# Patient Record
Sex: MALE | Race: WHITE | NOT HISPANIC OR LATINO | URBAN - METROPOLITAN AREA
[De-identification: names, ages, dates, MRNs, and addresses within clinical notes are randomized per-mention and may not be internally consistent; named-entity substitution may affect disease eponyms.]

---

## 2017-10-02 ENCOUNTER — EMERGENCY (EMERGENCY)
Facility: HOSPITAL | Age: 77
LOS: 1 days | Discharge: HOME | End: 2017-10-02
Attending: EMERGENCY MEDICINE
Payer: COMMERCIAL

## 2017-10-02 VITALS
WEIGHT: 210.1 LBS | SYSTOLIC BLOOD PRESSURE: 167 MMHG | RESPIRATION RATE: 16 BRPM | TEMPERATURE: 98 F | HEIGHT: 70 IN | DIASTOLIC BLOOD PRESSURE: 88 MMHG | HEART RATE: 86 BPM | OXYGEN SATURATION: 99 %

## 2017-10-02 DIAGNOSIS — S01.01XA LACERATION WITHOUT FOREIGN BODY OF SCALP, INITIAL ENCOUNTER: ICD-10-CM
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
## 2017-10-02 PROBLEM — Z00.00 ENCOUNTER FOR PREVENTIVE HEALTH EXAMINATION: Status: ACTIVE | Noted: 2017-10-02

## 2017-10-02 PROCEDURE — 90715 TDAP VACCINE 7 YRS/> IM: CPT

## 2017-10-02 PROCEDURE — 12002 RPR S/N/AX/GEN/TRNK2.6-7.5CM: CPT

## 2017-10-02 PROCEDURE — 99283 EMERGENCY DEPT VISIT LOW MDM: CPT

## 2017-10-02 PROCEDURE — 99283 EMERGENCY DEPT VISIT LOW MDM: CPT | Mod: 25

## 2017-10-02 PROCEDURE — 90471 IMMUNIZATION ADMIN: CPT

## 2017-10-02 RX ORDER — TETANUS TOXOID, REDUCED DIPHTHERIA TOXOID AND ACELLULAR PERTUSSIS VACCINE, ADSORBED 5; 2.5; 8; 8; 2.5 [IU]/.5ML; [IU]/.5ML; UG/.5ML; UG/.5ML; UG/.5ML
0.5 SUSPENSION INTRAMUSCULAR ONCE
Qty: 0 | Refills: 0 | Status: COMPLETED | OUTPATIENT
Start: 2017-10-02 | End: 2017-10-02

## 2017-10-02 RX ADMIN — TETANUS TOXOID, REDUCED DIPHTHERIA TOXOID AND ACELLULAR PERTUSSIS VACCINE, ADSORBED 0.5 MILLILITER(S): 5; 2.5; 8; 8; 2.5 SUSPENSION INTRAMUSCULAR at 21:58

## 2017-10-02 NOTE — ED PROVIDER NOTE - OBJECTIVE STATEMENT
76 y/o male, no significant pmhx presents with laceration to posterior scalp s/p metal object fell from shelf in garage today. Denies LOC. Tetanus not up to date.

## 2017-10-02 NOTE — ED PROVIDER NOTE - MEDICAL DECISION MAKING DETAILS
scalp laceration, no LOC, will give adacel, staple and return instructions for removal x10 days; discussed s/s infection.

## 2017-10-02 NOTE — ED ADULT NURSE NOTE - OBJECTIVE STATEMENT
as per patient while working on garage some metal stuff fell on his head and sustained laceration to back of the head, bleeding controlled on arrival

## 2017-10-02 NOTE — ED PROVIDER NOTE - ATTENDING CONTRIBUTION TO CARE
I had a face-to-face interaction with this patient. I agree with NP documentation and plan.  Patient reports a light metal object fell on his head in his garage and caused a laceration. No LOC, headache, n/v, dizziness, focal weakness, paresthesias, blood thinners.  Gen: Well-developed, well-nourished, NAD, VS as noted by nursing. HEENT: normocephalic, 4cm linear lac to back of head to subcutaneous layer, mmm   Chest: RRR, nl S1 and S2, no m/r/g. Resp: CTAB, no w/r/r  Abd: nl BS, soft, nt/nd. Ext: Warm, dry  Neuro: CN II-XII intact, normal and equal strength, sensation, and reflexes bilaterally, normal gait  Psych: AAOx3  MDM: Patient with laceration to head. I recommended CT head. Patient refused. Patient d/c'd in company of family. Instructed to return immediately if he develops ha, vomiting, confusion, dizziness, or any new or troubling symptoms.

## 2017-10-10 ENCOUNTER — EMERGENCY (EMERGENCY)
Facility: HOSPITAL | Age: 77
LOS: 1 days | Discharge: HOME | End: 2017-10-10
Attending: EMERGENCY MEDICINE
Payer: COMMERCIAL

## 2017-10-10 VITALS
DIASTOLIC BLOOD PRESSURE: 69 MMHG | SYSTOLIC BLOOD PRESSURE: 153 MMHG | TEMPERATURE: 98 F | HEIGHT: 69 IN | RESPIRATION RATE: 16 BRPM | WEIGHT: 220.02 LBS | HEART RATE: 81 BPM | OXYGEN SATURATION: 98 %

## 2017-10-10 DIAGNOSIS — Z48.02 ENCOUNTER FOR REMOVAL OF SUTURES: ICD-10-CM

## 2017-10-10 PROCEDURE — G0463: CPT

## 2017-10-10 NOTE — ED PROVIDER NOTE - PHYSICAL EXAMINATION
well approximated scalp laceration x7 staples, minimal surround erythema, no streaking, no heat, no drainage

## 2017-10-10 NOTE — ED ADULT NURSE NOTE - OBJECTIVE STATEMENT
CAME IN FOR STAPLES REMOVAL FROM HEAD , STAPLES REMOVED BY JE NP , AREA HEALED , NO REDNESS , NO DISCHARGES. DENIES PAIN

## 2017-10-10 NOTE — ED PROVIDER NOTE - MEDICAL DECISION MAKING DETAILS
removed x7 staples, healing well, no signs infection, discussed removing x2 days earlier, to be jerman while washing hair and not to pick at scab. Will dc home to PMD.

## 2017-10-10 NOTE — ED PROVIDER NOTE - OBJECTIVE STATEMENT
76 y/o male, no significant pmhx returns x8 days for staple removal s/p metal object falling on head. Denies fever/chills, headache, or any other concerns.

## 2017-10-10 NOTE — ED PROVIDER NOTE - ATTENDING CONTRIBUTION TO CARE
I had a face-to-face interaction with this patient. I agree with NP documentation and plan. Patient here for staple removal. Staples removed by NP. Good healing in progress. f/u with PMD in 1-2 days.

## 2022-01-21 ENCOUNTER — INPATIENT (INPATIENT)
Facility: HOSPITAL | Age: 82
LOS: 4 days | Discharge: HOME | End: 2022-01-26
Attending: HOSPITALIST | Admitting: HOSPITALIST
Payer: MEDICARE

## 2022-01-21 VITALS
RESPIRATION RATE: 20 BRPM | SYSTOLIC BLOOD PRESSURE: 170 MMHG | DIASTOLIC BLOOD PRESSURE: 81 MMHG | HEART RATE: 86 BPM | HEIGHT: 69 IN | OXYGEN SATURATION: 94 % | TEMPERATURE: 98 F

## 2022-01-21 DIAGNOSIS — E78.5 HYPERLIPIDEMIA, UNSPECIFIED: ICD-10-CM

## 2022-01-21 DIAGNOSIS — R00.2 PALPITATIONS: ICD-10-CM

## 2022-01-21 DIAGNOSIS — I10 ESSENTIAL (PRIMARY) HYPERTENSION: ICD-10-CM

## 2022-01-21 DIAGNOSIS — R06.02 SHORTNESS OF BREATH: ICD-10-CM

## 2022-01-21 DIAGNOSIS — Z90.49 ACQUIRED ABSENCE OF OTHER SPECIFIED PARTS OF DIGESTIVE TRACT: Chronic | ICD-10-CM

## 2022-01-21 DIAGNOSIS — R07.9 CHEST PAIN, UNSPECIFIED: ICD-10-CM

## 2022-01-21 DIAGNOSIS — N40.0 BENIGN PROSTATIC HYPERPLASIA WITHOUT LOWER URINARY TRACT SYMPTOMS: ICD-10-CM

## 2022-01-21 DIAGNOSIS — R05.8 OTHER SPECIFIED COUGH: ICD-10-CM

## 2022-01-21 DIAGNOSIS — Z29.9 ENCOUNTER FOR PROPHYLACTIC MEASURES, UNSPECIFIED: ICD-10-CM

## 2022-01-21 DIAGNOSIS — J18.1 LOBAR PNEUMONIA, UNSPECIFIED ORGANISM: ICD-10-CM

## 2022-01-21 DIAGNOSIS — Z98.890 OTHER SPECIFIED POSTPROCEDURAL STATES: Chronic | ICD-10-CM

## 2022-01-21 LAB
ALBUMIN SERPL ELPH-MCNC: 3.8 G/DL — SIGNIFICANT CHANGE UP (ref 3.3–5)
ALP SERPL-CCNC: 126 U/L — HIGH (ref 40–120)
ALT FLD-CCNC: 46 U/L — HIGH (ref 4–41)
ANION GAP SERPL CALC-SCNC: 12 MMOL/L — SIGNIFICANT CHANGE UP (ref 7–14)
APTT BLD: 27.6 SEC — SIGNIFICANT CHANGE UP (ref 27–36.3)
AST SERPL-CCNC: 30 U/L — SIGNIFICANT CHANGE UP (ref 4–40)
BASOPHILS # BLD AUTO: 0.05 K/UL — SIGNIFICANT CHANGE UP (ref 0–0.2)
BASOPHILS NFR BLD AUTO: 0.5 % — SIGNIFICANT CHANGE UP (ref 0–2)
BILIRUB SERPL-MCNC: <0.2 MG/DL — SIGNIFICANT CHANGE UP (ref 0.2–1.2)
BLOOD GAS VENOUS COMPREHENSIVE RESULT: SIGNIFICANT CHANGE UP
BUN SERPL-MCNC: 23 MG/DL — SIGNIFICANT CHANGE UP (ref 7–23)
CALCIUM SERPL-MCNC: 9.2 MG/DL — SIGNIFICANT CHANGE UP (ref 8.4–10.5)
CHLORIDE SERPL-SCNC: 105 MMOL/L — SIGNIFICANT CHANGE UP (ref 98–107)
CO2 SERPL-SCNC: 21 MMOL/L — LOW (ref 22–31)
CREAT SERPL-MCNC: 1.26 MG/DL — SIGNIFICANT CHANGE UP (ref 0.5–1.3)
EOSINOPHIL # BLD AUTO: 0.28 K/UL — SIGNIFICANT CHANGE UP (ref 0–0.5)
EOSINOPHIL NFR BLD AUTO: 2.9 % — SIGNIFICANT CHANGE UP (ref 0–6)
FLUAV AG NPH QL: SIGNIFICANT CHANGE UP
FLUBV AG NPH QL: SIGNIFICANT CHANGE UP
GLUCOSE SERPL-MCNC: 87 MG/DL — SIGNIFICANT CHANGE UP (ref 70–99)
HCT VFR BLD CALC: 37.8 % — LOW (ref 39–50)
HGB BLD-MCNC: 12.3 G/DL — LOW (ref 13–17)
IANC: 5.4 K/UL — SIGNIFICANT CHANGE UP (ref 1.5–8.5)
IMM GRANULOCYTES NFR BLD AUTO: 1.1 % — SIGNIFICANT CHANGE UP (ref 0–1.5)
INR BLD: 1.18 RATIO — HIGH (ref 0.88–1.16)
LYMPHOCYTES # BLD AUTO: 3.28 K/UL — SIGNIFICANT CHANGE UP (ref 1–3.3)
LYMPHOCYTES # BLD AUTO: 33.4 % — SIGNIFICANT CHANGE UP (ref 13–44)
MAGNESIUM SERPL-MCNC: 2.2 MG/DL — SIGNIFICANT CHANGE UP (ref 1.6–2.6)
MCHC RBC-ENTMCNC: 27.8 PG — SIGNIFICANT CHANGE UP (ref 27–34)
MCHC RBC-ENTMCNC: 32.5 GM/DL — SIGNIFICANT CHANGE UP (ref 32–36)
MCV RBC AUTO: 85.5 FL — SIGNIFICANT CHANGE UP (ref 80–100)
MONOCYTES # BLD AUTO: 0.7 K/UL — SIGNIFICANT CHANGE UP (ref 0–0.9)
MONOCYTES NFR BLD AUTO: 7.1 % — SIGNIFICANT CHANGE UP (ref 2–14)
NEUTROPHILS # BLD AUTO: 5.4 K/UL — SIGNIFICANT CHANGE UP (ref 1.8–7.4)
NEUTROPHILS NFR BLD AUTO: 55 % — SIGNIFICANT CHANGE UP (ref 43–77)
NRBC # BLD: 0 /100 WBCS — SIGNIFICANT CHANGE UP
NRBC # FLD: 0 K/UL — SIGNIFICANT CHANGE UP
NT-PROBNP SERPL-SCNC: 183 PG/ML — SIGNIFICANT CHANGE UP
PHOSPHATE SERPL-MCNC: 3.9 MG/DL — SIGNIFICANT CHANGE UP (ref 2.5–4.5)
PLATELET # BLD AUTO: 274 K/UL — SIGNIFICANT CHANGE UP (ref 150–400)
POTASSIUM SERPL-MCNC: 4.9 MMOL/L — SIGNIFICANT CHANGE UP (ref 3.5–5.3)
POTASSIUM SERPL-SCNC: 4.9 MMOL/L — SIGNIFICANT CHANGE UP (ref 3.5–5.3)
PROT SERPL-MCNC: 8 G/DL — SIGNIFICANT CHANGE UP (ref 6–8.3)
PROTHROM AB SERPL-ACNC: 13.4 SEC — SIGNIFICANT CHANGE UP (ref 10.6–13.6)
RBC # BLD: 4.42 M/UL — SIGNIFICANT CHANGE UP (ref 4.2–5.8)
RBC # FLD: 13.7 % — SIGNIFICANT CHANGE UP (ref 10.3–14.5)
RSV RNA NPH QL NAA+NON-PROBE: SIGNIFICANT CHANGE UP
SARS-COV-2 RNA SPEC QL NAA+PROBE: SIGNIFICANT CHANGE UP
SODIUM SERPL-SCNC: 138 MMOL/L — SIGNIFICANT CHANGE UP (ref 135–145)
TROPONIN T, HIGH SENSITIVITY RESULT: 18 NG/L — SIGNIFICANT CHANGE UP
TROPONIN T, HIGH SENSITIVITY RESULT: 18 NG/L — SIGNIFICANT CHANGE UP
WBC # BLD: 9.82 K/UL — SIGNIFICANT CHANGE UP (ref 3.8–10.5)
WBC # FLD AUTO: 9.82 K/UL — SIGNIFICANT CHANGE UP (ref 3.8–10.5)

## 2022-01-21 PROCEDURE — 71046 X-RAY EXAM CHEST 2 VIEWS: CPT | Mod: 26

## 2022-01-21 PROCEDURE — 99223 1ST HOSP IP/OBS HIGH 75: CPT

## 2022-01-21 PROCEDURE — 99285 EMERGENCY DEPT VISIT HI MDM: CPT | Mod: GC

## 2022-01-21 PROCEDURE — 71275 CT ANGIOGRAPHY CHEST: CPT | Mod: 26,MA

## 2022-01-21 RX ORDER — CEFTRIAXONE 500 MG/1
1000 INJECTION, POWDER, FOR SOLUTION INTRAMUSCULAR; INTRAVENOUS ONCE
Refills: 0 | Status: COMPLETED | OUTPATIENT
Start: 2022-01-21 | End: 2022-01-21

## 2022-01-21 RX ORDER — ACETAMINOPHEN 500 MG
650 TABLET ORAL EVERY 6 HOURS
Refills: 0 | Status: DISCONTINUED | OUTPATIENT
Start: 2022-01-21 | End: 2022-01-26

## 2022-01-21 RX ORDER — ENOXAPARIN SODIUM 100 MG/ML
40 INJECTION SUBCUTANEOUS DAILY
Refills: 0 | Status: DISCONTINUED | OUTPATIENT
Start: 2022-01-21 | End: 2022-01-24

## 2022-01-21 RX ORDER — AZITHROMYCIN 500 MG/1
500 TABLET, FILM COATED ORAL ONCE
Refills: 0 | Status: COMPLETED | OUTPATIENT
Start: 2022-01-21 | End: 2022-01-21

## 2022-01-21 RX ORDER — FINASTERIDE 5 MG/1
5 TABLET, FILM COATED ORAL DAILY
Refills: 0 | Status: DISCONTINUED | OUTPATIENT
Start: 2022-01-21 | End: 2022-01-26

## 2022-01-21 RX ORDER — ATORVASTATIN CALCIUM 80 MG/1
80 TABLET, FILM COATED ORAL AT BEDTIME
Refills: 0 | Status: DISCONTINUED | OUTPATIENT
Start: 2022-01-21 | End: 2022-01-26

## 2022-01-21 RX ORDER — LOSARTAN POTASSIUM 100 MG/1
50 TABLET, FILM COATED ORAL DAILY
Refills: 0 | Status: DISCONTINUED | OUTPATIENT
Start: 2022-01-21 | End: 2022-01-26

## 2022-01-21 RX ADMIN — CEFTRIAXONE 1000 MILLIGRAM(S): 500 INJECTION, POWDER, FOR SOLUTION INTRAMUSCULAR; INTRAVENOUS at 23:24

## 2022-01-21 RX ADMIN — AZITHROMYCIN 255 MILLIGRAM(S): 500 TABLET, FILM COATED ORAL at 23:56

## 2022-01-21 RX ADMIN — CEFTRIAXONE 100 MILLIGRAM(S): 500 INJECTION, POWDER, FOR SOLUTION INTRAMUSCULAR; INTRAVENOUS at 20:47

## 2022-01-21 NOTE — H&P ADULT - NSHPSOCIALHISTORY_GEN_ALL_CORE
.  Lives with the spouse.  Denies history of Nicotine, ETOH, Illicit drug use.  Retired building maintenance.   Colonoscopy : Denies  COVID X 3, 2021   Flu Vax: 2021   PNA Vax: Denies .  Lives with spouse.  Denies history of tobacco, alcohol, or illicit drug use.  Retired building maintenance.   Colonoscopy: Denies  COVID Vax x3 (2021)  Flu Vax: 2021   PNA Vax: Denies

## 2022-01-21 NOTE — H&P ADULT - ASSESSMENT
81M history of BPH, HTN, Hyperlipidemia, with a chronic dry cough, SOB, nonexertional chest pain and palpitation admitted for out pt CT chest showing small R hilar and parenchymal calcification consolidation with granulomatous disease. Two large area of consolidation, RLL infiltration and ground glass opacity in L LL.  82 yo obese man, Czech speaking, with history of BPH, HTN, and HLD presents with worsening shortness of breath, cough, chest tightness, and palpitations with abnormal CT chest showing small R hilar and parenchymal calcification consolidation with granulomatous disease, and CTA chest here with masslike consolidation in the right middle lobe and large consolidation in the right lower lobe.

## 2022-01-21 NOTE — H&P ADULT - NSHPLABSRESULTS_GEN_ALL_CORE
- CXR : Preliminary  right lower lobe consolidation better characterized on same-day CT chest.  - EKG NSR @ 83b/ min, QT/ QTC= 370/ 434.  - COVID and RVP Not detected.     - vPH= 7.39  - vLactate= 0.9                          12.3   9.82  )-----------( 274      ( 21 Jan 2022 17:57 )             37.8   01-21    138  |  105  |  23  ----------------------------<  87  4.9   |  21<L>  |  1.26    Ca    9.2      21 Jan 2022 17:57  Phos  3.9     01-21  Mg     2.20     01-21    TPro  8.0  /  Alb  3.8  /  TBili  <0.2  /  DBili  x   /  AST  30  /  ALT  46<H>  /  AlkPhos  126<H>  01-21 Imaging personally reviewed.  ACC: [id number] EXAM:  CT ANGIO CHEST PULM ART [facility]                        PROCEDURE DATE:  01/21/2022    INTERPRETATION:  CLINICAL INFORMATION: Abnormal outpatient CT. Evaluate for PE, sarcoid, other etiology.  COMPARISON: None.  CONTRAST/COMPLICATIONS:  IV Contrast: Omnipaque 350  90 cc administered   10 cc discarded  Oral Contrast: NONE  Complications: None reported at time of study completion  PROCEDURE:  CT Angiography of the Chest.  Sagittal and coronal reformats were performed as well as 3D (MIP)   reconstructions.  FINDINGS: Images are degraded by respiratory motion artifact.  LUNGS AND AIRWAYS: Patent central airways.  Right upper lobe calcified   granuloma. Masslike consolidation in the right middle lobe measures 5.3 x   2.8 cm, abutting the major fissure. Large consolidation in the right   lower lobe. A 0.8 cm posterior right lower lobe nodule (series 2, image   71).  Left lower lobe subsegmental atelectasis.  PLEURA: No pleural effusion.  MEDIASTINUM AND CONNIE: No lymphadenopathy.  VESSELS: Excessive respiratory motion artifact limits evaluation of lobar   through subsegmental branches of the pulmonary arteries. No evidence for   pulmonary embolus in the pulmonary trunk or right and left main pulmonary   arteries.  HEART: Heart size is normal. No pericardial effusion.  CHEST WALL AND LOWER NECK: Within normal limits.  VISUALIZED UPPER ABDOMEN: Cholecystectomy. Right hepatic hypodensity, too   small to characterize. 1.7 cm right renal cyst.  BONES: Degenerative changes.    IMPRESSION:  Suboptimal exam due to excessive respiratory motion artifact.  No pulmonary embolus involving the pulmonary trunk or right and left main   pulmonary arteries.  Large right lower lobe consolidation and right middle lobe masslike   consolidation which may reflect pneumonia, however, this should be   followed to resolution to exclude underlying neoplasm.  Additional 0.8 cm right lower lobe pulmonary nodule.    ACC: [id number] EXAM:  XR CHEST PA LAT 2V                        PROCEDURE DATE:  01/21/2022    ******PRELIMINARY REPORT******      ******PRELIMINARY REPORT******   INTERPRETATION:  right lower lobe consolidation better characterized on same-day CT chest    EKG personally reviewed.  NSR @ 83 bpm, QT/ QTc= 370/ 434 ms    Labs personally reviewed.  - COVID and RVP Not detected.   - vPH= 7.39  - vLactate= 0.9                       12.3   9.82  )-----------( 274      ( 21 Jan 2022 17:57 )             37.8   01-21    138  |  105  |  23  ----------------------------<  87  4.9   |  21<L>  |  1.26    Ca    9.2      21 Jan 2022 17:57  Phos  3.9     01-21  Mg     2.20     01-21    TPro  8.0  /  Alb  3.8  /  TBili  <0.2  /  DBili  x   /  AST  30  /  ALT  46<H>  /  AlkPhos  126<H>  01-21

## 2022-01-21 NOTE — H&P ADULT - NEGATIVE GENERAL SYMPTOMS
no chills/no weight loss/no polyphagia/no polyuria/no polydipsia/no malaise no fever/no chills/no weight loss/no polyphagia/no polyuria/no polydipsia/no malaise

## 2022-01-21 NOTE — ED PROVIDER NOTE - ATTENDING CONTRIBUTION TO CARE
81M HTN HLD BPH, c/o dry cough x 1 year, 2 weeks SOB worse on exertion, went to PMD who did CT - granulomatous dz, 2 areas of GGO and "dilation of vasculature"  No fever (+)chest tightness.  no hemoptysis.  Covid vax x 3.  (+)tachypnea to 30.  Sat in mid 90's on RA.  Lungs clear.  Potentially covid vs TB vs PE vs CHF.  Check CTA r/o PE and further characterize lung findings  Given tachypnea would admit.  (+)isolation  VS:  unremarkable except tachypnea, HTN    GEN - Mild resp distress at rest;   A+O x3   HEAD - NC/AT     ENT - PEERL, EOMI, mucous membranes  dry, no discharge      NECK: Neck supple, non-tender without lymphadenopathy, no masses, no JVD  PULM - CTA b/l,  symmetric breath sounds  COR -  normal heart sounds    ABD - , ND, NT, soft,  BACK - no CVA tenderness, nontender spine     EXTREMS - no edema, no deformity, warm and well perfused    SKIN - no rash    or bruising      NEUROLOGIC - alert, face symmetric, speech fluent, sensation nl, motor no focal deficit.

## 2022-01-21 NOTE — H&P ADULT - NEGATIVE MUSCULOSKELETAL SYMPTOMS
no myalgia/no muscle weakness/no arm pain L/no arm pain R/no leg pain L/no leg pain R no joint swelling/no myalgia/no muscle weakness/no arm pain L/no arm pain R/no back pain/no leg pain L/no leg pain R

## 2022-01-21 NOTE — H&P ADULT - NEUROLOGICAL DETAILS
responds to verbal commands/sensation intact alert and oriented x 3/responds to verbal commands/sensation intact/cranial nerves intact/normal strength

## 2022-01-21 NOTE — ED PROVIDER NOTE - PHYSICAL EXAMINATION
Gen: NAD, non-toxic appearing  Head: normal appearing  HEENT: normal conjunctiva, oral mucosa moist  Lung: tachypneic but no acute respiratory distress, speaking in full sentences, CTA b/l  CV: regular rate and rhythm, no murmurs  Abd: soft, non distended, non tender   MSK: no visible deformities  Neuro: No focal deficits, AAOx3  Skin: Warm  Psych: normal affect

## 2022-01-21 NOTE — ED ADULT NURSE NOTE - OBJECTIVE STATEMENT
Pt. is a 81 y.o male who presents to red rm#5 w/ c/o of chest tightness. Pt. A&Ox4 and ambulatory. Yakut speaking. Pmhx BPH and HTN. Pt. was told by his PCP to come to the ED immediately. Pt. endorsing chest tightness and occasional SOB ongoing for a few months. Pt. vax against COVID w/ booster. Respirations equal and unlabored b/l. No signs of resp. distress, no accessory muscle use. Pt. Normal sinus on cardiac monitor. Comfort measures provided, safety measures implemented, call bell in reach. Pt. is a 81 y.o male who presents to red rm#5 w/ c/o of chest tightness. Pt. A&Ox4 and ambulatory. Tajik speaking. Pmhx BPH and HTN. Pt. was told by his PCP to come to the ED immediately. Pt. endorsing chest tightness and occasional SOB ongoing for a few months. States SOB worse w/ exertion and has progressed the passed 3wks. Denies any fevers/chills/N/V/D. Pt. vax against COVID w/ booster. Respirations equal and unlabored b/l. No signs of resp. distress, no accessory muscle use. Pt. Normal sinus on cardiac monitor. Comfort measures provided, safety measures implemented, call bell in reach. #20g IV placed to rt. AC. Labs sent.

## 2022-01-21 NOTE — H&P ADULT - GASTROINTESTINAL DETAILS
soft/nontender/no masses palpable/bowel sounds normal/no bruit/no rebound tenderness/no guarding/no rigidity soft/nontender/no distention/no masses palpable/bowel sounds normal/no rebound tenderness/no guarding/no rigidity

## 2022-01-21 NOTE — H&P ADULT - CARDIOVASCULAR COMMENTS
Nonexertional, chest pain and palpitations only felt with cough and laying down. Chest pain and palpitations only felt while coughing or lying down

## 2022-01-21 NOTE — H&P ADULT - PROBLEM SELECTOR PLAN 3
Nonexertional chest pain felt only with cough.  Trop ; 18> 18  EKG with no ischemic changes.  Currently chest pain free. Chest tightness only felt while coughing or lying supine.   - Hs trops 18 -> 18  - EKG with no ischemic changes  - Currently chest pain free

## 2022-01-21 NOTE — H&P ADULT - PROBLEM SELECTOR PLAN 9
VTE with Lovenox 40 mg sub cut daily.  Fall, Aspiration, safety precautions.   Pt and Pulmonary eval.

## 2022-01-21 NOTE — H&P ADULT - PROBLEM SELECTOR PLAN 7
BP = 161/ 79.  Elevated likely due to whitecoat HTN.   Low salt diet.   Continue BP meds. No S/S of urinary retention.  - C/w finasteride

## 2022-01-21 NOTE — H&P ADULT - ATTENDING COMMENTS
82 yo obese man, Arabic speaking, with history of BPH, HTN, and HLD presents with worsening shortness of breath, cough, chest tightness, and palpitations with abnormal CT chest showing small R hilar and parenchymal calcification consolidation with granulomatous disease, and CTA chest here with masslike consolidation in the right middle lobe and large consolidation in the right lower lobe. Unclear etiology for the consolidations. Can be in setting of granulomatous disease, less likely 2/2 PNA. Will r/o pulm TB with AFB x3. Pulm and ID consults to be obtained.

## 2022-01-21 NOTE — H&P ADULT - PROBLEM SELECTOR PLAN 6
F/U Lipid panel.  Continue Statin. - C/w atorvastatin 80 mg qHS as an interchange for rosuvastatin 20 mg qHS  - Check lipid profile

## 2022-01-21 NOTE — ED ADULT NURSE REASSESSMENT NOTE - NS ED NURSE REASSESS COMMENT FT1
report rcd from day shift CHRIS Marcus. pt in rm 05 is A&Ox4. pt granddaughter at bedside at this time. pt primary language is Azeri. pt c/o of chest tightness with SOB at this time that has been going on for a few months to a year. pt respirations are even and unlabored at this time with no accessory muscle use. pt prefers sitting up at this time. pt O2 sat 98% on RA. pt c/o of cough that has been occurring for a few months to a year at this time. pt ambulatory at baseline while monitoring O2 sat in which 96-98% was maintained. 20G saline lock to the RAC was placed by day shift RN unremarkable. pt turned and positioned. pt denies headache, dizziness, chest pain, nausea, vomiting at this time. pt normal sinus rhythm on cardiac monitor. VS as noted in flowsheet. pt medicated as per md orders. will continue to monitor.

## 2022-01-21 NOTE — ED PROVIDER NOTE - OBJECTIVE STATEMENT
80y/o M w/ h/o HTN, HLD, BPH, vaccinated x3 sent in from PMD for abnormal CT. Pt states that he has been having SOB and dry cough for almost a year but has gotten worse for the past 3wks, worse with exertion with some chest tightness. Low dose CT chest showed granulomatosis disease with multifocal consolidations. No fevers, chills, nausea, vomiting, palpitations, abdominal pain, back pain, dysuria, numbness, tingling, recent surgeries, travel history, calf pain, weight loss.

## 2022-01-21 NOTE — H&P ADULT - PROBLEM SELECTOR PLAN 5
Tessalon pearls po PRN. BPs elevated to 150s-170s systolic. Pt on telmisartan at home.  - Hold telmisartan for now given possible ROSS vs. CKD. Will resume as needed.

## 2022-01-21 NOTE — ED PROVIDER NOTE - CLINICAL SUMMARY MEDICAL DECISION MAKING FREE TEXT BOX
80y/o M w/ h/o HTN, HLD, BPH, vaccinated x3 p/w SOB,cough, chest tightness w/ o/p CT showing granulomatosis disease with multifocal consolidations. Pt tachypneic and mildly hypoxic concern for infection vs inflammatory. ddx includes sarcoidosis vs covid vs multifocal pna. plan labs, cxr, cta, cardiac markers, ekg and likely admission.

## 2022-01-21 NOTE — H&P ADULT - NEGATIVE NEUROLOGICAL SYMPTOMS
no transient paralysis/no weakness/no paresthesias/no generalized seizures/no focal seizures/no syncope/no tremors/no vertigo/no loss of sensation/no difficulty walking/no headache/no loss of consciousness/no hemiparesis/no confusion/no facial palsy no transient paralysis/no weakness/no paresthesias/no generalized seizures/no syncope/no tremors/no vertigo/no loss of sensation/no difficulty walking/no headache/no loss of consciousness/no hemiparesis/no confusion/no facial palsy

## 2022-01-21 NOTE — H&P ADULT - PROBLEM SELECTOR PLAN 1
F/U Official CXR results.  Pt's son with out patient CT chest results at the pt's bedside.  A Febrile.  No leukocytosis.  Will hold Abx for now, unless the pt spike a fever.   Call house pulmonary 1/22 for bronchoscopy eval of consolidation.   Aspiration precautions. F/U Official CXR results.  Out patient CT chest results with the pt's son.  A Febrile.  No leukocytosis.  Will hold Abx for now, unless the pt spike a fever.   Call house pulmonary 1/22 for bronchoscopy eval of consolidation.   Aspiration precautions. CTA chest with IV contrast with masslike consolidation in the right middle lobe and large consolidation in the right   lower lobe. Unclear etiology for consolidations, but possibly granulomatous disease of the lungs and less likely bacterial PNA.  - Pt with no fevers or leukocytosis. Low suspicion for bacterial PNA as cause for lung consolidations. Will monitor off abx for now.   - F/u blood cxs  - R/o pulm TB with AFB smear / culture x3, check quant gold  - House Pulmonology consult to be called in AM for possible bronchoscopy eval of consolidations.  - If Pulmonology not be able to biopsy consolidations, obtain IR consult for biopsy  - ID consult to be called in AM

## 2022-01-21 NOTE — H&P ADULT - PROBLEM SELECTOR PLAN 2
Tachypneic @ 26b/ min.  SPO2= 96% ra.  vPH= 7.39.  vPO2= 57.  O2 via NC PRN SOB, SPO2< 92% Pt intially with SOB on exertion, now also with SOB at rest. Also with nearly 1 year of nonproductive cough. Pt currently maintaining SpO2 94-97% on RA. VBG with no hypercapnia. Likely in setting of lung consolidations. COVID-19 negative. Hs-trops 18 -> 18. Pro-.   - Plan as above for lung consolidation  - CTA chest with IV contrast with no PE  - F/u TTE to evaluate for pulm HTN  - Supplemental O2 PRN  - Monitor pulse ox q4hrs

## 2022-01-21 NOTE — ED PROVIDER NOTE - CARE PLAN
Principal Discharge DX:	Shortness of breath   1 Principal Discharge DX:	Shortness of breath  Secondary Diagnosis:	Lung consolidation

## 2022-01-21 NOTE — H&P ADULT - MUSCULOSKELETAL
details… detailed exam no joint swelling/no joint erythema/no joint warmth/no calf tenderness/normal strength ROM intact/no joint swelling/no joint erythema/no joint warmth/no calf tenderness/normal strength

## 2022-01-21 NOTE — H&P ADULT - NECK DETAILS
Zoila Whipple 1936     Office/Outpatient Visit    Visit Date: Fri, Apr 26, 2019 10:56 am    Provider: Jewel Chavez MD (Assistant: Breann Chandra RN)    Location: [facility]        Electronically signed by Jewel Chavez MD on  04/27/2019 08:41:50 AM                             SUBJECTIVE:        CC: fatigue, leg cramping         HPI:     Zoila is in today not feeling well.  She says that she has been feeling tired for the last month.  She is not sure what is causing this.  She has 'no energy at all'.  She does not feel sleepy.  She just has no energy to do what she wants to do.  She is not aware of any medications changes in the last month that might explain this.  She does not think that this is a sleep issue.  She also has noted that her legs 'ache like a toothache'.  She is unsure what is causing this.  She has an ache all the time.  She will take an occasional Tylenol for this, but it does not help much.  She does feel better when she sits and rests.     ROS:     CONSTITUTIONAL:  Negative for chills and fever.      CARDIOVASCULAR:  Negative for chest pain and palpitations.      RESPIRATORY:  Positive for dyspnea ( with moderate exertion ).   Negative for recent cough.      GASTROINTESTINAL:  Negative for abdominal pain, nausea and vomiting.      INTEGUMENTARY/BREAST:  Negative for atypical mole(s) and rash.          PMH/FMH/SH:     Last Reviewed on 2/18/2019 10:18 AM by Jewel Chavez    Past Medical History:                 PAST MEDICAL HISTORY         Hypertension    Aortic Valve Replacement - mechanical     Gastroesophageal Reflux Disease     Hypothyroidism         CURRENT MEDICAL PROVIDERS:    Cardiologist: Sky Bowman    Urologist: Dr. Jensen         PREVENTIVE HEALTH MAINTENANCE             BONE DENSITY: was last done 05/2017 with the following abnormality noted-- osteopenia     COLORECTAL CANCER SCREENING: Up to date (colonoscopy q10y; sigmoidoscopy q5y; Cologuard q3y) was  last done 2014, Results are in chart; colonoscopy with the following abnormalities noted-- pelvic fixation - limited exam     EYE EXAM: Diabetic Eye Exam during this calendar year and results are in chart was last done 2018     MAMMOGRAM: Done within last 2 years and results in are chart was last done 18 with normal results         Surgical History:         Appendectomy: at age 28;     Hysterectomy: at age 28; complete;      Hernia Repair: right inguinal;     Laminectomy: lumbar region; 1975;     Aortic Valve Replacement;    Back Surgery;    Skin Cancer Removal; Procedures: colonoscopy /normal heart cath          Family History:         Positive for Myocardial Infarction ( father ).  Father:  at age 49; Cause of death was MI     Mother:  at age 94         Social History:     Occupation: Retired (Prior occupation: [employer])     Marital Status:       Children: 5 children         Tobacco/Alcohol/Supplements:     Last Reviewed on 2019 10:18 AM by Jewel Chavez    Tobacco: She has never smoked.          Alcohol:  Does not drink alcohol and never has.          Substance Abuse History:     Last Reviewed on 2019 10:18 AM by Jewel Chavez    NEGATIVE         Mental Health History:     Last Reviewed on 2019 10:18 AM by Jewel Chavez        Communicable Diseases (eg STDs):     Last Reviewed on 2019 10:18 AM by Jewel Chavez            Current Problems:     Last Reviewed on 2019 10:18 AM by Jewel Chavez    Near-syncope     History of recurrent UTI's     Moderate depression     Type 2 diabetes     Hypercholesterolemia     Use of high risk medications     Hypertension     Acquired hypothyroidism     Heart valve replacement patient     GERD     Dysuria         Immunizations:     Havrix -adult dose (HepA) 2018     Havrix -adult dose (HepA) 2018     Prevnar 13 (Pneumococcal PCV 13) 2016     Flulaval  9/16/2011     Fluzone pf (3+ years dose) 10/8/2013     Fluzone High-Dose pf (>=65 yr) 10/13/2014     Fluzone High-Dose pf (>=65 yr) 10/26/2015     Fluzone High-Dose pf (>=65 yr) 9/21/2016     Fluzone High-Dose pf (>=65 yr) 9/21/2017     Fluzone High-Dose pf (>=65 yr) 10/5/2018     PNEUMOVAX 23 (Pneumococcal PPV23) 9/21/2017         Allergies:     Last Reviewed on 4/26/2019 10:59 AM by Breann Chandra    Sulfonamides: rash    Bactrim:    Niacin (Nicotinic Acid):        Current Medications:     Last Reviewed on 4/26/2019 11:01 AM by Breann Chandra    Metformin HCl 500mg Tablets, Extended Release Take 2 tablet(s) by mouth daily     Pantoprazole 40mg Tablets, Delayed Release Take 1 tablet(s) by mouth daily     Synthroid 0.075mg Tablet Take 1 tablet(s) by mouth daily     Accu-Chek Lana Plus Test Strips  Reagent Strips check blood sugar once daily  DXE11.9     Accu-Chek Softclix  Lancet Check blood sugar 1 x per day. DX: E11.9     Potassium Chloride 20mEq Tablets, Extended Release Take 1 tablet(s) by mouth bid     Cephalexin 250mg Capsules 1 capsule daily     Furosemide 20mg Tablets 1 tab daily     hemp oil 2500mg daily     Uribel Capsules take 1 bid prn     CoQ10 daily     Fish Oil 1,000mg Softgel capsule 1 / day     Vitamin C     Vitamin D3     Vitamin E 1,000IU Capsules 1 capsule daily     Metoprolol Succinate 25mg Tablets, Extended Release 1 tab PO daily     Atorvastatin Calcium 20mg Tablet 1 tab daily         OBJECTIVE:        Vitals:         Current: 4/26/2019 11:03:24 AM    Ht:  5 ft, 6 in;  Wt: 168.6 lbs;  BMI: 27.2    T: 97.5 F (oral);  BP: 137/56 mm Hg (right arm, sitting);  P: 69 bpm (right arm (BP Cuff), sitting);  sCr: 0.67 mg/dL;  GFR: 72.22        Exams:     PHYSICAL EXAM:     GENERAL: vital signs recorded - well developed, well nourished;  tired-appearing;     EYES: extraocular movements intact; conjunctiva and cornea are normal; PERRLA;     E/N/T:  normal EACs, TMs, nasal/oral mucosa, teeth, gingiva,  and oropharynx;     NECK: range of motion is normal; thyroid is non-palpable;     RESPIRATORY: normal respiratory rate and pattern with no distress; normal breath sounds with no rales, rhonchi, wheezes or rubs;     CARDIOVASCULAR: normal rate; rhythm is regular;  a systolic murmur is noted: it is grade 3/6;     Peripheral Pulses: dorsalis pedis: 2+ amplitude, no bruits; no edema;     GASTROINTESTINAL: nontender; normal bowel sounds; no organomegaly;     LYMPHATIC: no enlargement of cervical or facial nodes; no supraclavicular nodes;     NEUROLOGIC: mental status: alert and oriented x 3; cranial nerves II-XII grossly intact;     PSYCHIATRIC: appropriate affect and demeanor;         ASSESSMENT           780.79   R53.83  Malaise and Fatigue              DDx:     719.46   M79.651   M79.652  Joint pain, lower leg              DDx:     244.8   E03.8  Acquired hypothyroidism              DDx:     250.00   E11.9  Type 2 diabetes              DDx:     272.0   E78.2  Hypercholesterolemia              DDx:         ORDERS:         Radiology/Test Orders:       3014F  Screening mammography results documented and reviewed (PV)1  (In-House)         3017F  Colorectal CA screen results documented and reviewed (PV)  (In-House)         2022F  Dilated retinal eye exam w/interpret by ophthalmologist/optometrist documented/reviewed (DM)4  (In-House)           Lab Orders:       [id number]  DIAB2 - [facility] CMP A1C LIPID AND MICRO ALBUM CR RATIO: [id number]  (Send-Out)         86827  RAPII - [facility] Arthritis Profile  (Send-Out)         15650  CK - [facility]- CK total  (Send-Out)         49874  TSH - [facility] TSH  (Send-Out)         24737  LYSCR - [facility] Lyme Ab/Western Blot Reflex  (Send-Out)         01193  MG - [facility] Magnesium, Serum  (Send-Out)         47245  B12FO - [facility] Vitamin B12 with Folate  (Send-Out)         92014  BDCB2 - [facility] CBC w/o diff  (Send-Out)           Other Orders:         Calculated BMI above the upper parameter and a  follow-up plan was documented in the medical record  (In-House)                   PLAN:          Malaise and Fatigue     LABORATORY:  Labs ordered to be performed today include B12 with Folate, CBC W/O DIFF, CK, total, Lyme Ab/Western Blot Reflex, and Magnesium level.      RECOMMENDATIONS given include: I don't have a clear picture of what is causing her symptoms.  We will check labs and go from there.  I did ask her to NOT take the atorvastatin for the near term pending labs..  MIPS Vaccines Flu and Pneumonia updated in Shot record    DIABETIC EYE EXAM: Diabetic Eye Exam during this calendar year and results are in chart     MAMMOGRAM: Done within last 2 years and results in are chart     COLORECTAL CANCER SCREENING: Results are in chart     BMI Elevated - Follow-Up Plan: She was provided education on weight loss strategies           Orders:       [id number]  LYSCR - [facility] Lyme Ab/Western Blot Reflex  (Send-Out)         91904  MG - [facility] Magnesium, Serum  (Send-Out)         87671  B12FO - [facility] Vitamin B12 with Folate  (Send-Out)         56329  BDCB2 - [facility] CBC w/o diff  (Send-Out)         3014F  Screening mammography results documented and reviewed (PV)1  (In-House)         3017F  Colorectal CA screen results documented and reviewed (PV)  (In-House)           Calculated BMI above the upper parameter and a follow-up plan was documented in the medical record  (In-House)         2022F  Dilated retinal eye exam w/interpret by ophthalmologist/optometrist documented/reviewed (DM)4  (In-House)            Joint pain, lower leg     LABORATORY:  Labs ordered to be performed today include Arthritis Profile and CK, total.            Orders:       05523  RAPII - [facility] Arthritis Profile  (Send-Out)         50432  CK - [facility]- CK total  (Send-Out)            Acquired hypothyroidism     LABORATORY:  Labs ordered to be performed today include TSH.            Orders:       72652  TSH - [facility] TSH  (Send-Out)             Patient Education  Handouts:       Hypothyroidism           Type 2 diabetes     LABORATORY:  Labs ordered to be performed today include Diabetes Panel 2;CMP, A1C, Lipid, Microalbumin:Creatinine Ratio.            Orders:       20509  DIAB2 - [facility] CMP A1C LIPID AND MICRO ALBUM CR RATIO: [id number]  (Send-Out)            Hypercholesterolemia As above.             Patient Recommendations:        For  Joint pain, lower leg:     I also recommend ^.              CHARGE CAPTURE           **Please note: ICD descriptions below are intended for billing purposes only and may not represent clinical diagnoses**        Primary Diagnosis:         780.79 Malaise and Fatigue            R53.83    Other fatigue              Orders:          77350   Office/outpatient visit; established patient, level 4  (In-House)             3014F   Screening mammography results documented and reviewed (PV)1  (In-House)             3017F   Colorectal CA screen results documented and reviewed (PV)  (In-House)                Calculated BMI above the upper parameter and a follow-up plan was documented in the medical record  (In-House)             2022F   Dilated retinal eye exam w/interpret by ophthalmologist/optometrist documented/reviewed (DM)4  (In-House)           719.46 Joint pain, lower leg            M79.651    Pain in right thigh           M79.652    Pain in left thigh    244.8 Acquired hypothyroidism            E03.8    Other specified hypothyroidism    250.00 Type 2 diabetes            E11.9    Type 2 diabetes mellitus without complications    272.0 Hypercholesterolemia            E78.2    Mixed hyperlipidemia            supple/no JVD

## 2022-01-21 NOTE — H&P ADULT - NEGATIVE ENMT SYMPTOMS
no ear pain/no tinnitus/no vertigo/no sinus symptoms/no nasal congestion/no nasal discharge/no dysphagia no ear pain/no tinnitus/no vertigo/no sinus symptoms/no nasal congestion/no nasal discharge/no throat pain/no dysphagia

## 2022-01-21 NOTE — H&P ADULT - NEGATIVE PSYCHIATRIC SYMPTOMS
no visual hallucinations/no auditory hallucinations no depression/no anxiety/no agitation/no visual hallucinations/no auditory hallucinations
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
## 2022-01-21 NOTE — H&P ADULT - NSHPPHYSICALEXAM_GEN_ALL_CORE
Vital Signs Last 24 Hrs  T(C): 37.2 (22 Jan 2022 06:17), Max: 37.2 (22 Jan 2022 06:17)  T(F): 98.9 (22 Jan 2022 06:17), Max: 98.9 (22 Jan 2022 06:17)  HR: 80 (22 Jan 2022 06:17) (80 - 90)  BP: 160/84 (22 Jan 2022 06:17) (159/80 - 170/81)  BP(mean): --  RR: 20 (22 Jan 2022 06:17) (20 - 34)  SpO2: 98% (22 Jan 2022 06:17) (94% - 98%)

## 2022-01-21 NOTE — H&P ADULT - PROBLEM SELECTOR PLAN 8
Continue Finasteride. VTE ppx with Lovenox 40 mg sub cut daily.  Fall, aspiration, safety precautions.   Pulmonary and ID consults

## 2022-01-21 NOTE — H&P ADULT - PROBLEM SELECTOR PLAN 4
Nonexertional palpitations felt only with cough.  F/U TSH   EKG with no ischemic changes.  Currently palpitation free. Serum Cr 1.26 on admission. Can be ROSS vs. CKD.   - Monitor Cr and urine output  - Avoid nephrotoxic agents and pt's home telmisartan  - Renally dose meds

## 2022-01-21 NOTE — H&P ADULT - HEMATOLOGY/LYMPHATICS
How Severe Is Your Acne?: moderate Is This A New Presentation, Or A Follow-Up?: Acne Additional Comments (Use Complete Sentences): Pt not sure if it's acne or perioral dermatitis details…

## 2022-01-22 DIAGNOSIS — N17.9 ACUTE KIDNEY FAILURE, UNSPECIFIED: ICD-10-CM

## 2022-01-22 LAB
24R-OH-CALCIDIOL SERPL-MCNC: 25.5 NG/ML — LOW (ref 30–80)
A1C WITH ESTIMATED AVERAGE GLUCOSE RESULT: 6 % — HIGH (ref 4–5.6)
ANION GAP SERPL CALC-SCNC: 10 MMOL/L — SIGNIFICANT CHANGE UP (ref 7–14)
BUN SERPL-MCNC: 21 MG/DL — SIGNIFICANT CHANGE UP (ref 7–23)
CALCIUM SERPL-MCNC: 9.1 MG/DL — SIGNIFICANT CHANGE UP (ref 8.4–10.5)
CHLORIDE SERPL-SCNC: 105 MMOL/L — SIGNIFICANT CHANGE UP (ref 98–107)
CHOLEST SERPL-MCNC: 112 MG/DL — SIGNIFICANT CHANGE UP
CO2 SERPL-SCNC: 22 MMOL/L — SIGNIFICANT CHANGE UP (ref 22–31)
CREAT SERPL-MCNC: 1.26 MG/DL — SIGNIFICANT CHANGE UP (ref 0.5–1.3)
ESTIMATED AVERAGE GLUCOSE: 126 — SIGNIFICANT CHANGE UP
GLUCOSE BLDC GLUCOMTR-MCNC: 116 MG/DL — HIGH (ref 70–99)
GLUCOSE BLDC GLUCOMTR-MCNC: 83 MG/DL — SIGNIFICANT CHANGE UP (ref 70–99)
GLUCOSE SERPL-MCNC: 100 MG/DL — HIGH (ref 70–99)
HCT VFR BLD CALC: 35.4 % — LOW (ref 39–50)
HDLC SERPL-MCNC: 24 MG/DL — LOW
HGB BLD-MCNC: 11.8 G/DL — LOW (ref 13–17)
LIPID PNL WITH DIRECT LDL SERPL: 66 MG/DL — SIGNIFICANT CHANGE UP
MAGNESIUM SERPL-MCNC: 2.1 MG/DL — SIGNIFICANT CHANGE UP (ref 1.6–2.6)
MCHC RBC-ENTMCNC: 28.5 PG — SIGNIFICANT CHANGE UP (ref 27–34)
MCHC RBC-ENTMCNC: 33.3 GM/DL — SIGNIFICANT CHANGE UP (ref 32–36)
MCV RBC AUTO: 85.5 FL — SIGNIFICANT CHANGE UP (ref 80–100)
NIGHT BLUE STAIN TISS: SIGNIFICANT CHANGE UP
NON HDL CHOLESTEROL: 88 MG/DL — SIGNIFICANT CHANGE UP
NRBC # BLD: 0 /100 WBCS — SIGNIFICANT CHANGE UP
NRBC # FLD: 0 K/UL — SIGNIFICANT CHANGE UP
PHOSPHATE SERPL-MCNC: 3.1 MG/DL — SIGNIFICANT CHANGE UP (ref 2.5–4.5)
PLATELET # BLD AUTO: 266 K/UL — SIGNIFICANT CHANGE UP (ref 150–400)
POTASSIUM SERPL-MCNC: 4.2 MMOL/L — SIGNIFICANT CHANGE UP (ref 3.5–5.3)
POTASSIUM SERPL-SCNC: 4.2 MMOL/L — SIGNIFICANT CHANGE UP (ref 3.5–5.3)
PROCALCITONIN SERPL-MCNC: 0.11 NG/ML — HIGH (ref 0.02–0.1)
RBC # BLD: 4.14 M/UL — LOW (ref 4.2–5.8)
RBC # FLD: 13.7 % — SIGNIFICANT CHANGE UP (ref 10.3–14.5)
SODIUM SERPL-SCNC: 137 MMOL/L — SIGNIFICANT CHANGE UP (ref 135–145)
SPECIMEN SOURCE: SIGNIFICANT CHANGE UP
TRIGL SERPL-MCNC: 109 MG/DL — SIGNIFICANT CHANGE UP
TSH SERPL-MCNC: 1.02 UIU/ML — SIGNIFICANT CHANGE UP (ref 0.27–4.2)
WBC # BLD: 9.27 K/UL — SIGNIFICANT CHANGE UP (ref 3.8–10.5)
WBC # FLD AUTO: 9.27 K/UL — SIGNIFICANT CHANGE UP (ref 3.8–10.5)

## 2022-01-22 PROCEDURE — 93306 TTE W/DOPPLER COMPLETE: CPT | Mod: 26

## 2022-01-22 PROCEDURE — 99223 1ST HOSP IP/OBS HIGH 75: CPT

## 2022-01-22 PROCEDURE — 99233 SBSQ HOSP IP/OBS HIGH 50: CPT | Mod: GC

## 2022-01-22 PROCEDURE — 99223 1ST HOSP IP/OBS HIGH 75: CPT | Mod: GC

## 2022-01-22 RX ORDER — INSULIN LISPRO 100/ML
VIAL (ML) SUBCUTANEOUS
Refills: 0 | Status: DISCONTINUED | OUTPATIENT
Start: 2022-01-22 | End: 2022-01-26

## 2022-01-22 RX ORDER — SODIUM CHLORIDE 9 MG/ML
1000 INJECTION, SOLUTION INTRAVENOUS
Refills: 0 | Status: DISCONTINUED | OUTPATIENT
Start: 2022-01-22 | End: 2022-01-26

## 2022-01-22 RX ORDER — GLUCAGON INJECTION, SOLUTION 0.5 MG/.1ML
1 INJECTION, SOLUTION SUBCUTANEOUS ONCE
Refills: 0 | Status: DISCONTINUED | OUTPATIENT
Start: 2022-01-22 | End: 2022-01-26

## 2022-01-22 RX ORDER — INFLUENZA VIRUS VACCINE 15; 15; 15; 15 UG/.5ML; UG/.5ML; UG/.5ML; UG/.5ML
0.7 SUSPENSION INTRAMUSCULAR ONCE
Refills: 0 | Status: DISCONTINUED | OUTPATIENT
Start: 2022-01-22 | End: 2022-01-26

## 2022-01-22 RX ORDER — DEXTROSE 50 % IN WATER 50 %
15 SYRINGE (ML) INTRAVENOUS ONCE
Refills: 0 | Status: DISCONTINUED | OUTPATIENT
Start: 2022-01-22 | End: 2022-01-26

## 2022-01-22 RX ORDER — DEXTROSE 50 % IN WATER 50 %
12.5 SYRINGE (ML) INTRAVENOUS ONCE
Refills: 0 | Status: DISCONTINUED | OUTPATIENT
Start: 2022-01-22 | End: 2022-01-26

## 2022-01-22 RX ORDER — DEXTROSE 50 % IN WATER 50 %
25 SYRINGE (ML) INTRAVENOUS ONCE
Refills: 0 | Status: DISCONTINUED | OUTPATIENT
Start: 2022-01-22 | End: 2022-01-26

## 2022-01-22 RX ORDER — INSULIN LISPRO 100/ML
VIAL (ML) SUBCUTANEOUS AT BEDTIME
Refills: 0 | Status: DISCONTINUED | OUTPATIENT
Start: 2022-01-22 | End: 2022-01-26

## 2022-01-22 RX ADMIN — ENOXAPARIN SODIUM 40 MILLIGRAM(S): 100 INJECTION SUBCUTANEOUS at 11:47

## 2022-01-22 RX ADMIN — LOSARTAN POTASSIUM 50 MILLIGRAM(S): 100 TABLET, FILM COATED ORAL at 09:07

## 2022-01-22 RX ADMIN — ATORVASTATIN CALCIUM 80 MILLIGRAM(S): 80 TABLET, FILM COATED ORAL at 21:56

## 2022-01-22 RX ADMIN — FINASTERIDE 5 MILLIGRAM(S): 5 TABLET, FILM COATED ORAL at 11:46

## 2022-01-22 NOTE — PHYSICAL THERAPY INITIAL EVALUATION ADULT - PERTINENT HX OF CURRENT PROBLEM, REHAB EVAL
81 year old obese man, Persian speaking, with history of BPH, HTN, and HLD presents after an abnormal CT chest. Pt has been dealing with dyspnea on exertion and a nonproductive cough for nearly a year (since 3/2021) but pt started having nonexertional shortness of breath and worsening of his nonproductive cough starting 3 weeks ago. CXR preliminary revealed right lower lobe consolidation.
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
## 2022-01-22 NOTE — PHYSICAL THERAPY INITIAL EVALUATION ADULT - PRECAUTIONS/LIMITATIONS, REHAB EVAL
+airborne isolation precautions r/o TB/aspiration precautions/fall precautions/isolation precautions/seizure precautions

## 2022-01-22 NOTE — PROGRESS NOTE ADULT - PROBLEM SELECTOR PLAN 1
CTA chest with IV contrast with masslike consolidation in the right middle lobe and large consolidation in the right   lower lobe. Unclear etiology for consolidations, but possibly granulomatous disease of the lungs and less likely bacterial PNA.  - Pt with no fevers or leukocytosis. Low suspicion for bacterial PNA as cause for lung consolidations. Will monitor off abx for now.   - F/u blood cxs  - R/o pulm TB with AFB smear / culture x3, check quant gold  - House Pulmonology consult to be called in AM for possible bronchoscopy eval of consolidations.  - If Pulmonology not be able to biopsy consolidations, obtain IR consult for biopsy  - ID consult to be called in AM

## 2022-01-22 NOTE — CONSULT NOTE ADULT - ASSESSMENT
80 y/o M PMHx HTN, HLD, and BPH, presented with worsening ROWLEY and non-productive cough x several weeks. CT Chest showing RLL/RML consolidations and RUL calcified granuloma.     Impression:  #R/O TB - abnormal CT Chest with mass-like consolidation in RML and large RLL consolidation. Differential includes atypical source of infection including TB, vs malignancy. Low suspicion for bacterial etiology as patient afebrile, no leukocytosis, neg procal, and no supplemental O2 requirements. No known risk factors for fungal or opportunistic infections. Never-smoker, born in [facility].     Recs:  - send sputum AFB x 3 (q8h with one AM sample)  - airborne precautions until TB r/o'd   - f/u Quant gold  - check HIV screen   - monitor off antibiotics  - non-urgent pulm consult for bronch eval    Plan discussed with primary team    Miki Flores MD PGY-4  Infectious Disease Fellow  Available on [employer] Teams  Pager: [phone]  Before 9AM or after 5PM: [phone]     80 y/o M PMHx HTN, HLD, and BPH, presented with worsening ROWLEY and non-productive cough x several weeks. CT Chest showing RLL/RML consolidations and RUL calcified granuloma.     Impression:  #R/O TB - abnormal CT Chest with mass-like consolidation in RML and large RLL consolidation. Differential includes atypical source of infection including TB, vs malignancy. Low suspicion for bacterial etiology as patient afebrile, no leukocytosis, neg procal, and no supplemental O2 requirements. No known risk factors for fungal or opportunistic infections. Never-smoker, born in [facility], no recent travel.     Recs:  - send sputum AFB x 3 (q8h with one AM sample)  - airborne precautions until TB r/o'd   - f/u Quant gold  - check HIV screen   - monitor off antibiotics  - non-urgent pulm consult for bronch eval    Plan discussed with primary team    Miki Flores MD PGY-4  Infectious Disease Fellow  Available on [employer] Teams  Pager: [phone]  Before 9AM or after 5PM: [phone]
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
## 2022-01-22 NOTE — PROGRESS NOTE ADULT - SUBJECTIVE AND OBJECTIVE BOX
PROGRESS NOTE:    Josie Lombardo MD  Internal Medicine PGY-1  [phone]/[id number]    Patient is a 81y old  Male who presents with a chief complaint of Abnormal CT Chest findings (21 Jan 2022 22:41)      SUBJECTIVE / OVERNIGHT EVENTS: No acute overnight events. Pt seen and examined. Denies fevers, chills, CP, SOB, Abdominal pain, N/V, Constipation, Diarrhea      MEDICATIONS  (STANDING):  atorvastatin 80 milliGRAM(s) Oral at bedtime  enoxaparin Injectable 40 milliGRAM(s) SubCutaneous daily  finasteride 5 milliGRAM(s) Oral daily  influenza  Vaccine (HIGH DOSE) 0.7 milliLiter(s) IntraMuscular once  losartan 50 milliGRAM(s) Oral daily    MEDICATIONS  (PRN):  acetaminophen     Tablet .. 650 milliGRAM(s) Oral every 6 hours PRN Temp greater or equal to 38C (100.4F), Mild Pain (1 - 3)  benzonatate 100 milliGRAM(s) Oral every 8 hours PRN Cough      I&O's Summary      Vital Signs Last 24 Hrs  T(C): 37.2 (22 Jan 2022 06:17), Max: 37.2 (22 Jan 2022 06:17)  T(F): 98.9 (22 Jan 2022 06:17), Max: 98.9 (22 Jan 2022 06:17)  HR: 80 (22 Jan 2022 06:17) (80 - 90)  BP: 160/84 (22 Jan 2022 06:17) (159/80 - 170/81)  BP(mean): --  RR: 20 (22 Jan 2022 06:17) (20 - 34)  SpO2: 98% (22 Jan 2022 06:17) (94% - 98%)    =================PHYSICAL EXAM=================    PHYSICAL EXAM:  GENERAL: NAD, lying in bed comfortably  HEAD:  Atraumatic, Normocephalic  EYES: EOMI, PERRLA, conjunctiva and sclera clear  ENT: Moist mucous membranes  NECK: Supple, No JVD  CHEST/LUNG: Clear to auscultation bilaterally; No rales, rhonchi, wheezing, or rubs. Unlabored respirations  HEART: Regular rate and rhythm; No murmurs, rubs, or gallops  ABDOMEN: Bowel sounds present; Soft, Nontender, Nondistended. No hepatomegally  EXTREMITIES:  2+ Peripheral Pulses, brisk capillary refill. No clubbing, cyanosis, or edema  NERVOUS SYSTEM:  Alert & Oriented X3, speech clear. No deficits   MSK: FROM all 4 extremities, full and equal strength  SKIN: No rashes or lesions    =================================================    LABS:                        12.3   9.82  )-----------( 274      ( 21 Jan 2022 17:57 )             37.8     Auto Eosinophil # 0.28  / Auto Eosinophil % 2.9   / Auto Neutrophil # 5.40  / Auto Neutrophil % 55.0  / BANDS % x        01-21    138  |  105  |  23  ----------------------------<  87  4.9   |  21<L>  |  1.26    Ca    9.2      21 Jan 2022 17:57  Mg     2.20     01-21  Phos  3.9     01-21  TPro  8.0  /  Alb  3.8  /  TBili  <0.2  /  DBili  x   /  AST  30  /  ALT  46<H>  /  AlkPhos  126<H>  01-21    PT/INR - ( 21 Jan 2022 17:57 )   PT: 13.4 sec;   INR: 1.18 ratio         PTT - ( 21 Jan 2022 17:57 )  PTT:27.6 sec              RADIOLOGY & ADDITIONAL TESTS:    Imaging Personally Reviewed:    Consultant(s) Notes Reviewed:      Care Discussed with Consultants/Other Providers:   PROGRESS NOTE:    Josie Lombardo MD  Internal Medicine PGY-1  [phone]/[facility] [id number]    Patient is a 81y old  Male who presents with a chief complaint of Abnormal CT Chest findings (21 Jan 2022 22:41)      SUBJECTIVE / OVERNIGHT EVENTS: No acute overnight events. Pt seen and examined. Patient endorses nonproductive cough. He states that he feels well. Denies fevers, chills, CP, Abdominal pain, N/V, Constipation, Diarrhea.      MEDICATIONS  (STANDING):  atorvastatin 80 milliGRAM(s) Oral at bedtime  enoxaparin Injectable 40 milliGRAM(s) SubCutaneous daily  finasteride 5 milliGRAM(s) Oral daily  influenza  Vaccine (HIGH DOSE) 0.7 milliLiter(s) IntraMuscular once  losartan 50 milliGRAM(s) Oral daily    MEDICATIONS  (PRN):  acetaminophen     Tablet .. 650 milliGRAM(s) Oral every 6 hours PRN Temp greater or equal to 38C (100.4F), Mild Pain (1 - 3)  benzonatate 100 milliGRAM(s) Oral every 8 hours PRN Cough      I&O's Summary      Vital Signs Last 24 Hrs  T(C): 37.2 (22 Jan 2022 06:17), Max: 37.2 (22 Jan 2022 06:17)  T(F): 98.9 (22 Jan 2022 06:17), Max: 98.9 (22 Jan 2022 06:17)  HR: 80 (22 Jan 2022 06:17) (80 - 90)  BP: 160/84 (22 Jan 2022 06:17) (159/80 - 170/81)  BP(mean): --  RR: 20 (22 Jan 2022 06:17) (20 - 34)  SpO2: 98% (22 Jan 2022 06:17) (94% - 98%)    =================PHYSICAL EXAM=================    GENERAL: Older gentleman sitting up in bed in NAD  HEAD:  Atraumatic, Normocephalic  EYES: EOMI, PERRLA, conjunctiva and sclera clear  ENT: Moist mucous membranes  NECK: Supple, No JVD  CHEST/LUNG: Clear to auscultation bilaterally; No rales, rhonchi, wheezing, or rubs  HEART: Regular rate and rhythm; No murmurs, rubs, or gallops  ABDOMEN: Bowel sounds present; Soft, Nontender, Nondistended. No hepatomegaly  EXTREMITIES:  2+ Peripheral Pulses, brisk capillary refill. No clubbing, cyanosis, or edema  NERVOUS SYSTEM:  Alert & Oriented X3, speech clear. No deficits   MSK: FROM all 4 extremities, full and equal strength  SKIN: No rashes or lesions    =================================================    LABS:                        12.3   9.82  )-----------( 274      ( 21 Jan 2022 17:57 )             37.8     Auto Eosinophil # 0.28  / Auto Eosinophil % 2.9   / Auto Neutrophil # 5.40  / Auto Neutrophil % 55.0  / BANDS % x        01-21    138  |  105  |  23  ----------------------------<  87  4.9   |  21<L>  |  1.26    Ca    9.2      21 Jan 2022 17:57  Mg     2.20     01-21  Phos  3.9     01-21  TPro  8.0  /  Alb  3.8  /  TBili  <0.2  /  DBili  x   /  AST  30  /  ALT  46<H>  /  AlkPhos  126<H>  01-21    PT/INR - ( 21 Jan 2022 17:57 )   PT: 13.4 sec;   INR: 1.18 ratio         PTT - ( 21 Jan 2022 17:57 )  PTT:27.6 sec              RADIOLOGY & ADDITIONAL TESTS:    Imaging Personally Reviewed:    Consultant(s) Notes Reviewed:      Care Discussed with Consultants/Other Providers:

## 2022-01-22 NOTE — CONSULT NOTE ADULT - ASSESSMENT
80YO [place] Male PMH BPH, HTN, and HLD who presented with 1yr of ROWLEY and nonproductive cough with worsening SOB x3 weeks and outside CT chest showing granulomas and consolidations.   Pulmonary consulted for RLL mass like consolidation w/ associated SOB.     #RLL mass  - CT chest on this admission showing RML and RLL mass like consolidation. No calcified granulomas  - Symptoms lasting 1 year more suggestive of malignancy over infectious  - F/u QuantiFeron  - F/u AFB x3  - Please obtain TTE given ROWLEY  - Can consider bronchoscopy next week    INCOMPLETE 82YO Japanese Male never smoker PMH BPH, HTN, and HLD who presented with 1yr of nonproductive cough with worsening SOB x3 weeks and outside CT chest showing granulomas and consolidations. Pulmonary consulted for RLL mass like consolidation w/ associated SOB.     #RLL mass  - CT chest on this admission showing RML and RLL mass like consolidation. No calcified granulomas  - Symptoms lasting 1 year more suggestive of malignancy over infectious  - F/u QuantiFeron  - F/u AFB x3  - Please obtain TTE given ROWLEY  - Will review images with interventional pulmonologist and consider flexible bronchoscopy with BAL and EBUS guided biopsy next week    Case discussed with Dr. Lazara Echeverria PGY-5  Pulmonary/Critical Care Fellow  Pager: [phone] (SHANICE), [phone] (NS)  Pulmonary Spectra #[id number] (NS) / [phone] (SHANICE)

## 2022-01-22 NOTE — PATIENT PROFILE ADULT - FALL HARM RISK - RISK INTERVENTIONS
Assistance OOB with selected safe patient handling equipment/Assistance with ambulation/Communicate Fall Risk and Risk Factors to all staff, patient, and family/Discuss with provider need for PT consult/Monitor gait and stability/Reinforce activity limits and safety measures with patient and family/Visual Cue: Yellow wristband/Bed in lowest position, wheels locked, appropriate side rails in place/Call bell, personal items and telephone in reach/Instruct patient to call for assistance before getting out of bed or chair/Non-slip footwear when patient is out of bed/[place] to call system/Physically safe environment - no spills, clutter or unnecessary equipment/Purposeful Proactive Rounding/Room/bathroom lighting operational, light cord in reach

## 2022-01-22 NOTE — PROGRESS NOTE ADULT - PROBLEM SELECTOR PLAN 2
Pt intially with SOB on exertion, now also with SOB at rest. Also with nearly 1 year of nonproductive cough. Pt currently maintaining SpO2 94-97% on RA. VBG with no hypercapnia. Likely in setting of lung consolidations. COVID-19 negative. Hs-trops 18 -> 18. Pro-.   - Plan as above for lung consolidation  - CTA chest with IV contrast with no PE, Large right lower lobe consolidation and right middle lobe masslike; consolidation which may reflect pneumonia,   rule out TB      - F/u TTE   - Supplemental O2 PRn  - Monitor pulse ox q4hrs  -f/u ACID fAST X3   -f/u HIV   -f/u QuantiFeron   -f/u pulm Bronchoscopy some time next week   -ID recs appreciated: monitor of antibiotics   -Airbone precautions/ isoloation

## 2022-01-22 NOTE — CONSULT NOTE ADULT - ATTENDING COMMENTS
It's probably not TB but we can check for it.     Abebe Saldaña MD   Infectious Disease   Pager [phone]   After 5PM and on weekends please page fellow on call or call [phone]
81 M with bph, htn, hld here with dry cough and worsening sob x 3 weeks found to have abnormal CT chest.    Malignancy high on differential.   Likely not TB given hsitory of symptoms.    # abnormal CT chest  # dry cough/sob  - agree with quant gold  - will plan for bronchoscopy for this week (possibly Tuesday)  - would check repeat COVID test on Monday for possible bronch on Tuesday  - patient prefers that his son translate for him on phone; explained what this could be and possibility for bronch, they are agreeable    # diastolic dysfunction  - seen on TTE  - BP control given htn and diastolic dysfunction

## 2022-01-22 NOTE — PROGRESS NOTE ADULT - ASSESSMENT
80 yo obese man, Croatian speaking, with history of BPH, HTN, and HLD presents with worsening shortness of breath, cough, chest tightness, and palpitations with abnormal CT chest showing small R hilar and parenchymal calcification consolidation with granulomatous disease, and CTA chest here with masslike consolidation in the right middle lobe and large consolidation in the right lower lobe.

## 2022-01-23 LAB
ALBUMIN SERPL ELPH-MCNC: 3.8 G/DL — SIGNIFICANT CHANGE UP (ref 3.3–5)
ALP SERPL-CCNC: 129 U/L — HIGH (ref 40–120)
ALT FLD-CCNC: 31 U/L — SIGNIFICANT CHANGE UP (ref 4–41)
ANION GAP SERPL CALC-SCNC: 11 MMOL/L — SIGNIFICANT CHANGE UP (ref 7–14)
AST SERPL-CCNC: 18 U/L — SIGNIFICANT CHANGE UP (ref 4–40)
BILIRUB SERPL-MCNC: 0.3 MG/DL — SIGNIFICANT CHANGE UP (ref 0.2–1.2)
BUN SERPL-MCNC: 26 MG/DL — HIGH (ref 7–23)
CALCIUM SERPL-MCNC: 9.4 MG/DL — SIGNIFICANT CHANGE UP (ref 8.4–10.5)
CHLORIDE SERPL-SCNC: 104 MMOL/L — SIGNIFICANT CHANGE UP (ref 98–107)
CO2 SERPL-SCNC: 22 MMOL/L — SIGNIFICANT CHANGE UP (ref 22–31)
CREAT SERPL-MCNC: 1.39 MG/DL — HIGH (ref 0.5–1.3)
GLUCOSE BLDC GLUCOMTR-MCNC: 112 MG/DL — HIGH (ref 70–99)
GLUCOSE BLDC GLUCOMTR-MCNC: 80 MG/DL — SIGNIFICANT CHANGE UP (ref 70–99)
GLUCOSE BLDC GLUCOMTR-MCNC: 88 MG/DL — SIGNIFICANT CHANGE UP (ref 70–99)
GLUCOSE BLDC GLUCOMTR-MCNC: 92 MG/DL — SIGNIFICANT CHANGE UP (ref 70–99)
GLUCOSE SERPL-MCNC: 160 MG/DL — HIGH (ref 70–99)
HCT VFR BLD CALC: 38.6 % — LOW (ref 39–50)
HGB BLD-MCNC: 12.9 G/DL — LOW (ref 13–17)
HIV 1+2 AB+HIV1 P24 AG SERPL QL IA: SIGNIFICANT CHANGE UP
MAGNESIUM SERPL-MCNC: 2.3 MG/DL — SIGNIFICANT CHANGE UP (ref 1.6–2.6)
MCHC RBC-ENTMCNC: 28.5 PG — SIGNIFICANT CHANGE UP (ref 27–34)
MCHC RBC-ENTMCNC: 33.4 GM/DL — SIGNIFICANT CHANGE UP (ref 32–36)
MCV RBC AUTO: 85.4 FL — SIGNIFICANT CHANGE UP (ref 80–100)
NIGHT BLUE STAIN TISS: SIGNIFICANT CHANGE UP
NIGHT BLUE STAIN TISS: SIGNIFICANT CHANGE UP
NRBC # BLD: 0 /100 WBCS — SIGNIFICANT CHANGE UP
NRBC # FLD: 0 K/UL — SIGNIFICANT CHANGE UP
PHOSPHATE SERPL-MCNC: 3.4 MG/DL — SIGNIFICANT CHANGE UP (ref 2.5–4.5)
PLATELET # BLD AUTO: 277 K/UL — SIGNIFICANT CHANGE UP (ref 150–400)
POTASSIUM SERPL-MCNC: 4.4 MMOL/L — SIGNIFICANT CHANGE UP (ref 3.5–5.3)
POTASSIUM SERPL-SCNC: 4.4 MMOL/L — SIGNIFICANT CHANGE UP (ref 3.5–5.3)
PROT SERPL-MCNC: 7.9 G/DL — SIGNIFICANT CHANGE UP (ref 6–8.3)
RBC # BLD: 4.52 M/UL — SIGNIFICANT CHANGE UP (ref 4.2–5.8)
RBC # FLD: 13.6 % — SIGNIFICANT CHANGE UP (ref 10.3–14.5)
SODIUM SERPL-SCNC: 137 MMOL/L — SIGNIFICANT CHANGE UP (ref 135–145)
SPECIMEN SOURCE: SIGNIFICANT CHANGE UP
SPECIMEN SOURCE: SIGNIFICANT CHANGE UP
WBC # BLD: 9.31 K/UL — SIGNIFICANT CHANGE UP (ref 3.8–10.5)
WBC # FLD AUTO: 9.31 K/UL — SIGNIFICANT CHANGE UP (ref 3.8–10.5)

## 2022-01-23 PROCEDURE — 99232 SBSQ HOSP IP/OBS MODERATE 35: CPT | Mod: GC

## 2022-01-23 RX ADMIN — Medication 100 MILLIGRAM(S): at 16:58

## 2022-01-23 RX ADMIN — ATORVASTATIN CALCIUM 80 MILLIGRAM(S): 80 TABLET, FILM COATED ORAL at 22:08

## 2022-01-23 RX ADMIN — FINASTERIDE 5 MILLIGRAM(S): 5 TABLET, FILM COATED ORAL at 12:56

## 2022-01-23 RX ADMIN — ENOXAPARIN SODIUM 40 MILLIGRAM(S): 100 INJECTION SUBCUTANEOUS at 12:56

## 2022-01-23 RX ADMIN — LOSARTAN POTASSIUM 50 MILLIGRAM(S): 100 TABLET, FILM COATED ORAL at 05:51

## 2022-01-23 NOTE — PROGRESS NOTE ADULT - SUBJECTIVE AND OBJECTIVE BOX
Natasha Coker PGY2  pager [phone] or check resident tab for coverage    Patient is a 81y old  Male who presents with a chief complaint of Abnormal CT Chest findings (22 Jan 2022 14:24)      SUBJECTIVE / OVERNIGHT EVENTS:  no overnight events  am- pt coughing. endorses no concerns, feels comfortable. denies chest pain/sob. no abdominal pain.     MEDICATIONS  (STANDING):  atorvastatin 80 milliGRAM(s) Oral at bedtime  dextrose 40% Gel 15 Gram(s) Oral once  dextrose 5%. 1000 milliLiter(s) (50 mL/Hr) IV Continuous <Continuous>  dextrose 5%. 1000 milliLiter(s) (100 mL/Hr) IV Continuous <Continuous>  dextrose 50% Injectable 25 Gram(s) IV Push once  dextrose 50% Injectable 12.5 Gram(s) IV Push once  dextrose 50% Injectable 25 Gram(s) IV Push once  enoxaparin Injectable 40 milliGRAM(s) SubCutaneous daily  finasteride 5 milliGRAM(s) Oral daily  glucagon  Injectable 1 milliGRAM(s) IntraMuscular once  influenza  Vaccine (HIGH DOSE) 0.7 milliLiter(s) IntraMuscular once  insulin lispro (ADMELOG) corrective regimen sliding scale   SubCutaneous three times a day before meals  insulin lispro (ADMELOG) corrective regimen sliding scale   SubCutaneous at bedtime  losartan 50 milliGRAM(s) Oral daily    MEDICATIONS  (PRN):  acetaminophen     Tablet .. 650 milliGRAM(s) Oral every 6 hours PRN Temp greater or equal to 38C (100.4F), Mild Pain (1 - 3)  benzonatate 100 milliGRAM(s) Oral every 8 hours PRN Cough      CAPILLARY BLOOD GLUCOSE      POCT Blood Glucose.: 116 mg/dL (22 Jan 2022 21:46)  POCT Blood Glucose.: 83 mg/dL (22 Jan 2022 17:28)    I&O's Summary      Vital Signs Last 24 Hrs  T(C): 36.7 (23 Jan 2022 05:50), Max: 37 (22 Jan 2022 21:55)  T(F): 98 (23 Jan 2022 05:50), Max: 98.6 (22 Jan 2022 21:55)  HR: 76 (23 Jan 2022 05:50) (76 - 81)  BP: 155/88 (23 Jan 2022 05:50) (132/63 - 157/86)  BP(mean): --  RR: 18 (23 Jan 2022 05:50) (17 - 20)  SpO2: 96% (23 Jan 2022 05:50) (96% - 97%)    PHYSICAL EXAM:  GENERAL: no distress, on room air, walking around, intermittent cough  PSYCH: A&O x3  HEAD: Atraumatic, Normocephalic  CHEST/LUNG: clear to auscultation bilaterally  HEART: regular rate and rhythm, no murmurs  ABDOMEN: nontender to palpation, no rebound tenderness/guarding  EXTREMITIES: no edema on bilateral LE  NEUROLOGY: no focal neurologic deficit  SKIN: No rashes or lesions    LABS:                        11.8   9.27  )-----------( 266      ( 22 Jan 2022 07:16 )             35.4      01-22    137  |  105  |  21  ----------------------------<  100<H>  4.2   |  22  |  1.26    Ca    9.1      22 Jan 2022 07:16  Phos  3.1     01-22  Mg     2.10     01-22    TPro  8.0  /  Alb  3.8  /  TBili  <0.2  /  DBili  x   /  AST  30  /  ALT  46<H>  /  AlkPhos  126<H>  01-21    PT/INR - ( 21 Jan 2022 17:57 )   PT: 13.4 sec;   INR: 1.18 ratio         PTT - ( 21 Jan 2022 17:57 )  PTT:27.6 sec          RADIOLOGY & ADDITIONAL TESTS:    Imaging Personally Reviewed:    Consultant(s) Notes Reviewed:      Care Discussed with Consultants/Other Providers:

## 2022-01-23 NOTE — PROGRESS NOTE ADULT - ASSESSMENT
82 yo obese man, Yakut speaking, with history of BPH, HTN, and HLD presents with worsening shortness of breath, cough, chest tightness, and palpitations with abnormal CT chest showing small R hilar and parenchymal calcification consolidation with granulomatous disease, and CTA chest here with masslike consolidation in the right middle lobe and large consolidation in the right lower lobe.

## 2022-01-23 NOTE — PROGRESS NOTE ADULT - PROBLEM SELECTOR PLAN 2
Pt intially with SOB on exertion, now also with SOB at rest. Also with nearly 1 year of nonproductive cough. Pt currently maintaining SpO2 94-97% on RA. VBG with no hypercapnia. Likely in setting of lung consolidations. COVID-19 negative. Hs-trops 18 -> 18. Pro-.   - Plan as above for lung consolidation  - CTA chest with IV contrast with no PE, Large right lower lobe consolidation and right middle lobe masslike; consolidation which may reflect pneumonia,   rule out TB      - F/u TTE   - Supplemental O2 PRn  - Monitor pulse ox q4hrs  -f/u ACID fAST X3   -f/u HIV   -f/u QuantiFeron   -f/u pulm Bronchoscopy some time next week   -ID recs appreciated: monitor of antibiotics   -Airbone precautions/ isoloation Pt intially with SOB on exertion, now also with SOB at rest. Also with nearly 1 year of nonproductive cough. Pt currently maintaining SpO2 94-97% on RA. VBG with no hypercapnia. Likely in setting of lung consolidations. COVID-19 negative. Hs-trops 18 -> 18. Pro-.   - Plan as above for lung consolidation  - CTA chest with IV contrast with no PE, Large right lower lobe consolidation and right middle lobe masslike; consolidation which may reflect pneumonia,   rule out TB      - F/u TTE - wnl  - Supplemental O2 PRn  - Monitor pulse ox q4hrs  -f/u ACID fAST X3   -f/u HIV   -f/u QuantiFeron   -f/u pulm Bronchoscopy some time next week   -ID recs appreciated: monitor of antibiotics   -Airbone precautions/ isoloation

## 2022-01-23 NOTE — PROGRESS NOTE ADULT - PROBLEM SELECTOR PLAN 1
CTA chest with IV contrast with masslike consolidation in the right middle lobe and large consolidation in the right   lower lobe. Unclear etiology for consolidations, but possibly granulomatous disease of the lungs and less likely bacterial PNA.  - Pt with no fevers or leukocytosis. Low suspicion for bacterial PNA as cause for lung consolidations. Will monitor off abx for now.   - F/u blood cxs  - R/o pulm TB with AFB smear / culture x3, check quant gold  - House Pulmonology consult to be called in AM for possible bronchoscopy eval of consolidations.  - If Pulmonology not be able to biopsy consolidations, obtain IR consult for biopsy  - ID consult to be called in AM CTA chest with IV contrast with masslike consolidation in the right middle lobe and large consolidation in the right   lower lobe. Unclear etiology for consolidations, but possibly granulomatous disease of the lungs and less likely bacterial PNA.  - Pt with no fevers or leukocytosis. Low suspicion for bacterial PNA as cause for lung consolidations. Will monitor off abx for now.   - F/u blood cxs  - afb x1 negative, 2nd and 3rd pending. quant gold and hiv pending.   - House Pulmonology consult to be called in AM for possible bronchoscopy eval of consolidations.  - If Pulmonology not be able to biopsy consolidations, obtain IR consult for biopsy  - ID consult to be called in AM

## 2022-01-24 ENCOUNTER — TRANSCRIPTION ENCOUNTER (OUTPATIENT)
Age: 82
End: 2022-01-24

## 2022-01-24 LAB
ALBUMIN SERPL ELPH-MCNC: 3.8 G/DL — SIGNIFICANT CHANGE UP (ref 3.3–5)
ALP SERPL-CCNC: 119 U/L — SIGNIFICANT CHANGE UP (ref 40–120)
ALT FLD-CCNC: 35 U/L — SIGNIFICANT CHANGE UP (ref 4–41)
ANION GAP SERPL CALC-SCNC: 11 MMOL/L — SIGNIFICANT CHANGE UP (ref 7–14)
AST SERPL-CCNC: 21 U/L — SIGNIFICANT CHANGE UP (ref 4–40)
BILIRUB SERPL-MCNC: 0.3 MG/DL — SIGNIFICANT CHANGE UP (ref 0.2–1.2)
BLD GP AB SCN SERPL QL: NEGATIVE — SIGNIFICANT CHANGE UP
BUN SERPL-MCNC: 30 MG/DL — HIGH (ref 7–23)
CALCIUM SERPL-MCNC: 9.3 MG/DL — SIGNIFICANT CHANGE UP (ref 8.4–10.5)
CHLORIDE SERPL-SCNC: 103 MMOL/L — SIGNIFICANT CHANGE UP (ref 98–107)
CO2 SERPL-SCNC: 21 MMOL/L — LOW (ref 22–31)
CREAT SERPL-MCNC: 1.45 MG/DL — HIGH (ref 0.5–1.3)
GLUCOSE BLDC GLUCOMTR-MCNC: 121 MG/DL — HIGH (ref 70–99)
GLUCOSE SERPL-MCNC: 93 MG/DL — SIGNIFICANT CHANGE UP (ref 70–99)
HCT VFR BLD CALC: 38.4 % — LOW (ref 39–50)
HGB BLD-MCNC: 12.6 G/DL — LOW (ref 13–17)
MAGNESIUM SERPL-MCNC: 2.5 MG/DL — SIGNIFICANT CHANGE UP (ref 1.6–2.6)
MCHC RBC-ENTMCNC: 28.4 PG — SIGNIFICANT CHANGE UP (ref 27–34)
MCHC RBC-ENTMCNC: 32.8 GM/DL — SIGNIFICANT CHANGE UP (ref 32–36)
MCV RBC AUTO: 86.5 FL — SIGNIFICANT CHANGE UP (ref 80–100)
NIGHT BLUE STAIN TISS: SIGNIFICANT CHANGE UP
NRBC # BLD: 0 /100 WBCS — SIGNIFICANT CHANGE UP
NRBC # FLD: 0 K/UL — SIGNIFICANT CHANGE UP
PHOSPHATE SERPL-MCNC: 3.6 MG/DL — SIGNIFICANT CHANGE UP (ref 2.5–4.5)
PLATELET # BLD AUTO: 267 K/UL — SIGNIFICANT CHANGE UP (ref 150–400)
POTASSIUM SERPL-MCNC: 4.5 MMOL/L — SIGNIFICANT CHANGE UP (ref 3.5–5.3)
POTASSIUM SERPL-SCNC: 4.5 MMOL/L — SIGNIFICANT CHANGE UP (ref 3.5–5.3)
PROT SERPL-MCNC: 7.7 G/DL — SIGNIFICANT CHANGE UP (ref 6–8.3)
RBC # BLD: 4.44 M/UL — SIGNIFICANT CHANGE UP (ref 4.2–5.8)
RBC # FLD: 13.6 % — SIGNIFICANT CHANGE UP (ref 10.3–14.5)
RH IG SCN BLD-IMP: POSITIVE — SIGNIFICANT CHANGE UP
SARS-COV-2 RNA SPEC QL NAA+PROBE: SIGNIFICANT CHANGE UP
SODIUM SERPL-SCNC: 135 MMOL/L — SIGNIFICANT CHANGE UP (ref 135–145)
SPECIMEN SOURCE: SIGNIFICANT CHANGE UP
WBC # BLD: 9.64 K/UL — SIGNIFICANT CHANGE UP (ref 3.8–10.5)
WBC # FLD AUTO: 9.64 K/UL — SIGNIFICANT CHANGE UP (ref 3.8–10.5)

## 2022-01-24 PROCEDURE — 99232 SBSQ HOSP IP/OBS MODERATE 35: CPT | Mod: GC

## 2022-01-24 PROCEDURE — 99233 SBSQ HOSP IP/OBS HIGH 50: CPT

## 2022-01-24 RX ORDER — SODIUM CHLORIDE 9 MG/ML
1000 INJECTION INTRAMUSCULAR; INTRAVENOUS; SUBCUTANEOUS ONCE
Refills: 0 | Status: COMPLETED | OUTPATIENT
Start: 2022-01-24 | End: 2022-01-24

## 2022-01-24 RX ADMIN — ATORVASTATIN CALCIUM 80 MILLIGRAM(S): 80 TABLET, FILM COATED ORAL at 22:03

## 2022-01-24 RX ADMIN — ENOXAPARIN SODIUM 40 MILLIGRAM(S): 100 INJECTION SUBCUTANEOUS at 11:48

## 2022-01-24 RX ADMIN — FINASTERIDE 5 MILLIGRAM(S): 5 TABLET, FILM COATED ORAL at 11:48

## 2022-01-24 RX ADMIN — Medication 100 MILLIGRAM(S): at 11:50

## 2022-01-24 RX ADMIN — LOSARTAN POTASSIUM 50 MILLIGRAM(S): 100 TABLET, FILM COATED ORAL at 06:27

## 2022-01-24 RX ADMIN — SODIUM CHLORIDE 1000 MILLILITER(S): 9 INJECTION INTRAMUSCULAR; INTRAVENOUS; SUBCUTANEOUS at 12:00

## 2022-01-24 NOTE — PROVIDER CONTACT NOTE (OTHER) - ACTION/TREATMENT ORDERED:
provider made aware, no sliding scale ordered because patient's finger sticks have been "normal and or low".

## 2022-01-24 NOTE — PROGRESS NOTE ADULT - PROBLEM SELECTOR PLAN 2
Pt intially with SOB on exertion, now also with SOB at rest. Also with nearly 1 year of nonproductive cough. Pt currently maintaining SpO2 94-97% on RA. VBG with no hypercapnia. Likely in setting of lung consolidations. COVID-19 negative. Hs-trops 18 -> 18. Pro-.   - Plan as above for lung consolidation  - CTA chest with IV contrast with no PE, Large right lower lobe consolidation and right middle lobe masslike; consolidation which may reflect pneumonia,   rule out TB   TTE - EF: 68%; Mild diastolic dysfunction  afb x4 negative  HIV negative    - Supplemental O2 PRn  - Monitor pulse ox q4hrs  -f/u QuantiFeron   - Bronchoscopy planned for  1/25 @ 12:30  -ID recs appreciated: monitor of antibiotics   -Airbone precautions/ isolation D'cd

## 2022-01-24 NOTE — PROGRESS NOTE ADULT - ASSESSMENT
82 yo obese man, Tamazight speaking, with history of BPH, HTN, and HLD presents with worsening shortness of breath, cough, chest tightness, and palpitations with abnormal CT chest showing small R hilar and parenchymal calcification consolidation with granulomatous disease, and CTA chest here with masslike consolidation in the right middle lobe and large consolidation in the right lower lobe.

## 2022-01-24 NOTE — PROGRESS NOTE ADULT - ASSESSMENT
82YO Micron[place] Male never smoker PMH BPH, HTN, and HLD who presented with 1yr of nonproductive cough with worsening SOB x3 weeks and outside CT chest showing granulomas and consolidations. Pulmonary consulted for RLL mass like consolidation w/ associated SOB.     #RLL mass  - CT chest on this admission showing RML and RLL mass like consolidation. No calcified granulomas  - Symptoms lasting 1 year more suggestive of malignancy over infectious  - F/u QuantiFeron  - AFB negative x3  - TTE with grade 1 diastolic dysfunction without increased LA size  - Will review images with interventional pulmonologist and consider flexible bronchoscopy with BAL and EBUS guided biopsy    Doug Gamez PGY-6  Pulmonary/Critical Care Fellow  Pager: [phone] (SHANICE) [phone] (NS)  Pulmonary Spectra #[id number] (NS) / [phone] (SHANICE)   80YO Afghan Male never smoker PMH BPH, HTN, and HLD who presented with 1yr of nonproductive cough with worsening SOB x3 weeks and outside CT chest showing granulomas and consolidations. Pulmonary consulted for RLL mass like consolidation w/ associated SOB.     #RLL mass  - CT chest on this admission showing RML and RLL mass like consolidation. No calcified granulomas  - F/u QuantiFeron  - AFB negative x3  - TTE with grade 1 diastolic dysfunction without increased LA size  - scheduled for bronchoscopy with navigational bronchoscopy with TBBx and EBUS with [facility] tomorrow at 12:30 pm  - please keep patient NPO at midnight and obtain AM labs  - can hold morning dose of lovenox    Doug Gamez PGY-6  Pulmonary/Critical Care Fellow  Pager: [phone] (SHANICE) [phone] (NS)  Pulmonary Spectra #[id number] (NS) / [id number] (SHANICE)

## 2022-01-24 NOTE — PROGRESS NOTE ADULT - SUBJECTIVE AND OBJECTIVE BOX
PROGRESS NOTE:    Josie Lombardo MD  Internal Medicine PGY-1  [phone]/[facility] [id number]    Patient is a 81y old  Male who presents with a chief complaint of Abnormal CT Chest findings (23 Jan 2022 08:16)      SUBJECTIVE / OVERNIGHT EVENTS: No acute overnight events. Pt seen and examined. Denies fevers, chills, CP, SOB, Abdominal pain, N/V, Constipation, Diarrhea      MEDICATIONS  (STANDING):  atorvastatin 80 milliGRAM(s) Oral at bedtime  dextrose 40% Gel 15 Gram(s) Oral once  dextrose 5%. 1000 milliLiter(s) (50 mL/Hr) IV Continuous <Continuous>  dextrose 5%. 1000 milliLiter(s) (100 mL/Hr) IV Continuous <Continuous>  dextrose 50% Injectable 25 Gram(s) IV Push once  dextrose 50% Injectable 12.5 Gram(s) IV Push once  dextrose 50% Injectable 25 Gram(s) IV Push once  enoxaparin Injectable 40 milliGRAM(s) SubCutaneous daily  finasteride 5 milliGRAM(s) Oral daily  glucagon  Injectable 1 milliGRAM(s) IntraMuscular once  influenza  Vaccine (HIGH DOSE) 0.7 milliLiter(s) IntraMuscular once  insulin lispro (ADMELOG) corrective regimen sliding scale   SubCutaneous three times a day before meals  insulin lispro (ADMELOG) corrective regimen sliding scale   SubCutaneous at bedtime  losartan 50 milliGRAM(s) Oral daily    MEDICATIONS  (PRN):  acetaminophen     Tablet .. 650 milliGRAM(s) Oral every 6 hours PRN Temp greater or equal to 38C (100.4F), Mild Pain (1 - 3)  benzonatate 100 milliGRAM(s) Oral every 8 hours PRN Cough      I&O's Summary      Vital Signs Last 24 Hrs  T(C): 36.8 (24 Jan 2022 06:27), Max: 36.9 (24 Jan 2022 01:45)  T(F): 98.2 (24 Jan 2022 06:27), Max: 98.5 (24 Jan 2022 01:45)  HR: 70 (24 Jan 2022 06:27) (69 - 78)  BP: 136/88 (24 Jan 2022 06:27) (130/69 - 145/75)  BP(mean): --  RR: 16 (24 Jan 2022 06:27) (16 - 18)  SpO2: 95% (24 Jan 2022 06:27) (93% - 96%)    =================PHYSICAL EXAM=================    PHYSICAL EXAM:  GENERAL: NAD, lying in bed comfortably  HEAD:  Atraumatic, Normocephalic  EYES: EOMI, PERRLA, conjunctiva and sclera clear  ENT: Moist mucous membranes  NECK: Supple, No JVD  CHEST/LUNG: Clear to auscultation bilaterally; No rales, rhonchi, wheezing, or rubs. Unlabored respirations  HEART: Regular rate and rhythm; No murmurs, rubs, or gallops  ABDOMEN: Bowel sounds present; Soft, Nontender, Nondistended. No hepatomegally  EXTREMITIES:  2+ Peripheral Pulses, brisk capillary refill. No clubbing, cyanosis, or edema  NERVOUS SYSTEM:  Alert & Oriented X3, speech clear. No deficits   MSK: FROM all 4 extremities, full and equal strength  SKIN: No rashes or lesions    =================================================    LABS:                        12.9   9.31  )-----------( 277      ( 23 Jan 2022 10:01 )             38.6     Auto Eosinophil # x     / Auto Eosinophil % x     / Auto Neutrophil # x     / Auto Neutrophil % x     / BANDS % x                            11.8   9.27  )-----------( 266      ( 22 Jan 2022 07:16 )             35.4     Auto Eosinophil # x     / Auto Eosinophil % x     / Auto Neutrophil # x     / Auto Neutrophil % x     / BANDS % x        01-23    137  |  104  |  26<H>  ----------------------------<  160<H>  4.4   |  22  |  1.39<H>  01-22    137  |  105  |  21  ----------------------------<  100<H>  4.2   |  22  |  1.26    Ca    9.4      23 Jan 2022 10:01  Mg     2.30     01-23  Phos  3.4     01-23  TPro  7.9  /  Alb  3.8  /  TBili  0.3  /  DBili  x   /  AST  18  /  ALT  31  /  AlkPhos  129<H>  01-23                  RADIOLOGY & ADDITIONAL TESTS:    Imaging Personally Reviewed:    Consultant(s) Notes Reviewed:      Care Discussed with Consultants/Other Providers:   PROGRESS NOTE:    Josie Lombardo MD  Internal Medicine PGY-1  [phone]/[facility] [id number]    Patient is a 81y old  Male who presents with a chief complaint of Abnormal CT Chest findings (23 Jan 2022 08:16)      SUBJECTIVE / OVERNIGHT EVENTS: No acute overnight events. Pt seen and examined. His granddaughter provided translation at the bedside. Patient states that he feels well this morning. He endorses a cough that is worse when he lays down, however he does not wake up at night coughing. He sleeps at night with one pillow and denies any trouble breathing. Denies fevers, chills, CP, SOB, Abdominal pain, N/V, Constipation, Diarrhea      MEDICATIONS  (STANDING):  atorvastatin 80 milliGRAM(s) Oral at bedtime  dextrose 40% Gel 15 Gram(s) Oral once  dextrose 5%. 1000 milliLiter(s) (50 mL/Hr) IV Continuous <Continuous>  dextrose 5%. 1000 milliLiter(s) (100 mL/Hr) IV Continuous <Continuous>  dextrose 50% Injectable 25 Gram(s) IV Push once  dextrose 50% Injectable 12.5 Gram(s) IV Push once  dextrose 50% Injectable 25 Gram(s) IV Push once  enoxaparin Injectable 40 milliGRAM(s) SubCutaneous daily  finasteride 5 milliGRAM(s) Oral daily  glucagon  Injectable 1 milliGRAM(s) IntraMuscular once  influenza  Vaccine (HIGH DOSE) 0.7 milliLiter(s) IntraMuscular once  insulin lispro (ADMELOG) corrective regimen sliding scale   SubCutaneous three times a day before meals  insulin lispro (ADMELOG) corrective regimen sliding scale   SubCutaneous at bedtime  losartan 50 milliGRAM(s) Oral daily    MEDICATIONS  (PRN):  acetaminophen     Tablet .. 650 milliGRAM(s) Oral every 6 hours PRN Temp greater or equal to 38C (100.4F), Mild Pain (1 - 3)  benzonatate 100 milliGRAM(s) Oral every 8 hours PRN Cough      I&O's Summary      Vital Signs Last 24 Hrs  T(C): 36.8 (24 Jan 2022 06:27), Max: 36.9 (24 Jan 2022 01:45)  T(F): 98.2 (24 Jan 2022 06:27), Max: 98.5 (24 Jan 2022 01:45)  HR: 70 (24 Jan 2022 06:27) (69 - 78)  BP: 136/88 (24 Jan 2022 06:27) (130/69 - 145/75)  BP(mean): --  RR: 16 (24 Jan 2022 06:27) (16 - 18)  SpO2: 95% (24 Jan 2022 06:27) (93% - 96%)    =================PHYSICAL EXAM=================    GENERAL: Older gentleman sitting up in bed in NAD  HEAD:  Atraumatic, Normocephalic  EYES: EOMI, PERRLA, conjunctiva and sclera clear  ENT: Moist mucous membranes  NECK: Supple, No JVD  CHEST/LUNG: Clear to auscultation bilaterally; No rales, rhonchi, wheezing, or rubs  HEART: Regular rate and rhythm; No murmurs, rubs, or gallops  ABDOMEN: Bowel sounds present; Soft, Nontender, Nondistended. No hepatomegaly  EXTREMITIES:  2+ Peripheral Pulses, brisk capillary refill. No clubbing, cyanosis, or edema  NERVOUS SYSTEM:  Alert & Oriented X3, speech clear. No deficits   MSK: FROM all 4 extremities, full and equal strength  SKIN: No rashes or lesions    =================================================    LABS:                        12.9   9.31  )-----------( 277      ( 23 Jan 2022 10:01 )             38.6     Auto Eosinophil # x     / Auto Eosinophil % x     / Auto Neutrophil # x     / Auto Neutrophil % x     / BANDS % x                            11.8   9.27  )-----------( 266      ( 22 Jan 2022 07:16 )             35.4     Auto Eosinophil # x     / Auto Eosinophil % x     / Auto Neutrophil # x     / Auto Neutrophil % x     / BANDS % x        01-23    137  |  104  |  26<H>  ----------------------------<  160<H>  4.4   |  22  |  1.39<H>  01-22    137  |  105  |  21  ----------------------------<  100<H>  4.2   |  22  |  1.26    Ca    9.4      23 Jan 2022 10:01  Mg     2.30     01-23  Phos  3.4     01-23  TPro  7.9  /  Alb  3.8  /  TBili  0.3  /  DBili  x   /  AST  18  /  ALT  31  /  AlkPhos  129<H>  01-23                  RADIOLOGY & ADDITIONAL TESTS:    Imaging Personally Reviewed:    Consultant(s) Notes Reviewed:      Care Discussed with Consultants/Other Providers:

## 2022-01-24 NOTE — PROGRESS NOTE ADULT - SUBJECTIVE AND OBJECTIVE BOX
INCOMPLETE    SUBJECTIVE/OVERNIGHT EVENTS:      14 point ROS negative except as noted above      OBJECTIVE:  ICU Vital Signs Last 24 Hrs  T(C): 36.8 (24 Jan 2022 06:27), Max: 36.9 (24 Jan 2022 01:45)  T(F): 98.2 (24 Jan 2022 06:27), Max: 98.5 (24 Jan 2022 01:45)  HR: 70 (24 Jan 2022 06:27) (69 - 78)  BP: 136/88 (24 Jan 2022 06:27) (130/69 - 145/75)  BP(mean): --  ABP: --  ABP(mean): --  RR: 16 (24 Jan 2022 06:27) (16 - 18)  SpO2: 95% (24 Jan 2022 06:27) (93% - 96%)        CAPILLARY BLOOD GLUCOSE      POCT Blood Glucose.: 92 mg/dL (23 Jan 2022 22:32)      PHYSICAL EXAM:  General: awake and alert, nontoxic appearing *** lying in bed  HEENT: NC/AT, EOMI b/l, conjunctiva normal, MMM  Lymph Nodes: no cervical LAD  Neck: supple. full range of motion  Respiratory: CTA b/l, no w/r/c, appears comfortable on ***, no conversational dyspnea or accessory muscle use  Cardiovascular: S1 S2 present, RRR, no m/r/g  Abdomen: soft, NT/ND, +BS  Extremities: no c/c/e  Skin: no rashes or lesions noted  Neurological: AAOx3, no focal deficits  Psychiatry: calm, cooperative    LINES:     HOSPITAL MEDICATIONS:  Standing Meds:  atorvastatin 80 milliGRAM(s) Oral at bedtime  dextrose 40% Gel 15 Gram(s) Oral once  dextrose 5%. 1000 milliLiter(s) IV Continuous <Continuous>  dextrose 5%. 1000 milliLiter(s) IV Continuous <Continuous>  dextrose 50% Injectable 25 Gram(s) IV Push once  dextrose 50% Injectable 12.5 Gram(s) IV Push once  dextrose 50% Injectable 25 Gram(s) IV Push once  enoxaparin Injectable 40 milliGRAM(s) SubCutaneous daily  finasteride 5 milliGRAM(s) Oral daily  glucagon  Injectable 1 milliGRAM(s) IntraMuscular once  influenza  Vaccine (HIGH DOSE) 0.7 milliLiter(s) IntraMuscular once  insulin lispro (ADMELOG) corrective regimen sliding scale   SubCutaneous three times a day before meals  insulin lispro (ADMELOG) corrective regimen sliding scale   SubCutaneous at bedtime  losartan 50 milliGRAM(s) Oral daily      PRN Meds:  acetaminophen     Tablet .. 650 milliGRAM(s) Oral every 6 hours PRN  benzonatate 100 milliGRAM(s) Oral every 8 hours PRN      LABS:                        12.6   9.64  )-----------( 267      ( 24 Jan 2022 07:22 )             38.4     Hgb Trend: 12.6<--, 12.9<--, 11.8<--, 12.3<--  01-23    137  |  104  |  26<H>  ----------------------------<  160<H>  4.4   |  22  |  1.39<H>    Ca    9.4      23 Jan 2022 10:01  Phos  3.4     01-23  Mg     2.30     01-23    TPro  7.9  /  Alb  3.8  /  TBili  0.3  /  DBili  x   /  AST  18  /  ALT  31  /  AlkPhos  129<H>  01-23    Creatinine Trend: 1.39<--, 1.26<--, 1.26<--            MICROBIOLOGY:     Culture - Acid Fast - Sputum w/Smear (collected 23 Jan 2022 10:21)  Source: .Sputum Sputum    Culture - Acid Fast - Sputum w/Smear (collected 22 Jan 2022 22:54)  Source: .Sputum Sputum    Culture - Acid Fast - Sputum w/Smear (collected 22 Jan 2022 14:47)  Source: .Sputum Sputum    Culture - Blood (collected 22 Jan 2022 01:07)  Source: .Blood Blood-Peripheral  Preliminary Report (23 Jan 2022 02:01):    No growth to date.    Culture - Blood (collected 22 Jan 2022 01:07)  Source: .Blood Blood-Peripheral  Preliminary Report (23 Jan 2022 02:01):    No growth to date.        RADIOLOGY:  [ ] Reviewed and interpreted by me    PULMONARY FUNCTION TESTS:    EKG: SUBJECTIVE/OVERNIGHT EVENTS:  No overnight events  Feels well  Still complains of mild nonproductive cough  Denies f/c, SOB, chest pain, pleuritic pain, sputum production, hemoptysis, weight loss    14 point ROS negative except as noted above      OBJECTIVE:  ICU Vital Signs Last 24 Hrs  T(C): 36.8 (24 Jan 2022 06:27), Max: 36.9 (24 Jan 2022 01:45)  T(F): 98.2 (24 Jan 2022 06:27), Max: 98.5 (24 Jan 2022 01:45)  HR: 70 (24 Jan 2022 06:27) (69 - 78)  BP: 136/88 (24 Jan 2022 06:27) (130/69 - 145/75)  BP(mean): --  ABP: --  ABP(mean): --  RR: 16 (24 Jan 2022 06:27) (16 - 18)  SpO2: 95% (24 Jan 2022 06:27) (93% - 96%)        CAPILLARY BLOOD GLUCOSE      POCT Blood Glucose.: 92 mg/dL (23 Jan 2022 22:32)      PHYSICAL EXAM:  General: awake and alert, nontoxic appearing male sitting up in chair  HEENT: NC/AT, EOMI b/l, conjunctiva normal, MMM  Lymph Nodes: no cervical LAD  Neck: supple. full range of motion  Respiratory: decreased breath sounds at R lung base, no w/r/c, appears comfortable on room air, no conversational dyspnea or accessory muscle use  Cardiovascular: S1 S2 present, RRR, no m/r/g  Abdomen: soft, NT/ND, +BS  Extremities: no c/c/e  Skin: no rashes or lesions noted  Neurological: AAOx3, no focal deficits  Psychiatry: calm, cooperative    LINES:     HOSPITAL MEDICATIONS:  Standing Meds:  atorvastatin 80 milliGRAM(s) Oral at bedtime  dextrose 40% Gel 15 Gram(s) Oral once  dextrose 5%. 1000 milliLiter(s) IV Continuous <Continuous>  dextrose 5%. 1000 milliLiter(s) IV Continuous <Continuous>  dextrose 50% Injectable 25 Gram(s) IV Push once  dextrose 50% Injectable 12.5 Gram(s) IV Push once  dextrose 50% Injectable 25 Gram(s) IV Push once  enoxaparin Injectable 40 milliGRAM(s) SubCutaneous daily  finasteride 5 milliGRAM(s) Oral daily  glucagon  Injectable 1 milliGRAM(s) IntraMuscular once  influenza  Vaccine (HIGH DOSE) 0.7 milliLiter(s) IntraMuscular once  insulin lispro (ADMELOG) corrective regimen sliding scale   SubCutaneous three times a day before meals  insulin lispro (ADMELOG) corrective regimen sliding scale   SubCutaneous at bedtime  losartan 50 milliGRAM(s) Oral daily      PRN Meds:  acetaminophen     Tablet .. 650 milliGRAM(s) Oral every 6 hours PRN  benzonatate 100 milliGRAM(s) Oral every 8 hours PRN      LABS:                        12.6   9.64  )-----------( 267      ( 24 Jan 2022 07:22 )             38.4     Hgb Trend: 12.6<--, 12.9<--, 11.8<--, 12.3<--  01-23    137  |  104  |  26<H>  ----------------------------<  160<H>  4.4   |  22  |  1.39<H>    Ca    9.4      23 Jan 2022 10:01  Phos  3.4     01-23  Mg     2.30     01-23    TPro  7.9  /  Alb  3.8  /  TBili  0.3  /  DBili  x   /  AST  18  /  ALT  31  /  AlkPhos  129<H>  01-23    Creatinine Trend: 1.39<--, 1.26<--, 1.26<--            MICROBIOLOGY:     Culture - Acid Fast - Sputum w/Smear (collected 23 Jan 2022 10:21)  Source: .Sputum Sputum    Culture - Acid Fast - Sputum w/Smear (collected 22 Jan 2022 22:54)  Source: .Sputum Sputum    Culture - Acid Fast - Sputum w/Smear (collected 22 Jan 2022 14:47)  Source: .Sputum Sputum    Culture - Blood (collected 22 Jan 2022 01:07)  Source: .Blood Blood-Peripheral  Preliminary Report (23 Jan 2022 02:01):    No growth to date.    Culture - Blood (collected 22 Jan 2022 01:07)  Source: .Blood Blood-Peripheral  Preliminary Report (23 Jan 2022 02:01):    No growth to date.        RADIOLOGY:  [ ] Reviewed and interpreted by me    PULMONARY FUNCTION TESTS:    EKG:

## 2022-01-24 NOTE — PROGRESS NOTE ADULT - PROBLEM SELECTOR PLAN 1
CTA chest with IV contrast with masslike consolidation in the right middle lobe and large consolidation in the right   lower lobe. Unclear etiology for consolidations, but possibly granulomatous disease of the lungs and less likely bacterial PNA.  - Pt with no fevers or leukocytosis. Low suspicion for bacterial PNA as cause for lung consolidations. Will monitor off abx for now.   blood cxs: BGTD  afb x4 negative  HIV negative  quant gold  pending    -Pulm recs appreciated  -Bronchoscopy planned for  1/25 @ 12:30

## 2022-01-25 ENCOUNTER — RESULT REVIEW (OUTPATIENT)
Age: 82
End: 2022-01-25

## 2022-01-25 LAB
ANION GAP SERPL CALC-SCNC: 12 MMOL/L — SIGNIFICANT CHANGE UP (ref 7–14)
APTT BLD: 32.7 SEC — SIGNIFICANT CHANGE UP (ref 27–36.3)
BUN SERPL-MCNC: 25 MG/DL — HIGH (ref 7–23)
CALCIUM SERPL-MCNC: 9 MG/DL — SIGNIFICANT CHANGE UP (ref 8.4–10.5)
CHLORIDE SERPL-SCNC: 105 MMOL/L — SIGNIFICANT CHANGE UP (ref 98–107)
CO2 SERPL-SCNC: 20 MMOL/L — LOW (ref 22–31)
CREAT SERPL-MCNC: 1.31 MG/DL — HIGH (ref 0.5–1.3)
GLUCOSE SERPL-MCNC: 86 MG/DL — SIGNIFICANT CHANGE UP (ref 70–99)
HCT VFR BLD CALC: 36.3 % — LOW (ref 39–50)
HGB BLD-MCNC: 11.7 G/DL — LOW (ref 13–17)
INR BLD: 1.16 RATIO — SIGNIFICANT CHANGE UP (ref 0.88–1.16)
MAGNESIUM SERPL-MCNC: 2.2 MG/DL — SIGNIFICANT CHANGE UP (ref 1.6–2.6)
MCHC RBC-ENTMCNC: 28.5 PG — SIGNIFICANT CHANGE UP (ref 27–34)
MCHC RBC-ENTMCNC: 32.2 GM/DL — SIGNIFICANT CHANGE UP (ref 32–36)
MCV RBC AUTO: 88.5 FL — SIGNIFICANT CHANGE UP (ref 80–100)
NRBC # BLD: 0 /100 WBCS — SIGNIFICANT CHANGE UP
NRBC # FLD: 0 K/UL — SIGNIFICANT CHANGE UP
PHOSPHATE SERPL-MCNC: 3.5 MG/DL — SIGNIFICANT CHANGE UP (ref 2.5–4.5)
PLATELET # BLD AUTO: 252 K/UL — SIGNIFICANT CHANGE UP (ref 150–400)
POTASSIUM SERPL-MCNC: 4.1 MMOL/L — SIGNIFICANT CHANGE UP (ref 3.5–5.3)
POTASSIUM SERPL-SCNC: 4.1 MMOL/L — SIGNIFICANT CHANGE UP (ref 3.5–5.3)
PROTHROM AB SERPL-ACNC: 13.1 SEC — SIGNIFICANT CHANGE UP (ref 10.6–13.6)
RBC # BLD: 4.1 M/UL — LOW (ref 4.2–5.8)
RBC # FLD: 13.5 % — SIGNIFICANT CHANGE UP (ref 10.3–14.5)
SODIUM SERPL-SCNC: 137 MMOL/L — SIGNIFICANT CHANGE UP (ref 135–145)
WBC # BLD: 7.79 K/UL — SIGNIFICANT CHANGE UP (ref 3.8–10.5)
WBC # FLD AUTO: 7.79 K/UL — SIGNIFICANT CHANGE UP (ref 3.8–10.5)

## 2022-01-25 PROCEDURE — 31654 BRONCH EBUS IVNTJ PERPH LES: CPT | Mod: GC

## 2022-01-25 PROCEDURE — 31645 BRNCHSC W/THER ASPIR 1ST: CPT | Mod: GC

## 2022-01-25 PROCEDURE — 31624 DX BRONCHOSCOPE/LAVAGE: CPT | Mod: GC

## 2022-01-25 PROCEDURE — 88188 FLOWCYTOMETRY/READ 9-15: CPT

## 2022-01-25 PROCEDURE — 31652 BRONCH EBUS SAMPLNG 1/2 NODE: CPT | Mod: GC

## 2022-01-25 PROCEDURE — 88112 CYTOPATH CELL ENHANCE TECH: CPT | Mod: 26,59

## 2022-01-25 PROCEDURE — 88342 IMHCHEM/IMCYTCHM 1ST ANTB: CPT | Mod: 26,59

## 2022-01-25 PROCEDURE — 88173 CYTOPATH EVAL FNA REPORT: CPT | Mod: 26

## 2022-01-25 PROCEDURE — 88341 IMHCHEM/IMCYTCHM EA ADD ANTB: CPT | Mod: 26

## 2022-01-25 PROCEDURE — 99232 SBSQ HOSP IP/OBS MODERATE 35: CPT | Mod: GC

## 2022-01-25 PROCEDURE — 88305 TISSUE EXAM BY PATHOLOGIST: CPT | Mod: 26

## 2022-01-25 PROCEDURE — 99233 SBSQ HOSP IP/OBS HIGH 50: CPT | Mod: GC,25

## 2022-01-25 PROCEDURE — 31629 BRONCHOSCOPY/NEEDLE BX EACH: CPT | Mod: GC

## 2022-01-25 RX ORDER — BENZOCAINE AND MENTHOL 5; 1 G/100ML; G/100ML
1 LIQUID ORAL
Refills: 0 | Status: DISCONTINUED | OUTPATIENT
Start: 2022-01-25 | End: 2022-01-26

## 2022-01-25 RX ORDER — SODIUM CHLORIDE 9 MG/ML
1000 INJECTION, SOLUTION INTRAVENOUS
Refills: 0 | Status: DISCONTINUED | OUTPATIENT
Start: 2022-01-25 | End: 2022-01-25

## 2022-01-25 RX ORDER — ONDANSETRON 8 MG/1
4 TABLET, FILM COATED ORAL ONCE
Refills: 0 | Status: DISCONTINUED | OUTPATIENT
Start: 2022-01-25 | End: 2022-01-25

## 2022-01-25 RX ADMIN — Medication 1: at 21:59

## 2022-01-25 RX ADMIN — LOSARTAN POTASSIUM 50 MILLIGRAM(S): 100 TABLET, FILM COATED ORAL at 06:14

## 2022-01-25 RX ADMIN — SODIUM CHLORIDE 75 MILLILITER(S): 9 INJECTION, SOLUTION INTRAVENOUS at 15:00

## 2022-01-25 RX ADMIN — ATORVASTATIN CALCIUM 80 MILLIGRAM(S): 80 TABLET, FILM COATED ORAL at 21:55

## 2022-01-25 RX ADMIN — FINASTERIDE 5 MILLIGRAM(S): 5 TABLET, FILM COATED ORAL at 12:13

## 2022-01-25 NOTE — BRIEF OPERATIVE NOTE - COMMENTS
Successful flexible bronchoscopy and endobronchial ultrasound with RLL BAL, station 7 lymph node biopsy, and RLL Mass transbronchial biopsies Successful flexible bronchoscopy and endobronchial ultrasound with RLL BAL, station 7 lymph node biopsy, and RLL Mass transbronchial biopsies    #[id number]

## 2022-01-25 NOTE — BRIEF OPERATIVE NOTE - OPERATION/FINDINGS
Flexible bronchoscopy with RLL bronchoalveolar lavage, Endobronchial Ultrasound with Station 7 lymph node biopsy and RLL mass transbronchial biopsies.

## 2022-01-25 NOTE — PROGRESS NOTE ADULT - PROBLEM SELECTOR PLAN 1
CTA chest with IV contrast with masslike consolidation in the right middle lobe and large consolidation in the right   lower lobe. Unclear etiology for consolidations, but possibly granulomatous disease of the lungs and less likely bacterial PNA.  - Pt with no fevers or leukocytosis. Low suspicion for bacterial PNA as cause for lung consolidations. Will monitor off abx for now.   blood cxs: BGTD  afb x4 negative  HIV negative  quant gold  pending    -f/u Pulm recs  -bronchoscopy and possible navigational bronchoscopy, EBUS planned for today @ 12:30  IR recs appreciated: Will plan for transbronchial biopsies, BAL. Will send for infectious workup and cytopathology

## 2022-01-25 NOTE — BRIEF OPERATIVE NOTE - NSICDXBRIEFPROCEDURE_GEN_ALL_CORE_FT
PROCEDURES:  Bronchoscopy, flexible 25-Jan-2022 14:01:05  Diomedes Rowley  Bronchoalveolar lavage 25-Jan-2022 14:01:17  Diomedes Rowlye  Bronchoscopy, with transbronchial single lobe lung biopsy 25-Jan-2022 14:01:46  Diomedes Rowley  Bronchoscopy, with EBUS and 1 or 2 lymph node sampling 25-Jan-2022 14:01:56  Diomedes Rowley

## 2022-01-25 NOTE — PROGRESS NOTE ADULT - ASSESSMENT
80 yo obese man, Urdu speaking, with history of BPH, HTN, and HLD presents with worsening shortness of breath, cough, chest tightness, and palpitations with abnormal CT chest showing small R hilar and parenchymal calcification consolidation with granulomatous disease, and CTA chest here with masslike consolidation in the right middle lobe and large consolidation in the right lower lobe.

## 2022-01-25 NOTE — PROGRESS NOTE ADULT - SUBJECTIVE AND OBJECTIVE BOX
PROGRESS NOTE:    Josie Lombardo MD  Internal Medicine PGY-1  [phone]/[facility] [id number]    Patient is a 81y old  Male who presents with a chief complaint of Abnormal CT Chest findings (24 Jan 2022 08:25)      SUBJECTIVE / OVERNIGHT EVENTS: No acute overnight events. Pt seen and examined. Denies fevers, chills, CP, SOB, Abdominal pain, N/V, Constipation, Diarrhea      MEDICATIONS  (STANDING):  atorvastatin 80 milliGRAM(s) Oral at bedtime  dextrose 40% Gel 15 Gram(s) Oral once  dextrose 5%. 1000 milliLiter(s) (50 mL/Hr) IV Continuous <Continuous>  dextrose 5%. 1000 milliLiter(s) (100 mL/Hr) IV Continuous <Continuous>  dextrose 50% Injectable 25 Gram(s) IV Push once  dextrose 50% Injectable 12.5 Gram(s) IV Push once  dextrose 50% Injectable 25 Gram(s) IV Push once  finasteride 5 milliGRAM(s) Oral daily  glucagon  Injectable 1 milliGRAM(s) IntraMuscular once  influenza  Vaccine (HIGH DOSE) 0.7 milliLiter(s) IntraMuscular once  insulin lispro (ADMELOG) corrective regimen sliding scale   SubCutaneous three times a day before meals  insulin lispro (ADMELOG) corrective regimen sliding scale   SubCutaneous at bedtime  losartan 50 milliGRAM(s) Oral daily    MEDICATIONS  (PRN):  acetaminophen     Tablet .. 650 milliGRAM(s) Oral every 6 hours PRN Temp greater or equal to 38C (100.4F), Mild Pain (1 - 3)  benzonatate 100 milliGRAM(s) Oral every 8 hours PRN Cough      I&O's Summary      Vital Signs Last 24 Hrs  T(C): 36.5 (25 Jan 2022 06:14), Max: 36.6 (24 Jan 2022 21:38)  T(F): 97.7 (25 Jan 2022 06:14), Max: 97.9 (24 Jan 2022 21:38)  HR: 67 (25 Jan 2022 06:14) (67 - 82)  BP: 146/66 (25 Jan 2022 06:14) (138/80 - 153/74)  BP(mean): --  RR: 16 (25 Jan 2022 06:14) (16 - 18)  SpO2: 95% (25 Jan 2022 06:14) (95% - 96%)    =================PHYSICAL EXAM=================    PHYSICAL EXAM:  GENERAL: NAD, lying in bed comfortably  HEAD:  Atraumatic, Normocephalic  EYES: EOMI, PERRLA, conjunctiva and sclera clear  ENT: Moist mucous membranes  NECK: Supple, No JVD  CHEST/LUNG: Clear to auscultation bilaterally; No rales, rhonchi, wheezing, or rubs. Unlabored respirations  HEART: Regular rate and rhythm; No murmurs, rubs, or gallops  ABDOMEN: Bowel sounds present; Soft, Nontender, Nondistended. No hepatomegally  EXTREMITIES:  2+ Peripheral Pulses, brisk capillary refill. No clubbing, cyanosis, or edema  NERVOUS SYSTEM:  Alert & Oriented X3, speech clear. No deficits   MSK: FROM all 4 extremities, full and equal strength  SKIN: No rashes or lesions    =================================================    LABS:                        12.6   9.64  )-----------( 267      ( 24 Jan 2022 07:22 )             38.4     Auto Eosinophil # x     / Auto Eosinophil % x     / Auto Neutrophil # x     / Auto Neutrophil % x     / BANDS % x                            12.9   9.31  )-----------( 277      ( 23 Jan 2022 10:01 )             38.6     Auto Eosinophil # x     / Auto Eosinophil % x     / Auto Neutrophil # x     / Auto Neutrophil % x     / BANDS % x        01-24    135  |  103  |  30<H>  ----------------------------<  93  4.5   |  21<L>  |  1.45<H>  01-23    137  |  104  |  26<H>  ----------------------------<  160<H>  4.4   |  22  |  1.39<H>    Ca    9.3      24 Jan 2022 07:22  Mg     2.50     01-24  Phos  3.6     01-24  TPro  7.7  /  Alb  3.8  /  TBili  0.3  /  DBili  x   /  AST  21  /  ALT  35  /  AlkPhos  119  01-24  TPro  7.9  /  Alb  3.8  /  TBili  0.3  /  DBili  x   /  AST  18  /  ALT  31  /  AlkPhos  129<H>  01-23                  RADIOLOGY & ADDITIONAL TESTS:    Imaging Personally Reviewed:    Consultant(s) Notes Reviewed:      Care Discussed with Consultants/Other Providers:   PROGRESS NOTE:    Josie Lombardo MD  Internal Medicine PGY-1  [phone]/[facility] [id number]    Patient is a 81y old  Male who presents with a chief complaint of Abnormal CT Chest findings (24 Jan 2022 08:25)      SUBJECTIVE / OVERNIGHT EVENTS: No acute overnight events. Pt seen and examined. He reports that his cough is better today. Otherwise he has no complaints. Denies fevers, chills, CP, SOB, Abdominal pain.       MEDICATIONS  (STANDING):  atorvastatin 80 milliGRAM(s) Oral at bedtime  dextrose 40% Gel 15 Gram(s) Oral once  dextrose 5%. 1000 milliLiter(s) (50 mL/Hr) IV Continuous <Continuous>  dextrose 5%. 1000 milliLiter(s) (100 mL/Hr) IV Continuous <Continuous>  dextrose 50% Injectable 25 Gram(s) IV Push once  dextrose 50% Injectable 12.5 Gram(s) IV Push once  dextrose 50% Injectable 25 Gram(s) IV Push once  finasteride 5 milliGRAM(s) Oral daily  glucagon  Injectable 1 milliGRAM(s) IntraMuscular once  influenza  Vaccine (HIGH DOSE) 0.7 milliLiter(s) IntraMuscular once  insulin lispro (ADMELOG) corrective regimen sliding scale   SubCutaneous three times a day before meals  insulin lispro (ADMELOG) corrective regimen sliding scale   SubCutaneous at bedtime  losartan 50 milliGRAM(s) Oral daily    MEDICATIONS  (PRN):  acetaminophen     Tablet .. 650 milliGRAM(s) Oral every 6 hours PRN Temp greater or equal to 38C (100.4F), Mild Pain (1 - 3)  benzonatate 100 milliGRAM(s) Oral every 8 hours PRN Cough      I&O's Summary      Vital Signs Last 24 Hrs  T(C): 36.5 (25 Jan 2022 06:14), Max: 36.6 (24 Jan 2022 21:38)  T(F): 97.7 (25 Jan 2022 06:14), Max: 97.9 (24 Jan 2022 21:38)  HR: 67 (25 Jan 2022 06:14) (67 - 82)  BP: 146/66 (25 Jan 2022 06:14) (138/80 - 153/74)  BP(mean): --  RR: 16 (25 Jan 2022 06:14) (16 - 18)  SpO2: 95% (25 Jan 2022 06:14) (95% - 96%)    =================PHYSICAL EXAM=================    GENERAL: Older gentleman sitting up in chair in NAD  HEAD:  Atraumatic, Normocephalic  EYES: EOMI, PERRLA, conjunctiva and sclera clear  ENT: Moist mucous membranes  CHEST/LUNG: Clear to auscultation bilaterally; No rales, rhonchi, wheezing, or rubs  HEART: Regular rate and rhythm; No murmurs, rubs, or gallops  ABDOMEN: Bowel sounds present; Soft, Nontender, Nondistended. No hepatomegaly  EXTREMITIES:  2+ Peripheral Pulses, brisk capillary refill. No clubbing, cyanosis, or edema  NERVOUS SYSTEM:  Alert & Oriented X3, speech clear. No deficits   MSK: FROM all 4 extremities, full and equal strength      =================================================    LABS:                        12.6   9.64  )-----------( 267      ( 24 Jan 2022 07:22 )             38.4     Auto Eosinophil # x     / Auto Eosinophil % x     / Auto Neutrophil # x     / Auto Neutrophil % x     / BANDS % x                            12.9   9.31  )-----------( 277      ( 23 Jan 2022 10:01 )             38.6     Auto Eosinophil # x     / Auto Eosinophil % x     / Auto Neutrophil # x     / Auto Neutrophil % x     / BANDS % x        01-24    135  |  103  |  30<H>  ----------------------------<  93  4.5   |  21<L>  |  1.45<H>  01-23    137  |  104  |  26<H>  ----------------------------<  160<H>  4.4   |  22  |  1.39<H>    Ca    9.3      24 Jan 2022 07:22  Mg     2.50     01-24  Phos  3.6     01-24  TPro  7.7  /  Alb  3.8  /  TBili  0.3  /  DBili  x   /  AST  21  /  ALT  35  /  AlkPhos  119  01-24  TPro  7.9  /  Alb  3.8  /  TBili  0.3  /  DBili  x   /  AST  18  /  ALT  31  /  AlkPhos  129<H>  01-23                  RADIOLOGY & ADDITIONAL TESTS:    Imaging Personally Reviewed:    Consultant(s) Notes Reviewed:      Care Discussed with Consultants/Other Providers:

## 2022-01-25 NOTE — PROGRESS NOTE ADULT - SUBJECTIVE AND OBJECTIVE BOX
CHIEF COMPLAINT: Abnormal CT Chest    Interval Events: No acute events, planned for bronchoscopy today. Still with cough.    OBJECTIVE:  ICU Vital Signs Last 24 Hrs  T(C): 36.5 (25 Jan 2022 06:14), Max: 36.6 (24 Jan 2022 21:38)  T(F): 97.7 (25 Jan 2022 06:14), Max: 97.9 (24 Jan 2022 21:38)  HR: 78 (25 Jan 2022 10:05) (67 - 78)  BP: 136/76 (25 Jan 2022 10:05) (136/76 - 146/71)  BP(mean): --  ABP: --  ABP(mean): --  RR: 17 (25 Jan 2022 10:05) (16 - 17)  SpO2: 96% (25 Jan 2022 10:05) (95% - 96%)        CAPILLARY BLOOD GLUCOSE      POCT Blood Glucose.: 89 mg/dL (25 Jan 2022 06:04)      PHYSICAL EXAM:  General: Awake, alert, oriented X 3.   HEENT: Atraumatic, normocephalic.   Lymph Nodes: No palpable lymphadenopathy  Neck: No JVD. No carotid bruit.   Respiratory: Normal chest expansion. Decreased BS at R base.  Cardiovascular: S1 S2 normal. No murmurs, rubs or gallops.   Abdomen: Soft, non-tender, non-distended. No organomegaly.  Extremities: Warm to touch. Peripheral pulse palpable. No pedal edema.   Skin: No rashes or skin lesions  Neurological: Motor and sensory examination equal and normal in all four extremities.  Psychiatry: Appropriate mood and affect.    HOSPITAL MEDICATIONS:  MEDICATIONS  (STANDING):  atorvastatin 80 milliGRAM(s) Oral at bedtime  dextrose 40% Gel 15 Gram(s) Oral once  dextrose 5%. 1000 milliLiter(s) (50 mL/Hr) IV Continuous <Continuous>  dextrose 5%. 1000 milliLiter(s) (100 mL/Hr) IV Continuous <Continuous>  dextrose 50% Injectable 25 Gram(s) IV Push once  dextrose 50% Injectable 12.5 Gram(s) IV Push once  dextrose 50% Injectable 25 Gram(s) IV Push once  finasteride 5 milliGRAM(s) Oral daily  glucagon  Injectable 1 milliGRAM(s) IntraMuscular once  influenza  Vaccine (HIGH DOSE) 0.7 milliLiter(s) IntraMuscular once  insulin lispro (ADMELOG) corrective regimen sliding scale   SubCutaneous three times a day before meals  insulin lispro (ADMELOG) corrective regimen sliding scale   SubCutaneous at bedtime  losartan 50 milliGRAM(s) Oral daily    MEDICATIONS  (PRN):  acetaminophen     Tablet .. 650 milliGRAM(s) Oral every 6 hours PRN Temp greater or equal to 38C (100.4F), Mild Pain (1 - 3)  benzonatate 100 milliGRAM(s) Oral every 8 hours PRN Cough      LABS:                        11.7   7.79  )-----------( 252      ( 25 Jan 2022 07:20 )             36.3     01-25    137  |  105  |  25<H>  ----------------------------<  86  4.1   |  20<L>  |  1.31<H>    Ca    9.0      25 Jan 2022 07:20  Phos  3.5     01-25  Mg     2.20     01-25    TPro  7.7  /  Alb  3.8  /  TBili  0.3  /  DBili  x   /  AST  21  /  ALT  35  /  AlkPhos  119  01-24    PT/INR - ( 25 Jan 2022 07:20 )   PT: 13.1 sec;   INR: 1.16 ratio         PTT - ( 25 Jan 2022 07:20 )  PTT:32.7 sec          MICROBIOLOGY:     RADIOLOGY:  [ ] Reviewed and interpreted by me    Point of Care Ultrasound Findings:    PFT:    EKG: Interventional Pulmonology and Bronchoscopy Progress Note    CHIEF COMPLAINT: Abnormal CT Chest    Interval Events: No acute events, planned for bronchoscopy today. Still with cough.    OBJECTIVE:  ICU Vital Signs Last 24 Hrs  T(C): 36.5 (25 Jan 2022 06:14), Max: 36.6 (24 Jan 2022 21:38)  T(F): 97.7 (25 Jan 2022 06:14), Max: 97.9 (24 Jan 2022 21:38)  HR: 78 (25 Jan 2022 10:05) (67 - 78)  BP: 136/76 (25 Jan 2022 10:05) (136/76 - 146/71)  BP(mean): --  ABP: --  ABP(mean): --  RR: 17 (25 Jan 2022 10:05) (16 - 17)  SpO2: 96% (25 Jan 2022 10:05) (95% - 96%)        CAPILLARY BLOOD GLUCOSE      POCT Blood Glucose.: 89 mg/dL (25 Jan 2022 06:04)      PHYSICAL EXAM:  General: Awake, alert, oriented X 3.   HEENT: Atraumatic, normocephalic.   Lymph Nodes: No palpable lymphadenopathy  Neck: No JVD. No carotid bruit.   Respiratory: Normal chest expansion. Decreased BS at R base.  Cardiovascular: S1 S2 normal. No murmurs, rubs or gallops.   Abdomen: Soft, non-tender, non-distended. No organomegaly.  Extremities: Warm to touch. Peripheral pulse palpable. No pedal edema.   Skin: No rashes or skin lesions  Neurological: Motor and sensory examination equal and normal in all four extremities.  Psychiatry: Appropriate mood and affect.    HOSPITAL MEDICATIONS:  MEDICATIONS  (STANDING):  atorvastatin 80 milliGRAM(s) Oral at bedtime  dextrose 40% Gel 15 Gram(s) Oral once  dextrose 5%. 1000 milliLiter(s) (50 mL/Hr) IV Continuous <Continuous>  dextrose 5%. 1000 milliLiter(s) (100 mL/Hr) IV Continuous <Continuous>  dextrose 50% Injectable 25 Gram(s) IV Push once  dextrose 50% Injectable 12.5 Gram(s) IV Push once  dextrose 50% Injectable 25 Gram(s) IV Push once  finasteride 5 milliGRAM(s) Oral daily  glucagon  Injectable 1 milliGRAM(s) IntraMuscular once  influenza  Vaccine (HIGH DOSE) 0.7 milliLiter(s) IntraMuscular once  insulin lispro (ADMELOG) corrective regimen sliding scale   SubCutaneous three times a day before meals  insulin lispro (ADMELOG) corrective regimen sliding scale   SubCutaneous at bedtime  losartan 50 milliGRAM(s) Oral daily    MEDICATIONS  (PRN):  acetaminophen     Tablet .. 650 milliGRAM(s) Oral every 6 hours PRN Temp greater or equal to 38C (100.4F), Mild Pain (1 - 3)  benzonatate 100 milliGRAM(s) Oral every 8 hours PRN Cough      LABS:                        11.7   7.79  )-----------( 252      ( 25 Jan 2022 07:20 )             36.3     01-25    137  |  105  |  25<H>  ----------------------------<  86  4.1   |  20<L>  |  1.31<H>    Ca    9.0      25 Jan 2022 07:20  Phos  3.5     01-25  Mg     2.20     01-25    TPro  7.7  /  Alb  3.8  /  TBili  0.3  /  DBili  x   /  AST  21  /  ALT  35  /  AlkPhos  119  01-24    PT/INR - ( 25 Jan 2022 07:20 )   PT: 13.1 sec;   INR: 1.16 ratio         PTT - ( 25 Jan 2022 07:20 )  PTT:32.7 sec          MICROBIOLOGY:     RADIOLOGY:  [ ] Reviewed and interpreted by me    Point of Care Ultrasound Findings:    PFT:    EKG:

## 2022-01-25 NOTE — PROGRESS NOTE ADULT - ASSESSMENT
80YO [place] Male never smoker PMH BPH, HTN, and HLD who presented with 1yr of nonproductive cough with worsening SOB x3 weeks and outside CT chest showing granulomas and consolidations. Interventional pulmonary consulted for bronchoscopic evaluation of RLL mass like consolidation w/ associated SOB.     #RLL mass  - CT chest on this admission showing RML and RLL mass like consolidation. No calcified granulomas  - AFB negative x3  - Planned for flexible bronchoscopy and possible navigational bronchoscopy, EBUS with Dr. Moran today, 1/25  - Will plan for transbronchial biopsies, BAL. Will send for infectious workup and cytopathology    Diomedes Rowley, PGY-6  Pulmonary and Critical Care Medicine  [id number]

## 2022-01-25 NOTE — PROGRESS NOTE ADULT - PROBLEM SELECTOR PLAN 2
Pt intially with SOB on exertion, now also with SOB at rest. Also with nearly 1 year of nonproductive cough. Pt currently maintaining SpO2 94-97% on RA. VBG with no hypercapnia. Likely in setting of lung consolidations. COVID-19 negative. Hs-trops 18 -> 18. Pro-.   - Plan as above for lung consolidation  - CTA chest with IV contrast with no PE, Large right lower lobe consolidation and right middle lobe masslike; consolidation which may reflect pneumonia,   rule out TB   TTE - EF: 68%; Mild diastolic dysfunction  afb x4 negative  HIV negative    - Supplemental O2 PRn  -f/u QuantiFeron   - Bronchoscopy planned for  today 1/25 @ 12:30  -ID recs appreciated: monitor of antibiotics   -Airbone precautions/ isolation

## 2022-01-26 ENCOUNTER — TRANSCRIPTION ENCOUNTER (OUTPATIENT)
Age: 82
End: 2022-01-26

## 2022-01-26 VITALS
SYSTOLIC BLOOD PRESSURE: 141 MMHG | TEMPERATURE: 97 F | RESPIRATION RATE: 16 BRPM | OXYGEN SATURATION: 96 % | DIASTOLIC BLOOD PRESSURE: 66 MMHG | HEART RATE: 74 BPM

## 2022-01-26 LAB
ANION GAP SERPL CALC-SCNC: 12 MMOL/L — SIGNIFICANT CHANGE UP (ref 7–14)
BUN SERPL-MCNC: 35 MG/DL — HIGH (ref 7–23)
CALCIUM SERPL-MCNC: 9.3 MG/DL — SIGNIFICANT CHANGE UP (ref 8.4–10.5)
CHLORIDE SERPL-SCNC: 105 MMOL/L — SIGNIFICANT CHANGE UP (ref 98–107)
CO2 SERPL-SCNC: 19 MMOL/L — LOW (ref 22–31)
CREAT SERPL-MCNC: 1.39 MG/DL — HIGH (ref 0.5–1.3)
GLUCOSE SERPL-MCNC: 152 MG/DL — HIGH (ref 70–99)
HCT VFR BLD CALC: 36.9 % — LOW (ref 39–50)
HGB BLD-MCNC: 12 G/DL — LOW (ref 13–17)
MAGNESIUM SERPL-MCNC: 2.2 MG/DL — SIGNIFICANT CHANGE UP (ref 1.6–2.6)
MCHC RBC-ENTMCNC: 28 PG — SIGNIFICANT CHANGE UP (ref 27–34)
MCHC RBC-ENTMCNC: 32.5 GM/DL — SIGNIFICANT CHANGE UP (ref 32–36)
MCV RBC AUTO: 86 FL — SIGNIFICANT CHANGE UP (ref 80–100)
NRBC # BLD: 0 /100 WBCS — SIGNIFICANT CHANGE UP
NRBC # FLD: 0 K/UL — SIGNIFICANT CHANGE UP
PHOSPHATE SERPL-MCNC: 3 MG/DL — SIGNIFICANT CHANGE UP (ref 2.5–4.5)
PLATELET # BLD AUTO: 299 K/UL — SIGNIFICANT CHANGE UP (ref 150–400)
POTASSIUM SERPL-MCNC: 4.8 MMOL/L — SIGNIFICANT CHANGE UP (ref 3.5–5.3)
POTASSIUM SERPL-SCNC: 4.8 MMOL/L — SIGNIFICANT CHANGE UP (ref 3.5–5.3)
RBC # BLD: 4.29 M/UL — SIGNIFICANT CHANGE UP (ref 4.2–5.8)
RBC # FLD: 13.5 % — SIGNIFICANT CHANGE UP (ref 10.3–14.5)
SODIUM SERPL-SCNC: 136 MMOL/L — SIGNIFICANT CHANGE UP (ref 135–145)
WBC # BLD: 15.65 K/UL — HIGH (ref 3.8–10.5)
WBC # FLD AUTO: 15.65 K/UL — HIGH (ref 3.8–10.5)

## 2022-01-26 PROCEDURE — 99233 SBSQ HOSP IP/OBS HIGH 50: CPT

## 2022-01-26 PROCEDURE — 99239 HOSP IP/OBS DSCHRG MGMT >30: CPT

## 2022-01-26 RX ORDER — ENOXAPARIN SODIUM 100 MG/ML
40 INJECTION SUBCUTANEOUS DAILY
Refills: 0 | Status: DISCONTINUED | OUTPATIENT
Start: 2022-01-26 | End: 2022-01-26

## 2022-01-26 RX ADMIN — Medication 100 MILLIGRAM(S): at 12:05

## 2022-01-26 RX ADMIN — LOSARTAN POTASSIUM 50 MILLIGRAM(S): 100 TABLET, FILM COATED ORAL at 06:23

## 2022-01-26 RX ADMIN — FINASTERIDE 5 MILLIGRAM(S): 5 TABLET, FILM COATED ORAL at 11:44

## 2022-01-26 RX ADMIN — BENZOCAINE AND MENTHOL 1 LOZENGE: 5; 1 LIQUID ORAL at 12:33

## 2022-01-26 RX ADMIN — ENOXAPARIN SODIUM 40 MILLIGRAM(S): 100 INJECTION SUBCUTANEOUS at 12:11

## 2022-01-26 NOTE — DISCHARGE NOTE NURSING/CASE MANAGEMENT/SOCIAL WORK - NSDCVIVACCINE_GEN_ALL_CORE_FT
Tdap; 02-Oct-2017 21:58; Nica Joyce (RN); [employer]; [id number]; IntraMuscular; Deltoid Right.; 0.5 milliLiter(s); VIS (VIS Published: 09-May-2013, VIS Presented: 02-Oct-2017);

## 2022-01-26 NOTE — PROGRESS NOTE ADULT - PROBLEM SELECTOR PLAN 7
No S/S of urinary retention.  - C/w finasteride

## 2022-01-26 NOTE — DISCHARGE NOTE PROVIDER - PROVIDER TOKENS
PROVIDER:[TOKEN:[[id number]]],PROVIDER:[TOKEN:[[id number]]],PROVIDER:[TOKEN:[[id number]]] PROVIDER:[TOKEN:[[id number]]],PROVIDER:[TOKEN:[[id number]]],PROVIDER:[TOKEN:[[id number]]],PROVIDER:[TOKEN:[[id number]]]

## 2022-01-26 NOTE — PROGRESS NOTE ADULT - PROBLEM SELECTOR PLAN 5
BPs elevated to 150s  now 130  Pt on telmisartan at home.  Hold telmisartan for now   -ctm   -consider restarting tomorrow
BPs elevated to 150s  now 130  Pt on telmisartan at home.  Hold telmisartan for now in setting of julita    -ctm
BPs elevated to 150s  now 130  Pt on telmisartan at home.  Hold telmisartan for now in setting of julita    -ctm
BPs elevated to 150s  now 130  Pt on telmisartan at home.  Hold telmisartan for now   -ctm   -consider restarting tomorrow
BPs elevated to 150s  now 130  Pt on telmisartan at home.  Hold telmisartan for now in setting of julita    -ctm

## 2022-01-26 NOTE — PROGRESS NOTE ADULT - SUBJECTIVE AND OBJECTIVE BOX
INCOMPLETE    SUBJECTIVE/OVERNIGHT EVENTS:  S/p bronchoscopy yesterday    14 point ROS negative except as noted above      OBJECTIVE:  ICU Vital Signs Last 24 Hrs  T(C): 36.3 (26 Jan 2022 06:23), Max: 36.4 (25 Jan 2022 22:04)  T(F): 97.3 (26 Jan 2022 06:23), Max: 97.6 (25 Jan 2022 22:04)  HR: 74 (26 Jan 2022 06:23) (74 - 92)  BP: 141/74 (26 Jan 2022 06:23) (120/60 - 154/65)  BP(mean): 86 (25 Jan 2022 16:15) (73 - 87)  ABP: --  ABP(mean): --  RR: 16 (26 Jan 2022 06:23) (16 - 20)  SpO2: 95% (26 Jan 2022 06:23) (90% - 100%)        01-25 @ 07:01  -  01-26 @ 07:00  --------------------------------------------------------  IN: 250 mL / OUT: 0 mL / NET: 250 mL      CAPILLARY BLOOD GLUCOSE      POCT Blood Glucose.: 265 mg/dL (25 Jan 2022 21:52)      PHYSICAL EXAM:  General: awake and alert, nontoxic appearing male sitting up in chair  HEENT: NC/AT, EOMI b/l, conjunctiva normal, MMM  Lymph Nodes: no cervical LAD  Neck: supple. full range of motion  Respiratory: decreased breath sounds at R lung base, no w/r/c, appears comfortable on room air, no conversational dyspnea or accessory muscle use  Cardiovascular: S1 S2 present, RRR, no m/r/g  Abdomen: soft, NT/ND, +BS  Extremities: no c/c/e  Skin: no rashes or lesions noted  Neurological: AAOx3, no focal deficits  Psychiatry: calm, cooperative    LINES:     MEDICATIONS  (STANDING):  atorvastatin 80 milliGRAM(s) Oral at bedtime  dextrose 40% Gel 15 Gram(s) Oral once  dextrose 5%. 1000 milliLiter(s) (50 mL/Hr) IV Continuous <Continuous>  dextrose 5%. 1000 milliLiter(s) (100 mL/Hr) IV Continuous <Continuous>  dextrose 50% Injectable 25 Gram(s) IV Push once  dextrose 50% Injectable 12.5 Gram(s) IV Push once  dextrose 50% Injectable 25 Gram(s) IV Push once  finasteride 5 milliGRAM(s) Oral daily  glucagon  Injectable 1 milliGRAM(s) IntraMuscular once  influenza  Vaccine (HIGH DOSE) 0.7 milliLiter(s) IntraMuscular once  insulin lispro (ADMELOG) corrective regimen sliding scale   SubCutaneous three times a day before meals  insulin lispro (ADMELOG) corrective regimen sliding scale   SubCutaneous at bedtime  losartan 50 milliGRAM(s) Oral daily    MEDICATIONS  (PRN):  acetaminophen     Tablet .. 650 milliGRAM(s) Oral every 6 hours PRN Temp greater or equal to 38C (100.4F), Mild Pain (1 - 3)  benzocaine 15 mG/menthol 3.6 mG Lozenge 1 Lozenge Oral four times a day PRN Sore Throat  benzonatate 100 milliGRAM(s) Oral every 8 hours PRN Cough      LABS:                        11.7   7.79  )-----------( 252      ( 25 Jan 2022 07:20 )             36.3     Hgb Trend: 11.7<--, 12.6<--, 12.9<--, 11.8<--, 12.3<--  01-25    137  |  105  |  25<H>  ----------------------------<  86  4.1   |  20<L>  |  1.31<H>    Ca    9.0      25 Jan 2022 07:20  Phos  3.5     01-25  Mg     2.20     01-25      Creatinine Trend: 1.31<--, 1.45<--, 1.39<--, 1.26<--, 1.26<--  PT/INR - ( 25 Jan 2022 07:20 )   PT: 13.1 sec;   INR: 1.16 ratio         PTT - ( 25 Jan 2022 07:20 )  PTT:32.7 sec          MICROBIOLOGY:     Culture - Acid Fast - Sputum w/Smear (collected 23 Jan 2022 10:21)  Source: .Sputum Sputum    Culture - Acid Fast - Sputum w/Smear (collected 22 Jan 2022 22:54)  Source: .Sputum Sputum    Culture - Acid Fast - Sputum w/Smear (collected 22 Jan 2022 14:47)  Source: .Sputum Sputum    Culture - Blood (collected 22 Jan 2022 01:07)  Source: .Blood Blood-Peripheral  Preliminary Report (23 Jan 2022 02:01):    No growth to date.    Culture - Blood (collected 22 Jan 2022 01:07)  Source: .Blood Blood-Peripheral  Preliminary Report (23 Jan 2022 02:01):    No growth to date.        RADIOLOGY:  [ ] Reviewed and interpreted by me    PULMONARY FUNCTION TESTS:    EKG: INCOMPLETE    SUBJECTIVE/OVERNIGHT EVENTS:  S/p bronchoscopy yesterday  No overnight events  Feels well today, has no complaints  Denies f/c, cough, SOB, chest pain, pleuritic pain, sputum production    14 point ROS negative except as noted above      OBJECTIVE:  ICU Vital Signs Last 24 Hrs  T(C): 36.3 (26 Jan 2022 06:23), Max: 36.4 (25 Jan 2022 22:04)  T(F): 97.3 (26 Jan 2022 06:23), Max: 97.6 (25 Jan 2022 22:04)  HR: 74 (26 Jan 2022 06:23) (74 - 92)  BP: 141/74 (26 Jan 2022 06:23) (120/60 - 154/65)  BP(mean): 86 (25 Jan 2022 16:15) (73 - 87)  ABP: --  ABP(mean): --  RR: 16 (26 Jan 2022 06:23) (16 - 20)  SpO2: 95% (26 Jan 2022 06:23) (90% - 100%)        01-25 @ 07:01  -  01-26 @ 07:00  --------------------------------------------------------  IN: 250 mL / OUT: 0 mL / NET: 250 mL      CAPILLARY BLOOD GLUCOSE      POCT Blood Glucose.: 265 mg/dL (25 Jan 2022 21:52)      PHYSICAL EXAM:  General: awake and alert, nontoxic appearing male sitting up in chair  HEENT: NC/AT, EOMI b/l, conjunctiva normal, MMM  Lymph Nodes: no cervical LAD  Neck: supple. full range of motion  Respiratory: decreased breath sounds at R lung base, no w/r/c, appears comfortable on room air, no conversational dyspnea or accessory muscle use  Cardiovascular: S1 S2 present, RRR, no m/r/g  Abdomen: soft, NT/ND, +BS  Extremities: no c/c/e  Skin: no rashes or lesions noted  Neurological: AAOx3, no focal deficits  Psychiatry: calm, cooperative    LINES:     MEDICATIONS  (STANDING):  atorvastatin 80 milliGRAM(s) Oral at bedtime  dextrose 40% Gel 15 Gram(s) Oral once  dextrose 5%. 1000 milliLiter(s) (50 mL/Hr) IV Continuous <Continuous>  dextrose 5%. 1000 milliLiter(s) (100 mL/Hr) IV Continuous <Continuous>  dextrose 50% Injectable 25 Gram(s) IV Push once  dextrose 50% Injectable 12.5 Gram(s) IV Push once  dextrose 50% Injectable 25 Gram(s) IV Push once  finasteride 5 milliGRAM(s) Oral daily  glucagon  Injectable 1 milliGRAM(s) IntraMuscular once  influenza  Vaccine (HIGH DOSE) 0.7 milliLiter(s) IntraMuscular once  insulin lispro (ADMELOG) corrective regimen sliding scale   SubCutaneous three times a day before meals  insulin lispro (ADMELOG) corrective regimen sliding scale   SubCutaneous at bedtime  losartan 50 milliGRAM(s) Oral daily    MEDICATIONS  (PRN):  acetaminophen     Tablet .. 650 milliGRAM(s) Oral every 6 hours PRN Temp greater or equal to 38C (100.4F), Mild Pain (1 - 3)  benzocaine 15 mG/menthol 3.6 mG Lozenge 1 Lozenge Oral four times a day PRN Sore Throat  benzonatate 100 milliGRAM(s) Oral every 8 hours PRN Cough      LABS:                        11.7   7.79  )-----------( 252      ( 25 Jan 2022 07:20 )             36.3     Hgb Trend: 11.7<--, 12.6<--, 12.9<--, 11.8<--, 12.3<--  01-25    137  |  105  |  25<H>  ----------------------------<  86  4.1   |  20<L>  |  1.31<H>    Ca    9.0      25 Jan 2022 07:20  Phos  3.5     01-25  Mg     2.20     01-25      Creatinine Trend: 1.31<--, 1.45<--, 1.39<--, 1.26<--, 1.26<--  PT/INR - ( 25 Jan 2022 07:20 )   PT: 13.1 sec;   INR: 1.16 ratio         PTT - ( 25 Jan 2022 07:20 )  PTT:32.7 sec          MICROBIOLOGY:     Culture - Acid Fast - Sputum w/Smear (collected 23 Jan 2022 10:21)  Source: .Sputum Sputum    Culture - Acid Fast - Sputum w/Smear (collected 22 Jan 2022 22:54)  Source: .Sputum Sputum    Culture - Acid Fast - Sputum w/Smear (collected 22 Jan 2022 14:47)  Source: .Sputum Sputum    Culture - Blood (collected 22 Jan 2022 01:07)  Source: .Blood Blood-Peripheral  Preliminary Report (23 Jan 2022 02:01):    No growth to date.    Culture - Blood (collected 22 Jan 2022 01:07)  Source: .Blood Blood-Peripheral  Preliminary Report (23 Jan 2022 02:01):    No growth to date.        RADIOLOGY:  [ ] Reviewed and interpreted by me    PULMONARY FUNCTION TESTS:    EKG: SUBJECTIVE/OVERNIGHT EVENTS:  S/p bronchoscopy yesterday  No overnight events  Feels well today, has no complaints  Denies f/c, cough, SOB, chest pain, pleuritic pain, sputum production    14 point ROS negative except as noted above      OBJECTIVE:  ICU Vital Signs Last 24 Hrs  T(C): 36.3 (26 Jan 2022 06:23), Max: 36.4 (25 Jan 2022 22:04)  T(F): 97.3 (26 Jan 2022 06:23), Max: 97.6 (25 Jan 2022 22:04)  HR: 74 (26 Jan 2022 06:23) (74 - 92)  BP: 141/74 (26 Jan 2022 06:23) (120/60 - 154/65)  BP(mean): 86 (25 Jan 2022 16:15) (73 - 87)  ABP: --  ABP(mean): --  RR: 16 (26 Jan 2022 06:23) (16 - 20)  SpO2: 95% (26 Jan 2022 06:23) (90% - 100%)        01-25 @ 07:01  -  01-26 @ 07:00  --------------------------------------------------------  IN: 250 mL / OUT: 0 mL / NET: 250 mL      CAPILLARY BLOOD GLUCOSE      POCT Blood Glucose.: 265 mg/dL (25 Jan 2022 21:52)      PHYSICAL EXAM:  General: awake and alert, nontoxic appearing male sitting up in chair  HEENT: NC/AT, EOMI b/l, conjunctiva normal, MMM  Lymph Nodes: no cervical LAD  Neck: supple. full range of motion  Respiratory: decreased breath sounds at R lung base, no w/r/c, appears comfortable on room air, no conversational dyspnea or accessory muscle use  Cardiovascular: S1 S2 present, RRR, no m/r/g  Abdomen: soft, NT/ND, +BS  Extremities: no c/c/e  Skin: no rashes or lesions noted  Neurological: AAOx3, no focal deficits  Psychiatry: calm, cooperative    LINES:     MEDICATIONS  (STANDING):  atorvastatin 80 milliGRAM(s) Oral at bedtime  dextrose 40% Gel 15 Gram(s) Oral once  dextrose 5%. 1000 milliLiter(s) (50 mL/Hr) IV Continuous <Continuous>  dextrose 5%. 1000 milliLiter(s) (100 mL/Hr) IV Continuous <Continuous>  dextrose 50% Injectable 25 Gram(s) IV Push once  dextrose 50% Injectable 12.5 Gram(s) IV Push once  dextrose 50% Injectable 25 Gram(s) IV Push once  finasteride 5 milliGRAM(s) Oral daily  glucagon  Injectable 1 milliGRAM(s) IntraMuscular once  influenza  Vaccine (HIGH DOSE) 0.7 milliLiter(s) IntraMuscular once  insulin lispro (ADMELOG) corrective regimen sliding scale   SubCutaneous three times a day before meals  insulin lispro (ADMELOG) corrective regimen sliding scale   SubCutaneous at bedtime  losartan 50 milliGRAM(s) Oral daily    MEDICATIONS  (PRN):  acetaminophen     Tablet .. 650 milliGRAM(s) Oral every 6 hours PRN Temp greater or equal to 38C (100.4F), Mild Pain (1 - 3)  benzocaine 15 mG/menthol 3.6 mG Lozenge 1 Lozenge Oral four times a day PRN Sore Throat  benzonatate 100 milliGRAM(s) Oral every 8 hours PRN Cough      LABS:                        11.7   7.79  )-----------( 252      ( 25 Jan 2022 07:20 )             36.3     Hgb Trend: 11.7<--, 12.6<--, 12.9<--, 11.8<--, 12.3<--  01-25    137  |  105  |  25<H>  ----------------------------<  86  4.1   |  20<L>  |  1.31<H>    Ca    9.0      25 Jan 2022 07:20  Phos  3.5     01-25  Mg     2.20     01-25      Creatinine Trend: 1.31<--, 1.45<--, 1.39<--, 1.26<--, 1.26<--  PT/INR - ( 25 Jan 2022 07:20 )   PT: 13.1 sec;   INR: 1.16 ratio         PTT - ( 25 Jan 2022 07:20 )  PTT:32.7 sec          MICROBIOLOGY:     Culture - Acid Fast - Sputum w/Smear (collected 23 Jan 2022 10:21)  Source: .Sputum Sputum    Culture - Acid Fast - Sputum w/Smear (collected 22 Jan 2022 22:54)  Source: .Sputum Sputum    Culture - Acid Fast - Sputum w/Smear (collected 22 Jan 2022 14:47)  Source: .Sputum Sputum    Culture - Blood (collected 22 Jan 2022 01:07)  Source: .Blood Blood-Peripheral  Preliminary Report (23 Jan 2022 02:01):    No growth to date.    Culture - Blood (collected 22 Jan 2022 01:07)  Source: .Blood Blood-Peripheral  Preliminary Report (23 Jan 2022 02:01):    No growth to date.        RADIOLOGY:  [ ] Reviewed and interpreted by me    PULMONARY FUNCTION TESTS:    EKG:

## 2022-01-26 NOTE — PROGRESS NOTE ADULT - TIME BILLING
risks and benefits of the procedure, alternative procedures as well as further management plan. Complex patient requiring services. Services provided were separate in additional from general pulmonary services due to critical nature of patient and interventions requires. Time spent separate from time spent doing any procedures.
reviewing chart and coordinating care with primary team/staff, as well as reviewing vitals, radiology, medication list, recent labs, and prior records.
reviewing chart and coordinating care with primary team/staff, as well as reviewing vitals, radiology, medication list, recent labs, and prior records. v

## 2022-01-26 NOTE — PROGRESS NOTE ADULT - ASSESSMENT
80YO Sinhala Male never smoker PMH BPH, HTN, and HLD who presented with 1yr of nonproductive cough with worsening SOB x3 weeks and outside CT chest showing granulomas and consolidations. Pulmonary consulted for RLL mass like consolidation w/ associated SOB.     #RLL mass  - CT chest on this admission showing RML and RLL mass like consolidation. No calcified granulomas  - F/u QuantiFeron  - AFB negative x3  - f/u AFB from bronch  - bronch cultures negative  - f/u cyto and flow cytometry - can be done outpatient    Patient should f/u with pulmonary within 1-2 weeks of discharge (Dr. Moran). Please email [email or web address] and include patient's name,  and MRN and allow 24 hours for scheduling.        Doug Gamez PGY-6  Pulmonary/Critical Care Fellow  Pager: [phone] (SHANICE) [phone] (NS)  Pulmonary Spectra #[id number] (NS) / [phone] (SHANICE)   82YO Maltese Male never smoker PMH BPH, HTN, and HLD who presented with 1yr of nonproductive cough with worsening SOB x3 weeks and outside CT chest showing granulomas and consolidations. Pulmonary consulted for RLL mass like consolidation w/ associated SOB.     #RLL mass  - CT chest on this admission showing RML and RLL mass like consolidation. No calcified granulomas  - F/u QuantiFeron  - AFB negative x3 - can D/C airborne precautions  - f/u AFB from bronch  - bronch cultures negative  - f/u cyto and flow cytometry - can be done outpatient    Patient should f/u with pulmonary within 1-2 weeks of discharge (Dr. Moran). Please email [email or web address] and include patient's name,  and MRN and allow 24 hours for scheduling.        Doug Gamez PGY-6  Pulmonary/Critical Care Fellow  Pager: [phone] (SHANICE) [phone] (NS)  Pulmonary Spectra #[id number] (NS) / [phone] (SHANICE)   82YO Japanese Male never smoker PMH BPH, HTN, and HLD who presented with 1yr of nonproductive cough with worsening SOB x3 weeks and outside CT chest showing granulomas and consolidations. Pulmonary consulted for RLL mass like consolidation w/ associated SOB.     #RLL mass  - CT chest on this admission showing RML and RLL mass like consolidation. No calcified granulomas  - F/u QuantiFeron - can be done outpatient  - AFB negative x3 - can D/C airborne precautions  - f/u AFB from bronch - can be followed up outpatient  - bronch cultures negative  - f/u cyto and flow cytometry - can be done outpatient    Patient should f/u with pulmonary within 1-2 weeks of discharge (Dr. Moran). Please email [email or web address] and include patient's name,  and MRN and allow 24 hours for scheduling.    Doug Gamez PGY-6  Pulmonary/Critical Care Fellow  Pager: [phone] (SHANICE) [phone] (NS)  Pulmonary Spectra #[id number] (NS) / [phone] (SHANICE)   80YO Faroese Male never smoker PMH BPH, HTN, and HLD who presented with 1yr of nonproductive cough with worsening SOB x3 weeks and outside CT chest showing granulomas and consolidations. Pulmonary consulted for RLL mass like consolidation w/ associated SOB.     #RLL mass  - CT chest on this admission showing RML and RLL mass like consolidation. No calcified granulomas  - F/u QuantiFeron - can be done outpatient  - AFB negative x3 - can D/C airborne precautions  - bronch AFB negative  - bronch cultures negative  - f/u cyto and flow cytometry - can be done outpatient    Patient should f/u with pulmonary within 1-2 weeks of discharge (Dr. Moran). Please email [email or web address] and include patient's name,  and MRN and allow 24 hours for scheduling.    Doug Gamez PGY-6  Pulmonary/Critical Care Fellow  Pager: [phone] (SHANICE) [phone] (NS)  Pulmonary Spectra #[id number] (NS) / [phone] (SHANICE)

## 2022-01-26 NOTE — PROGRESS NOTE ADULT - ATTENDING COMMENTS
80 y/o M with PMH as above her wit ha dry cough, CT chest showed a large RLL consolidation and RML mass-like which is suspicious for malignancy.    #RLL mass  - CT chest on this admission showing RML and RLL mass like consolidation. No calcified granulomas  - AFB negative x3 - can D/C airborne precautions  - bronch AFB negative  - bronch cultures negative  - f/u cyto and flow cytometry as an outpatient.
80 y/o M with PMH as above her wit ha dry cough, CT chest showed a large RLL consolidation and RML mass-like which is suspicious for malignancy.  Pulmonary consulted for further evaluation.  Planned for bronchoscopy tomorrow at 12:30 pm.  Please keep npo after midnight and check AM CBC/coags.  Follow up results of bronch.  Rest of plan as above.
Patient seen and examined at the bedside. Planned for flexible bronchoscopy with EBUS guided tissue sampling, possible ventura bronch. Risks benefits discussed. Further plan based on results. If malignant, will need Brain MRI and outpatient PET CT.
80 yo M non-smoker p/w sob and cough found to have mass like consolidation RUL/RML of the lung on CT. DDx inc malignancy vs. atypical infection  -sputum AFB neg x 3, can d/c isolation, quant gold still testing  -monitor off abx  -ID and pulm following- plan for bronchoscopy tomorrow   -Cr slightly elevated, 1L NS bolus given today   PT: no skilled PT needs
80 yo M non-smoker p/w sob and cough found to have mass like consolidation RUL/RML of the lung on CT. DDx inc malignancy vs. atypical infection, will r/u TB  -sputum AFB x 3 and QuantiFeron TB  -monitor off abx  -ID and pulm following- possible bronch
82 yo M non-smoker p/w sob and cough found to have mass like consolidation RUL/RML of the lung on CT. DDx inc malignancy vs. atypical infection  -sputum AFB neg x 3, QuantiFeron TB pending. likely can dc isolation- will discuss with infection control   -monitor off abx  -ID and pulm following- possible bronch  -Cr slightly elevated today after IV contrast,  gentle IVF today
82 yo M non-smoker p/w sob and cough found to have mass like consolidation RUL/RML of the lung on CT. DDx inc malignancy vs. atypical infection  -sputum AFB neg x 3, can d/c isolation, quant gold still testing  -monitor off abx  -ID and Pulm following, s/p bronchoscopy 1/25, bronch AFB negative, can f/u cytology and rest of studies as OP w/ Pulm  Medically stable for DC home today  35 min spent on DC planning
82 yo M non-smoker p/w sob and cough found to have mass like consolidation RUL/RML of the lung on CT. DDx inc malignancy vs. atypical infection  -sputum AFB neg x 3, can d/c isolation, quant gold still testing  -monitor off abx  -ID and Pulm following- bronchoscopy today   -Cr improved s/p IVF hydration yesterday  PT: no skilled PT needs   Will f/u Pulm re findings on bronch, whether pt can be dc'ed while awaiting path with OP f/u

## 2022-01-26 NOTE — PROGRESS NOTE ADULT - ASSESSMENT
82 yo obese man, Chinese speaking, with history of BPH, HTN, and HLD presents with worsening shortness of breath, cough, chest tightness, and palpitations with abnormal CT chest showing small R hilar and parenchymal calcification consolidation with granulomatous disease, and CTA chest here with masslike consolidation in the right middle lobe and large consolidation in the right lower lobe.
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
## 2022-01-26 NOTE — PROGRESS NOTE ADULT - REASON FOR ADMISSION
Abnormal CT Chest findings

## 2022-01-26 NOTE — DISCHARGE NOTE PROVIDER - NSDCCPCAREPLAN_GEN_ALL_CORE_FT
PRINCIPAL DISCHARGE DIAGNOSIS  Diagnosis: Shortness of breath  Assessment and Plan of Treatment: You presented to the hospital due to a worsening of your chronic shortness of breath and cough in the context of an abnormal CT scan of your chest as an outpatient. Upon admission, a repeat scan of your chest revealed abnormal findings concerning for malignancy versus infection. The pulmonary medicine (lung specialist) and infectious disease teams were consulted in your care. You underwent laboratory testing of your blood, and sputum, which were unrevealing for infection, including tuberculosis. Additionally, you underwent a bronchoscopy which is a procedure where a camera is advanced through your mouth, into your lungs, to visualize any abnormalities. Samples of your sputum were collected during this procedure, and also did not reveal evidence of infection, including tuberculosis. During the procedure, a biopsy was taken to further evaluate the abnormality seen on CT scan.  You will be discharged from the hospital, to follow up the results of the biopsy as an outpatient with the pulmonary medicine team. You will also follow up with your primary care doctor.   Please return to the hospital if you experience headache, weakness, fainting, chest pain, worsening shortness of breath or cough, visualize blood in your sputum/mucous, or any other concerning symptoms.         SECONDARY DISCHARGE DIAGNOSES  Diagnosis: Lung consolidation  Assessment and Plan of Treatment:      PRINCIPAL DISCHARGE DIAGNOSIS  Diagnosis: Shortness of breath  Assessment and Plan of Treatment: You presented to the hospital due to a worsening of your chronic shortness of breath and cough in the context of an abnormal CT scan of your chest as an outpatient. Upon admission, a repeat scan of your chest revealed abnormal findings concerning for malignancy versus infection. The pulmonary medicine (lung specialist) and infectious disease teams were consulted in your care. You underwent laboratory testing of your blood, and sputum, which were unrevealing for infection, including tuberculosis. Additionally, you underwent a bronchoscopy which is a procedure where a camera is advanced through your mouth, into your lungs, to visualize any abnormalities. Samples of your sputum were collected during this procedure, and also did not reveal evidence of infection, including tuberculosis. During the procedure, a biopsy was taken to further evaluate the abnormality seen on CT scan.  You will be discharged from the hospital, to follow up the results of the biopsy as an outpatient with the pulmonary medicine team. You will also follow up with your primary care doctor.   Please return to the hospital if you experience headache, weakness, fainting, chest pain, worsening shortness of breath or cough, visualize blood in your sputum/mucous, or any other concerning symptoms.          PRINCIPAL DISCHARGE DIAGNOSIS  Diagnosis: Shortness of breath  Assessment and Plan of Treatment: You presented to the hospital due to a worsening of your chronic shortness of breath and cough in the context of an abnormal CT scan of your chest as an outpatient. Upon admission, a repeat scan of your chest revealed abnormal findings concerning for malignancy versus infection. The pulmonary medicine (lung specialist) and infectious disease teams were consulted in your care. You underwent laboratory testing of your blood, and sputum, which were unrevealing for infection, including tuberculosis. Additionally, you underwent a bronchoscopy which is a procedure where a camera is advanced through your mouth, into your lungs, to visualize any abnormalities. Samples of your sputum were collected during this procedure, and also did not reveal evidence of infection, including tuberculosis. During the procedure, a biopsy was taken to further evaluate the abnormality seen on CT scan.  You will be discharged from the hospital, to follow up the results of the biopsy as well as a pending laboratory test for tuberculosis (quantiferon TB) as an outpatient with the pulmonary medicine team. You will also follow up with your primary care doctor.   Please return to the hospital if you experience headache, weakness, fainting, chest pain, worsening shortness of breath or cough, visualize blood in your sputum/mucous, or any other concerning symptoms.

## 2022-01-26 NOTE — PROGRESS NOTE ADULT - PROBLEM SELECTOR PLAN 8
DVT: Hold Pharmacologic ppx for bronchoscopy tomorrow   Diet: DASH, NPO after midnight for bronchoscopy   Dispo: pending clinical improvement; PT recs home with no skilled needs    Fall, aspiration, safety precautions.
DVT: Lovenox 40 mg sub cut daily  Diet: DASH  Dispo: pending clinical improvement; PT recs home with no skilled needs    Fall, aspiration, safety precautions.
DVT: Hold Pharmacologic ppx for bronchoscopy TODAY  Diet: DASH, NPO for bronchoscopy   Dispo: home pending clinical improvement; PT recs home with no skilled needs    Fall, aspiration, safety precautions.
DVT: enoxaparin   Diet: DASH  Dispo: home pending AFB results; PT recs home with no skilled needs    Fall, aspiration, safety precautions.
DVT: Lovenox 40 mg sub cut daily  Diet: DASH  Dispo: pending clinical improvement; PT recs home with no skilled needs    Fall, aspiration, safety precautions.

## 2022-01-26 NOTE — PROGRESS NOTE ADULT - PROBLEM SELECTOR PLAN 6
Home: rosuvastatin 20 mg qHS  Lipid:   Cholesterol: 112  Triglycerides: 109  HDL: 24  Non HDL: 88  LDL:66    - C/w atorvastatin 80 mg qHS

## 2022-01-26 NOTE — PROGRESS NOTE ADULT - PROBLEM SELECTOR PLAN 3
Chest tightness only felt while coughing or lying supine.   - Hs trops 18 -> 18  - EKG with no ischemic changes  - Currently chest pain free

## 2022-01-26 NOTE — PROGRESS NOTE ADULT - PROBLEM SELECTOR PROBLEM 4
ROSS (acute kidney injury)

## 2022-01-26 NOTE — PROGRESS NOTE ADULT - PROBLEM SELECTOR PLAN 2
Pt intially with SOB on exertion, now also with SOB at rest. Also with nearly 1 year of nonproductive cough. Pt currently maintaining SpO2 94-97% on RA. VBG with no hypercapnia. Likely in setting of lung consolidations. COVID-19 negative. Hs-trops 18 -> 18. Pro-.   - Plan as above for lung consolidation  - CTA chest with IV contrast with no PE, Large right lower lobe consolidation and right middle lobe masslike; consolidation which may reflect pneumonia,   rule out TB   TTE - EF: 68%; Mild diastolic dysfunction  afb x4 negative  HIV negative  sore throat s/p bronchoscopy     -f/u QuantiFeron   -ID recs appreciated: monitor of antibiotics   -Airbone precautions/ isolation discontinued   -cepacol for sore throat s/p bronchospy

## 2022-01-26 NOTE — PROGRESS NOTE ADULT - PROBLEM SELECTOR PLAN 1
CTA chest with IV contrast with masslike consolidation in the right middle lobe and large consolidation in the right   lower lobe. Unclear etiology for consolidations, but possibly granulomatous disease of the lungs and less likely bacterial PNA.  - Pt with no fevers or leukocytosis. Low suspicion for bacterial PNA as cause for lung consolidations. Will monitor off abx for now.   blood cxs: BGTD  afb x4 negative  HIV negative  s/p bronchoscopy yesterday  - bronch cultures negative     -f/u quant gold    -Pulm recs appreciated: d/c airborne precautions   - f/u AFB from bronch  - f/u cyto and flow cytometry - can be done outpatient

## 2022-01-26 NOTE — PROGRESS NOTE ADULT - PROBLEM SELECTOR PLAN 4
Serum Cr 1.26 on admission. Can be ROSS vs. CKD.   SCr improved after IVF bolus yesterday    - Monitor Cr and urine output  - Avoid nephrotoxic agents and pt's home telmisartan  - Renally dose meds
Serum Cr 1.26 on admission. Can be ROSS vs. CKD.   - Monitor Cr and urine output  - Avoid nephrotoxic agents and pt's home telmisartan  - Renally dose meds
Serum Cr 1.26 on admission. Can be ROSS vs. CKD.   SCr increase likely 2/2 to contrast    -IVF bolus today  - Monitor Cr and urine output  - Avoid nephrotoxic agents and pt's home telmisartan  - Renally dose meds
Serum Cr 1.26 on admission. Can be ROSS vs. CKD.   SCr worsened most likely 2/2 to NPO status yesterday     - Monitor Cr and urine output  - Avoid nephrotoxic agents and pt's home telmisartan  - Renally dose meds
Serum Cr 1.26 on admission. Can be ROSS vs. CKD.   - Monitor Cr and urine output  - Avoid nephrotoxic agents and pt's home telmisartan  - Renally dose meds

## 2022-01-26 NOTE — DISCHARGE NOTE PROVIDER - CARE PROVIDER_API CALL
Nicholas Elizalde ()  Critical Care Medicine; Internal Medicine; Pulmonary Disease  [address], [address]  [place] [address]  Phone: [phone]  Fax: [phone]  Follow Up Time:     Bahman Moran)  Critical Care Medicine; Internal Medicine; Pulmonary Disease  [address] [place] [address]  Phone: [phone]  Fax: [phone]  Follow Up Time:     Abebe Saldaña)  Internal Medicine  [address] [place] [id number]  Phone: [phone]  Fax: [phone]  Follow Up Time:    Nicholas Elizalde ()  Critical Care Medicine; Internal Medicine; Pulmonary Disease  [address], [address]  [place] [address]  Phone: [phone]  Fax: [phone]  Follow Up Time:     Bahman Moran)  Critical Care Medicine; Internal Medicine; Pulmonary Disease  [address] [place] [address]  Phone: [phone]  Fax: [phone]  Follow Up Time:     Abebe Saldaña)  Internal Medicine  [address] [place] [id number]  Phone: [phone]  Fax: [phone]  Follow Up Time:     HOLLAND MCNAMARA  Pediatrics  [address] [place] [address]  Phone: [phone]  Fax: [phone]  Follow Up Time:

## 2022-01-26 NOTE — DISCHARGE NOTE NURSING/CASE MANAGEMENT/SOCIAL WORK - NSDCPEFALRISK_GEN_ALL_CORE
For information on Fall & Injury Prevention, visit: [email or web address] OR  [email or web address] OR  https://www.cdc.gov/steadi/patient.html

## 2022-01-26 NOTE — DISCHARGE NOTE NURSING/CASE MANAGEMENT/SOCIAL WORK - PATIENT PORTAL LINK FT
You can access the FollowMyHealth Patient Portal offered by [facility] by registering at the following website: [email or web address]. By joining [facility]’s FollowMyHealth portal, you will also be able to view your health information using other applications (apps) compatible with our system.

## 2022-01-26 NOTE — PROGRESS NOTE ADULT - PROVIDER SPECIALTY LIST ADULT
Internal Medicine
Pulmonology
Pulmonology
Internal Medicine
Intervent Pulmonology
Internal Medicine

## 2022-01-27 LAB
CULTURE RESULTS: SIGNIFICANT CHANGE UP
CULTURE RESULTS: SIGNIFICANT CHANGE UP
SPECIMEN SOURCE: SIGNIFICANT CHANGE UP
SPECIMEN SOURCE: SIGNIFICANT CHANGE UP

## 2022-01-28 PROBLEM — Z87.438 PERSONAL HISTORY OF OTHER DISEASES OF MALE GENITAL ORGANS: Chronic | Status: ACTIVE | Noted: 2022-01-21

## 2022-01-28 PROBLEM — I10 ESSENTIAL (PRIMARY) HYPERTENSION: Chronic | Status: ACTIVE | Noted: 2022-01-21

## 2022-01-28 PROBLEM — E78.5 HYPERLIPIDEMIA, UNSPECIFIED: Chronic | Status: ACTIVE | Noted: 2022-01-21

## 2022-01-31 ENCOUNTER — TRANSCRIPTION ENCOUNTER (OUTPATIENT)
Age: 82
End: 2022-01-31

## 2022-01-31 ENCOUNTER — APPOINTMENT (OUTPATIENT)
Dept: PULMONOLOGY | Facility: CLINIC | Age: 82
End: 2022-01-31
Payer: MEDICARE

## 2022-01-31 VITALS
HEIGHT: 69.29 IN | RESPIRATION RATE: 16 BRPM | TEMPERATURE: 97.5 F | SYSTOLIC BLOOD PRESSURE: 175 MMHG | BODY MASS INDEX: 29.73 KG/M2 | DIASTOLIC BLOOD PRESSURE: 77 MMHG | OXYGEN SATURATION: 97 % | WEIGHT: 203 LBS | HEART RATE: 75 BPM

## 2022-01-31 PROCEDURE — 99205 OFFICE O/P NEW HI 60 MIN: CPT

## 2022-01-31 PROCEDURE — 99215 OFFICE O/P EST HI 40 MIN: CPT

## 2022-01-31 NOTE — CONSULT LETTER
[Dear  ___] : Dear  [unfilled], [Consult Letter:] : I had the pleasure of evaluating your patient, [unfilled]. [Please see my note below.] : Please see my note below. [Consult Closing:] : Thank you very much for allowing me to participate in the care of this patient.  If you have any questions, please do not hesitate to contact me. [Sincerely,] : Sincerely, [FreeTextEntry3] : Bahman Moran MD\par Director of Interventional Pulmonology & Bronchoscopy\par . \par Division of Pulmonary, Critical Care & Sleep Medicine\par [facility] [employer] [facility].\par \par

## 2022-01-31 NOTE — END OF VISIT
[] : Fellow [FreeTextEntry3] : \par Interventional Pulmonology Attending Addendum\par \par Patient seen and examined during the office visit with the fellow. 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 Referred to Thoracic oncology and Thoracic surgery. Full PFT's. Will follow. \par \par \par  [Time Spent: ___ minutes] : I have spent [unfilled] minutes of time on the encounter. [>50% of the face to face encounter time was spent on counseling and/or coordination of care for ___] : Greater than 50% of the face to face encounter time was spent on counseling and/or coordination of care for [unfilled]

## 2022-01-31 NOTE — PHYSICAL EXAM
[No Acute Distress] : no acute distress [Well Developed] : well developed [Normal Oropharynx] : normal oropharynx [Normal Appearance] : normal appearance [No Neck Mass] : no neck mass [Normal Rate/Rhythm] : normal rate/rhythm [Normal S1, S2] : normal s1, s2 [No Murmurs] : no murmurs [No Abnormalities] : no abnormalities [Benign] : benign [Not Tender] : not tender [Soft] : soft [No Clubbing] : no clubbing [No Cyanosis] : no cyanosis [No Edema] : no edema [Normal Color/ Pigmentation] : normal color/ pigmentation [Normal Turgor] : normal turgor [No Focal Deficits] : no focal deficits [No Motor Deficits] : no motor deficits [Oriented x3] : oriented x3 [Normal Mood] : normal mood [Normal Affect] : normal affect [TextBox_68] : Decreased breath sounds at the right base, otherwise clear to auscultation

## 2022-01-31 NOTE — HISTORY OF PRESENT ILLNESS
[Former] : former [TextBox_4] : Interventional Pulmonology Consultation/Visit Note\par \par 82 M Wol[place] speaking with PMH of HTN and BPH, never smoker but previously worked in building maintenance presented initially 1/21/22 with chronic dry cough x1 year and worsened dyspnea on exertion for 3 weeks, admitted to [facility] after found on CT to have to have a 5.3 x 2.8 cm masslike consolidation in the RLL. On 1/25, he underwent inpatient bronchoscopy and endobronchial ultrasound with RLL BAL, TBNA of station 7 lymph node, RLL Mass transbronchial biopsy which was suspicious for malignancy on EVERTON. Pt presents for evaluation and consultation bronchoscopy. He reports unchanged mild cough, denies fever, dyspnea, or chest pain. No acute exacerbating or relieving factors. \par \par Pt requests that son Hasan provide translation, does not wish to use  phone. Grand-daughter also present in the room.

## 2022-01-31 NOTE — ASSESSMENT
[FreeTextEntry1] : 81 y/o M Ukrainian speaking with PMH of HTN and BPH, never smoker but previously worked in building maintenance presented initially 1/21/22 with chronic dry cough x1 year and worsened dyspnea on exertion for 3 weeks, admitted to [facility] after found on CT to have to have a 5.3 x 2.8 cm masslike consolidation in the RLL. On 1/25, he underwent inpatient bronchoscopy and endobronchial ultrasound with RLL BAL, TBNA of station 7 lymph node, RLL Mass transbronchial biopsy which was suspicious for malignancy on [place]ERT[place]. Pt presents for evaluation following bronchoscopy, asymptomatic aside from chronic dry cough. 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 \par - Will obtain PET/CT, MRI brain for staging. Referrals and contact info provided. \par - Subcarinal node was negative for malignancy. \par - Will refer to Thoracic oncology team.\par - Will also refer to Thoracic Surgery team (Dr. Blunt) to assess surgical candidacy pending PET/CT results and MRI results/staging. \par - Referred for PFT's.\par - Diagnosis and further steps in staging discussed extensively with son Lazara who translated for his father and our patietn Mr. Del Rio. All questions answered. Emotional support provided.\par \par \par Huy Field MD\par Fellow, Pulmonary and Critical Care Medicine\par \par Discussed with Dr. Moran\par \par \par

## 2022-01-31 NOTE — REASON FOR VISIT
[Lung Cancer] : lung cancer [Consultation] : a consultation [TextBox_44] : Interventional Pulmonology Consultation for Lung Mass [TextBox_13] : Srinivas Mon

## 2022-01-31 NOTE — DISCUSSION/SUMMARY
[FreeTextEntry1] : The patients most recent CT scan was reviewed by my independently and my interpretation is stated below:\par \par CT CHEST with RLL consolidation.

## 2022-01-31 NOTE — REVIEW OF SYSTEMS
[Cough] : cough [Negative] : Endocrine [Hemoptysis] : no hemoptysis [Sputum] : no sputum [Dyspnea] : no dyspnea [Pleuritic Pain] : no pleuritic pain

## 2022-02-03 ENCOUNTER — NON-APPOINTMENT (OUTPATIENT)
Age: 82
End: 2022-02-03

## 2022-02-07 ENCOUNTER — APPOINTMENT (OUTPATIENT)
Dept: NUCLEAR MEDICINE | Facility: IMAGING CENTER | Age: 82
End: 2022-02-07
Payer: MEDICARE

## 2022-02-07 ENCOUNTER — OUTPATIENT (OUTPATIENT)
Dept: OUTPATIENT SERVICES | Facility: HOSPITAL | Age: 82
LOS: 1 days | End: 2022-02-07
Payer: MEDICARE

## 2022-02-07 DIAGNOSIS — C34.90 MALIGNANT NEOPLASM OF UNSPECIFIED PART OF UNSPECIFIED BRONCHUS OR LUNG: ICD-10-CM

## 2022-02-07 DIAGNOSIS — Z98.890 OTHER SPECIFIED POSTPROCEDURAL STATES: Chronic | ICD-10-CM

## 2022-02-07 PROCEDURE — A9552: CPT

## 2022-02-07 PROCEDURE — 78815 PET IMAGE W/CT SKULL-THIGH: CPT

## 2022-02-07 PROCEDURE — 78815 PET IMAGE W/CT SKULL-THIGH: CPT | Mod: 26,PI,MH

## 2022-02-08 ENCOUNTER — OUTPATIENT (OUTPATIENT)
Dept: OUTPATIENT SERVICES | Facility: HOSPITAL | Age: 82
LOS: 1 days | Discharge: HOME | End: 2022-02-08

## 2022-02-08 DIAGNOSIS — Z98.890 OTHER SPECIFIED POSTPROCEDURAL STATES: Chronic | ICD-10-CM

## 2022-02-08 DIAGNOSIS — C26.0 MALIGNANT NEOPLASM OF INTESTINAL TRACT, PART UNSPECIFIED: ICD-10-CM

## 2022-02-10 ENCOUNTER — RESULT REVIEW (OUTPATIENT)
Age: 82
End: 2022-02-10

## 2022-02-10 ENCOUNTER — APPOINTMENT (OUTPATIENT)
Dept: HEMATOLOGY ONCOLOGY | Facility: CLINIC | Age: 82
End: 2022-02-10
Payer: MEDICARE

## 2022-02-10 ENCOUNTER — NON-APPOINTMENT (OUTPATIENT)
Age: 82
End: 2022-02-10

## 2022-02-10 VITALS
BODY MASS INDEX: 32.58 KG/M2 | RESPIRATION RATE: 16 BRPM | DIASTOLIC BLOOD PRESSURE: 78 MMHG | OXYGEN SATURATION: 96 % | SYSTOLIC BLOOD PRESSURE: 150 MMHG | TEMPERATURE: 97.2 F | HEART RATE: 69 BPM | WEIGHT: 214.95 LBS | HEIGHT: 68.11 IN

## 2022-02-10 DIAGNOSIS — Z80.1 FAMILY HISTORY OF MALIGNANT NEOPLASM OF TRACHEA, BRONCHUS AND LUNG: ICD-10-CM

## 2022-02-10 LAB
BASOPHILS # BLD AUTO: 0.04 K/UL — SIGNIFICANT CHANGE UP (ref 0–0.2)
BASOPHILS NFR BLD AUTO: 0.5 % — SIGNIFICANT CHANGE UP (ref 0–2)
EOSINOPHIL # BLD AUTO: 0.28 K/UL — SIGNIFICANT CHANGE UP (ref 0–0.5)
EOSINOPHIL NFR BLD AUTO: 3.4 % — SIGNIFICANT CHANGE UP (ref 0–6)
HCT VFR BLD CALC: 40.4 % — SIGNIFICANT CHANGE UP (ref 39–50)
HGB BLD-MCNC: 13.1 G/DL — SIGNIFICANT CHANGE UP (ref 13–17)
IMM GRANULOCYTES NFR BLD AUTO: 0.6 % — SIGNIFICANT CHANGE UP (ref 0–1.5)
LYMPHOCYTES # BLD AUTO: 3.46 K/UL — HIGH (ref 1–3.3)
LYMPHOCYTES # BLD AUTO: 41.4 % — SIGNIFICANT CHANGE UP (ref 13–44)
MCHC RBC-ENTMCNC: 28.3 PG — SIGNIFICANT CHANGE UP (ref 27–34)
MCHC RBC-ENTMCNC: 32.4 G/DL — SIGNIFICANT CHANGE UP (ref 32–36)
MCV RBC AUTO: 87.3 FL — SIGNIFICANT CHANGE UP (ref 80–100)
MONOCYTES # BLD AUTO: 0.59 K/UL — SIGNIFICANT CHANGE UP (ref 0–0.9)
MONOCYTES NFR BLD AUTO: 7.1 % — SIGNIFICANT CHANGE UP (ref 2–14)
NEUTROPHILS # BLD AUTO: 3.93 K/UL — SIGNIFICANT CHANGE UP (ref 1.8–7.4)
NEUTROPHILS NFR BLD AUTO: 47 % — SIGNIFICANT CHANGE UP (ref 43–77)
NRBC # BLD: 0 /100 WBCS — SIGNIFICANT CHANGE UP (ref 0–0)
PLATELET # BLD AUTO: 198 K/UL — SIGNIFICANT CHANGE UP (ref 150–400)
RBC # BLD: 4.63 M/UL — SIGNIFICANT CHANGE UP (ref 4.2–5.8)
RBC # FLD: 14.3 % — SIGNIFICANT CHANGE UP (ref 10.3–14.5)
WBC # BLD: 8.35 K/UL — SIGNIFICANT CHANGE UP (ref 3.8–10.5)
WBC # FLD AUTO: 8.35 K/UL — SIGNIFICANT CHANGE UP (ref 3.8–10.5)

## 2022-02-10 PROCEDURE — 99205 OFFICE O/P NEW HI 60 MIN: CPT

## 2022-02-10 NOTE — CONSULT LETTER
[Dear  ___] : Dear  [unfilled], [Consult Letter:] : I had the pleasure of evaluating your patient, [unfilled]. [Please see my note below.] : Please see my note below. [Consult Closing:] : Thank you very much for allowing me to participate in the care of this patient.  If you have any questions, please do not hesitate to contact me. [Sincerely,] : Sincerely, [FreeTextEntry2] : Dr. Bahman Moran [FreeTextEntry3] : Vj Domingo MD\par \par

## 2022-02-10 NOTE — REASON FOR VISIT
[Initial Consultation] : an initial consultation [Family Member] : family member [Other: _____] : [unfilled] [FreeTextEntry2] : lung cancer

## 2022-02-10 NOTE — PHYSICAL EXAM
[Restricted in physically strenuous activity but ambulatory and able to carry out work of a light or sedentary nature] : Status 1- Restricted in physically strenuous activity but ambulatory and able to carry out work of a light or sedentary nature, e.g., light house work, office work [Normal] : affect appropriate [de-identified] : bronchial BS on rt

## 2022-02-10 NOTE — REVIEW OF SYSTEMS
[Cough] : cough [SOB on Exertion] : shortness of breath during exertion [Negative] : Allergic/Immunologic

## 2022-02-10 NOTE — HISTORY OF PRESENT ILLNESS
[Disease: _____________________] : Disease: [unfilled] [T: ___] : T[unfilled] [N: ___] : N[unfilled] [M: ___] : M[unfilled] [AJCC Stage: ____] : AJCC Stage: [unfilled] [de-identified] : Mr. Del Rio is a pleasant 82 M, Frisian (very little English), speaking with PMH of HTN and BPH, never smoker but previously worked in building maintenance presented initially 1/21/22 with chronic dry cough x1 year and worsened dyspnea on exertion for 3 weeks, admitted to [facility] after found on CT to have to have a 5.3 x 2.8 cm masslike consolidation in the RLL. On 1/25, he underwent inpatient bronchoscopy and endobronchial ultrasound with RLL BAL, TBNA of station 7 lymph node, RLL Mass transbronchial biopsy which was suspicious for malignancy. \par He saw Dr. Moran and was recommended for PET/CT\par 2/7/22: \par large FDG-avid consolidations in right middle lobe and right lower lobe, unchanged as compared to CT dated 1/21/2022, compatible with known adenocarcinoma. Small FDG-avid nodule in right lung is suspicious for another site of disease. A mildly FDG-avid left lower lobe peripheral opacity, also unchanged on CT, is indeterminate.\par \par 2. Mildly FDG-avid subcarinal lymph node is nonspecific.\par \par Pt does not wish to use  phone. Grand-daughter provided the translation as per patient request. Other than cough, he has no other symptoms. He has tried OTC cough meds with no help. \par  [de-identified] : adeno ca

## 2022-02-10 NOTE — ASSESSMENT
[FreeTextEntry1] : 81 yo m with at least stage IIIA lung adeno ca, PDL1 0%, NGS pending\par Reveiwed PET/CT personally- disease confined to rt RLL and RML- some activity in subcarinal region\par I also reviewed path- bx from RLL pos for adeno ca, lavage showed atypical cells, and level 7 LN- was benign\par MRI scheudled on 2/17\par NGS ETA 2/14\par Mr. SALAZAR  was educated about lung cancer, staging work up and treatment.\par I explained the need to wait till we get all results before we come up with a plan\par He would benefit from surgical eval. Will d/w Dr. Moran\par Will discuss at TB\par All questions answered to patient's satisfaction\par OV in 7-10 days- can be TEB as well\par Will follow up on all pending tests and results\par Trial of benzonatate for cough\par

## 2022-02-12 ENCOUNTER — NON-APPOINTMENT (OUTPATIENT)
Age: 82
End: 2022-02-12

## 2022-02-12 LAB
ALBUMIN SERPL ELPH-MCNC: 4.3 G/DL
ALP BLD-CCNC: 116 U/L
ALT SERPL-CCNC: 31 U/L
ANION GAP SERPL CALC-SCNC: 12 MMOL/L
AST SERPL-CCNC: 19 U/L
BILIRUB SERPL-MCNC: 0.4 MG/DL
BUN SERPL-MCNC: 25 MG/DL
CALCIUM SERPL-MCNC: 9.1 MG/DL
CEA SERPL-MCNC: 3.4 NG/ML
CHLORIDE SERPL-SCNC: 111 MMOL/L
CO2 SERPL-SCNC: 20 MMOL/L
CREAT SERPL-MCNC: 1.32 MG/DL
GLUCOSE SERPL-MCNC: 90 MG/DL
HAV IGM SER QL: NONREACTIVE
HBV CORE IGM SER QL: NONREACTIVE
HBV SURFACE AG SER QL: NONREACTIVE
HCV AB SER QL: NONREACTIVE
HCV S/CO RATIO: 0.1 S/CO
MAGNESIUM SERPL-MCNC: 2.2 MG/DL
POTASSIUM SERPL-SCNC: 4.8 MMOL/L
PROT SERPL-MCNC: 7.3 G/DL
SODIUM SERPL-SCNC: 142 MMOL/L
TSH SERPL-ACNC: 2.05 UIU/ML

## 2022-02-17 ENCOUNTER — APPOINTMENT (OUTPATIENT)
Dept: MRI IMAGING | Facility: CLINIC | Age: 82
End: 2022-02-17
Payer: MEDICARE

## 2022-02-17 ENCOUNTER — OUTPATIENT (OUTPATIENT)
Dept: OUTPATIENT SERVICES | Facility: HOSPITAL | Age: 82
LOS: 1 days | End: 2022-02-17
Payer: MEDICARE

## 2022-02-17 DIAGNOSIS — C34.90 MALIGNANT NEOPLASM OF UNSPECIFIED PART OF UNSPECIFIED BRONCHUS OR LUNG: ICD-10-CM

## 2022-02-17 DIAGNOSIS — Z98.890 OTHER SPECIFIED POSTPROCEDURAL STATES: Chronic | ICD-10-CM

## 2022-02-17 PROCEDURE — A9585: CPT

## 2022-02-17 PROCEDURE — 70553 MRI BRAIN STEM W/O & W/DYE: CPT | Mod: 26,MH

## 2022-02-17 PROCEDURE — 70553 MRI BRAIN STEM W/O & W/DYE: CPT

## 2022-02-18 ENCOUNTER — APPOINTMENT (OUTPATIENT)
Dept: HEMATOLOGY ONCOLOGY | Facility: CLINIC | Age: 82
End: 2022-02-18
Payer: MEDICARE

## 2022-02-18 PROCEDURE — 99215 OFFICE O/P EST HI 40 MIN: CPT | Mod: 95

## 2022-02-18 NOTE — HISTORY OF PRESENT ILLNESS
[Disease: _____________________] : Disease: [unfilled] [T: ___] : T[unfilled] [N: ___] : N[unfilled] [M: ___] : M[unfilled] [AJCC Stage: ____] : AJCC Stage: [unfilled] [Home] : at home, [unfilled] , at the time of the visit. [Medical Office: ([facility])___] : at the medical office located in  [Family Member] : family member [Verbal consent obtained from patient] : the patient, [unfilled] [de-identified] : Mr. Del Rio is a pleasant 82 M, Korean (very little English), speaking with PMH of HTN and BPH, never smoker but previously worked in building maintenance presented initially 1/21/22 with chronic dry cough x1 year and worsened dyspnea on exertion for 3 weeks, admitted to [facility] after found on CT to have to have a 5.3 x 2.8 cm masslike consolidation in the RLL. On 1/25, he underwent inpatient bronchoscopy and endobronchial ultrasound with RLL BAL, TBNA of station 7 lymph node, RLL Mass transbronchial biopsy which was suspicious for malignancy. \par He saw Dr. Moran and was recommended for PET/CT\par 2/7/22: \par large FDG-avid consolidations in right middle lobe and right lower lobe, unchanged as compared to CT dated 1/21/2022, compatible with known adenocarcinoma. Small FDG-avid nodule in right lung is suspicious for another site of disease. A mildly FDG-avid left lower lobe peripheral opacity, also unchanged on CT, is indeterminate.\par \par 2. Mildly FDG-avid subcarinal lymph node is nonspecific.\par \par Pt does not wish to use  phone. Grand-daughter provided the translation as per patient request. Other than cough, he has no other symptoms. He has tried OTC cough meds with no help. \par \par \par 2/18/22: Pt seen vis tel. No new complaints. He still has cough which is persistent and dry.  [de-identified] : adeno ca

## 2022-02-18 NOTE — ASSESSMENT
[FreeTextEntry1] : 83 yo m with at least stage IIIA lung adeno ca, PDL1 0%, NGS showed KRAS G12V mut in addition to a few other uncontainable ones. \par Reviewed PET/CT personally- disease confined to rt RLL and RML- some activity in subcarinal region\par I also reviewed path- bx from RLL pos for adeno ca, lavage showed atypical cells, and level 7 LN- was benign\par I presented the case at  and the group concurred that optimal staging would involve sampling of rt middle lobe. SUV on rt middkle lobe mass is lower than that of RLL. \par As per Dr. Moran, this could be accomplished by either ventura bronch or CT guided. He will discuss with Dr. Regan and inform the ot\par Meanwhile, pt has PFTs and appt with Dima Leroy next week\par I will reach ou to Dr. Blunt and get him set up with thoracic surgery. I think a surgical eval; is warranted\par MRI done on 2/17, read pending but to my eye- chronic microvascular changes, no mets or edema \par Mr. SALAZAR  and his granddaughter were educated about lung cancer, staging work up and treatment.\par I explained the need to wait till we get all results before we come up with a plan\par All questions answered to patient's satisfaction\par Will tentatively schedule chemoimmunotherapy (Carbo/pem/pem) to start 2 weeks from now\par I discussed the regimen in detail including potential benefits and AEs in detail. I answered all questions. I went over schedule and other pertinent details. I explained that the plan may change if staging uis different and if pt undergoes surgery or radiation is recommended concurrently. \par Continue benzonatate for cough\par

## 2022-02-18 NOTE — PHYSICAL EXAM
[Restricted in physically strenuous activity but ambulatory and able to carry out work of a light or sedentary nature] : Status 1- Restricted in physically strenuous activity but ambulatory and able to carry out work of a light or sedentary nature, e.g., light house work, office work [Normal] : affect appropriate [de-identified] : bronchial BS on rt

## 2022-02-22 LAB — SARS-COV-2 N GENE NPH QL NAA+PROBE: NOT DETECTED

## 2022-02-23 ENCOUNTER — APPOINTMENT (OUTPATIENT)
Dept: PULMONOLOGY | Facility: CLINIC | Age: 82
End: 2022-02-23
Payer: MEDICARE

## 2022-02-23 VITALS
HEIGHT: 68 IN | HEART RATE: 70 BPM | BODY MASS INDEX: 32.28 KG/M2 | OXYGEN SATURATION: 96 % | TEMPERATURE: 98.2 F | SYSTOLIC BLOOD PRESSURE: 120 MMHG | WEIGHT: 213 LBS | DIASTOLIC BLOOD PRESSURE: 70 MMHG

## 2022-02-23 DIAGNOSIS — E78.5 HYPERLIPIDEMIA, UNSPECIFIED: ICD-10-CM

## 2022-02-23 DIAGNOSIS — I10 ESSENTIAL (PRIMARY) HYPERTENSION: ICD-10-CM

## 2022-02-23 DIAGNOSIS — N40.0 BENIGN PROSTATIC HYPERPLASIA WITHOUT LOWER URINARY TRACT SYMPMS: ICD-10-CM

## 2022-02-23 PROCEDURE — ZZZZZ: CPT

## 2022-02-23 PROCEDURE — 94729 DIFFUSING CAPACITY: CPT | Mod: GC

## 2022-02-23 PROCEDURE — 94010 BREATHING CAPACITY TEST: CPT | Mod: GC

## 2022-02-23 PROCEDURE — 99213 OFFICE O/P EST LOW 20 MIN: CPT | Mod: 25,GC

## 2022-02-23 PROCEDURE — 94726 PLETHYSMOGRAPHY LUNG VOLUMES: CPT | Mod: GC

## 2022-02-23 RX ORDER — FINASTERIDE 5 MG/1
5 TABLET, FILM COATED ORAL DAILY
Qty: 30 | Refills: 5 | Status: ACTIVE | COMMUNITY

## 2022-02-23 NOTE — ASSESSMENT
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 \par \par Plan \par - PFTs overall normal function \par - c/w Tessalon pearls for Cough \par - will reach out to oncology to see if patient is scheduled for chemo \par - will reach out to Thoracic surgery to setup an outpatient evaluation \par - will contact Interventional pulmonary team for possible biopsy of the right middle lobe. \par \par d/w Dr. Ch

## 2022-02-23 NOTE — HISTORY OF PRESENT ILLNESS
[TextBox_4] : 82 y.o. male with Mucinous adenocarcinoma of the lung, HTN, HLD, BPH here to establish care. The patient underwent bronchoscopy and endobronchial US of the RLL, RLL BAL on 1/25. Found to have mucinous adenocarcinoma of the lung. Patient seen by oncology, plan for chemo tentatively in two weeks, however would want a biopsy of the RML nodule as well for further staging. \par \par The patient is accompanied by his granddaughter, Karmen who is also providing translation. The patient's only complaint has been a dry cough. He has been taking Tessalon pearls to help with the cough. The patient denies SOB at rest or exertion or wheezing.

## 2022-02-23 NOTE — PHYSICAL EXAM
[No Acute Distress] : no acute distress [Normal Oropharynx] : normal oropharynx [No Resp Distress] : no resp distress [Benign] : benign [Normal Gait] : normal gait [No Clubbing] : no clubbing [No Focal Deficits] : no focal deficits [Oriented x3] : oriented x3 [TextBox_68] : Decreased BS on the right side

## 2022-02-25 ENCOUNTER — APPOINTMENT (OUTPATIENT)
Dept: PULMONOLOGY | Facility: CLINIC | Age: 82
End: 2022-02-25

## 2022-03-07 ENCOUNTER — APPOINTMENT (OUTPATIENT)
Dept: PULMONOLOGY | Facility: CLINIC | Age: 82
End: 2022-03-07
Payer: MEDICARE

## 2022-03-07 VITALS
RESPIRATION RATE: 18 BRPM | SYSTOLIC BLOOD PRESSURE: 150 MMHG | HEIGHT: 68 IN | OXYGEN SATURATION: 93 % | BODY MASS INDEX: 32.58 KG/M2 | DIASTOLIC BLOOD PRESSURE: 66 MMHG | HEART RATE: 84 BPM | TEMPERATURE: 97.6 F | WEIGHT: 215 LBS

## 2022-03-07 PROCEDURE — 99215 OFFICE O/P EST HI 40 MIN: CPT

## 2022-03-07 NOTE — END OF VISIT
[] : Fellow [FreeTextEntry3] : Agree with assessment and plan as discussed above. Previously diagnosed RLL adenoca [id number]. Referred for biopsy of the RML lesion. Will pursue robotic ventura bronch. WIll discuss with oncology team if repeat mediastinal staging is warranted. Blood work today.  [Time Spent: ___ minutes] : I have spent [unfilled] minutes of time on the encounter. [>50% of the face to face encounter time was spent on counseling and/or coordination of care for ___] : Greater than 50% of the face to face encounter time was spent on counseling and/or coordination of care for [unfilled]

## 2022-03-07 NOTE — PHYSICAL EXAM
[No Acute Distress] : no acute distress [Normal Oropharynx] : normal oropharynx [Normal Appearance] : normal appearance [No Neck Mass] : no neck mass [Normal Rate/Rhythm] : normal rate/rhythm [Normal S1, S2] : normal s1, s2 [No Murmurs] : no murmurs [No Resp Distress] : no resp distress [Clear to Auscultation Bilaterally] : clear to auscultation bilaterally [No Abnormalities] : no abnormalities [Benign] : benign [Normal Gait] : normal gait [No Clubbing] : no clubbing [No Cyanosis] : no cyanosis [No Edema] : no edema [FROM] : FROM [Normal Color/ Pigmentation] : normal color/ pigmentation [No Focal Deficits] : no focal deficits [Oriented x3] : oriented x3 [Normal Affect] : normal affect [No Acc Muscle Use] : no acc muscle use [Normal Palpation] : normal palpation [Normal Rhythm and Effort] : normal rhythm and effort [Normal to Percussion] : normal to percussion

## 2022-03-07 NOTE — HISTORY OF PRESENT ILLNESS
[TextBox_4] : Interventional Pulmonology Consultation/Visit Note\par \par Son providing translation at bedside, declined translation service. \par \par Patient is a 82 never smoker M Croatian speaking with PMH of HTN and BPH, and recently diagnosed RLL adenocarcinoma. had 5.3 x 2.8 cm masslike consolidation in the RLL s/p bronchoscopy and EBUS on 1/25. RLL mass FNA was positive for malignancy and level 7 FNA was negative. Patient PET CT on 2/7 which showed large PET avid RML and RLL consolidation unchanged as well as small PET avid R lung nodule and a mildly PET avid LLL peripheral opacity. \par \par Patient denies any fevers, chills, SOB. Endorses intermittent dry cough. Denies any ROWLEY or CP. \par \par Discussed need for sampling of RML lesion. Discussed diagnostic modalities including bronchoscopic biopsy vs transthoracic needle biopsy including risk and benefits.

## 2022-03-07 NOTE — DISCUSSION/SUMMARY
Pre-procedure Intake  If YES to any questions or NO to having a   Please complete laminated checklist and leave on the computer keyboard for Provider, verbally inform provider if able.    For SCS Trial, RFA's or any sedation procedure:  Have you been fasting? NA    If yes, for how long?     Are you taking any any blood thinners such as Coumadin, Warfarin, Jantoven, Pradaxa Xarelto, Eliquis, Edoxaban, Enoxaparin, Lovenox, Heparin, Arixtra, Fondaparinux, or Fragmin? OR Antiplatelet medication such as Plavix, Brilinta, or Effient?   No     If yes, when did you take your last dose?     Do you take aspirin?  Yes -   ASA 81g/day    If cervical procedure, have you held aspirin for 6 days?   NA    Do you have any allergies to contrast dye, iodine, steroid and/or numbing medications?  NO    Are you currently taking antibiotics or have an active infection?  YES: chronically 3x/week for bronchial issues    Have you had a fever/elevated temperature within the past week? NO    Are you currently taking oral steroids? NO    Do you have a ? Yes    Are you pregnant or breastfeeding?  Not Applicable    Have you received the COVID-19 vaccine? Yes    If yes, was it your 1st, 2nd or only dose needed?booster > 14 days ago    Date of most recent vaccine:     Notify provider and RNs if systolic BP >170, diastolic BP >100, P >100 or O2 sats < 90%      Callie RN-BSN  [facility]             [FreeTextEntry1] : The patients most recent CT scan was reviewed by my independently and my interpretation is stated below:\par \par RLL mass, previously known NSCLC. RML lesion, ill defined with airway leading to the same. \par

## 2022-03-07 NOTE — ASSESSMENT
[FreeTextEntry1] : Patient is a 82 never smoker M Omani speaking with PMH of HTN and BPH, and recently diagnosed RLL adenocarcinoma. had 5.3 x 2.8 cm masslike consolidation in the RLL s/p bronchoscopy and EBUS on 1/25. RLL mass FNA was positive for malignancy and level 7 FNA was negative. \par \par #Adenocarcinoma -  s/p bronchoscopy and EBUS on 1/25. RLL mass FNA was positive for malignancy and level 7 FNA was negative. PET CT on 2/7 which showed large PET avid RML and RLL consolidation unchanged as well as small PET avid R lung nodule and a mildly PET avid LLL peripheral opacity. \par - Discussed need for sampling of RML lesion. Discussed diagnostic modalities including bronchoscopic vs transthoracic including risk and benefits. Patient opting for bronchoscopic evaluation\par \par The diagnostic yield of robotic assisted navigation assisted bronchoscopy with biopsy was discussed with the patient. The risk and benefits of bronchoscopy with navigation assisted transbronchial biopsy including risk of bleeding and  risk pneumothorax was discussed with the patient and they demonstrated understanding. We will also send the tissue/BAL for culture to assess for infectious etiology during the procedure. The patient is agreeable to proceed with a navigation assisted bronchoscopy with biopsy of the lung lesion.\par - Advised to obtain medical clearance from PMD\par - Will schedule for procedure\par \par Will need COVID swab and presurgical testing prior to scheduling the procedure. The office will co-ordinate testing and pre-surgical appointment.\par \par \par Chepe Ivey, F3

## 2022-03-08 ENCOUNTER — OUTPATIENT (OUTPATIENT)
Dept: OUTPATIENT SERVICES | Facility: HOSPITAL | Age: 82
LOS: 1 days | End: 2022-03-08

## 2022-03-08 VITALS
HEIGHT: 68 IN | TEMPERATURE: 97 F | DIASTOLIC BLOOD PRESSURE: 79 MMHG | HEART RATE: 77 BPM | SYSTOLIC BLOOD PRESSURE: 153 MMHG | RESPIRATION RATE: 16 BRPM | OXYGEN SATURATION: 97 % | WEIGHT: 216.05 LBS

## 2022-03-08 DIAGNOSIS — G47.33 OBSTRUCTIVE SLEEP APNEA (ADULT) (PEDIATRIC): ICD-10-CM

## 2022-03-08 DIAGNOSIS — Z98.890 OTHER SPECIFIED POSTPROCEDURAL STATES: Chronic | ICD-10-CM

## 2022-03-08 DIAGNOSIS — I10 ESSENTIAL (PRIMARY) HYPERTENSION: ICD-10-CM

## 2022-03-08 DIAGNOSIS — Z87.438 PERSONAL HISTORY OF OTHER DISEASES OF MALE GENITAL ORGANS: ICD-10-CM

## 2022-03-08 DIAGNOSIS — R91.1 SOLITARY PULMONARY NODULE: ICD-10-CM

## 2022-03-08 DIAGNOSIS — R91.8 OTHER NONSPECIFIC ABNORMAL FINDING OF LUNG FIELD: ICD-10-CM

## 2022-03-08 LAB
ALBUMIN SERPL ELPH-MCNC: 4.2 G/DL — SIGNIFICANT CHANGE UP (ref 3.3–5)
ALP SERPL-CCNC: 109 U/L — SIGNIFICANT CHANGE UP (ref 40–120)
ALT FLD-CCNC: 19 U/L — SIGNIFICANT CHANGE UP (ref 4–41)
ANION GAP SERPL CALC-SCNC: 13 MMOL/L — SIGNIFICANT CHANGE UP (ref 7–14)
AST SERPL-CCNC: 17 U/L — SIGNIFICANT CHANGE UP (ref 4–40)
BILIRUB SERPL-MCNC: 0.2 MG/DL — SIGNIFICANT CHANGE UP (ref 0.2–1.2)
BUN SERPL-MCNC: 25 MG/DL — HIGH (ref 7–23)
CALCIUM SERPL-MCNC: 9.3 MG/DL — SIGNIFICANT CHANGE UP (ref 8.4–10.5)
CHLORIDE SERPL-SCNC: 109 MMOL/L — HIGH (ref 98–107)
CO2 SERPL-SCNC: 19 MMOL/L — LOW (ref 22–31)
CREAT SERPL-MCNC: 1.36 MG/DL — HIGH (ref 0.5–1.3)
EGFR: 53 ML/MIN/1.73M2 — LOW
GLUCOSE SERPL-MCNC: 80 MG/DL — SIGNIFICANT CHANGE UP (ref 70–99)
HCT VFR BLD CALC: 41.6 % — SIGNIFICANT CHANGE UP (ref 39–50)
HGB BLD-MCNC: 13.5 G/DL — SIGNIFICANT CHANGE UP (ref 13–17)
MCHC RBC-ENTMCNC: 28.1 PG — SIGNIFICANT CHANGE UP (ref 27–34)
MCHC RBC-ENTMCNC: 32.5 GM/DL — SIGNIFICANT CHANGE UP (ref 32–36)
MCV RBC AUTO: 86.7 FL — SIGNIFICANT CHANGE UP (ref 80–100)
NRBC # BLD: 0 /100 WBCS — SIGNIFICANT CHANGE UP
NRBC # FLD: 0 K/UL — SIGNIFICANT CHANGE UP
PLATELET # BLD AUTO: 226 K/UL — SIGNIFICANT CHANGE UP (ref 150–400)
POTASSIUM SERPL-MCNC: 4.2 MMOL/L — SIGNIFICANT CHANGE UP (ref 3.5–5.3)
POTASSIUM SERPL-SCNC: 4.2 MMOL/L — SIGNIFICANT CHANGE UP (ref 3.5–5.3)
PROT SERPL-MCNC: 7.3 G/DL — SIGNIFICANT CHANGE UP (ref 6–8.3)
RBC # BLD: 4.8 M/UL — SIGNIFICANT CHANGE UP (ref 4.2–5.8)
RBC # FLD: 14.6 % — HIGH (ref 10.3–14.5)
SODIUM SERPL-SCNC: 141 MMOL/L — SIGNIFICANT CHANGE UP (ref 135–145)
WBC # BLD: 8.84 K/UL — SIGNIFICANT CHANGE UP (ref 3.8–10.5)
WBC # FLD AUTO: 8.84 K/UL — SIGNIFICANT CHANGE UP (ref 3.8–10.5)

## 2022-03-08 NOTE — H&P PST ADULT - ATTENDING COMMENTS
Patient here for flex bronch/monarch robotic ventura bronch/ebus. Risks and benefits discussed including bleeding, pneumothorax.

## 2022-03-08 NOTE — H&P PST ADULT - PROBLEM SELECTOR PLAN 1
Navigational Endobronchial Ultrasound Bronchoscopy Radial Endobronchial Ultrasound , Linear with Cytology     Pre op instructions reviewed with pt and renea Haile ; both appear to have a good understanding of pre op instructions    Pt with h/o HTN; appt previously scheduled with Dr Anderson for pre op evaluation [place] Robotic Navigational Endobronchial Ultrasound Bronchoscopy Radial Endobronchial Ultrasound , Linear with Cytology     Pre op instructions reviewed with pt and renea Haile ; both appear to have a good understanding of pre op instructions    Pt with h/o HTN; appt  scheduled with Dr Anderson for pre op evaluation

## 2022-03-08 NOTE — H&P PST ADULT - NSICDXPASTSURGICALHX_GEN_ALL_CORE_FT
PAST SURGICAL HISTORY:  History of ERCP     S/P bronchoscopy 1/22    S/P inguinal hernia repair Right approximately 1961

## 2022-03-08 NOTE — H&P PST ADULT - NEGATIVE CARDIOVASCULAR SYMPTOMS
pt can climb up to 3 flights stairs without c/o cp. palpitations, sob/no chest pain/no palpitations/no dyspnea on exertion pt can climb up to 3 flights stairs without c/o cp. denies palpitations, denies sob/no chest pain/no palpitations/no dyspnea on exertion

## 2022-03-08 NOTE — H&P PST ADULT - INTERPRETATION SERVICES DECLINED
Call to  [facility] # [phone] ; pt refused  requesting renea Rasmussen present act as /Patient/Caregiver requests family/friend to interpret.

## 2022-03-08 NOTE — H&P PST ADULT - HISTORY OF PRESENT ILLNESS
Pt is an 82 y.o. Kyrgyz speaking male ; call to  [facility] # [id number] ; pt refused  requesting son Lazara present as . Pt s son reports recent onset cough > 5 months ago ; pt to [facility] 1/22 ; s/p Bronchoscopy and Endobronchial Ultrasound .Pt s/p Pet scan. Per son ; p Pt is an 82 y.o. Danish speaking male ; call to  [facility] # [id number] ; pt refused  requesting son Lazara present as . Pt s son reports h/o  cough > 6 months ago ; pt to [facility] 1/22 ; s/p Bronchoscopy and Endobronchial Ultrasound .Pt s/p Pet scan. Per son ;further evaluation is needed. Pt now presents for Navigational Endobronchial Ultrasound Bronchoscopy , Radial Endobronchial Ultrasound Linear with Cytology

## 2022-03-12 LAB
CULTURE RESULTS: SIGNIFICANT CHANGE UP
SPECIMEN SOURCE: SIGNIFICANT CHANGE UP

## 2022-03-14 ENCOUNTER — TRANSCRIPTION ENCOUNTER (OUTPATIENT)
Age: 82
End: 2022-03-14

## 2022-03-14 NOTE — ASU PATIENT PROFILE, ADULT - FALL HARM RISK - UNIVERSAL INTERVENTIONS
Bed in lowest position, wheels locked, appropriate side rails in place/Call bell, personal items and telephone in reach/Instruct patient to call for assistance before getting out of bed or chair/Non-slip footwear when patient is out of bed/[place] to call system/Purposeful Proactive Rounding/Room/bathroom lighting operational, light cord in reach

## 2022-03-14 NOTE — ASU PATIENT PROFILE, ADULT - INTERPRETATION SERVICES DECLINED
Call to  [facility] # [id number] ; pt refused  requesting renea Rasmussen present act as /Patient/Caregiver requests family/friend to interpret. Patient/Caregiver requests family/friend to interpret.

## 2022-03-15 ENCOUNTER — RESULT REVIEW (OUTPATIENT)
Age: 82
End: 2022-03-15

## 2022-03-15 ENCOUNTER — OUTPATIENT (OUTPATIENT)
Dept: OUTPATIENT SERVICES | Facility: HOSPITAL | Age: 82
LOS: 1 days | Discharge: HOME | End: 2022-03-15
Payer: MEDICARE

## 2022-03-15 ENCOUNTER — APPOINTMENT (OUTPATIENT)
Dept: PULMONOLOGY | Facility: HOSPITAL | Age: 82
End: 2022-03-15

## 2022-03-15 VITALS
HEART RATE: 82 BPM | WEIGHT: 216.05 LBS | DIASTOLIC BLOOD PRESSURE: 71 MMHG | RESPIRATION RATE: 16 BRPM | TEMPERATURE: 98 F | HEIGHT: 68 IN | OXYGEN SATURATION: 92 % | SYSTOLIC BLOOD PRESSURE: 152 MMHG

## 2022-03-15 VITALS
OXYGEN SATURATION: 97 % | DIASTOLIC BLOOD PRESSURE: 70 MMHG | RESPIRATION RATE: 18 BRPM | HEART RATE: 83 BPM | SYSTOLIC BLOOD PRESSURE: 132 MMHG

## 2022-03-15 DIAGNOSIS — R91.1 SOLITARY PULMONARY NODULE: ICD-10-CM

## 2022-03-15 DIAGNOSIS — Z98.890 OTHER SPECIFIED POSTPROCEDURAL STATES: Chronic | ICD-10-CM

## 2022-03-15 LAB
GRAM STN FLD: SIGNIFICANT CHANGE UP
SPECIMEN SOURCE: SIGNIFICANT CHANGE UP

## 2022-03-15 PROCEDURE — 88305 TISSUE EXAM BY PATHOLOGIST: CPT | Mod: 26

## 2022-03-15 PROCEDURE — 31627 NAVIGATIONAL BRONCHOSCOPY: CPT | Mod: GC

## 2022-03-15 PROCEDURE — 31628 BRONCHOSCOPY/LUNG BX EACH: CPT | Mod: GC

## 2022-03-15 PROCEDURE — 31654 BRONCH EBUS IVNTJ PERPH LES: CPT | Mod: GC

## 2022-03-15 PROCEDURE — 31624 DX BRONCHOSCOPE/LAVAGE: CPT | Mod: GC

## 2022-03-15 PROCEDURE — 71045 X-RAY EXAM CHEST 1 VIEW: CPT | Mod: 26

## 2022-03-15 PROCEDURE — 88112 CYTOPATH CELL ENHANCE TECH: CPT | Mod: 26,59

## 2022-03-15 PROCEDURE — 88173 CYTOPATH EVAL FNA REPORT: CPT | Mod: 26

## 2022-03-15 PROCEDURE — 31629 BRONCHOSCOPY/NEEDLE BX EACH: CPT | Mod: GC

## 2022-03-15 PROCEDURE — 88305 TISSUE EXAM BY PATHOLOGIST: CPT | Mod: 26,59

## 2022-03-15 RX ORDER — FENTANYL CITRATE 50 UG/ML
25 INJECTION INTRAVENOUS
Refills: 0 | Status: DISCONTINUED | OUTPATIENT
Start: 2022-03-15 | End: 2022-03-15

## 2022-03-15 RX ORDER — ONDANSETRON 8 MG/1
4 TABLET, FILM COATED ORAL ONCE
Refills: 0 | Status: DISCONTINUED | OUTPATIENT
Start: 2022-03-15 | End: 2022-03-29

## 2022-03-15 RX ORDER — FENTANYL CITRATE 50 UG/ML
50 INJECTION INTRAVENOUS ONCE
Refills: 0 | Status: DISCONTINUED | OUTPATIENT
Start: 2022-03-15 | End: 2022-03-15

## 2022-03-15 NOTE — BRIEF OPERATIVE NOTE - NSICDXBRIEFPROCEDURE_GEN_ALL_CORE_FT
PROCEDURES:  Robot-assisted electromagnetic navigation bronchoscopy with endobronchial ultrasound 15-Mar-2022 15:32:26  Chepe Ivey  Transbronchial biopsy of lung using forceps 15-Mar-2022 15:32:39  Chepe Ivey  Transbronchial needle aspiration 15-Mar-2022 15:33:04  Chepe Ivey  Bronchoalveolar lavage 15-Mar-2022 15:33:16  Chepe Ivey  Bronchoscopy, with EBUS and 1 or 2 lymph node sampling 15-Mar-2022 15:33:29  Chepe Ivey

## 2022-03-15 NOTE — ASU DISCHARGE PLAN (ADULT/PEDIATRIC) - NS MD DC FALL RISK RISK
For information on Fall & Injury Prevention, visit: [email or web address] OR  [email or web address] OR  https://www.cdc.gov/steadi/patient.html

## 2022-03-15 NOTE — ASU PREOP CHECKLIST - 3.
Patient speaks Filipino - Patient refused  phone even to ask if he wanted to use it  Son Lazara Del Rio translated

## 2022-03-15 NOTE — ASU DISCHARGE PLAN (ADULT/PEDIATRIC) - CALL YOUR DOCTOR IF YOU HAVE ANY OF THE FOLLOWING:
Bleeding that does not stop/Fever greater than (need to indicate Fahrenheit or Celsius)/Nausea and vomiting that does not stop/Inability to tolerate liquids or foods Bleeding that does not stop/Pain not relieved by Medications/Fever greater than (need to indicate Fahrenheit or Celsius)/Nausea and vomiting that does not stop/Unable to urinate/Inability to tolerate liquids or foods

## 2022-03-15 NOTE — ASU DISCHARGE PLAN (ADULT/PEDIATRIC) - CARE PROVIDER_API CALL
Bahman Moran)  Critical Care Medicine; Internal Medicine; Pulmonary Disease  [address] [place] [address]  Phone: [phone]  Fax: [phone]  Follow Up Time:

## 2022-03-15 NOTE — ASU DISCHARGE PLAN (ADULT/PEDIATRIC) - DRIVING DURATION DAY(S)
Addended by: MIKEY MARTINEZ on: 1/31/2018 10:42 AM     Modules accepted: Orders    
when cleared by

## 2022-03-15 NOTE — BRIEF OPERATIVE NOTE - OPERATION/FINDINGS
Flexible bronchoscopy performed. Robotic assisted navigational bronchoscopy performed. Transbronchial forceps biopsies and transbronchial needle aspirations of RML mass performed with aid of fluoroscopy and radial endobronchial ultrasound. BAL performed of RML. Linear EBUS surveillance of mediastinal lymph nodes performed. FNA performed of station 7. Flexible bronchoscopy performed. Robotic assisted navigational bronchoscopy performed. Transbronchial forceps biopsies and transbronchial needle aspirations of RML mass performed with aid of fluoroscopy and EBUS. BAL performed of RML. Linear EBUS surveillance of mediastinal lymph nodes performed. FNA performed of station 7.    #[id number] None

## 2022-03-15 NOTE — ASU DISCHARGE PLAN (ADULT/PEDIATRIC) - PROCEDURE
Robotic assisted navigational bronchoscopy with transbronchial biopsy and needle aspiration, bronchoalveolar lavage, lymph node fine needle aspiration

## 2022-03-16 LAB
CULTURE RESULTS: SIGNIFICANT CHANGE UP
CULTURE RESULTS: SIGNIFICANT CHANGE UP
NIGHT BLUE STAIN TISS: SIGNIFICANT CHANGE UP
SPECIMEN SOURCE: SIGNIFICANT CHANGE UP

## 2022-03-17 LAB
CULTURE RESULTS: SIGNIFICANT CHANGE UP
CULTURE RESULTS: SIGNIFICANT CHANGE UP
NON-GYNECOLOGICAL CYTOLOGY STUDY: SIGNIFICANT CHANGE UP
SPECIMEN SOURCE: SIGNIFICANT CHANGE UP
SPECIMEN SOURCE: SIGNIFICANT CHANGE UP

## 2022-03-18 ENCOUNTER — NON-APPOINTMENT (OUTPATIENT)
Age: 82
End: 2022-03-18

## 2022-03-18 LAB — SURGICAL PATHOLOGY STUDY: SIGNIFICANT CHANGE UP

## 2022-03-23 ENCOUNTER — NON-APPOINTMENT (OUTPATIENT)
Age: 82
End: 2022-03-23

## 2022-04-04 ENCOUNTER — OUTPATIENT (OUTPATIENT)
Dept: OUTPATIENT SERVICES | Facility: HOSPITAL | Age: 82
LOS: 1 days | Discharge: HOME | End: 2022-04-04

## 2022-04-04 DIAGNOSIS — Z98.890 OTHER SPECIFIED POSTPROCEDURAL STATES: Chronic | ICD-10-CM

## 2022-04-04 DIAGNOSIS — C26.0 MALIGNANT NEOPLASM OF INTESTINAL TRACT, PART UNSPECIFIED: ICD-10-CM

## 2022-04-07 ENCOUNTER — APPOINTMENT (OUTPATIENT)
Dept: HEMATOLOGY ONCOLOGY | Facility: CLINIC | Age: 82
End: 2022-04-07
Payer: MEDICARE

## 2022-04-07 PROCEDURE — 99215 OFFICE O/P EST HI 40 MIN: CPT | Mod: 95

## 2022-04-07 NOTE — HISTORY OF PRESENT ILLNESS
[Disease: _____________________] : Disease: [unfilled] [T: ___] : T[unfilled] [N: ___] : N[unfilled] [M: ___] : M[unfilled] [AJCC Stage: ____] : AJCC Stage: [unfilled] [de-identified] : Mr. 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 On 1/25, he underwent inpatient bronchoscopy and endobronchial ultrasound with RLL BAL, TBNA of station 7 lymph node, RLL Mass transbronchial biopsy which was suspicious for malignancy. \par He saw Dr. Moran and was recommended for PET/CT\par 2/7/22: \par large FDG-avid consolidations in right middle lobe and right lower lobe, unchanged as compared to CT dated 1/21/2022, compatible with known adenocarcinoma. Small FDG-avid nodule in right lung is suspicious for another site of disease. A mildly FDG-avid left lower lobe peripheral opacity, also unchanged on CT, is indeterminate.\par \par 2. Mildly FDG-avid subcarinal lymph node is nonspecific.\par \par Pt does not wish to use  phone. Grand-daughter provided the translation as per patient request. Other than cough, he has no other symptoms. He has tried OTC cough meds with no help. \par \par \par 2/18/22: Pt seen vis tel. No new complaints. He still has cough which is persistent and dry. \par \par 4/7/22: Pt seen via tele. 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  [de-identified] : adeno ca [Home] : at home, [unfilled] , at the time of the visit. [Medical Office: ([facility])___] : at the medical office located in  [Family Member] : family member [Verbal consent obtained from patient] : the patient, [unfilled]

## 2022-04-07 NOTE — PHYSICAL EXAM
[Restricted in physically strenuous activity but ambulatory and able to carry out work of a light or sedentary nature] : Status 1- Restricted in physically strenuous activity but ambulatory and able to carry out work of a light or sedentary nature, e.g., light house work, office work [Normal] : affect appropriate [de-identified] : bronchial BS on rt

## 2022-04-07 NOTE — ASSESSMENT
[FreeTextEntry1] : 81 yo m with at least stage IIIA lung adeno ca, PDL1 0%, NGS showed KRAS G12V mut in addition to a few other uncontainable ones. \par Reviewed PET/CT personally- disease confined to rt RLL and RML- some activity in subcarinal region\par I also reviewed path- bx from RLL pos for adeno ca, lavage showed atypical cells, and level 7 LN- was benign\par I presented the case at  and the group concurred that optimal staging would involve sampling of rt middle lobe. SUV on rt middle lobe mass is lower than that of RLL. \par Since last visit pt has undergone evaluation of RML through ventura bronch and was pos for adeno ca\par ([id number])\par MRi brain on 2/17 showed no mets\par Based on AJCC 8th staging, pt has stage IIIA lung cancer\par Based on PFTs. he is potentially- resectable. Will make an urgent referral to thoracic\par However, based on recent Checkmate 816 study, neoadjuvant chemo+nivo is likely to yield higher rates of CR and is not an FDA-approved option. \par Will tentatively schedule chemoimmunotherapy (Carbo/pem/nivo) ASAP\par I discussed the regimen in detail including potential benefits and AEs in detail. I answered all questions. I went over schedule and other pertinent details. I explained that the plan may change if staging uis different and if pt undergoes surgery or radiation is recommended concurrently. \par Continue benzonatate for cough\par Regimen:\par Carboplatin AUC 4\par Pemetrexed 500 mg/m2\par NIVO 360 mg + platinum-doublet chemo Q3W\par \par Pt is taking folic acid already\par B12 with first cycle\par

## 2022-04-12 ENCOUNTER — RESULT REVIEW (OUTPATIENT)
Age: 82
End: 2022-04-12

## 2022-04-12 ENCOUNTER — APPOINTMENT (OUTPATIENT)
Dept: HEMATOLOGY ONCOLOGY | Facility: CLINIC | Age: 82
End: 2022-04-12

## 2022-04-12 ENCOUNTER — APPOINTMENT (OUTPATIENT)
Dept: INFUSION THERAPY | Facility: HOSPITAL | Age: 82
End: 2022-04-12

## 2022-04-12 DIAGNOSIS — R11.2 NAUSEA WITH VOMITING, UNSPECIFIED: ICD-10-CM

## 2022-04-12 DIAGNOSIS — Z51.11 ENCOUNTER FOR ANTINEOPLASTIC CHEMOTHERAPY: ICD-10-CM

## 2022-04-12 LAB
ALBUMIN SERPL ELPH-MCNC: 4.1 G/DL — SIGNIFICANT CHANGE UP (ref 3.3–5)
ALP SERPL-CCNC: 124 U/L — HIGH (ref 40–120)
ALT FLD-CCNC: 15 U/L — SIGNIFICANT CHANGE UP (ref 10–45)
ANION GAP SERPL CALC-SCNC: 15 MMOL/L — SIGNIFICANT CHANGE UP (ref 5–17)
AST SERPL-CCNC: 20 U/L — SIGNIFICANT CHANGE UP (ref 10–40)
BASOPHILS # BLD AUTO: 0.05 K/UL — SIGNIFICANT CHANGE UP (ref 0–0.2)
BASOPHILS NFR BLD AUTO: 0.5 % — SIGNIFICANT CHANGE UP (ref 0–2)
BILIRUB SERPL-MCNC: 0.3 MG/DL — SIGNIFICANT CHANGE UP (ref 0.2–1.2)
BUN SERPL-MCNC: 22 MG/DL — SIGNIFICANT CHANGE UP (ref 7–23)
CALCIUM SERPL-MCNC: 9.3 MG/DL — SIGNIFICANT CHANGE UP (ref 8.4–10.5)
CEA SERPL-MCNC: 3.2 NG/ML — SIGNIFICANT CHANGE UP (ref 0–3.8)
CHLORIDE SERPL-SCNC: 104 MMOL/L — SIGNIFICANT CHANGE UP (ref 96–108)
CO2 SERPL-SCNC: 22 MMOL/L — SIGNIFICANT CHANGE UP (ref 22–31)
CREAT SERPL-MCNC: 1.41 MG/DL — HIGH (ref 0.5–1.3)
EGFR: 50 ML/MIN/1.73M2 — LOW
EOSINOPHIL # BLD AUTO: 0.25 K/UL — SIGNIFICANT CHANGE UP (ref 0–0.5)
EOSINOPHIL NFR BLD AUTO: 2.5 % — SIGNIFICANT CHANGE UP (ref 0–6)
GLUCOSE SERPL-MCNC: 87 MG/DL — SIGNIFICANT CHANGE UP (ref 70–99)
HCT VFR BLD CALC: 40.6 % — SIGNIFICANT CHANGE UP (ref 39–50)
HGB BLD-MCNC: 13.4 G/DL — SIGNIFICANT CHANGE UP (ref 13–17)
IMM GRANULOCYTES NFR BLD AUTO: 0.8 % — SIGNIFICANT CHANGE UP (ref 0–1.5)
LYMPHOCYTES # BLD AUTO: 4.31 K/UL — HIGH (ref 1–3.3)
LYMPHOCYTES # BLD AUTO: 43.5 % — SIGNIFICANT CHANGE UP (ref 13–44)
MCHC RBC-ENTMCNC: 28.3 PG — SIGNIFICANT CHANGE UP (ref 27–34)
MCHC RBC-ENTMCNC: 33 G/DL — SIGNIFICANT CHANGE UP (ref 32–36)
MCV RBC AUTO: 85.8 FL — SIGNIFICANT CHANGE UP (ref 80–100)
MONOCYTES # BLD AUTO: 0.68 K/UL — SIGNIFICANT CHANGE UP (ref 0–0.9)
MONOCYTES NFR BLD AUTO: 6.9 % — SIGNIFICANT CHANGE UP (ref 2–14)
NEUTROPHILS # BLD AUTO: 4.53 K/UL — SIGNIFICANT CHANGE UP (ref 1.8–7.4)
NEUTROPHILS NFR BLD AUTO: 45.8 % — SIGNIFICANT CHANGE UP (ref 43–77)
NRBC # BLD: 0 /100 WBCS — SIGNIFICANT CHANGE UP (ref 0–0)
PLATELET # BLD AUTO: 227 K/UL — SIGNIFICANT CHANGE UP (ref 150–400)
POTASSIUM SERPL-MCNC: 5.6 MMOL/L — HIGH (ref 3.5–5.3)
POTASSIUM SERPL-SCNC: 5.6 MMOL/L — HIGH (ref 3.5–5.3)
PROT SERPL-MCNC: 7.2 G/DL — SIGNIFICANT CHANGE UP (ref 6–8.3)
RBC # BLD: 4.73 M/UL — SIGNIFICANT CHANGE UP (ref 4.2–5.8)
RBC # FLD: 13.7 % — SIGNIFICANT CHANGE UP (ref 10.3–14.5)
SODIUM SERPL-SCNC: 140 MMOL/L — SIGNIFICANT CHANGE UP (ref 135–145)
T4 FREE+ TSH PNL SERPL: 1.8 UIU/ML — SIGNIFICANT CHANGE UP (ref 0.27–4.2)
WBC # BLD: 9.9 K/UL — SIGNIFICANT CHANGE UP (ref 3.8–10.5)
WBC # FLD AUTO: 9.9 K/UL — SIGNIFICANT CHANGE UP (ref 3.8–10.5)

## 2022-04-13 ENCOUNTER — NON-APPOINTMENT (OUTPATIENT)
Age: 82
End: 2022-04-13

## 2022-04-13 LAB
CULTURE RESULTS: SIGNIFICANT CHANGE UP
SPECIMEN SOURCE: SIGNIFICANT CHANGE UP

## 2022-04-14 ENCOUNTER — NON-APPOINTMENT (OUTPATIENT)
Age: 82
End: 2022-04-14

## 2022-04-25 ENCOUNTER — APPOINTMENT (OUTPATIENT)
Dept: THORACIC SURGERY | Facility: CLINIC | Age: 82
End: 2022-04-25
Payer: MEDICARE

## 2022-04-25 ENCOUNTER — NON-APPOINTMENT (OUTPATIENT)
Age: 82
End: 2022-04-25

## 2022-04-25 VITALS
SYSTOLIC BLOOD PRESSURE: 144 MMHG | HEIGHT: 69 IN | OXYGEN SATURATION: 98 % | WEIGHT: 215 LBS | BODY MASS INDEX: 31.84 KG/M2 | HEART RATE: 71 BPM | DIASTOLIC BLOOD PRESSURE: 83 MMHG

## 2022-04-25 PROCEDURE — 99205 OFFICE O/P NEW HI 60 MIN: CPT

## 2022-04-25 RX ORDER — BENZONATATE 100 MG/1
100 CAPSULE ORAL 3 TIMES DAILY
Qty: 21 | Refills: 0 | Status: COMPLETED | COMMUNITY
Start: 2022-02-10 | End: 2022-04-25

## 2022-04-25 NOTE — ASSESSMENT
[FreeTextEntry1] : Mr. AMOS SALAZAR, 82 year old male, never smoker, w/ hx of HLD, HTN, who presented with persistent cough and went to ER. \par \par CT angio chest on 01/21/2022:\par - Masslike consolidation in the right middle lobe measures 5.3 x 2.8 cm, abutting the major fissure. Large consolidation in the right lower lobe. A 0.8 cm posterior right lower lobe nodule (series 2, image 71). Left lower lobe subsegmental atelectasis.\par \par Patient is s/p Flexible bronchoscopy, airway inspection, endobronchial ultrasoundguided  lymph node aspiration of level 7 lymph node, endobronchial ultrasoundguided transbronchial needle aspiration of the right lower lobe mass, therapeutic aspiration of secretions, and bronchoalveolar lavage on 01/25/2022 by Dr. Moran. Path of RLL revealed positive for malignant cells, consistent with adenocarcinoma, mucinous type. Level 7 negative for malignant cells. \par \par PET/CT on 02/07/2022:\par - A mildly FDG-avid subcarinal lymph node measures 1.5 x 0.9 cm, SUV 3.0 (image 96).\par - A large focal area of increased FDG activity corresponding to the opacity/mass in the right lung base (SUV 10.9; image 117). There is an additional focal area of increased FDG activity in the right middle lobe (SUV 4.4; image 108) as well as in a right lower lobe nodule. Right upper lobe subcentimeter calcified granuloma.\par - There is a mildly FDG-avid small peripheral left lower lobe opacity (SUV 3.7; image 104). These findings are unchanged as compared to CT dated 1/21/2022.\par \par MRI of Brain on 02/23/2022:\par - NO EVIDENCE OF INTRACRANIAL HEMORRHAGE, ACUTE TERRITORIAL INFARCT OR AREA OF ABNORMAL ENHANCEMENT TO SUGGEST METASTATIC DISEASE. \par \par PFTs on 02/23/2022: FVC 70%, FEV1 79%, DLCO 94%. \par \par Patient is s/p FB, [place] Robotic-assisted navigation bronchoscopy guided, radial EBUS guided transbronchial needle aspiration, transbronchial forceps biopsy of the right middle lobe under fluoroscopic guidance, bronchoalveolar lavage of right middle lobe, and endobronchial  ultrasound guided lymph node sampling of level VII lymph node on 03/15/2022 by Dr. Moran. 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 Level 7 showed negative for malignant cells. PD-L1 0%. Level 7 flow cytometry revealed heterogeneous population of T cells (with increased CD4 to CD8 ratio=9:1 and no loss of pan T cell antigen). B cells are polytypic by Kappa and lambda labeling.  \par \par Dr. Domingo plan to start on neoadjuvant chemo for total of 3 cycles. Pt is now s/p one cycle of chemo. \par \par I have reviewed the patient's medical records and diagnostic images at time of this office consultation and have made the following recommendation:\par 1. All studies reviewed with pt, Pt has advanced stage disease, continue neoadjuvant chemotherapy with Dr. Domingo. \par 2. RTC after completing of chemotherapy with new scan to re-assess the surgical candidacy. \par \par \par I personally performed the services described in the documentation, reviewed the documentation recorded by the scribe in my presence and it accurately and completely records my words and actions. \par \par I, Alysia Mari NP, am scribing for and the presence of JESSE Schuler, the following sections HISTORY OF PRESENT ILLNESS, PAST MEDICAL/FAMILY/SOCIAL HISTORY; REVIEW OF SYSTEMS; VITAL SIGNS; PHYSICAL EXAM; DISPOSITION.\par \par \par

## 2022-04-25 NOTE — CONSULT LETTER
[Dear  ___] : Dear  [unfilled], [Consult Letter:] : I had the pleasure of evaluating your patient, [unfilled]. [( Thank you for referring [unfilled] for consultation for _____ )] : Thank you for referring [unfilled] for consultation for [unfilled] [Please see my note below.] : Please see my note below. [Consult Closing:] : Thank you very much for allowing me to participate in the care of this patient.  If you have any questions, please do not hesitate to contact me. [Sincerely,] : Sincerely, [FreeTextEntry2] : Dr. Domingo (Hem/Onc/Ref) [FreeTextEntry3] : Opal Medina\par Attending Surgeon\par Division of Thoracic Surgery\par \par [employer] [facility]\par

## 2022-04-25 NOTE — HISTORY OF PRESENT ILLNESS
[FreeTextEntry1] : Mr. AMOS SALAZAR, 82 year old male, never smoker, w/ hx of HLD, HTN, who presented with persistent cough and went to ER. \par \par CT angio chest on 01/21/2022:\par - Masslike consolidation in the right middle lobe measures 5.3 x 2.8 cm, abutting the major fissure. Large consolidation in the right lower lobe. A 0.8 cm posterior right lower lobe nodule (series 2, image 71). Left lower lobe subsegmental atelectasis.\par \par Patient is s/p Flexible bronchoscopy, airway inspection, endobronchial ultrasoundguided  lymph node aspiration of level 7 lymph node, endobronchial ultrasoundguided transbronchial needle aspiration of the right lower lobe mass, therapeutic aspiration of secretions, and bronchoalveolar lavage on 01/25/2022 by Dr. Moran. Path of RLL revealed positive for malignant cells, consistent with adenocarcinoma, mucinous type. Level 7 negative for malignant cells. \par \par PET/CT on 02/07/2022:\par - A mildly FDG-avid subcarinal lymph node measures 1.5 x 0.9 cm, SUV 3.0 (image 96).\par - A large focal area of increased FDG activity corresponding to the opacity/mass in the right lung base (SUV 10.9; image 117). There is an additional focal area of increased FDG activity in the right middle lobe (SUV 4.4; image 108) as well as in a right lower lobe nodule. Right upper lobe subcentimeter calcified granuloma.\par - There is a mildly FDG-avid small peripheral left lower lobe opacity (SUV 3.7; image 104). These findings are unchanged as compared to CT dated 1/21/2022.\par \par MRI of Brain on 02/23/2022:\par - NO EVIDENCE OF INTRACRANIAL HEMORRHAGE, ACUTE TERRITORIAL INFARCT OR AREA OF ABNORMAL ENHANCEMENT TO SUGGEST METASTATIC DISEASE. \par \par PFTs on 02/23/2022: FVC 70%, FEV1 79%, DLCO 94%. \par \par Patient is s/p FB, [place] Robotic-assisted navigation bronchoscopy guided, radial EBUS guided transbronchial needle aspiration, transbronchial forceps biopsy of the right middle lobe under fluoroscopic guidance, bronchoalveolar lavage of right middle lobe, and endobronchial  ultrasound guided lymph node sampling of level VII lymph node on 03/15/2022 by Dr. Moran. 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 Level 7 showed negative for malignant cells. PD-L1 0%. Level 7 flow cytometry revealed heterogeneous population of T cells (with increased CD4 to CD8 ratio=9:1 and no loss of pan T cell antigen). B cells are polytypic by Kappa and lambda labeling.  \par \par Dr. Domingo plan to start on neoadjuvant chemo for total of 3 cycles. Pt is now s/p one cycle of chemo. \par \par Patient is here today for CT surgery consultation, referred by Dr. Domingo. Pt admits to cough, denies fever, chills, SOB, hemoptysis, or CP.

## 2022-05-03 ENCOUNTER — RESULT REVIEW (OUTPATIENT)
Age: 82
End: 2022-05-03

## 2022-05-03 ENCOUNTER — NON-APPOINTMENT (OUTPATIENT)
Age: 82
End: 2022-05-03

## 2022-05-03 ENCOUNTER — APPOINTMENT (OUTPATIENT)
Dept: HEMATOLOGY ONCOLOGY | Facility: CLINIC | Age: 82
End: 2022-05-03
Payer: MEDICARE

## 2022-05-03 ENCOUNTER — APPOINTMENT (OUTPATIENT)
Dept: HEMATOLOGY ONCOLOGY | Facility: CLINIC | Age: 82
End: 2022-05-03

## 2022-05-03 ENCOUNTER — APPOINTMENT (OUTPATIENT)
Dept: INFUSION THERAPY | Facility: HOSPITAL | Age: 82
End: 2022-05-03

## 2022-05-03 DIAGNOSIS — E86.0 DEHYDRATION: ICD-10-CM

## 2022-05-03 LAB
ALBUMIN SERPL ELPH-MCNC: 4 G/DL — SIGNIFICANT CHANGE UP (ref 3.3–5)
ALP SERPL-CCNC: 117 U/L — SIGNIFICANT CHANGE UP (ref 40–120)
ALT FLD-CCNC: 24 U/L — SIGNIFICANT CHANGE UP (ref 10–45)
ANION GAP SERPL CALC-SCNC: 14 MMOL/L — SIGNIFICANT CHANGE UP (ref 5–17)
AST SERPL-CCNC: 20 U/L — SIGNIFICANT CHANGE UP (ref 10–40)
BASOPHILS # BLD AUTO: 0.06 K/UL — SIGNIFICANT CHANGE UP (ref 0–0.2)
BASOPHILS NFR BLD AUTO: 0.7 % — SIGNIFICANT CHANGE UP (ref 0–2)
BILIRUB SERPL-MCNC: <0.2 MG/DL — SIGNIFICANT CHANGE UP (ref 0.2–1.2)
BUN SERPL-MCNC: 27 MG/DL — HIGH (ref 7–23)
CALCIUM SERPL-MCNC: 9.1 MG/DL — SIGNIFICANT CHANGE UP (ref 8.4–10.5)
CEA SERPL-MCNC: 4.5 NG/ML — HIGH (ref 0–3.8)
CHLORIDE SERPL-SCNC: 105 MMOL/L — SIGNIFICANT CHANGE UP (ref 96–108)
CO2 SERPL-SCNC: 20 MMOL/L — LOW (ref 22–31)
CREAT SERPL-MCNC: 1.57 MG/DL — HIGH (ref 0.5–1.3)
EGFR: 44 ML/MIN/1.73M2 — LOW
EOSINOPHIL # BLD AUTO: 0.09 K/UL — SIGNIFICANT CHANGE UP (ref 0–0.5)
EOSINOPHIL NFR BLD AUTO: 1 % — SIGNIFICANT CHANGE UP (ref 0–6)
GLUCOSE SERPL-MCNC: 90 MG/DL — SIGNIFICANT CHANGE UP (ref 70–99)
HCT VFR BLD CALC: 35.8 % — LOW (ref 39–50)
HGB BLD-MCNC: 11.8 G/DL — LOW (ref 13–17)
IMM GRANULOCYTES NFR BLD AUTO: 4.1 % — HIGH (ref 0–1.5)
LYMPHOCYTES # BLD AUTO: 3.29 K/UL — SIGNIFICANT CHANGE UP (ref 1–3.3)
LYMPHOCYTES # BLD AUTO: 36.7 % — SIGNIFICANT CHANGE UP (ref 13–44)
MCHC RBC-ENTMCNC: 28.6 PG — SIGNIFICANT CHANGE UP (ref 27–34)
MCHC RBC-ENTMCNC: 33 G/DL — SIGNIFICANT CHANGE UP (ref 32–36)
MCV RBC AUTO: 86.7 FL — SIGNIFICANT CHANGE UP (ref 80–100)
MONOCYTES # BLD AUTO: 0.67 K/UL — SIGNIFICANT CHANGE UP (ref 0–0.9)
MONOCYTES NFR BLD AUTO: 7.5 % — SIGNIFICANT CHANGE UP (ref 2–14)
NEUTROPHILS # BLD AUTO: 4.48 K/UL — SIGNIFICANT CHANGE UP (ref 1.8–7.4)
NEUTROPHILS NFR BLD AUTO: 50 % — SIGNIFICANT CHANGE UP (ref 43–77)
NRBC # BLD: 0 /100 WBCS — SIGNIFICANT CHANGE UP (ref 0–0)
PLATELET # BLD AUTO: 293 K/UL — SIGNIFICANT CHANGE UP (ref 150–400)
POTASSIUM SERPL-MCNC: 5.7 MMOL/L — HIGH (ref 3.5–5.3)
POTASSIUM SERPL-SCNC: 5.7 MMOL/L — HIGH (ref 3.5–5.3)
PROT SERPL-MCNC: 6.9 G/DL — SIGNIFICANT CHANGE UP (ref 6–8.3)
RBC # BLD: 4.13 M/UL — LOW (ref 4.2–5.8)
RBC # FLD: 13.6 % — SIGNIFICANT CHANGE UP (ref 10.3–14.5)
SODIUM SERPL-SCNC: 140 MMOL/L — SIGNIFICANT CHANGE UP (ref 135–145)
T4 FREE+ TSH PNL SERPL: 1.7 UIU/ML — SIGNIFICANT CHANGE UP (ref 0.27–4.2)
WBC # BLD: 8.96 K/UL — SIGNIFICANT CHANGE UP (ref 3.8–10.5)
WBC # FLD AUTO: 8.96 K/UL — SIGNIFICANT CHANGE UP (ref 3.8–10.5)

## 2022-05-03 PROCEDURE — 99215 OFFICE O/P EST HI 40 MIN: CPT

## 2022-05-03 NOTE — REVIEW OF SYSTEMS
[Cough] : cough [SOB on Exertion] : shortness of breath during exertion [Negative] : Allergic/Immunologic [FreeTextEntry6] : improved

## 2022-05-03 NOTE — HISTORY OF PRESENT ILLNESS
[Disease: _____________________] : Disease: [unfilled] [T: ___] : T[unfilled] [N: ___] : N[unfilled] [M: ___] : M[unfilled] [AJCC Stage: ____] : AJCC Stage: [unfilled] [de-identified] : Mr. Del Rio is a pleasant 82 M, Singaporean (very little English), speaking with PMH of HTN and BPH, never smoker but previously worked in building maintenance presented initially 1/21/22 with chronic dry cough x1 year and worsened dyspnea on exertion for 3 weeks, admitted to [facility] after found on CT to have to have a 5.3 x 2.8 cm masslike consolidation in the RLL. On 1/25, he underwent inpatient bronchoscopy and endobronchial ultrasound with RLL BAL, TBNA of station 7 lymph node, RLL Mass transbronchial biopsy which was suspicious for malignancy. \par He saw Dr. Moran and was recommended for PET/CT\par 2/7/22: \par large FDG-avid consolidations in right middle lobe and right lower lobe, unchanged as compared to CT dated 1/21/2022, compatible with known adenocarcinoma. Small FDG-avid nodule in right lung is suspicious for another site of disease. A mildly FDG-avid left lower lobe peripheral opacity, also unchanged on CT, is indeterminate.\par \par 2. Mildly FDG-avid subcarinal lymph node is nonspecific.\par \par Pt does not wish to use  phone. Grand-daughter provided the translation as per patient request. Other than cough, he has no other symptoms. He has tried OTC cough meds with no help. \par \par \par 2/18/22: Pt seen vis tel. No new complaints. He still has cough which is persistent and dry. \par \par 4/7/22: Pt seen via tele. 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 \par \par 5/3/22: Pt seen in tx room. He received cycle 1 3 weeks ago. Tolerated well with absolutely no AEs. He has since seen Dr. Medina who will see him again after scans post 3 cycles of tx. Pt here for cycle 2.  [de-identified] : adeno ca

## 2022-05-03 NOTE — PHYSICAL EXAM
[Restricted in physically strenuous activity but ambulatory and able to carry out work of a light or sedentary nature] : Status 1- Restricted in physically strenuous activity but ambulatory and able to carry out work of a light or sedentary nature, e.g., light house work, office work [Normal] : affect appropriate [de-identified] : bronchial BS on rt

## 2022-05-04 LAB
CULTURE RESULTS: SIGNIFICANT CHANGE UP
SPECIMEN SOURCE: SIGNIFICANT CHANGE UP

## 2022-05-17 LAB — MISCELLANEOUS TEST NAME: SIGNIFICANT CHANGE UP

## 2022-05-18 ENCOUNTER — OUTPATIENT (OUTPATIENT)
Dept: OUTPATIENT SERVICES | Facility: HOSPITAL | Age: 82
LOS: 1 days | Discharge: HOME | End: 2022-05-18

## 2022-05-18 DIAGNOSIS — C26.0 MALIGNANT NEOPLASM OF INTESTINAL TRACT, PART UNSPECIFIED: ICD-10-CM

## 2022-05-18 DIAGNOSIS — Z98.890 OTHER SPECIFIED POSTPROCEDURAL STATES: Chronic | ICD-10-CM

## 2022-05-20 ENCOUNTER — INPATIENT (INPATIENT)
Facility: HOSPITAL | Age: 82
LOS: 0 days | Discharge: HOME | End: 2022-05-21
Attending: HOSPITALIST | Admitting: HOSPITALIST
Payer: MEDICARE

## 2022-05-20 VITALS
RESPIRATION RATE: 19 BRPM | SYSTOLIC BLOOD PRESSURE: 142 MMHG | DIASTOLIC BLOOD PRESSURE: 61 MMHG | HEIGHT: 68 IN | TEMPERATURE: 101 F | OXYGEN SATURATION: 95 % | HEART RATE: 124 BPM

## 2022-05-20 DIAGNOSIS — A41.9 SEPSIS, UNSPECIFIED ORGANISM: ICD-10-CM

## 2022-05-20 DIAGNOSIS — Z98.890 OTHER SPECIFIED POSTPROCEDURAL STATES: Chronic | ICD-10-CM

## 2022-05-20 DIAGNOSIS — C34.90 MALIGNANT NEOPLASM OF UNSPECIFIED PART OF UNSPECIFIED BRONCHUS OR LUNG: ICD-10-CM

## 2022-05-20 DIAGNOSIS — J06.9 ACUTE UPPER RESPIRATORY INFECTION, UNSPECIFIED: ICD-10-CM

## 2022-05-20 DIAGNOSIS — Z29.9 ENCOUNTER FOR PROPHYLACTIC MEASURES, UNSPECIFIED: ICD-10-CM

## 2022-05-20 DIAGNOSIS — R50.9 FEVER, UNSPECIFIED: ICD-10-CM

## 2022-05-20 DIAGNOSIS — J18.9 PNEUMONIA, UNSPECIFIED ORGANISM: ICD-10-CM

## 2022-05-20 DIAGNOSIS — I10 ESSENTIAL (PRIMARY) HYPERTENSION: ICD-10-CM

## 2022-05-20 DIAGNOSIS — E78.5 HYPERLIPIDEMIA, UNSPECIFIED: ICD-10-CM

## 2022-05-20 DIAGNOSIS — Z87.438 PERSONAL HISTORY OF OTHER DISEASES OF MALE GENITAL ORGANS: ICD-10-CM

## 2022-05-20 DIAGNOSIS — N17.9 ACUTE KIDNEY FAILURE, UNSPECIFIED: ICD-10-CM

## 2022-05-20 LAB
ALBUMIN SERPL ELPH-MCNC: 3.9 G/DL — SIGNIFICANT CHANGE UP (ref 3.3–5)
ALP SERPL-CCNC: 120 U/L — SIGNIFICANT CHANGE UP (ref 40–120)
ALT FLD-CCNC: 25 U/L — SIGNIFICANT CHANGE UP (ref 4–41)
ANION GAP SERPL CALC-SCNC: 13 MMOL/L — SIGNIFICANT CHANGE UP (ref 7–14)
APPEARANCE UR: CLEAR — SIGNIFICANT CHANGE UP
AST SERPL-CCNC: 19 U/L — SIGNIFICANT CHANGE UP (ref 4–40)
B PERT DNA SPEC QL NAA+PROBE: SIGNIFICANT CHANGE UP
B PERT+PARAPERT DNA PNL SPEC NAA+PROBE: SIGNIFICANT CHANGE UP
BACTERIA # UR AUTO: NEGATIVE — SIGNIFICANT CHANGE UP
BASE EXCESS BLDV CALC-SCNC: -4.9 MMOL/L — LOW (ref -2–3)
BASOPHILS # BLD AUTO: 0.01 K/UL — SIGNIFICANT CHANGE UP (ref 0–0.2)
BASOPHILS NFR BLD AUTO: 0.2 % — SIGNIFICANT CHANGE UP (ref 0–2)
BILIRUB SERPL-MCNC: 0.4 MG/DL — SIGNIFICANT CHANGE UP (ref 0.2–1.2)
BILIRUB UR-MCNC: NEGATIVE — SIGNIFICANT CHANGE UP
BLOOD GAS VENOUS COMPREHENSIVE RESULT: SIGNIFICANT CHANGE UP
BORDETELLA PARAPERTUSSIS (RAPRVP): SIGNIFICANT CHANGE UP
BUN SERPL-MCNC: 27 MG/DL — HIGH (ref 7–23)
C PNEUM DNA SPEC QL NAA+PROBE: SIGNIFICANT CHANGE UP
CALCIUM SERPL-MCNC: 9 MG/DL — SIGNIFICANT CHANGE UP (ref 8.4–10.5)
CHLORIDE BLDV-SCNC: 109 MMOL/L — HIGH (ref 96–108)
CHLORIDE SERPL-SCNC: 106 MMOL/L — SIGNIFICANT CHANGE UP (ref 98–107)
CO2 BLDV-SCNC: 20.5 MMOL/L — LOW (ref 22–26)
CO2 SERPL-SCNC: 19 MMOL/L — LOW (ref 22–31)
COLOR SPEC: YELLOW — SIGNIFICANT CHANGE UP
CREAT SERPL-MCNC: 1.85 MG/DL — HIGH (ref 0.5–1.3)
DIFF PNL FLD: ABNORMAL
EGFR: 36 ML/MIN/1.73M2 — LOW
EOSINOPHIL # BLD AUTO: 0.03 K/UL — SIGNIFICANT CHANGE UP (ref 0–0.5)
EOSINOPHIL NFR BLD AUTO: 0.5 % — SIGNIFICANT CHANGE UP (ref 0–6)
EPI CELLS # UR: 0 /HPF — SIGNIFICANT CHANGE UP (ref 0–5)
FLUAV SUBTYP SPEC NAA+PROBE: SIGNIFICANT CHANGE UP
FLUBV RNA SPEC QL NAA+PROBE: SIGNIFICANT CHANGE UP
GAS PNL BLDV: 136 MMOL/L — SIGNIFICANT CHANGE UP (ref 136–145)
GAS PNL BLDV: SIGNIFICANT CHANGE UP
GLUCOSE BLDV-MCNC: 99 MG/DL — SIGNIFICANT CHANGE UP (ref 70–99)
GLUCOSE SERPL-MCNC: 115 MG/DL — HIGH (ref 70–99)
GLUCOSE UR QL: NEGATIVE — SIGNIFICANT CHANGE UP
HADV DNA SPEC QL NAA+PROBE: SIGNIFICANT CHANGE UP
HCO3 BLDV-SCNC: 20 MMOL/L — LOW (ref 22–29)
HCOV 229E RNA SPEC QL NAA+PROBE: DETECTED
HCOV HKU1 RNA SPEC QL NAA+PROBE: SIGNIFICANT CHANGE UP
HCOV NL63 RNA SPEC QL NAA+PROBE: SIGNIFICANT CHANGE UP
HCOV OC43 RNA SPEC QL NAA+PROBE: SIGNIFICANT CHANGE UP
HCT VFR BLD CALC: 32.8 % — LOW (ref 39–50)
HCT VFR BLDA CALC: 32 % — LOW (ref 39–51)
HGB BLD CALC-MCNC: 10.8 G/DL — LOW (ref 13–17)
HGB BLD-MCNC: 10.9 G/DL — LOW (ref 13–17)
HMPV RNA SPEC QL NAA+PROBE: SIGNIFICANT CHANGE UP
HPIV1 RNA SPEC QL NAA+PROBE: SIGNIFICANT CHANGE UP
HPIV2 RNA SPEC QL NAA+PROBE: SIGNIFICANT CHANGE UP
HPIV3 RNA SPEC QL NAA+PROBE: SIGNIFICANT CHANGE UP
HPIV4 RNA SPEC QL NAA+PROBE: SIGNIFICANT CHANGE UP
HYALINE CASTS # UR AUTO: 2 /LPF — SIGNIFICANT CHANGE UP (ref 0–7)
IANC: 3.89 K/UL — SIGNIFICANT CHANGE UP (ref 1.8–7.4)
IMM GRANULOCYTES NFR BLD AUTO: 1.9 % — HIGH (ref 0–1.5)
KETONES UR-MCNC: NEGATIVE — SIGNIFICANT CHANGE UP
LACTATE BLDV-MCNC: 0.9 MMOL/L — SIGNIFICANT CHANGE UP (ref 0.5–2)
LEUKOCYTE ESTERASE UR-ACNC: NEGATIVE — SIGNIFICANT CHANGE UP
LYMPHOCYTES # BLD AUTO: 0.97 K/UL — LOW (ref 1–3.3)
LYMPHOCYTES # BLD AUTO: 16.8 % — SIGNIFICANT CHANGE UP (ref 13–44)
M PNEUMO DNA SPEC QL NAA+PROBE: SIGNIFICANT CHANGE UP
MCHC RBC-ENTMCNC: 28.9 PG — SIGNIFICANT CHANGE UP (ref 27–34)
MCHC RBC-ENTMCNC: 33.2 GM/DL — SIGNIFICANT CHANGE UP (ref 32–36)
MCV RBC AUTO: 87 FL — SIGNIFICANT CHANGE UP (ref 80–100)
MONOCYTES # BLD AUTO: 0.75 K/UL — SIGNIFICANT CHANGE UP (ref 0–0.9)
MONOCYTES NFR BLD AUTO: 13 % — SIGNIFICANT CHANGE UP (ref 2–14)
NEUTROPHILS # BLD AUTO: 3.89 K/UL — SIGNIFICANT CHANGE UP (ref 1.8–7.4)
NEUTROPHILS NFR BLD AUTO: 67.6 % — SIGNIFICANT CHANGE UP (ref 43–77)
NITRITE UR-MCNC: NEGATIVE — SIGNIFICANT CHANGE UP
NRBC # BLD: 0 /100 WBCS — SIGNIFICANT CHANGE UP
NRBC # FLD: 0 K/UL — SIGNIFICANT CHANGE UP
PCO2 BLDV: 33 MMHG — LOW (ref 42–55)
PH BLDV: 7.38 — SIGNIFICANT CHANGE UP (ref 7.32–7.43)
PH UR: 5.5 — SIGNIFICANT CHANGE UP (ref 5–8)
PLATELET # BLD AUTO: 184 K/UL — SIGNIFICANT CHANGE UP (ref 150–400)
PO2 BLDV: 61 MMHG — SIGNIFICANT CHANGE UP
POTASSIUM BLDV-SCNC: 4.2 MMOL/L — SIGNIFICANT CHANGE UP (ref 3.5–5.1)
POTASSIUM SERPL-MCNC: 4.5 MMOL/L — SIGNIFICANT CHANGE UP (ref 3.5–5.3)
POTASSIUM SERPL-SCNC: 4.5 MMOL/L — SIGNIFICANT CHANGE UP (ref 3.5–5.3)
PROCALCITONIN SERPL-MCNC: 1 NG/ML — HIGH (ref 0.02–0.1)
PROT SERPL-MCNC: 7.7 G/DL — SIGNIFICANT CHANGE UP (ref 6–8.3)
PROT UR-MCNC: ABNORMAL
RAPID RVP RESULT: DETECTED
RBC # BLD: 3.77 M/UL — LOW (ref 4.2–5.8)
RBC # FLD: 14.8 % — HIGH (ref 10.3–14.5)
RBC CASTS # UR COMP ASSIST: 4 /HPF — SIGNIFICANT CHANGE UP (ref 0–4)
RSV RNA SPEC QL NAA+PROBE: SIGNIFICANT CHANGE UP
RV+EV RNA SPEC QL NAA+PROBE: SIGNIFICANT CHANGE UP
SAO2 % BLDV: 91.7 % — SIGNIFICANT CHANGE UP
SARS-COV-2 RNA SPEC QL NAA+PROBE: SIGNIFICANT CHANGE UP
SODIUM SERPL-SCNC: 138 MMOL/L — SIGNIFICANT CHANGE UP (ref 135–145)
SP GR SPEC: 1.03 — SIGNIFICANT CHANGE UP (ref 1–1.05)
UROBILINOGEN FLD QL: SIGNIFICANT CHANGE UP
WBC # BLD: 5.76 K/UL — SIGNIFICANT CHANGE UP (ref 3.8–10.5)
WBC # FLD AUTO: 5.76 K/UL — SIGNIFICANT CHANGE UP (ref 3.8–10.5)
WBC UR QL: 1 /HPF — SIGNIFICANT CHANGE UP (ref 0–5)

## 2022-05-20 PROCEDURE — 99285 EMERGENCY DEPT VISIT HI MDM: CPT | Mod: CS,GC

## 2022-05-20 PROCEDURE — 71046 X-RAY EXAM CHEST 2 VIEWS: CPT | Mod: 26

## 2022-05-20 PROCEDURE — 99223 1ST HOSP IP/OBS HIGH 75: CPT | Mod: GC

## 2022-05-20 RX ORDER — ROSUVASTATIN CALCIUM 5 MG/1
1 TABLET ORAL
Qty: 0 | Refills: 0 | DISCHARGE

## 2022-05-20 RX ORDER — FOLIC ACID 0.8 MG
1 TABLET ORAL DAILY
Refills: 0 | Status: DISCONTINUED | OUTPATIENT
Start: 2022-05-20 | End: 2022-05-21

## 2022-05-20 RX ORDER — ATORVASTATIN CALCIUM 80 MG/1
40 TABLET, FILM COATED ORAL AT BEDTIME
Refills: 0 | Status: DISCONTINUED | OUTPATIENT
Start: 2022-05-20 | End: 2022-05-21

## 2022-05-20 RX ORDER — CEFTRIAXONE 500 MG/1
1000 INJECTION, POWDER, FOR SOLUTION INTRAMUSCULAR; INTRAVENOUS EVERY 24 HOURS
Refills: 0 | Status: DISCONTINUED | OUTPATIENT
Start: 2022-05-20 | End: 2022-05-21

## 2022-05-20 RX ORDER — TELMISARTAN 20 MG/1
0 TABLET ORAL
Qty: 0 | Refills: 0 | DISCHARGE

## 2022-05-20 RX ORDER — VANCOMYCIN HCL 1 G
1000 VIAL (EA) INTRAVENOUS ONCE
Refills: 0 | Status: COMPLETED | OUTPATIENT
Start: 2022-05-20 | End: 2022-05-20

## 2022-05-20 RX ORDER — FINASTERIDE 5 MG/1
0 TABLET, FILM COATED ORAL
Qty: 0 | Refills: 0 | DISCHARGE

## 2022-05-20 RX ORDER — ACETAMINOPHEN 500 MG
650 TABLET ORAL EVERY 6 HOURS
Refills: 0 | Status: DISCONTINUED | OUTPATIENT
Start: 2022-05-20 | End: 2022-05-21

## 2022-05-20 RX ORDER — PIPERACILLIN AND TAZOBACTAM 4; .5 G/20ML; G/20ML
3.38 INJECTION, POWDER, LYOPHILIZED, FOR SOLUTION INTRAVENOUS ONCE
Refills: 0 | Status: COMPLETED | OUTPATIENT
Start: 2022-05-20 | End: 2022-05-20

## 2022-05-20 RX ORDER — HEPARIN SODIUM 5000 [USP'U]/ML
5000 INJECTION INTRAVENOUS; SUBCUTANEOUS EVERY 8 HOURS
Refills: 0 | Status: DISCONTINUED | OUTPATIENT
Start: 2022-05-20 | End: 2022-05-21

## 2022-05-20 RX ORDER — ACETAMINOPHEN 500 MG
650 TABLET ORAL ONCE
Refills: 0 | Status: COMPLETED | OUTPATIENT
Start: 2022-05-20 | End: 2022-05-20

## 2022-05-20 RX ORDER — SODIUM CHLORIDE 9 MG/ML
1000 INJECTION INTRAMUSCULAR; INTRAVENOUS; SUBCUTANEOUS ONCE
Refills: 0 | Status: COMPLETED | OUTPATIENT
Start: 2022-05-20 | End: 2022-05-20

## 2022-05-20 RX ORDER — ROSUVASTATIN CALCIUM 5 MG/1
0 TABLET ORAL
Qty: 0 | Refills: 0 | DISCHARGE

## 2022-05-20 RX ORDER — AZITHROMYCIN 500 MG/1
500 TABLET, FILM COATED ORAL DAILY
Refills: 0 | Status: DISCONTINUED | OUTPATIENT
Start: 2022-05-20 | End: 2022-05-21

## 2022-05-20 RX ORDER — FINASTERIDE 5 MG/1
5 TABLET, FILM COATED ORAL DAILY
Refills: 0 | Status: DISCONTINUED | OUTPATIENT
Start: 2022-05-20 | End: 2022-05-21

## 2022-05-20 RX ADMIN — Medication 250 MILLIGRAM(S): at 09:23

## 2022-05-20 RX ADMIN — PIPERACILLIN AND TAZOBACTAM 200 GRAM(S): 4; .5 INJECTION, POWDER, LYOPHILIZED, FOR SOLUTION INTRAVENOUS at 07:53

## 2022-05-20 RX ADMIN — Medication 100 MILLIGRAM(S): at 19:18

## 2022-05-20 RX ADMIN — HEPARIN SODIUM 5000 UNIT(S): 5000 INJECTION INTRAVENOUS; SUBCUTANEOUS at 21:53

## 2022-05-20 RX ADMIN — SODIUM CHLORIDE 1000 MILLILITER(S): 9 INJECTION INTRAMUSCULAR; INTRAVENOUS; SUBCUTANEOUS at 07:53

## 2022-05-20 RX ADMIN — Medication 650 MILLIGRAM(S): at 07:53

## 2022-05-20 RX ADMIN — AZITHROMYCIN 255 MILLIGRAM(S): 500 TABLET, FILM COATED ORAL at 15:01

## 2022-05-20 RX ADMIN — CEFTRIAXONE 100 MILLIGRAM(S): 500 INJECTION, POWDER, FOR SOLUTION INTRAMUSCULAR; INTRAVENOUS at 16:06

## 2022-05-20 RX ADMIN — ATORVASTATIN CALCIUM 40 MILLIGRAM(S): 80 TABLET, FILM COATED ORAL at 21:51

## 2022-05-20 NOTE — ED PROVIDER NOTE - ATTENDING CONTRIBUTION TO CARE
DR. ANDREWS, ATTENDING MD-  I performed a face to face bedside interview with the patient regarding history of present illness, review of symptoms and past medical history. I completed an independent physical exam.  I have discussed the patient's plan of care with the resident.   Documentation as above in the note.    HPI / ROS / PE / MDM written by myself.

## 2022-05-20 NOTE — ED PROVIDER NOTE - OBJECTIVE STATEMENT
81 y/o male h/o lung ca on chemo (last tx 2 wks ago) here with f/c cough nausea fatigue since last night.  No known sick contacts.  Is vaccinated for covid.  Feeling worse since onset.  Dec appetite.  Denies ha neck stiffness abd pain dysuria rash.

## 2022-05-20 NOTE — H&P ADULT - PROBLEM SELECTOR PLAN 2
admission SCr 1.88, baseline 1.2-1.3. i/s/o of sepsis, low PO intake  -encourage PO intake  -continue to monitor  -holding home ARB

## 2022-05-20 NOTE — H&P ADULT - ASSESSMENT
82M Thai-speaking (prefers family translate) pmhx lung adenocarcinoma (dx 2022) on chemotherapy last 5/3, BPH, HTN, HLD presenting cough/nausea/fatigue/aches found to have non-COVID coronavirus. Pt also febrile/tachycardic on admission with pro-charli 1, no WBC.

## 2022-05-20 NOTE — H&P ADULT - NSHPREVIEWOFSYSTEMS_GEN_ALL_CORE
REVIEW OF SYSTEMS:    CONSTITUTIONAL: See HPI  EYES/ENT: No visual changes;  No vertigo or throat pain   NECK: No pain or stiffness  RESPIRATORY: See HPI  CARDIOVASCULAR: See HPI  GASTROINTESTINAL: See HPI  GENITOURINARY: No dysuria, frequency or hematuria  NEUROLOGICAL: No numbness or weakness  SKIN: No itching, rashes

## 2022-05-20 NOTE — H&P ADULT - PROBLEM SELECTOR PLAN 1
Febrile, tachycardic on admission. Positive RVP for non-COVID19 coronavirus. Pro-charli ~1  RLL consolidation on CXR though known RML, RLL lung adenocarcinoma on active chemotherapy last 5/3  -s/p x1 vanc/zosyn in ED 5/20  -ceftriaxone (5/21 - ), azithromycin (5/21- )  -f/u BCx  -f/u UCx  -f/u urine legionella  -symptomatic relief with guaifenesin, tylenol Febrile, tachycardic on admission. Positive RVP for non-COVID19 coronavirus. Pro-charli ~1  RLL consolidation on CXR though known RML, RLL lung adenocarcinoma on active chemotherapy last 5/3  -s/p x1 vanc/zosyn in ED 5/20  -ceftriaxone (5/20 - ), azithromycin (5/20- ) for possible superimposed pna i/s/o immunosuppression  -f/u BCx  -f/u UCx  -f/u urine legionella  -symptomatic relief with guaifenesin, tylenol  -on room air Febrile, tachycardic on admission. Positive RVP for non-COVID19 coronavirus. Pro-charli ~1  RLL consolidation on CXR though known RML, RLL lung adenocarcinoma on active chemotherapy last 5/3  -s/p x1 vanc/zosyn in ED 5/20  -ceftriaxone (5/20 - ), azithromycin (5/20- ) for possible superimposed pna i/s/o immunosuppression  -f/u BCx  -f/u UCx  -f/u urine legionella  -f/u MRSA swab  -symptomatic relief with guaifenesin, tylenol  -on room air

## 2022-05-20 NOTE — H&P ADULT - PROBLEM SELECTOR PLAN 3
per outpt documents, at least stage IIIA  -outpt oncologist Chayo aware  -last chemo 5/3, no adverse effects noted

## 2022-05-20 NOTE — ED ADULT NURSE REASSESSMENT NOTE - NS ED NURSE REASSESS COMMENT FT1
pt a&xo4, awake and alert, denies chest pain and SOB, breathing is spontaneous and unlabored. family at bedside, call bell within reach, bed in lowest position. will continue to monitor.

## 2022-05-20 NOTE — ED PROVIDER NOTE - CLINICAL SUMMARY MEDICAL DECISION MAKING FREE TEXT BOX
83 y/o male lung ca on chemo here with sepsis.  Obtain cbc cmp blood cx x2 vbg ua ucx cxr rvp give ivf abx antipyretic admit for further care and evaluation.

## 2022-05-20 NOTE — H&P ADULT - PROBLEM SELECTOR PLAN 7
Plan:  DVT: heparin SQH  Diet: regular  Bowel regimen:   PT:   PCP: Tram Allen   Family: Son Lazara main contact  Dispo: likely home

## 2022-05-20 NOTE — ED PROVIDER NOTE - PROGRESS NOTE DETAILS
Joseph Frankel PGY3: Patient's HR normalized after tylenol. BP remains stable. Will admit for sepsis. Signed out to Dr. Cross.

## 2022-05-20 NOTE — H&P ADULT - ATTENDING COMMENTS
81 y/o M with a PMH of lung adenocarcinoma (dx 2022) on chemotherapy, BPH, HTN, HLD here for ~2 days of increasing cough, nausea, fatigue, body aches found to have sepsis 2/2 coronavirus (non-COVID) and pneumonia.    #Sepsis 2/2 coronavirus and PNA, pt. with T 100.6 and tachycardia. Received 1L NS in ED. Vanc + zosyn. CXR concerning for RLL PNA - however has known RML lung adenocarcinoma. Sepsis 2/2 coronovirus and likely superimposed PNA. With known cancer - it is possible there is a post-obstructive PNA (procalcitonin 1). Will continue supportive management for viral infection. Will treat for CAP with azithromycin and ceftriaxone. No need for oxygen supplementation - sating well on RA. F/u BCx. If BCx negative and fever curve is downtrending - can likely be d/c tomorrow on oral antibiotics.     #ROSS, likely in the setting of dehydration/sepsis. S/p fluid resuscitation. Holding ARB. F/u AM BMP.     #Lung adenocarcinoma, oncology to be made aware of admission.     Remainder of plan as stated above. Plan discussed with HS.

## 2022-05-20 NOTE — H&P ADULT - NSHPPHYSICALEXAM_GEN_ALL_CORE
VITALS:   T(C): 36.8 (05-20-22 @ 11:45), Max: 38.2 (05-20-22 @ 06:01)  HR: 64 (05-20-22 @ 11:45) (64 - 124)  BP: 111/54 (05-20-22 @ 11:45) (111/54 - 142/61)  RR: 18 (05-20-22 @ 11:45) (18 - 20)  SpO2: 100% (05-20-22 @ 11:45) (95% - 100%)    PHYSICAL EXAM:     GENERAL: NAD, sitting in bed comfortably  HEAD:  Atraumatic, Normocephalic  EYES: EOMI  ENT: Moist mucous membranes  NECK: Supple, No JVD  CHEST/LUNG: Clear to auscultation bilaterally; Occasional cough  HEART: Regular rate and rhythm; No murmurs, rubs, or gallops  ABDOMEN: normal bowel sounds; Soft, nontender, nondistended  EXTREMITIES:  2+ Peripheral Pulses, brisk capillary refill. No clubbing, cyanosis, or edema  Neurological:  A&Ox3, no focal deficits   SKIN: No rashes or lesions  PSYCH: normal affect and mood

## 2022-05-20 NOTE — ED ADULT NURSE NOTE - OBJECTIVE STATEMENT
Pt received to room 5. Pt A&Ox4, Swazi speaking only, ambulatory at baseline. Pmh of lung cancer on chemo; last chemo session was approximately 3x weeks ago. Pt c/o fevers, chills, chest pain associated with non-productive cough and SOB, nausea with no vomiting. Pt states the symptoms began last night. Pt endorses experiencing generalized body aches at this time 8/10. Pt vitally stable except for elevated HR and elevated temperature. Pt resp even unlabored,, abd soft nontender, pedal pulses 2+ bilaterally.  Denies vomiting, diarrhea, headache, numbness/tingling, visual disturbances.  R 20G to the AC, labs collected and sent. Awaiting chest xray.

## 2022-05-20 NOTE — ED ADULT TRIAGE NOTE - CHIEF COMPLAINT QUOTE
Pt c/o fever, chills, SOB nausea, non productive cough, weakness since last night. Pt currently on chemo for lung CA last treatment was 2 weeks ago. Pt noted to be febrile and tachy. No complaints of chest pain, headache,  dizziness, vomiting verbalized..

## 2022-05-20 NOTE — H&P ADULT - HISTORY OF PRESENT ILLNESS
82M Yakut-speaking (prefers family translate) pmhx lung adenocarcinoma (dx 2022) on chemotherapy, BPH, HTN, HLD here for ~2 days of increasing cough, nausea, fatigue, body aches. Cough is productive with mucus, non purulent. Endorses subjective chills/fever. Pt endorses normal appetite and fluid-intake but states he does not drink much water normally. COVID-vaccinated. No sick contacts. No chest pain, abdominal pain, urinary or bowel symptoms.     At bedside translation by granddaughter who is RN on [address].      Patient's lung adenocarcinoma was diagnosed earlier in 2022, known masses in RLL, RML. He has undergone 2 chemotherapy treatments thus far (last on 5/3/22) of: Carboplatin, Pemetrexed, NIVO, platinum-doublet. Does not require home O2. At baseline pt has chronic cough with non-purulent sputum.    ED Course: 82M Luxembourgish-speaking (prefers family translate) pmhx lung adenocarcinoma (dx 2022) on chemotherapy, BPH, HTN, HLD here for ~2 days of increasing cough, nausea, fatigue, body aches. Cough is productive with mucus, non purulent. Endorses subjective chills/fever. Pt endorses normal appetite and fluid-intake but states he does not drink much water normally. COVID-vaccinated. No sick contacts. No chest pain, abdominal pain, urinary or bowel symptoms.     At bedside translation by granddaughter who is RN on [address].      Patient's lung adenocarcinoma was diagnosed earlier in 2022, known masses in RLL, RML. He has undergone 2 chemotherapy treatments thus far (last on 5/3/22) of: Carboplatin, Pemetrexed, NIVO, platinum-doublet. Does not require home O2. At baseline pt has chronic cough with non-purulent sputum.     ED Course:  x1 zosyn, x1 vanc, 1L bolus, tylenol. Febrile 100.6, Tachycardic to 119    Main contact: [phone] (Lazara, son)

## 2022-05-21 ENCOUNTER — TRANSCRIPTION ENCOUNTER (OUTPATIENT)
Age: 82
End: 2022-05-21

## 2022-05-21 VITALS
OXYGEN SATURATION: 95 % | RESPIRATION RATE: 16 BRPM | SYSTOLIC BLOOD PRESSURE: 144 MMHG | TEMPERATURE: 98 F | HEART RATE: 91 BPM | DIASTOLIC BLOOD PRESSURE: 61 MMHG

## 2022-05-21 LAB
ANION GAP SERPL CALC-SCNC: 14 MMOL/L — SIGNIFICANT CHANGE UP (ref 7–14)
BUN SERPL-MCNC: 24 MG/DL — HIGH (ref 7–23)
CALCIUM SERPL-MCNC: 9.1 MG/DL — SIGNIFICANT CHANGE UP (ref 8.4–10.5)
CHLORIDE SERPL-SCNC: 104 MMOL/L — SIGNIFICANT CHANGE UP (ref 98–107)
CO2 SERPL-SCNC: 19 MMOL/L — LOW (ref 22–31)
CREAT SERPL-MCNC: 1.49 MG/DL — HIGH (ref 0.5–1.3)
CULTURE RESULTS: SIGNIFICANT CHANGE UP
EGFR: 47 ML/MIN/1.73M2 — LOW
GLUCOSE SERPL-MCNC: 96 MG/DL — SIGNIFICANT CHANGE UP (ref 70–99)
HCT VFR BLD CALC: 31.1 % — LOW (ref 39–50)
HGB BLD-MCNC: 10.1 G/DL — LOW (ref 13–17)
LEGIONELLA AG UR QL: NEGATIVE — SIGNIFICANT CHANGE UP
MAGNESIUM SERPL-MCNC: 1.8 MG/DL — SIGNIFICANT CHANGE UP (ref 1.6–2.6)
MCHC RBC-ENTMCNC: 28.6 PG — SIGNIFICANT CHANGE UP (ref 27–34)
MCHC RBC-ENTMCNC: 32.5 GM/DL — SIGNIFICANT CHANGE UP (ref 32–36)
MCV RBC AUTO: 88.1 FL — SIGNIFICANT CHANGE UP (ref 80–100)
NRBC # BLD: 0 /100 WBCS — SIGNIFICANT CHANGE UP
NRBC # FLD: 0 K/UL — SIGNIFICANT CHANGE UP
PHOSPHATE SERPL-MCNC: 2.8 MG/DL — SIGNIFICANT CHANGE UP (ref 2.5–4.5)
PLATELET # BLD AUTO: 185 K/UL — SIGNIFICANT CHANGE UP (ref 150–400)
POTASSIUM SERPL-MCNC: 4.3 MMOL/L — SIGNIFICANT CHANGE UP (ref 3.5–5.3)
POTASSIUM SERPL-SCNC: 4.3 MMOL/L — SIGNIFICANT CHANGE UP (ref 3.5–5.3)
RBC # BLD: 3.53 M/UL — LOW (ref 4.2–5.8)
RBC # FLD: 14.9 % — HIGH (ref 10.3–14.5)
SODIUM SERPL-SCNC: 137 MMOL/L — SIGNIFICANT CHANGE UP (ref 135–145)
SPECIMEN SOURCE: SIGNIFICANT CHANGE UP
WBC # BLD: 5.06 K/UL — SIGNIFICANT CHANGE UP (ref 3.8–10.5)
WBC # FLD AUTO: 5.06 K/UL — SIGNIFICANT CHANGE UP (ref 3.8–10.5)

## 2022-05-21 PROCEDURE — 99239 HOSP IP/OBS DSCHRG MGMT >30: CPT

## 2022-05-21 RX ORDER — TELMISARTAN 20 MG/1
1 TABLET ORAL
Qty: 0 | Refills: 0 | DISCHARGE

## 2022-05-21 RX ORDER — AZITHROMYCIN 500 MG/1
1 TABLET, FILM COATED ORAL
Qty: 4 | Refills: 0
Start: 2022-05-21 | End: 2022-05-24

## 2022-05-21 RX ORDER — TELMISARTAN 20 MG/1
1 TABLET ORAL
Qty: 0 | Refills: 0 | DISCHARGE
Start: 2022-05-21

## 2022-05-21 RX ADMIN — CEFTRIAXONE 100 MILLIGRAM(S): 500 INJECTION, POWDER, FOR SOLUTION INTRAMUSCULAR; INTRAVENOUS at 12:39

## 2022-05-21 RX ADMIN — FINASTERIDE 5 MILLIGRAM(S): 5 TABLET, FILM COATED ORAL at 11:36

## 2022-05-21 RX ADMIN — Medication 100 MILLIGRAM(S): at 03:11

## 2022-05-21 RX ADMIN — Medication 100 MILLIGRAM(S): at 11:35

## 2022-05-21 RX ADMIN — Medication 1 MILLIGRAM(S): at 11:36

## 2022-05-21 RX ADMIN — HEPARIN SODIUM 5000 UNIT(S): 5000 INJECTION INTRAVENOUS; SUBCUTANEOUS at 05:19

## 2022-05-21 RX ADMIN — AZITHROMYCIN 255 MILLIGRAM(S): 500 TABLET, FILM COATED ORAL at 11:30

## 2022-05-21 NOTE — DISCHARGE NOTE NURSING/CASE MANAGEMENT/SOCIAL WORK - NSDCPEFALRISK_GEN_ALL_CORE
For information on Fall & Injury Prevention, visit: [email or web address] OR  [email or web address] OR  https://www.cdc.gov/steadi/patient.html

## 2022-05-21 NOTE — DISCHARGE NOTE PROVIDER - NSDCCPCAREPLAN_GEN_ALL_CORE_FT
PRINCIPAL DISCHARGE DIAGNOSIS  Diagnosis: Sepsis  Assessment and Plan of Treatment: You arrived to the hospital with fever and increased fatigue, body aches. Your lab results showed you had non-COVID19 coronavirus which is an upper respiratory infection.  You were also found to have a consolidation on X-ray and a lab value called pro-calcitonin was elevated which suggests you may also have a bacterial pneumonia in addition to the coronavirus. For this reason you were treated with antibiotics.  Please follow-up with your outpatient doctors.  If you have worsening breathing, fever please return to the hospital or contact your physician.  You will continue to take XXX, last day 5/24.      SECONDARY DISCHARGE DIAGNOSES  Diagnosis: Sepsis  Assessment and Plan of Treatment:      PRINCIPAL DISCHARGE DIAGNOSIS  Diagnosis: Sepsis  Assessment and Plan of Treatment: You arrived to the hospital with fever and increased fatigue, body aches. Your lab results showed you had non-COVID19 coronavirus which is an upper respiratory infection.  You were also found to have a consolidation on X-ray and a lab value called pro-calcitonin was elevated which suggests you may also have a bacterial pneumonia in addition to the coronavirus. For this reason you were treated with antibiotics.  Please follow-up with your outpatient doctors.  If you have worsening breathing, fever please return to the hospital or contact your physician.  You will continue to take XXX, last day 5/24.      SECONDARY DISCHARGE DIAGNOSES  Diagnosis: ROSS (acute kidney injury)  Assessment and Plan of Treatment: When you arrived to the hospital your creatinine which is a marker of kidney function was elevated, likely due to your infection and possible less intake of food and water.  It was improving while you were in the hospital, but please follow-up this up with your PCP who may ask for repeat blood work.  For now, please stop taking your home telmisartan. Your PCP may resume this medication as needed.     PRINCIPAL DISCHARGE DIAGNOSIS  Diagnosis: Sepsis  Assessment and Plan of Treatment: You arrived to the hospital with fever and increased fatigue, body aches. Your lab results showed you had non-COVID19 coronavirus which is an upper respiratory infection.  You were also found to have a consolidation on X-ray and a lab value called pro-calcitonin was elevated which suggests you may also have a bacterial pneumonia in addition to the coronavirus. For this reason you were treated with antibiotics.  Please follow-up with your outpatient doctors.  If you have worsening breathing, fever please return to the hospital or contact your physician.  You will continue to take Amox-Clav 875-125 twice per day and azithromycin 250mg once per day, last day 5/24.      SECONDARY DISCHARGE DIAGNOSES  Diagnosis: ROSS (acute kidney injury)  Assessment and Plan of Treatment: When you arrived to the hospital your creatinine which is a marker of kidney function was elevated, likely due to your infection and possible less intake of food and water.  It was improving while you were in the hospital, but please follow-up this up with your PCP who may ask for repeat blood work.  For now, please stop taking your home telmisartan. Your PCP may resume this medication as needed.     PRINCIPAL DISCHARGE DIAGNOSIS  Diagnosis: Sepsis  Assessment and Plan of Treatment: You arrived to the hospital with fever and increased fatigue, body aches. Your lab results showed you had non-COVID19 coronavirus which is an upper respiratory infection.  You were also found to have a consolidation on X-ray and a lab value called pro-calcitonin was elevated which suggests you may also have a bacterial pneumonia in addition to the coronavirus. For this reason you were treated with antibiotics.  Please follow-up with your outpatient doctors.  If you have worsening breathing, fever please return to the hospital or contact your physician.  You will continue to take Amox-Clav 875-125 twice per day and azithromycin 250mg once per day, last day 5/24.      SECONDARY DISCHARGE DIAGNOSES  Diagnosis: ROSS (acute kidney injury)  Assessment and Plan of Treatment: When you arrived to the hospital your creatinine which is a marker of kidney function was elevated, likely due to your infection and possible less intake of food and water.  It was improving while you were in the hospital, but please follow-up this up with your PCP who may ask for repeat blood work.  Please continue taking your medications as normal, including telmisartan.

## 2022-05-21 NOTE — DISCHARGE NOTE PROVIDER - HOSPITAL COURSE
HPI:  82M Portuguese-speaking (prefers family translate) pmhx lung adenocarcinoma (dx 2022) on chemotherapy, BPH, HTN, HLD here for ~2 days of increasing cough, nausea, fatigue, body aches. Cough is productive with mucus, non purulent. Endorses subjective chills/fever. Pt endorses normal appetite and fluid-intake but states he does not drink much water normally. COVID-vaccinated. No sick contacts. No chest pain, abdominal pain, urinary or bowel symptoms.     At bedside translation by granddaughter who is RN on [address].      Patient's lung adenocarcinoma was diagnosed earlier in 2022, known masses in RLL, RML. He has undergone 2 chemotherapy treatments thus far (last on 5/3/22) of: Carboplatin, Pemetrexed, NIVO, platinum-doublet. Does not require home O2. At baseline pt has chronic cough with non-purulent sputum.     ED Course:  x1 zosyn, x1 vanc, 1L bolus, tylenol. Febrile 100.6, Tachycardic to 119    Main contact: [phone] (Lazara, son)   (20 May 2022 13:19)    Hospital Course:  Pt treated with ceftriaxone and azithromycin for CAP coverage for possible superimposed pneumonia with coronavirus, non COVID.  Pt on room air, no distress. At discharge patient is stable and ready for discharge.    He will be discharged on Amox-Clav and Azithromycin (last day 5/24). He follow-up with his oncologist who is aware of his discharge and admission.

## 2022-05-21 NOTE — DISCHARGE NOTE PROVIDER - NSDCFUSCHEDAPPT_GEN_ALL_CORE_FT
[facility] Physician [facility]  [facility]  Scheduled Appointment: 05/24/2022    Vj Domingo  [facility] Phys[facility]  [facility]  Scheduled Appointment: 06/16/2022

## 2022-05-21 NOTE — PROGRESS NOTE ADULT - PROBLEM SELECTOR PLAN 1
Febrile, tachycardic on admission. Positive RVP for non-COVID19 coronavirus. Pro-charli ~1  RLL consolidation on CXR though known RML, RLL lung adenocarcinoma on active chemotherapy last 5/3  -s/p x1 vanc/zosyn in ED 5/20  -ceftriaxone (5/20 - ), azithromycin (5/20- ) for possible superimposed pna i/s/o immunosuppression  -f/u BCx  -f/u UCx  -f/u urine legionella  -f/u MRSA swab  -symptomatic relief with guaifenesin, tylenol  -on room air

## 2022-05-21 NOTE — PROGRESS NOTE ADULT - ATTENDING COMMENTS
81 y/o M with a PMH of lung adenocarcinoma (dx 2022) on chemotherapy, BPH, HTN, HLD here for ~2 days of increasing cough, nausea, fatigue, body aches found to have sepsis 2/2 coronavirus (non-COVID) and pneumonia.  #Severe sepsis 2/2 coronavirus and PNA w/ROSS: resolving, Cr at baseline, pt feeling well, tolerating PO.   dc home today PO abx, w/outpt followup with PCP and oncology for lung ca. 83 y/o M with a PMH of lung adenocarcinoma (dx 2022) on chemotherapy, BPH, HTN, HLD here for ~2 days of increasing cough, nausea, fatigue, body aches found to have sepsis 2/2 coronavirus (non-COVID) and pneumonia.  #Severe sepsis 2/2 coronavirus and PNA w/ROSS: resolving, Cr at baseline, pt feeling well, tolerating PO.   dc home today PO abx, w/outpt followup with PCP and oncology for lung ca.  Plan d/w pt and son at bedside   ~40 minutes

## 2022-05-21 NOTE — PROGRESS NOTE ADULT - SUBJECTIVE AND OBJECTIVE BOX
Darien Abrams, PGY1    PROGRESS NOTE    Patient is a 82y old  Male who presents with a chief complaint of     SUBJECTIVE/INTERVAL EVENTS:   Patient was seen and examined at bedside this morning. Denies any nausea/vomiting/diarrhea, headache, shortness of breath, abdominal pain or chest pain/palpitations. Patient responding appropriately to questions and able to make needs known. Vital signs/imaging/telemetry events reviewed.      Review of Systems:  See subjective above. All other systems unchanged from previous.    MEDICATIONS  (STANDING):  atorvastatin 40 milliGRAM(s) Oral at bedtime  azithromycin  IVPB 500 milliGRAM(s) IV Intermittent daily  cefTRIAXone   IVPB 1000 milliGRAM(s) IV Intermittent every 24 hours  finasteride 5 milliGRAM(s) Oral daily  folic acid 1 milliGRAM(s) Oral daily  heparin   Injectable 5000 Unit(s) SubCutaneous every 8 hours    MEDICATIONS  (PRN):  acetaminophen     Tablet .. 650 milliGRAM(s) Oral every 6 hours PRN Temp greater or equal to 38C (100.4F), Mild Pain (1 - 3)  guaiFENesin Oral Liquid (Sugar-Free) 100 milliGRAM(s) Oral every 6 hours PRN Cough      VITAL SIGNS:  T(F): 97.8 (22 @ 00:30), Max: 100.6 (22 @ 07:36)  HR: 86 (22 @ 00:30) (64 - 119)  BP: 150/58 (22 @ 00:30) (111/54 - 159/62)  RR: 17 (22 @ 00:30) (17 - 20)  SpO2: 95% (22 @ 00:30) (95% - 100%)    I&O's Summary    Daily     Daily     PHYSICAL EXAM:  GENERAL: NAD, sitting in bed comfortably  HEAD:  Atraumatic, Normocephalic  EYES: EOMI  ENT: Moist mucous membranes  NECK: Supple, No JVD  CHEST/LUNG: Clear to auscultation bilaterally; Occasional cough  HEART: Regular rate and rhythm; No murmurs, rubs, or gallops  ABDOMEN: normal bowel sounds; Soft, nontender, nondistended  EXTREMITIES:  2+ Peripheral Pulses, brisk capillary refill. No clubbing, cyanosis, or edema  Neurological:  A&Ox3, no focal deficits   SKIN: No rashes or lesions  PSYCH: normal affect and mood    LABS:                        10.9   5.76  )-----------( 184      ( 20 May 2022 07:30 )             32.8     Hgb Trend: 10.9<--  05-20    138  |  106  |  27<H>  ----------------------------<  115<H>  4.5   |  19<L>  |  1.85<H>    Ca    9.0      20 May 2022 07:30    TPro  7.7  /  Alb  3.9  /  TBili  0.4  /  DBili  x   /  AST  19  /  ALT  25  /  AlkPhos  120      Creatinine Trend: 1.85<--, 1.57<--  LIVER FUNCTIONS - ( 20 May 2022 07:30 )  Alb: 3.9 g/dL / Pro: 7.7 g/dL / ALK PHOS: 120 U/L / ALT: 25 U/L / AST: 19 U/L / GGT: x                 Urinalysis Basic - ( 20 May 2022 10:00 )    Color: Yellow / Appearance: Clear / S.028 / pH: x  Gluc: x / Ketone: Negative  / Bili: Negative / Urobili: <2 mg/dL   Blood: x / Protein: Trace / Nitrite: Negative   Leuk Esterase: Negative / RBC: 4 /HPF / WBC 1 /HPF   Sq Epi: x / Non Sq Epi: 0 /HPF / Bacteria: Negative        CAPILLARY BLOOD GLUCOSE          RADIOLOGY & ADDITIONAL TESTS: Reviewed    Imaging Personally Reviewed:    Consultant(s) Notes Reviewed:      Care Discussed with Consultants/Other Providers:

## 2022-05-21 NOTE — DISCHARGE NOTE PROVIDER - NSDCMRMEDTOKEN_GEN_ALL_CORE_FT
finasteride 5 mg oral tablet: 1 tab(s) orally once a day PM.  folic acid 1 mg oral tablet: 1 tab(s) orally once a day PM.  metoclopramide 10 mg oral tablet: take 1 tablet every 6 hours AS NEEDED for nausea   rosuvastatin 10 mg oral tablet: 1 tab(s) orally once a day  telmisartan 20 mg oral tablet: 1 tab(s) orally once a day AM.   finasteride 5 mg oral tablet: 1 tab(s) orally once a day PM.  folic acid 1 mg oral tablet: 1 tab(s) orally once a day PM.  metoclopramide 10 mg oral tablet: take 1 tablet every 6 hours AS NEEDED for nausea   rosuvastatin 10 mg oral tablet: 1 tab(s) orally once a day   amoxicillin-clavulanate 875 mg-125 mg oral tablet: 1 tab(s) orally 2 times a day   azithromycin 250 mg oral tablet: 1 tab(s) orally once a day   finasteride 5 mg oral tablet: 1 tab(s) orally once a day PM.  folic acid 1 mg oral tablet: 1 tab(s) orally once a day PM.  metoclopramide 10 mg oral tablet: take 1 tablet every 6 hours AS NEEDED for nausea   rosuvastatin 10 mg oral tablet: 1 tab(s) orally once a day  telmisartan 20 mg oral tablet: 1 tab(s) orally once a day

## 2022-05-21 NOTE — DISCHARGE NOTE NURSING/CASE MANAGEMENT/SOCIAL WORK - NSDCVIVACCINE_GEN_ALL_CORE_FT
Tdap; 02-Oct-2017 21:58; Nica Joyce (RN); [employer]; [id number]; IntraMuscular; Deltoid Right.; 0.5 milliLiter(s); VIS (VIS Published: 09-May-2013, VIS Presented: 02-Oct-2017);

## 2022-05-21 NOTE — DISCHARGE NOTE PROVIDER - CARE PROVIDER_API CALL
Vj Domingo; MBBS)  H[facility]ology; [facility]; Medical Oncology  [address] [place] [address] [phone]  Phone: [phone]  Fax: [phone]  Follow Up Time:

## 2022-05-21 NOTE — DISCHARGE NOTE NURSING/CASE MANAGEMENT/SOCIAL WORK - PATIENT PORTAL LINK FT
You can access the FollowMyHealth Patient Portal offered by [facility] by registering at the following website: [email or web address]. By joining [facility]’s FollowMyHealth portal, you will also be able to view your health information using other applications (apps) compatible with our system.

## 2022-05-22 ENCOUNTER — NON-APPOINTMENT (OUTPATIENT)
Age: 82
End: 2022-05-22

## 2022-05-23 LAB
CULTURE RESULTS: SIGNIFICANT CHANGE UP
SPECIMEN SOURCE: SIGNIFICANT CHANGE UP

## 2022-05-24 ENCOUNTER — RESULT REVIEW (OUTPATIENT)
Age: 82
End: 2022-05-24

## 2022-05-24 ENCOUNTER — RX RENEWAL (OUTPATIENT)
Age: 82
End: 2022-05-24

## 2022-05-24 ENCOUNTER — APPOINTMENT (OUTPATIENT)
Dept: HEMATOLOGY ONCOLOGY | Facility: CLINIC | Age: 82
End: 2022-05-24
Payer: MEDICARE

## 2022-05-24 ENCOUNTER — APPOINTMENT (OUTPATIENT)
Dept: INFUSION THERAPY | Facility: HOSPITAL | Age: 82
End: 2022-05-24

## 2022-05-24 DIAGNOSIS — E86.0 DEHYDRATION: ICD-10-CM

## 2022-05-24 DIAGNOSIS — Z51.11 ENCOUNTER FOR ANTINEOPLASTIC CHEMOTHERAPY: ICD-10-CM

## 2022-05-24 DIAGNOSIS — R11.2 NAUSEA WITH VOMITING, UNSPECIFIED: ICD-10-CM

## 2022-05-24 LAB
ALBUMIN SERPL ELPH-MCNC: 3.9 G/DL — SIGNIFICANT CHANGE UP (ref 3.3–5)
ALP SERPL-CCNC: 112 U/L — SIGNIFICANT CHANGE UP (ref 40–120)
ALT FLD-CCNC: 34 U/L — SIGNIFICANT CHANGE UP (ref 10–45)
ANION GAP SERPL CALC-SCNC: 13 MMOL/L — SIGNIFICANT CHANGE UP (ref 5–17)
AST SERPL-CCNC: 27 U/L — SIGNIFICANT CHANGE UP (ref 10–40)
BASOPHILS # BLD AUTO: 0.04 K/UL — SIGNIFICANT CHANGE UP (ref 0–0.2)
BASOPHILS NFR BLD AUTO: 0.5 % — SIGNIFICANT CHANGE UP (ref 0–2)
BILIRUB SERPL-MCNC: 0.2 MG/DL — SIGNIFICANT CHANGE UP (ref 0.2–1.2)
BUN SERPL-MCNC: 29 MG/DL — HIGH (ref 7–23)
CALCIUM SERPL-MCNC: 8.8 MG/DL — SIGNIFICANT CHANGE UP (ref 8.4–10.5)
CEA SERPL-MCNC: 4.5 NG/ML — HIGH (ref 0–3.8)
CHLORIDE SERPL-SCNC: 107 MMOL/L — SIGNIFICANT CHANGE UP (ref 96–108)
CO2 SERPL-SCNC: 19 MMOL/L — LOW (ref 22–31)
CREAT SERPL-MCNC: 1.5 MG/DL — HIGH (ref 0.5–1.3)
EGFR: 47 ML/MIN/1.73M2 — LOW
EOSINOPHIL # BLD AUTO: 0.11 K/UL — SIGNIFICANT CHANGE UP (ref 0–0.5)
EOSINOPHIL NFR BLD AUTO: 1.4 % — SIGNIFICANT CHANGE UP (ref 0–6)
GLUCOSE SERPL-MCNC: 85 MG/DL — SIGNIFICANT CHANGE UP (ref 70–99)
HCT VFR BLD CALC: 33.2 % — LOW (ref 39–50)
HGB BLD-MCNC: 11 G/DL — LOW (ref 13–17)
IMM GRANULOCYTES NFR BLD AUTO: 4.1 % — HIGH (ref 0–1.5)
LYMPHOCYTES # BLD AUTO: 3.43 K/UL — HIGH (ref 1–3.3)
LYMPHOCYTES # BLD AUTO: 43.6 % — SIGNIFICANT CHANGE UP (ref 13–44)
MCHC RBC-ENTMCNC: 28.8 PG — SIGNIFICANT CHANGE UP (ref 27–34)
MCHC RBC-ENTMCNC: 33.1 G/DL — SIGNIFICANT CHANGE UP (ref 32–36)
MCV RBC AUTO: 86.9 FL — SIGNIFICANT CHANGE UP (ref 80–100)
MONOCYTES # BLD AUTO: 0.57 K/UL — SIGNIFICANT CHANGE UP (ref 0–0.9)
MONOCYTES NFR BLD AUTO: 7.3 % — SIGNIFICANT CHANGE UP (ref 2–14)
NEUTROPHILS # BLD AUTO: 3.39 K/UL — SIGNIFICANT CHANGE UP (ref 1.8–7.4)
NEUTROPHILS NFR BLD AUTO: 43.1 % — SIGNIFICANT CHANGE UP (ref 43–77)
NRBC # BLD: 0 /100 WBCS — SIGNIFICANT CHANGE UP (ref 0–0)
PLATELET # BLD AUTO: 227 K/UL — SIGNIFICANT CHANGE UP (ref 150–400)
POTASSIUM SERPL-MCNC: 5 MMOL/L — SIGNIFICANT CHANGE UP (ref 3.5–5.3)
POTASSIUM SERPL-SCNC: 5 MMOL/L — SIGNIFICANT CHANGE UP (ref 3.5–5.3)
PROT SERPL-MCNC: 7.2 G/DL — SIGNIFICANT CHANGE UP (ref 6–8.3)
RBC # BLD: 3.82 M/UL — LOW (ref 4.2–5.8)
RBC # FLD: 15.1 % — HIGH (ref 10.3–14.5)
SODIUM SERPL-SCNC: 139 MMOL/L — SIGNIFICANT CHANGE UP (ref 135–145)
T4 FREE+ TSH PNL SERPL: 1.85 UIU/ML — SIGNIFICANT CHANGE UP (ref 0.27–4.2)
WBC # BLD: 7.86 K/UL — SIGNIFICANT CHANGE UP (ref 3.8–10.5)
WBC # FLD AUTO: 7.86 K/UL — SIGNIFICANT CHANGE UP (ref 3.8–10.5)

## 2022-05-24 PROCEDURE — 99215 OFFICE O/P EST HI 40 MIN: CPT

## 2022-05-24 NOTE — ASSESSMENT
[FreeTextEntry1] : 83 yo m with at least stage IIIA lung adeno ca, PDL1 0%, NGS showed KRAS G12V mut in addition to a few other uncontainable ones. \par Reviewed baseline PET/CT personally- disease confined to rt RLL and RML- some activity in subcarinal region\par I also reviewed path- bx from RLL pos for adeno ca, lavage showed atypical cells, and level 7 LN- was benign\par I presented the case at  and the group concurred that optimal staging would involve sampling of rt middle lobe. SUV on rt middle lobe mass is lower than that of RLL. \par Pt underwent RML through ventura bronch and was pos for adeno ca ([id number])\par MRI brain on 2/17 showed no mets\par Based on AJCC 8th staging, pt was staged as stage IIIA lung cancer\par Based on PFTs. he is potentially- resectable. He has been seen by Dr. Medina\par Based on recent Checkmate 816 study, neoadjuvant chemo+nivo is likely to yield higher rates of CR and is not an FDA-approved option. \par He started chemoimmunotherapy (Carbo/pem/nivo). S/p cycle 2\par Interim hospitalization and events as above. \par Pt is recovering well and based on today's exam, pt seems ready for treatment as planned\par Will get PET/CT after this cycle and follow up in 2 weeks\par I again discussed the regimen in detail including potential benefits and AEs in detail. I answered all questions. I went over schedule and other pertinent details. I explained that the plan may change if staging uis different and if pt undergoes surgery or radiation is recommended concurrently. \par Pt has stopped benzonatate since cough is not bothersome\par Complete abx as recommended\par RVP results noted. Not COVID. \par Regimen:\par Carboplatin AUC 4\par Pemetrexed 500 mg/m2\par NIVO 360 mg + platinum-doublet chemo Q3W\par \par Plan for 3 cycles\par Will get scans after\par OV after scans\par Pt is taking folic acid already\par B12 with first and third cycle\par [facility] monitoring- due 5/26 (draw#3)\par

## 2022-05-24 NOTE — PHYSICAL EXAM
[Restricted in physically strenuous activity but ambulatory and able to carry out work of a light or sedentary nature] : Status 1- Restricted in physically strenuous activity but ambulatory and able to carry out work of a light or sedentary nature, e.g., light house work, office work [Normal] : affect appropriate [de-identified] : bronchial BS on rt

## 2022-05-24 NOTE — REVIEW OF SYSTEMS
[Cough] : cough [SOB on Exertion] : shortness of breath during exertion [Negative] : Allergic/Immunologic [Recent Change In Weight] : ~T recent weight change [FreeTextEntry2] : appetite and weight improving [FreeTextEntry6] : improved

## 2022-05-24 NOTE — HISTORY OF PRESENT ILLNESS
[Disease: _____________________] : Disease: [unfilled] [T: ___] : T[unfilled] [N: ___] : N[unfilled] [M: ___] : M[unfilled] [AJCC Stage: ____] : AJCC Stage: [unfilled] [de-identified] : Mr. Del Rio is a pleasant 82 M, Citizen of [place] (very little English), speaking with PMH of HTN and BPH, never smoker but previously worked in building maintenance presented initially 1/21/22 with chronic dry cough x1 year and worsened dyspnea on exertion for 3 weeks, admitted to [facility] after found on CT to have to have a 5.3 x 2.8 cm masslike consolidation in the RLL. On 1/25, he underwent inpatient bronchoscopy and endobronchial ultrasound with RLL BAL, TBNA of station 7 lymph node, RLL Mass transbronchial biopsy which was suspicious for malignancy. \par He saw Dr. Moran and was recommended for PET/CT\par 2/7/22: \par large FDG-avid consolidations in right middle lobe and right lower lobe, unchanged as compared to CT dated 1/21/2022, compatible with known adenocarcinoma. Small FDG-avid nodule in right lung is suspicious for another site of disease. A mildly FDG-avid left lower lobe peripheral opacity, also unchanged on CT, is indeterminate.\par \par 2. Mildly FDG-avid subcarinal lymph node is nonspecific.\par \par Pt does not wish to use  phone. Grand-daughter provided the translation as per patient request. Other than cough, he has no other symptoms. He has tried OTC cough meds with no help. \par \par \par 2/18/22: Pt seen vis tel. No new complaints. He still has cough which is persistent and dry. \par \par 4/7/22: Pt seen via tele. 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 \par \par 5/3/22: Pt seen in tx room. He received cycle 1 3 weeks ago. Tolerated well with absolutely no AEs. He has since seen Dr. Medina who will see him again after scans post 3 cycles of tx. Pt here for cycle 2. \par \par 5/24/22: Since last visit, pt has had an eventful course. He was recently hospitalized with c/o fever, URI symptoms, myalgia and was found to have non-COVID coronavirus infection. Pt also febrile/tachycardic on admission, which improved with supportive care/abx. CXR suggested RLL consolidation (thought his could be known lung mass). He was started on vanc/zosyn in ED 5/20, followed by ceftriaxone and azithromycin for possible superimposed PNA in the setting of immunosuppression. Pt improved with all this and is now completing oral course of abx (last day tomorrow). Incidentally, pt was also noted to have ROSS (acute kidney injury) with admission SCr 1.88, baseline 1.2-1.3 thought to be related to sepsis/poor PO intake and dehydration. He was treated with IV hydration and home ARB was held (he continues to not take this at this time). \par He presents today for planned 3/3 cycle of chemoIO. He notes no fever, chills and cough has markedly improved. \par Attestation Statements:\par Attestation Statements:\par I have personally seen and examined the patient. I fully participated in the\par care of this patient. I have made amendments to the documentation where\par necessary, and agree with the history, physical exam, and plan as documented by\par the Resident.\par \par 81 y/o M with a PMH of lung adenocarcinoma (dx 2022) on chemotherapy, BPH, HTN,\par HLD here for ~2 days of increasing cough, nausea, fatigue, body aches found to\par have sepsis 2/2 coronavirus (non-COVID) and pneumonia.\par #Severe sepsis 2/2 coronavirus and PNA w/ROSS: resolving, Cr at baseline, pt\par feeling well, tolerating PO.\par dc home today PO abx, w/outpt followup with PCP and oncology for lung ca.\par Plan d/w pt and son at bedside\par ~40 minutes.\par  [de-identified] : adeno ca

## 2022-05-25 ENCOUNTER — RESULT REVIEW (OUTPATIENT)
Age: 82
End: 2022-05-25

## 2022-05-31 ENCOUNTER — APPOINTMENT (OUTPATIENT)
Dept: MRI IMAGING | Facility: CLINIC | Age: 82
End: 2022-05-31
Payer: MEDICARE

## 2022-05-31 PROCEDURE — 70553 MRI BRAIN STEM W/O & W/DYE: CPT | Mod: MG

## 2022-05-31 PROCEDURE — A9585: CPT | Mod: JW

## 2022-05-31 PROCEDURE — G1004: CPT

## 2022-06-07 ENCOUNTER — APPOINTMENT (OUTPATIENT)
Dept: NUCLEAR MEDICINE | Facility: IMAGING CENTER | Age: 82
End: 2022-06-07
Payer: MEDICARE

## 2022-06-07 ENCOUNTER — OUTPATIENT (OUTPATIENT)
Dept: OUTPATIENT SERVICES | Facility: HOSPITAL | Age: 82
LOS: 1 days | End: 2022-06-07
Payer: MEDICARE

## 2022-06-07 ENCOUNTER — APPOINTMENT (OUTPATIENT)
Dept: CT IMAGING | Facility: IMAGING CENTER | Age: 82
End: 2022-06-07
Payer: MEDICARE

## 2022-06-07 DIAGNOSIS — C34.90 MALIGNANT NEOPLASM OF UNSPECIFIED PART OF UNSPECIFIED BRONCHUS OR LUNG: ICD-10-CM

## 2022-06-07 DIAGNOSIS — Z98.890 OTHER SPECIFIED POSTPROCEDURAL STATES: Chronic | ICD-10-CM

## 2022-06-07 PROCEDURE — 71260 CT THORAX DX C+: CPT | Mod: 26,MG

## 2022-06-07 PROCEDURE — G1004: CPT

## 2022-06-07 PROCEDURE — 74177 CT ABD & PELVIS W/CONTRAST: CPT | Mod: 26,MG

## 2022-06-07 PROCEDURE — 78815 PET IMAGE W/CT SKULL-THIGH: CPT

## 2022-06-07 PROCEDURE — 78815 PET IMAGE W/CT SKULL-THIGH: CPT | Mod: 26,PS,MH

## 2022-06-07 PROCEDURE — 74177 CT ABD & PELVIS W/CONTRAST: CPT | Mod: MG

## 2022-06-07 PROCEDURE — 71260 CT THORAX DX C+: CPT | Mod: MG

## 2022-06-07 PROCEDURE — A9552: CPT

## 2022-06-10 NOTE — ED PROVIDER NOTE - CONSTITUTIONAL, MLM
Pt c/o hypertension and lower back pain, pt states was sent By PCP for hypertension. Pt states ran out of meds for over a month.  Pt denies difficulty or burning urination   normal... Well appearing, well nourished, awake, alert, oriented to person, place, time/situation and in no apparent distress.

## 2022-06-16 ENCOUNTER — APPOINTMENT (OUTPATIENT)
Dept: HEMATOLOGY ONCOLOGY | Facility: CLINIC | Age: 82
End: 2022-06-16
Payer: MEDICARE

## 2022-06-16 VITALS
WEIGHT: 211.64 LBS | HEART RATE: 72 BPM | DIASTOLIC BLOOD PRESSURE: 76 MMHG | RESPIRATION RATE: 16 BRPM | SYSTOLIC BLOOD PRESSURE: 143 MMHG | TEMPERATURE: 97.5 F | HEIGHT: 69 IN | OXYGEN SATURATION: 95 % | BODY MASS INDEX: 31.35 KG/M2

## 2022-06-16 PROCEDURE — 99215 OFFICE O/P EST HI 40 MIN: CPT

## 2022-06-16 NOTE — PHYSICAL EXAM
[Restricted in physically strenuous activity but ambulatory and able to carry out work of a light or sedentary nature] : Status 1- Restricted in physically strenuous activity but ambulatory and able to carry out work of a light or sedentary nature, e.g., light house work, office work [Normal] : affect appropriate [de-identified] : bronchial BS on rt

## 2022-06-16 NOTE — ASSESSMENT
[FreeTextEntry1] : 81 yo m with at least stage IIIA lung adeno ca, PDL1 0%, NGS showed KRAS G12V mut in addition to a few other uncontainable ones. \par Reviewed baseline PET/CT personally- disease confined to rt RLL and RML- some activity in subcarinal region\par I also reviewed path- bx from RLL pos for adeno ca, lavage showed atypical cells, and level 7 LN- was benign\par I presented the case at  and the group concurred that optimal staging would involve sampling of rt middle lobe. SUV on rt middle lobe mass is lower than that of RLL. \par Pt underwent RML through ventura bronch and was pos for adeno ca (80- [id number])\par MRI brain on 2/17 showed no mets\par Based on AJCC 8th staging, pt was staged as stage IIIA lung cancer\par Based on PFTs. he is potentially- resectable. He has been seen by Dr. Medina\par Based on recent Checkmate 816 study, neoadjuvant chemo+nivo is likely to yield higher rates of CR and is not an FDA-approved option. \par He started chemoimmunotherapy (Carbo/pem/nivo). S/p cycle 3\par Interim hospitalization after cycle 2 and events as above. \par Pt is recovering well and based on today's exam, he looks great!\par PET/CT unfortunately is very confusing. There is mizxed response on rt- right middle lobe mass is smaller and less FDG-avid but LL mass is larger and still quite avid. There is also some process on left side which could be related to recent viral infection. \par The PET/CT picture which suggests POD is not in agreement with clinical exam or tumor-informed ctDNA testing through [facility] in which level decreased from 0.91 MTM/ml to undetectable. \par Discussed with Dr. Mckeon and we concurred that a repeat CT without contrast in 1 month would help determine the left sided process better\par Meantime, will have pt see Dr Medina and proceed with one more cycle of chemoIO\par Repeat ctDNA testing next week\par \par Regimen:\par Carboplatin AUC 4\par Pemetrexed 500 mg/m2\par NIVO 360 mg + platinum-doublet chemo Q3W x 1 more cycle\par continue folic acid\par \par OV in 1 months after scans

## 2022-06-16 NOTE — HISTORY OF PRESENT ILLNESS
[Disease: _____________________] : Disease: [unfilled] [T: ___] : T[unfilled] [N: ___] : N[unfilled] [M: ___] : M[unfilled] [AJCC Stage: ____] : AJCC Stage: [unfilled] [de-identified] : Mr. Del Rio is a pleasant 82 M, [place] (very little English), speaking with PMH of HTN and BPH, never smoker but previously worked in building maintenance presented initially 1/21/22 with chronic dry cough x1 year and worsened dyspnea on exertion for 3 weeks, admitted to [facility] after found on CT to have to have a 5.3 x 2.8 cm masslike consolidation in the RLL. On 1/25, he underwent inpatient bronchoscopy and endobronchial ultrasound with RLL BAL, TBNA of station 7 lymph node, RLL Mass transbronchial biopsy which was suspicious for malignancy. \par He saw Dr. Moran and was recommended for PET/CT\par 2/7/22: \par large FDG-avid consolidations in right middle lobe and right lower lobe, unchanged as compared to CT dated 1/21/2022, compatible with known adenocarcinoma. Small FDG-avid nodule in right lung is suspicious for another site of disease. A mildly FDG-avid left lower lobe peripheral opacity, also unchanged on CT, is indeterminate.\par \par 2. Mildly FDG-avid subcarinal lymph node is nonspecific.\par \par Pt does not wish to use  phone. Grand-daughter provided the translation as per patient request. Other than cough, he has no other symptoms. He has tried OTC cough meds with no help. \par \par \par 2/18/22: Pt seen vis tel. No new complaints. He still has cough which is persistent and dry. \par \par 4/7/22: Pt seen via tele. 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 \par \par 5/3/22: Pt seen in tx room. He received cycle 1 3 weeks ago. Tolerated well with absolutely no AEs. He has since seen Dr. Medina who will see him again after scans post 3 cycles of tx. Pt here for cycle 2. \par \par 5/24/22: Since last visit, pt has had an eventful course. He was recently hospitalized with c/o fever, URI symptoms, myalgia and was found to have non-COVID coronavirus infection. Pt also febrile/tachycardic on admission, which improved with supportive care/abx. CXR suggested RLL consolidation (thought his could be known lung mass). He was started on vanc/zosyn in ED 5/20, followed by ceftriaxone and azithromycin for possible superimposed PNA in the setting of immunosuppression. Pt improved with all this and is now completing oral course of abx (last day tomorrow). Incidentally, pt was also noted to have ROSS (acute kidney injury) with admission SCr 1.88, baseline 1.2-1.3 thought to be related to sepsis/poor PO intake and dehydration. He was treated with IV hydration and home ARB was held (he continues to not take this at this time). \par He presents today for planned 3/3 cycle of chemoIO. He notes no fever, chills and cough has markedly improved. \par \par 6/16/22: Pt was diagnosed with corona virus infection, after ER visit for fever and cough. Cough has decreased and pt is fine now.  [de-identified] : adeno ca

## 2022-06-16 NOTE — REVIEW OF SYSTEMS
[Recent Change In Weight] : ~T recent weight change [Cough] : cough [SOB on Exertion] : shortness of breath during exertion [Negative] : Allergic/Immunologic [FreeTextEntry2] : appetite and weight improving [FreeTextEntry6] : improved

## 2022-06-23 ENCOUNTER — RESULT REVIEW (OUTPATIENT)
Age: 82
End: 2022-06-23

## 2022-06-23 ENCOUNTER — APPOINTMENT (OUTPATIENT)
Dept: INFUSION THERAPY | Facility: HOSPITAL | Age: 82
End: 2022-06-23

## 2022-06-23 ENCOUNTER — NON-APPOINTMENT (OUTPATIENT)
Age: 82
End: 2022-06-23

## 2022-06-23 LAB
ALBUMIN SERPL ELPH-MCNC: 4.2 G/DL — SIGNIFICANT CHANGE UP (ref 3.3–5)
ALP SERPL-CCNC: 100 U/L — SIGNIFICANT CHANGE UP (ref 40–120)
ALT FLD-CCNC: 32 U/L — SIGNIFICANT CHANGE UP (ref 10–45)
ANION GAP SERPL CALC-SCNC: 12 MMOL/L — SIGNIFICANT CHANGE UP (ref 5–17)
AST SERPL-CCNC: 25 U/L — SIGNIFICANT CHANGE UP (ref 10–40)
BASOPHILS # BLD AUTO: 0.05 K/UL — SIGNIFICANT CHANGE UP (ref 0–0.2)
BASOPHILS NFR BLD AUTO: 0.8 % — SIGNIFICANT CHANGE UP (ref 0–2)
BILIRUB SERPL-MCNC: 0.3 MG/DL — SIGNIFICANT CHANGE UP (ref 0.2–1.2)
BUN SERPL-MCNC: 28 MG/DL — HIGH (ref 7–23)
CALCIUM SERPL-MCNC: 9 MG/DL — SIGNIFICANT CHANGE UP (ref 8.4–10.5)
CEA SERPL-MCNC: 4.4 NG/ML — HIGH (ref 0–3.8)
CHLORIDE SERPL-SCNC: 107 MMOL/L — SIGNIFICANT CHANGE UP (ref 96–108)
CO2 SERPL-SCNC: 22 MMOL/L — SIGNIFICANT CHANGE UP (ref 22–31)
CREAT SERPL-MCNC: 1.64 MG/DL — HIGH (ref 0.5–1.3)
EGFR: 42 ML/MIN/1.73M2 — LOW
EOSINOPHIL # BLD AUTO: 0.12 K/UL — SIGNIFICANT CHANGE UP (ref 0–0.5)
EOSINOPHIL NFR BLD AUTO: 1.8 % — SIGNIFICANT CHANGE UP (ref 0–6)
GLUCOSE SERPL-MCNC: 85 MG/DL — SIGNIFICANT CHANGE UP (ref 70–99)
HCT VFR BLD CALC: 32.9 % — LOW (ref 39–50)
HGB BLD-MCNC: 11.1 G/DL — LOW (ref 13–17)
IMM GRANULOCYTES NFR BLD AUTO: 1.7 % — HIGH (ref 0–1.5)
LYMPHOCYTES # BLD AUTO: 3.48 K/UL — HIGH (ref 1–3.3)
LYMPHOCYTES # BLD AUTO: 53.5 % — HIGH (ref 13–44)
MCHC RBC-ENTMCNC: 30.1 PG — SIGNIFICANT CHANGE UP (ref 27–34)
MCHC RBC-ENTMCNC: 33.7 G/DL — SIGNIFICANT CHANGE UP (ref 32–36)
MCV RBC AUTO: 89.2 FL — SIGNIFICANT CHANGE UP (ref 80–100)
MONOCYTES # BLD AUTO: 0.57 K/UL — SIGNIFICANT CHANGE UP (ref 0–0.9)
MONOCYTES NFR BLD AUTO: 8.8 % — SIGNIFICANT CHANGE UP (ref 2–14)
NEUTROPHILS # BLD AUTO: 2.17 K/UL — SIGNIFICANT CHANGE UP (ref 1.8–7.4)
NEUTROPHILS NFR BLD AUTO: 33.4 % — LOW (ref 43–77)
NRBC # BLD: 0 /100 WBCS — SIGNIFICANT CHANGE UP (ref 0–0)
PLATELET # BLD AUTO: 171 K/UL — SIGNIFICANT CHANGE UP (ref 150–400)
POTASSIUM SERPL-MCNC: 5 MMOL/L — SIGNIFICANT CHANGE UP (ref 3.5–5.3)
POTASSIUM SERPL-SCNC: 5 MMOL/L — SIGNIFICANT CHANGE UP (ref 3.5–5.3)
PROT SERPL-MCNC: 7 G/DL — SIGNIFICANT CHANGE UP (ref 6–8.3)
RBC # BLD: 3.69 M/UL — LOW (ref 4.2–5.8)
RBC # FLD: 17.5 % — HIGH (ref 10.3–14.5)
SODIUM SERPL-SCNC: 141 MMOL/L — SIGNIFICANT CHANGE UP (ref 135–145)
T4 FREE+ TSH PNL SERPL: 2.5 UIU/ML — SIGNIFICANT CHANGE UP (ref 0.27–4.2)
WBC # BLD: 6.5 K/UL — SIGNIFICANT CHANGE UP (ref 3.8–10.5)
WBC # FLD AUTO: 6.5 K/UL — SIGNIFICANT CHANGE UP (ref 3.8–10.5)

## 2022-06-28 ENCOUNTER — OUTPATIENT (OUTPATIENT)
Dept: OUTPATIENT SERVICES | Facility: HOSPITAL | Age: 82
LOS: 1 days | End: 2022-06-28
Payer: MEDICARE

## 2022-06-28 ENCOUNTER — APPOINTMENT (OUTPATIENT)
Dept: CT IMAGING | Facility: IMAGING CENTER | Age: 82
End: 2022-06-28

## 2022-06-28 DIAGNOSIS — C34.91 MALIGNANT NEOPLASM OF UNSPECIFIED PART OF RIGHT BRONCHUS OR LUNG: ICD-10-CM

## 2022-06-28 DIAGNOSIS — Z98.890 OTHER SPECIFIED POSTPROCEDURAL STATES: Chronic | ICD-10-CM

## 2022-06-28 DIAGNOSIS — E78.5 HYPERLIPIDEMIA, UNSPECIFIED: ICD-10-CM

## 2022-06-28 PROCEDURE — 71250 CT THORAX DX C-: CPT | Mod: 26,MH

## 2022-06-28 PROCEDURE — 71250 CT THORAX DX C-: CPT

## 2022-06-30 ENCOUNTER — RESULT REVIEW (OUTPATIENT)
Age: 82
End: 2022-06-30

## 2022-06-30 ENCOUNTER — APPOINTMENT (OUTPATIENT)
Dept: HEMATOLOGY ONCOLOGY | Facility: CLINIC | Age: 82
End: 2022-06-30

## 2022-06-30 VITALS
DIASTOLIC BLOOD PRESSURE: 60 MMHG | SYSTOLIC BLOOD PRESSURE: 96 MMHG | HEART RATE: 73 BPM | BODY MASS INDEX: 31.25 KG/M2 | WEIGHT: 211.64 LBS | OXYGEN SATURATION: 96 % | TEMPERATURE: 98.2 F | RESPIRATION RATE: 16 BRPM

## 2022-06-30 LAB
BASOPHILS # BLD AUTO: 0.02 K/UL — SIGNIFICANT CHANGE UP (ref 0–0.2)
BASOPHILS NFR BLD AUTO: 0.5 % — SIGNIFICANT CHANGE UP (ref 0–2)
EOSINOPHIL # BLD AUTO: 0.08 K/UL — SIGNIFICANT CHANGE UP (ref 0–0.5)
EOSINOPHIL NFR BLD AUTO: 2.1 % — SIGNIFICANT CHANGE UP (ref 0–6)
ERYTHROCYTE [SEDIMENTATION RATE] IN BLOOD: 48 MM/HR — HIGH (ref 0–20)
HCT VFR BLD CALC: 32.4 % — LOW (ref 39–50)
HGB BLD-MCNC: 10.7 G/DL — LOW (ref 13–17)
IMM GRANULOCYTES NFR BLD AUTO: 0.3 % — SIGNIFICANT CHANGE UP (ref 0–1.5)
LYMPHOCYTES # BLD AUTO: 2.71 K/UL — SIGNIFICANT CHANGE UP (ref 1–3.3)
LYMPHOCYTES # BLD AUTO: 70.8 % — HIGH (ref 13–44)
MCHC RBC-ENTMCNC: 30.1 PG — SIGNIFICANT CHANGE UP (ref 27–34)
MCHC RBC-ENTMCNC: 33 G/DL — SIGNIFICANT CHANGE UP (ref 32–36)
MCV RBC AUTO: 91.3 FL — SIGNIFICANT CHANGE UP (ref 80–100)
MONOCYTES # BLD AUTO: 0.16 K/UL — SIGNIFICANT CHANGE UP (ref 0–0.9)
MONOCYTES NFR BLD AUTO: 4.2 % — SIGNIFICANT CHANGE UP (ref 2–14)
NEUTROPHILS # BLD AUTO: 0.85 K/UL — LOW (ref 1.8–7.4)
NEUTROPHILS NFR BLD AUTO: 22.1 % — LOW (ref 43–77)
NRBC # BLD: 0 /100 WBCS — SIGNIFICANT CHANGE UP (ref 0–0)
PLAT MORPH BLD: NORMAL — SIGNIFICANT CHANGE UP
PLATELET # BLD AUTO: 119 K/UL — LOW (ref 150–400)
RBC # BLD: 3.55 M/UL — LOW (ref 4.2–5.8)
RBC # FLD: 16.7 % — HIGH (ref 10.3–14.5)
RBC BLD AUTO: SIGNIFICANT CHANGE UP
RETICS #: 17.4 K/UL — LOW (ref 25–125)
RETICS/RBC NFR: 0.5 % — SIGNIFICANT CHANGE UP (ref 0.5–2.5)
WBC # BLD: 3.83 K/UL — SIGNIFICANT CHANGE UP (ref 3.8–10.5)
WBC # FLD AUTO: 3.83 K/UL — SIGNIFICANT CHANGE UP (ref 3.8–10.5)

## 2022-06-30 PROCEDURE — 99215 OFFICE O/P EST HI 40 MIN: CPT

## 2022-06-30 NOTE — HISTORY OF PRESENT ILLNESS
[Disease: _____________________] : Disease: [unfilled] [T: ___] : T[unfilled] [N: ___] : N[unfilled] [M: ___] : M[unfilled] [AJCC Stage: ____] : AJCC Stage: [unfilled] [de-identified] : Mr. 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 On 1/25, he underwent inpatient bronchoscopy and endobronchial ultrasound with RLL BAL, TBNA of station 7 lymph node, RLL Mass transbronchial biopsy which was suspicious for malignancy. \par He saw Dr. Moran and was recommended for PET/CT\par 2/7/22: \par large FDG-avid consolidations in right middle lobe and right lower lobe, unchanged as compared to CT dated 1/21/2022, compatible with known adenocarcinoma. Small FDG-avid nodule in right lung is suspicious for another site of disease. A mildly FDG-avid left lower lobe peripheral opacity, also unchanged on CT, is indeterminate.\par \par 2. Mildly FDG-avid subcarinal lymph node is nonspecific.\par \par Pt does not wish to use  phone. Grand-daughter provided the translation as per patient request. Other than cough, he has no other symptoms. He has tried OTC cough meds with no help. \par \par \par 2/18/22: Pt seen vis tel. No new complaints. He still has cough which is persistent and dry. \par \par 4/7/22: Pt seen via tele. 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 \par \par 5/3/22: Pt seen in tx room. He received cycle 1 3 weeks ago. Tolerated well with absolutely no AEs. He has since seen Dr. Medina who will see him again after scans post 3 cycles of tx. Pt here for cycle 2. \par \par 5/24/22: Since last visit, pt has had an eventful course. He was recently hospitalized with c/o fever, URI symptoms, myalgia and was found to have non-COVID coronavirus infection. Pt also febrile/tachycardic on admission, which improved with supportive care/abx. CXR suggested RLL consolidation (thought his could be known lung mass). He was started on vanc/zosyn in ED 5/20, followed by ceftriaxone and azithromycin for possible superimposed PNA in the setting of immunosuppression. Pt improved with all this and is now completing oral course of abx (last day tomorrow). Incidentally, pt was also noted to have ROSS (acute kidney injury) with admission SCr 1.88, baseline 1.2-1.3 thought to be related to sepsis/poor PO intake and dehydration. He was treated with IV hydration and home ARB was held (he continues to not take this at this time). \par He presents today for planned 3/3 cycle of chemoIO. He notes no fever, chills and cough has markedly improved. \par \par 6/16/22: Pt was diagnosed with corona virus infection, after ER visit for fever and cough. Cough has decreased and pt is fine now. \par \par 6/30/22: Cough improved. Otherwise feeling well [de-identified] : adeno ca

## 2022-06-30 NOTE — PHYSICAL EXAM
[Restricted in physically strenuous activity but ambulatory and able to carry out work of a light or sedentary nature] : Status 1- Restricted in physically strenuous activity but ambulatory and able to carry out work of a light or sedentary nature, e.g., light house work, office work [Normal] : affect appropriate [de-identified] : bronchial BS on rt

## 2022-06-30 NOTE — ASSESSMENT
[FreeTextEntry1] : 83 yo m with at least stage IIIA lung adeno ca, PDL1 0%, NGS showed KRAS G12V mut in addition to a few other uncontainable ones. \par Reviewed baseline PET/CT personally- disease confined to rt RLL and RML- some activity in subcarinal region\par I also reviewed path- bx from RLL pos for adeno ca, lavage showed atypical cells, and level 7 LN- was benign\par I presented the case at  and the group concurred that optimal staging would involve sampling of rt middle lobe. SUV on rt middle lobe mass is lower than that of RLL. \par Pt underwent RML through ventura bronch and was pos for adeno ca ([id number]- [id number])\par MRI brain on 2/17 showed no mets\par Based on AJCC 8th staging, pt was staged as stage IIIA lung cancer\par Based on PFTs. he was deemed potentially- resectable. He has been seen by Dr. Medina\par Based on recent Checkmate 816 study, neoadjuvant chemo+nivo is likely to yield higher rates of CR and is an FDA-approved option. \par He started chemoimmunotherapy (Carbo/pem/nivo). S/p cycle 4\par Interim hospitalization after cycle 2 and events as above. \par PET/CT from [facility] unfortunately is very confusing. There is mixed response on rt- right middle lobe mass is smaller and less FDG-avid but LL mass is larger and still quite avid. There is also some process on left side which could be related to recent viral infection. \par The PET/CT picture which suggests POD is not in agreement with clinical exam or tumor-informed ctDNA testing through [facility] in which level decreased from 0.91 MTM/ml to undetectable. \par Discussed with Dr. Mckeon and we concurred that a repeat CT without contrast in 1 month would help determine the left sided process better. This was done recently. I reviewed it independently (no report yet). 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 \par Will repeat [facility] test today\par Follow up with Dr. Moran\par Hold tx till after eval by Dr. Moran and perhaps a course of steroids. \par Meantime, will have pt see Dr Medina and proceed with one more cycle of chemoIO\par Discussed the above with pt and his son. \par LAbs today\par OV in 1 month

## 2022-07-05 LAB
ALBUMIN SERPL ELPH-MCNC: 4.1 G/DL
ALP BLD-CCNC: 107 U/L
ALT SERPL-CCNC: 29 U/L
ANION GAP SERPL CALC-SCNC: 10 MMOL/L
AST SERPL-CCNC: 22 U/L
BILIRUB SERPL-MCNC: 0.4 MG/DL
BUN SERPL-MCNC: 35 MG/DL
CALCIUM SERPL-MCNC: 9 MG/DL
CEA SERPL-MCNC: 3.6 NG/ML
CHLORIDE SERPL-SCNC: 107 MMOL/L
CO2 SERPL-SCNC: 22 MMOL/L
CREAT SERPL-MCNC: 1.56 MG/DL
CRP SERPL-MCNC: 5 MG/L
EGFR: 45 ML/MIN/1.73M2
GLUCOSE SERPL-MCNC: 81 MG/DL
MAGNESIUM SERPL-MCNC: 2.2 MG/DL
POTASSIUM SERPL-SCNC: 5 MMOL/L
PROT SERPL-MCNC: 7 G/DL
SODIUM SERPL-SCNC: 139 MMOL/L
TSH SERPL-ACNC: 1.95 UIU/ML

## 2022-07-06 ENCOUNTER — OUTPATIENT (OUTPATIENT)
Dept: OUTPATIENT SERVICES | Facility: HOSPITAL | Age: 82
LOS: 1 days | Discharge: HOME | End: 2022-07-06

## 2022-07-06 DIAGNOSIS — Z98.890 OTHER SPECIFIED POSTPROCEDURAL STATES: Chronic | ICD-10-CM

## 2022-07-06 DIAGNOSIS — C26.0 MALIGNANT NEOPLASM OF INTESTINAL TRACT, PART UNSPECIFIED: ICD-10-CM

## 2022-07-18 ENCOUNTER — APPOINTMENT (OUTPATIENT)
Dept: PULMONOLOGY | Facility: CLINIC | Age: 82
End: 2022-07-18

## 2022-07-18 VITALS
OXYGEN SATURATION: 94 % | BODY MASS INDEX: 31.4 KG/M2 | DIASTOLIC BLOOD PRESSURE: 72 MMHG | SYSTOLIC BLOOD PRESSURE: 132 MMHG | HEART RATE: 83 BPM | WEIGHT: 212 LBS | RESPIRATION RATE: 17 BRPM | TEMPERATURE: 98.5 F | HEIGHT: 69 IN

## 2022-07-18 DIAGNOSIS — R91.8 OTHER NONSPECIFIC ABNORMAL FINDING OF LUNG FIELD: ICD-10-CM

## 2022-07-18 PROCEDURE — 99214 OFFICE O/P EST MOD 30 MIN: CPT

## 2022-07-19 ENCOUNTER — APPOINTMENT (OUTPATIENT)
Dept: HEMATOLOGY ONCOLOGY | Facility: CLINIC | Age: 82
End: 2022-07-19
Payer: MEDICARE

## 2022-07-19 PROCEDURE — 99215 OFFICE O/P EST HI 40 MIN: CPT | Mod: 95

## 2022-07-19 NOTE — PHYSICAL EXAM
[Restricted in physically strenuous activity but ambulatory and able to carry out work of a light or sedentary nature] : Status 1- Restricted in physically strenuous activity but ambulatory and able to carry out work of a light or sedentary nature, e.g., light house work, office work [Normal] : affect appropriate [de-identified] : bronchial BS on rt

## 2022-07-19 NOTE — HISTORY OF PRESENT ILLNESS
[Disease: _____________________] : Disease: [unfilled] [T: ___] : T[unfilled] [N: ___] : N[unfilled] [M: ___] : M[unfilled] [AJCC Stage: ____] : AJCC Stage: [unfilled] [de-identified] : Mr. Del Rio is a pleasant 82 M, Malian (very little English), speaking with PMH of HTN and BPH, never smoker but previously worked in building maintenance presented initially 1/21/22 with chronic dry cough x1 year and worsened dyspnea on exertion for 3 weeks, admitted to [facility] after found on CT to have to have a 5.3 x 2.8 cm masslike consolidation in the RLL. On 1/25, he underwent inpatient bronchoscopy and endobronchial ultrasound with RLL BAL, TBNA of station 7 lymph node, RLL Mass transbronchial biopsy which was suspicious for malignancy. \par He saw Dr. Moran and was recommended for PET/CT\par 2/7/22: \par large FDG-avid consolidations in right middle lobe and right lower lobe, unchanged as compared to CT dated 1/21/2022, compatible with known adenocarcinoma. Small FDG-avid nodule in right lung is suspicious for another site of disease. A mildly FDG-avid left lower lobe peripheral opacity, also unchanged on CT, is indeterminate.\par \par 2. Mildly FDG-avid subcarinal lymph node is nonspecific.\par \par Pt does not wish to use  phone. Grand-daughter provided the translation as per patient request. Other than cough, he has no other symptoms. He has tried OTC cough meds with no help. \par \par \par 2/18/22: Pt seen vis tel. No new complaints. He still has cough which is persistent and dry. \par \par 4/7/22: Pt seen via tele. 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 \par \par 5/3/22: Pt seen in tx room. He received cycle 1 3 weeks ago. Tolerated well with absolutely no AEs. He has since seen Dr. Medina who will see him again after scans post 3 cycles of tx. Pt here for cycle 2. \par \par 5/24/22: Since last visit, pt has had an eventful course. He was recently hospitalized with c/o fever, URI symptoms, myalgia and was found to have non-COVID coronavirus infection. Pt also febrile/tachycardic on admission, which improved with supportive care/abx. CXR suggested RLL consolidation (thought his could be known lung mass). He was started on vanc/zosyn in ED 5/20, followed by ceftriaxone and azithromycin for possible superimposed PNA in the setting of immunosuppression. Pt improved with all this and is now completing oral course of abx (last day tomorrow). Incidentally, pt was also noted to have ROSS (acute kidney injury) with admission SCr 1.88, baseline 1.2-1.3 thought to be related to sepsis/poor PO intake and dehydration. He was treated with IV hydration and home ARB was held (he continues to not take this at this time). \par He presents today for planned 3/3 cycle of chemoIO. He notes no fever, chills and cough has markedly improved. \par \par 6/16/22: Pt was diagnosed with corona virus infection, after ER visit for fever and cough. Cough has decreased and pt is fine now. \par \par 6/30/22: Cough improved. Otherwise feeling well\par \par 7/19/22: saw Dr. Carrillo. A bx of left sided lesion was recommended. pt wanted to discuss with me. He feels well. denies any dyspnea or cough or other symptoms.  [de-identified] : adeno ca

## 2022-07-19 NOTE — ASSESSMENT
[FreeTextEntry1] : 81 yo m with at least stage IIIA lung adeno ca, PDL1 0%, NGS showed KRAS G12V mut in addition to a few other uncontainable ones. \par Reviewed baseline PET/CT personally- disease confined to rt RLL and RML- some activity in subcarinal region\par I also reviewed path- bx from RLL pos for adeno ca, lavage showed atypical cells, and level 7 LN- was benign\par I presented the case at  and the group concurred that optimal staging would involve sampling of rt middle lobe. SUV on rt middle lobe mass is lower than that of RLL. \par Pt underwent RML through ventura bronch and was pos for adeno ca ([id number]- [id number])\par MRI brain on 2/17 showed no mets\par Based on AJCC 8th staging, pt was staged as stage IIIA lung cancer\par Based on PFTs. he was deemed potentially- resectable. He has been seen by Dr. Medina\par Based on recent Checkmate 816 study, neoadjuvant chemo+nivo is likely to yield higher rates of CR and is an FDA-approved option. \par He started chemoimmunotherapy (Carbo/pem/nivo). S/p cycle 4\par Interim hospitalization after cycle 2 and events as above. \par PET/CT from [facility] unfortunately is very confusing. There is mixed response on rt- right middle lobe mass is smaller and less FDG-avid but LL mass is larger and still quite avid. There is also some process on left side which could be related to recent viral infection. \par The PET/CT picture which suggests POD is not in agreement with clinical exam or tumor-informed ctDNA testing through [facility] in which level decreased from 0.91 MTM/ml to undetectable. 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 \par Pt is feeling well and hesitant to undergo any bx\par Hence, will watch off tx and repeat CT end of August\par Will continue tracking ctDNA through [facility]\par Discussed the above with pt and his grand daughter who agreed. \par OV in 1 month

## 2022-08-15 PROBLEM — R91.8 MASS OF MIDDLE LOBE OF RIGHT LUNG: Status: ACTIVE | Noted: 2022-03-07

## 2022-08-15 NOTE — ASSESSMENT
[FreeTextEntry1] : Stacy Del Rio is an 82 year old male, never smoker with PMH of HTN , BPH and RLL adenocarcinoma diagnosed via pathology from RML transbronchial biopsy 3/15/22 who presents to interventional pulmonary clinic for follow up.  He has had interval development of poorly defined left sided consolidations present on CT scans 6/7 and 6/28.\par \par #Adenocarcinoma \par - patient diagnosed per biopsy 3/15/22 and is s/p 4/4 cycles of chemoimmunotherapy (carbo/pem/nivo), now w/interval development of left lower lobe mass vs consolidation that is FDG avid, concern being for progression of disease vs inflammatory/organizing process (patient w/somewhat recent COVID infection and so this remains on differential)\par - patient may ultimately need repeat biopsy to more definitely rule out progression of disease, may pursue that after next repeat CT\par - Will discuss w/hem onc necessity/utility of biopsy of left lower lobe mass/consolidation at this time vs pursuing based upon repeat imaging, patient made aware of plan and voiced understanding\par \par RTC pending further discussions w/hem-onc\par \par Patient seen and discussed w/Dr. Moran \par \par Juan Miguel Duff PGY5\par [id number]\par

## 2022-08-15 NOTE — HISTORY OF PRESENT ILLNESS
[Never] : never [TextBox_4] : Interventional Pulmonology Consultation/Visit Note\par \par Stacy Del Rio is an 82 year old male, never smoker with PMH of HTN , BPH and RLL adenocarcinoma diagnosed via pathology from [facility] transbronchial biopsy 3/15/22 who presents to interventional pulmonary clinic for follow up.  He has had interval development of poorly defined left sided consolidations present on CT scans 6/7 and 6/28.\par \par On exam patient states that he feels good overall.  He denies any shortness of breath or dyspnea on exertion.  He has a mild, non-productive cough that has been stable in severity.  He denies hemoptysis, chest pain, fevers, and chills.  He states his appetite and energy levels are good and denies any night sweats or weight loss.\par

## 2022-08-22 ENCOUNTER — RX RENEWAL (OUTPATIENT)
Age: 82
End: 2022-08-22

## 2022-08-25 ENCOUNTER — APPOINTMENT (OUTPATIENT)
Dept: HEMATOLOGY ONCOLOGY | Facility: CLINIC | Age: 82
End: 2022-08-25

## 2022-09-13 ENCOUNTER — OUTPATIENT (OUTPATIENT)
Dept: OUTPATIENT SERVICES | Facility: HOSPITAL | Age: 82
LOS: 1 days | Discharge: HOME | End: 2022-09-13

## 2022-09-13 DIAGNOSIS — C26.0 MALIGNANT NEOPLASM OF INTESTINAL TRACT, PART UNSPECIFIED: ICD-10-CM

## 2022-09-13 DIAGNOSIS — Z98.890 OTHER SPECIFIED POSTPROCEDURAL STATES: Chronic | ICD-10-CM

## 2022-09-16 ENCOUNTER — APPOINTMENT (OUTPATIENT)
Dept: HEMATOLOGY ONCOLOGY | Facility: CLINIC | Age: 82
End: 2022-09-16

## 2022-09-16 ENCOUNTER — RESULT REVIEW (OUTPATIENT)
Age: 82
End: 2022-09-16

## 2022-09-16 VITALS
SYSTOLIC BLOOD PRESSURE: 134 MMHG | HEIGHT: 68.98 IN | DIASTOLIC BLOOD PRESSURE: 76 MMHG | RESPIRATION RATE: 16 BRPM | OXYGEN SATURATION: 95 % | BODY MASS INDEX: 31.35 KG/M2 | WEIGHT: 211.64 LBS | HEART RATE: 66 BPM | TEMPERATURE: 97.5 F

## 2022-09-16 LAB
ALBUMIN SERPL ELPH-MCNC: 4.3 G/DL
ALP BLD-CCNC: 111 U/L
ALT SERPL-CCNC: 26 U/L
ANION GAP SERPL CALC-SCNC: 13 MMOL/L
AST SERPL-CCNC: 19 U/L
BASOPHILS # BLD AUTO: 0.04 K/UL — SIGNIFICANT CHANGE UP (ref 0–0.2)
BASOPHILS NFR BLD AUTO: 0.5 % — SIGNIFICANT CHANGE UP (ref 0–2)
BILIRUB SERPL-MCNC: 0.3 MG/DL
BUN SERPL-MCNC: 25 MG/DL
CALCIUM SERPL-MCNC: 9.6 MG/DL
CEA SERPL-MCNC: 2.9 NG/ML
CHLORIDE SERPL-SCNC: 107 MMOL/L
CO2 SERPL-SCNC: 22 MMOL/L
CREAT SERPL-MCNC: 1.61 MG/DL
EGFR: 43 ML/MIN/1.73M2
EOSINOPHIL # BLD AUTO: 0.2 K/UL — SIGNIFICANT CHANGE UP (ref 0–0.5)
EOSINOPHIL NFR BLD AUTO: 2.7 % — SIGNIFICANT CHANGE UP (ref 0–6)
GLUCOSE SERPL-MCNC: 91 MG/DL
HCT VFR BLD CALC: 41 % — SIGNIFICANT CHANGE UP (ref 39–50)
HGB BLD-MCNC: 13.4 G/DL — SIGNIFICANT CHANGE UP (ref 13–17)
IMM GRANULOCYTES NFR BLD AUTO: 0.5 % — SIGNIFICANT CHANGE UP (ref 0–0.9)
LYMPHOCYTES # BLD AUTO: 3.15 K/UL — SIGNIFICANT CHANGE UP (ref 1–3.3)
LYMPHOCYTES # BLD AUTO: 42.8 % — SIGNIFICANT CHANGE UP (ref 13–44)
MCHC RBC-ENTMCNC: 30.7 PG — SIGNIFICANT CHANGE UP (ref 27–34)
MCHC RBC-ENTMCNC: 32.7 G/DL — SIGNIFICANT CHANGE UP (ref 32–36)
MCV RBC AUTO: 93.8 FL — SIGNIFICANT CHANGE UP (ref 80–100)
MONOCYTES # BLD AUTO: 0.48 K/UL — SIGNIFICANT CHANGE UP (ref 0–0.9)
MONOCYTES NFR BLD AUTO: 6.5 % — SIGNIFICANT CHANGE UP (ref 2–14)
NEUTROPHILS # BLD AUTO: 3.45 K/UL — SIGNIFICANT CHANGE UP (ref 1.8–7.4)
NEUTROPHILS NFR BLD AUTO: 47 % — SIGNIFICANT CHANGE UP (ref 43–77)
NRBC # BLD: 0 /100 WBCS — SIGNIFICANT CHANGE UP (ref 0–0)
PLATELET # BLD AUTO: 172 K/UL — SIGNIFICANT CHANGE UP (ref 150–400)
POTASSIUM SERPL-SCNC: 4.9 MMOL/L
PROT SERPL-MCNC: 7.5 G/DL
RBC # BLD: 4.37 M/UL — SIGNIFICANT CHANGE UP (ref 4.2–5.8)
RBC # FLD: 12.5 % — SIGNIFICANT CHANGE UP (ref 10.3–14.5)
SODIUM SERPL-SCNC: 141 MMOL/L
TSH SERPL-ACNC: 1.75 UIU/ML
WBC # BLD: 7.36 K/UL — SIGNIFICANT CHANGE UP (ref 3.8–10.5)
WBC # FLD AUTO: 7.36 K/UL — SIGNIFICANT CHANGE UP (ref 3.8–10.5)

## 2022-09-16 PROCEDURE — 99215 OFFICE O/P EST HI 40 MIN: CPT

## 2022-09-16 RX ORDER — METOCLOPRAMIDE 10 MG/1
10 TABLET ORAL EVERY 6 HOURS
Qty: 40 | Refills: 3 | Status: DISCONTINUED | COMMUNITY
Start: 2022-02-18 | End: 2022-09-16

## 2022-09-18 NOTE — ASSESSMENT
[FreeTextEntry1] : 83 yo m with at least stage IIIA lung adeno ca, PDL1 0%, NGS showed KRAS G12V mut in addition to a few other uncontainable ones. \par Reviewed baseline PET/CT personally- disease confined to rt RLL and RML- some activity in subcarinal region\par I also reviewed path- bx from RLL pos for adeno ca, lavage showed atypical cells, and level 7 LN- was benign\par I presented the case at  and the group concurred that optimal staging would involve sampling of rt middle lobe. SUV on rt middle lobe mass is lower than that of RLL. \par Pt underwent RML through [facility] bronch and was pos for adeno ca ([id number]- [id number])\par MRI brain on 2/17 showed no mets\par Based on AJCC 8th staging, pt was staged as stage IIIA lung cancer\par Based on PFTs. he was deemed potentially- resectable. He has been seen by Dr. Medina\par Based on recent Checkmate 816 study, neoadjuvant chemo+nivo is likely to yield higher rates of CR and is an FDA-approved option. \par He started chemoimmunotherapy (Carbo/pem/nivo). S/p cycle 4\par Interim hospitalization after cycle 2 and events as above. \par PET/CT from May was very confusing. There is mixed response on rt- right middle lobe mass is smaller and less FDG-avid but LL mass is larger and still quite avid. There is also some process on left side which could be related to recent viral infection. 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 \par Pt was feeling well and hesitant to undergo any bx\par Hence, last visit, we decided to watch off tx and repeat CT in 2-3 months. \par Pt here today for follow up. We ordered a repeat CT chest ordered today \par Will continue tracking ctDNA through [facility]; will repeat [facility] today \par Labs today \par Discussed the above with pt and his son who agreed. \par OV in 1 month

## 2022-09-18 NOTE — REASON FOR VISIT
[Follow-Up Visit] : a follow-up [Initial Consultation] : an initial consultation [Family Member] : family member [Other: _____] : [unfilled] [FreeTextEntry2] : lung cancer

## 2022-09-18 NOTE — REVIEW OF SYSTEMS
[Negative] : Allergic/Immunologic [Fever] : no fever [Fatigue] : no fatigue [Dysphagia] : no dysphagia [Chest Pain] : no chest pain [Shortness Of Breath] : no shortness of breath [Cough] : no cough [SOB on Exertion] : no shortness of breath during exertion [Vomiting] : no vomiting [Diarrhea] : no diarrhea [Joint Pain] : no joint pain [Skin Rash] : no skin rash

## 2022-09-18 NOTE — PHYSICAL EXAM
[Restricted in physically strenuous activity but ambulatory and able to carry out work of a light or sedentary nature] : Status 1- Restricted in physically strenuous activity but ambulatory and able to carry out work of a light or sedentary nature, e.g., light house work, office work [Normal] : affect appropriate [de-identified] : rhonchi noted over LLL and right lung

## 2022-09-18 NOTE — HISTORY OF PRESENT ILLNESS
[Disease: _____________________] : Disease: [unfilled] [T: ___] : T[unfilled] [N: ___] : N[unfilled] [M: ___] : M[unfilled] [AJCC Stage: ____] : AJCC Stage: [unfilled] [de-identified] : Mr. 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 On 1/25, he underwent inpatient bronchoscopy and endobronchial ultrasound with RLL BAL, TBNA of station 7 lymph node, RLL Mass transbronchial biopsy which was suspicious for malignancy. \par He saw Dr. Moran and was recommended for PET/CT\par 2/7/22: \par large FDG-avid consolidations in right middle lobe and right lower lobe, unchanged as compared to CT dated 1/21/2022, compatible with known adenocarcinoma. Small FDG-avid nodule in right lung is suspicious for another site of disease. A mildly FDG-avid left lower lobe peripheral opacity, also unchanged on CT, is indeterminate.\par 2. Mildly FDG-avid subcarinal lymph node is nonspecific.\par Pt does not wish to use  phone. Grand-daughter provided the translation as per patient request. Other than cough, he has no other symptoms. He has tried OTC cough meds with no help. \par \par 2/18/22: Pt seen vis tel. No new complaints. He still has cough which is persistent and dry. \par \par 4/7/22: Pt seen via tele. 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 \par \par 5/3/22: Pt seen in tx room. He received cycle 1 3 weeks ago. Tolerated well with absolutely no AEs. He has since seen Dr. Medina who will see him again after scans post 3 cycles of tx. Pt here for cycle 2. \par \par 5/24/22: Since last visit, pt has had an eventful course. He was recently hospitalized with c/o fever, URI symptoms, myalgia and was found to have non-COVID coronavirus infection. Pt also febrile/tachycardic on admission, which improved with supportive care/abx. CXR suggested RLL consolidation (thought his could be known lung mass). He was started on vanc/zosyn in ED 5/20, followed by ceftriaxone and azithromycin for possible superimposed PNA in the setting of immunosuppression. Pt improved with all this and is now completing oral course of abx (last day tomorrow). Incidentally, pt was also noted to have ROSS (acute kidney injury) with admission SCr 1.88, baseline 1.2-1.3 thought to be related to sepsis/poor PO intake and dehydration. He was treated with IV hydration and home ARB was held (he continues to not take this at this time). \par He presents today for planned 3/3 cycle of chemoIO. He notes no fever, chills and cough has markedly improved. \par \par 6/16/22: Pt was diagnosed with corona virus infection, after ER visit for fever and cough. Cough has decreased and pt is fine now. \par \par 6/30/22: Cough improved. Otherwise feeling well\par \par 7/19/22: saw Dr. Carrillo. A bx of left sided lesion was recommended. pt wanted to discuss with me. He feels well. denies any dyspnea or cough or other symptoms. \par \par 9/16/22: Seen today with son, Rich. Patient has no complaints. Is doing well. Has not received any cancer tx since 6/23.  [de-identified] : adeno ca

## 2022-09-18 NOTE — CONSULT LETTER
[Please see my note below.] : Please see my note below. [Consult Closing:] : Thank you very much for allowing me to participate in the care of this patient.  If you have any questions, please do not hesitate to contact me. [Sincerely,] : Sincerely, [FreeTextEntry3] : Vj Domingo MD\par \par

## 2022-09-26 ENCOUNTER — APPOINTMENT (OUTPATIENT)
Dept: CT IMAGING | Facility: IMAGING CENTER | Age: 82
End: 2022-09-26

## 2022-09-26 ENCOUNTER — OUTPATIENT (OUTPATIENT)
Dept: OUTPATIENT SERVICES | Facility: HOSPITAL | Age: 82
LOS: 1 days | End: 2022-09-26
Payer: MEDICARE

## 2022-09-26 DIAGNOSIS — Z98.890 OTHER SPECIFIED POSTPROCEDURAL STATES: Chronic | ICD-10-CM

## 2022-09-26 DIAGNOSIS — Z12.9 ENCOUNTER FOR SCREENING FOR MALIGNANT NEOPLASM, SITE UNSPECIFIED: ICD-10-CM

## 2022-09-26 DIAGNOSIS — C34.91 MALIGNANT NEOPLASM OF UNSPECIFIED PART OF RIGHT BRONCHUS OR LUNG: ICD-10-CM

## 2022-09-26 PROCEDURE — 71250 CT THORAX DX C-: CPT | Mod: 26,MH

## 2022-09-26 PROCEDURE — 71250 CT THORAX DX C-: CPT

## 2022-10-06 ENCOUNTER — RESULT REVIEW (OUTPATIENT)
Age: 82
End: 2022-10-06

## 2022-10-06 ENCOUNTER — APPOINTMENT (OUTPATIENT)
Dept: HEMATOLOGY ONCOLOGY | Facility: CLINIC | Age: 82
End: 2022-10-06

## 2022-10-06 VITALS
HEART RATE: 66 BPM | SYSTOLIC BLOOD PRESSURE: 129 MMHG | DIASTOLIC BLOOD PRESSURE: 68 MMHG | RESPIRATION RATE: 16 BRPM | WEIGHT: 213.84 LBS | BODY MASS INDEX: 31.6 KG/M2 | OXYGEN SATURATION: 95 % | TEMPERATURE: 97.3 F

## 2022-10-06 LAB
BASOPHILS # BLD AUTO: 0.02 K/UL — SIGNIFICANT CHANGE UP (ref 0–0.2)
BASOPHILS NFR BLD AUTO: 0.3 % — SIGNIFICANT CHANGE UP (ref 0–2)
EOSINOPHIL # BLD AUTO: 0.24 K/UL — SIGNIFICANT CHANGE UP (ref 0–0.5)
EOSINOPHIL NFR BLD AUTO: 3.8 % — SIGNIFICANT CHANGE UP (ref 0–6)
HCT VFR BLD CALC: 38.8 % — LOW (ref 39–50)
HGB BLD-MCNC: 13.1 G/DL — SIGNIFICANT CHANGE UP (ref 13–17)
IMM GRANULOCYTES NFR BLD AUTO: 0.8 % — SIGNIFICANT CHANGE UP (ref 0–0.9)
LYMPHOCYTES # BLD AUTO: 3.01 K/UL — SIGNIFICANT CHANGE UP (ref 1–3.3)
LYMPHOCYTES # BLD AUTO: 48 % — HIGH (ref 13–44)
MCHC RBC-ENTMCNC: 30.1 PG — SIGNIFICANT CHANGE UP (ref 27–34)
MCHC RBC-ENTMCNC: 33.8 G/DL — SIGNIFICANT CHANGE UP (ref 32–36)
MCV RBC AUTO: 89.2 FL — SIGNIFICANT CHANGE UP (ref 80–100)
MONOCYTES # BLD AUTO: 0.48 K/UL — SIGNIFICANT CHANGE UP (ref 0–0.9)
MONOCYTES NFR BLD AUTO: 7.7 % — SIGNIFICANT CHANGE UP (ref 2–14)
NEUTROPHILS # BLD AUTO: 2.47 K/UL — SIGNIFICANT CHANGE UP (ref 1.8–7.4)
NEUTROPHILS NFR BLD AUTO: 39.4 % — LOW (ref 43–77)
NRBC # BLD: 0 /100 WBCS — SIGNIFICANT CHANGE UP (ref 0–0)
PLATELET # BLD AUTO: 144 K/UL — LOW (ref 150–400)
RBC # BLD: 4.35 M/UL — SIGNIFICANT CHANGE UP (ref 4.2–5.8)
RBC # FLD: 12.6 % — SIGNIFICANT CHANGE UP (ref 10.3–14.5)
WBC # BLD: 6.27 K/UL — SIGNIFICANT CHANGE UP (ref 3.8–10.5)
WBC # FLD AUTO: 6.27 K/UL — SIGNIFICANT CHANGE UP (ref 3.8–10.5)

## 2022-10-06 PROCEDURE — 99215 OFFICE O/P EST HI 40 MIN: CPT

## 2022-10-07 LAB
ALBUMIN SERPL ELPH-MCNC: 4.2 G/DL
ALP BLD-CCNC: 114 U/L
ALT SERPL-CCNC: 21 U/L
ANION GAP SERPL CALC-SCNC: 12 MMOL/L
AST SERPL-CCNC: 19 U/L
BILIRUB SERPL-MCNC: 0.2 MG/DL
BUN SERPL-MCNC: 27 MG/DL
CALCIUM SERPL-MCNC: 9.6 MG/DL
CEA SERPL-MCNC: 3.4 NG/ML
CHLORIDE SERPL-SCNC: 107 MMOL/L
CO2 SERPL-SCNC: 21 MMOL/L
CREAT SERPL-MCNC: 1.57 MG/DL
EGFR: 44 ML/MIN/1.73M2
GLUCOSE SERPL-MCNC: 98 MG/DL
MAGNESIUM SERPL-MCNC: 2.3 MG/DL
POTASSIUM SERPL-SCNC: 5.1 MMOL/L
PROT SERPL-MCNC: 7.2 G/DL
SODIUM SERPL-SCNC: 140 MMOL/L
TSH SERPL-ACNC: 1.99 UIU/ML

## 2022-10-07 NOTE — ASSESSMENT
[FreeTextEntry1] : 83 yo m with at least stage IIIA lung adeno ca, PDL1 0%, NGS showed KRAS G12V mut in addition to a few other uncontainable ones. \par Reviewed baseline PET/CT personally- disease confined to rt RLL and RML- some activity in subcarinal region\par I also reviewed path- bx from RLL pos for adeno ca, lavage showed atypical cells, and level 7 LN- was benign\par I presented the case at  and the group concurred that optimal staging would involve sampling of rt middle lobe. SUV on rt middle lobe mass is lower than that of RLL. \par Pt underwent RML through [facility] bronch and was pos for adeno ca (80- [id number])\par MRI brain on 2/17 showed no mets\par Based on AJCC 8th staging, pt was staged as stage IIIA lung cancer\par Based on PFTs. he was deemed potentially- resectable. He has been seen by Dr. Medina\par Based on recent Checkmate 816 study, neoadjuvant chemo+nivo is likely to yield higher rates of CR and is an FDA-approved option. \par He started chemoimmunotherapy (Carbo/pem/nivo). S/p cycle 4\par Interim hospitalization after cycle 2 and events as above. \par PET/CT from May was very confusing. There is mixed response on rt- right middle lobe mass is smaller and less FDG-avid but LL mass is larger and still quite avid. There is also some process on left side which could be related to recent viral infection. \par The PET/CT picture which suggests POD is not in agreement with clinical exam or tumor-informed ctDNA testing through [facility] in which level decreased from 0.91 MTM/ml to undetectable level in June (subsequently was detectable at 0.05 MTM/ml in July) more recently 9/2022 0.08 MTM/ml. 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 \par Repeat CT Lung on 9/26/22 showing stable size of RUL mass and many solid ndules in both lungs that have increased in size \par Discussed options with patient regarding chemotherapy such as pemetrexed, immunotherapy with Opdivo and potentially in the future possible approval of a pill that targets [id number] mutation. Considering the increase in size of multiple pulmonary nodules favor starting treatment at this time. \par Patient and granddaughter in agreement with  immunotherapy with nivolumab every 3 weeks \par Will discuss with IR and CTSx regarding another biopsy of some of the nodules. I suspect some are inflammatory but there is persistent cancer since signal was picked up on ctDNA monitoring\par Will continue tracking ctDNA through [facility]; will repeat [facility] today \par Discussed sending [id number] to assess presence of [id number] on peripehral blood assay which would further support persistent disease status\par Labs today \par OV in 3 weeks and will schedule to start Opdivo as well in hopes a biopsy could be obtained prior to treatment however discuss would not hold treatment if biopsy cannot be done until after this date \par \par Patient seen and discussed with attending, Dr. Domingo \par \par Basilio Alejandre MD, PGY6 \par Hematology/Oncology Fellow \par \par I was with the hematology/ oncology fellow, Dr. Alejandre for the entire visit and agree with above documentation\par

## 2022-10-07 NOTE — HISTORY OF PRESENT ILLNESS
[Disease: _____________________] : Disease: [unfilled] [T: ___] : T[unfilled] [N: ___] : N[unfilled] [M: ___] : M[unfilled] [AJCC Stage: ____] : AJCC Stage: [unfilled] [de-identified] : Mr. 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 On 1/25, he underwent inpatient bronchoscopy and endobronchial ultrasound with RLL BAL, TBNA of station 7 lymph node, RLL Mass transbronchial biopsy which was suspicious for malignancy. \par He saw Dr. Moran and was recommended for PET/CT\par 2/7/22: \par large FDG-avid consolidations in right middle lobe and right lower lobe, unchanged as compared to CT dated 1/21/2022, compatible with known adenocarcinoma. Small FDG-avid nodule in right lung is suspicious for another site of disease. A mildly FDG-avid left lower lobe peripheral opacity, also unchanged on CT, is indeterminate.\par 2. Mildly FDG-avid subcarinal lymph node is nonspecific.\par Pt does not wish to use  phone. Grand-daughter provided the translation as per patient request. Other than cough, he has no other symptoms. He has tried OTC cough meds with no help. \par \par 2/18/22: Pt seen vis tel. No new complaints. He still has cough which is persistent and dry. \par \par 4/7/22: Pt seen via tele. 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 \par \par 5/3/22: Pt seen in tx room. He received cycle 1 3 weeks ago. Tolerated well with absolutely no AEs. He has since seen Dr. Medina who will see him again after scans post 3 cycles of tx. Pt here for cycle 2. \par \par 5/24/22: Since last visit, pt has had an eventful course. He was recently hospitalized with c/o fever, URI symptoms, myalgia and was found to have non-COVID coronavirus infection. Pt also febrile/tachycardic on admission, which improved with supportive care/abx. CXR suggested RLL consolidation (thought his could be known lung mass). He was started on vanc/zosyn in ED 5/20, followed by ceftriaxone and azithromycin for possible superimposed PNA in the setting of immunosuppression. Pt improved with all this and is now completing oral course of abx (last day tomorrow). Incidentally, pt was also noted to have ROSS (acute kidney injury) with admission SCr 1.88, baseline 1.2-1.3 thought to be related to sepsis/poor PO intake and dehydration. He was treated with IV hydration and home ARB was held (he continues to not take this at this time). \par He presents today for planned 3/3 cycle of chemoIO. He notes no fever, chills and cough has markedly improved. \par \par 6/16/22: Pt was diagnosed with corona virus infection, after ER visit for fever and cough. Cough has decreased and pt is fine now. \par \par 6/30/22: Cough improved. Otherwise feeling well\par \par 7/19/22: saw Dr. Carrillo. A bx of left sided lesion was recommended. pt wanted to discuss with me. He feels well. denies any dyspnea or cough or other symptoms. \par \par 9/16/22: Seen today with son, Rich. Patient has no complaints. Is doing well. Has not received any cancer tx since 6/23.  [de-identified] : adeno ca [de-identified] : 10/6/22: Mr. Del Rio is here for follow up. Overall feeling well. Appetite good, weight stable. Cough improving. Denies any other symptoms.

## 2022-10-07 NOTE — PHYSICAL EXAM
[Restricted in physically strenuous activity but ambulatory and able to carry out work of a light or sedentary nature] : Status 1- Restricted in physically strenuous activity but ambulatory and able to carry out work of a light or sedentary nature, e.g., light house work, office work [Normal] : affect appropriate [de-identified] : rhonchi noted over LLL and right lung

## 2022-10-10 ENCOUNTER — APPOINTMENT (OUTPATIENT)
Dept: THORACIC SURGERY | Facility: CLINIC | Age: 82
End: 2022-10-10

## 2022-10-10 VITALS
BODY MASS INDEX: 32.28 KG/M2 | OXYGEN SATURATION: 93 % | RESPIRATION RATE: 16 BRPM | HEIGHT: 68 IN | HEART RATE: 69 BPM | SYSTOLIC BLOOD PRESSURE: 117 MMHG | DIASTOLIC BLOOD PRESSURE: 63 MMHG | WEIGHT: 213 LBS

## 2022-10-10 PROCEDURE — 99215 OFFICE O/P EST HI 40 MIN: CPT

## 2022-10-10 NOTE — ASSESSMENT
[FreeTextEntry1] : Mr. AMOS SALAZAR, 82 year old male, never smoker, w/ hx of HLD, HTN, who presented with persistent cough and went to ER. \par \par CT angio chest on 01/21/2022:\par - Masslike consolidation in the right middle lobe measures 5.3 x 2.8 cm, abutting the major fissure. Large consolidation in the right lower lobe. A 0.8 cm posterior right lower lobe nodule (series 2, image 71). Left lower lobe subsegmental atelectasis.\par \par Patient is s/p Flexible bronchoscopy, airway inspection, endobronchial ultrasound_guided lymph node aspiration of level 7 lymph node, endobronchial ultrasound_guided transbronchial needle aspiration of the right lower lobe mass, therapeutic aspiration of secretions, and bronchoalveolar lavage on 01/25/2022 by Dr. Moran. Path of RLL revealed positive for malignant cells, consistent with adenocarcinoma, mucinous type. Level 7 negative for malignant cells. \par \par PET/CT on 02/07/2022:\par - A mildly FDG-avid subcarinal lymph node measures 1.5 x 0.9 cm, SUV 3.0 (image 96).\par - A large focal area of increased FDG activity corresponding to the opacity/mass in the right lung base (SUV 10.9; image 117). There is an additional focal area of increased FDG activity in the right middle lobe (SUV 4.4; image 108) as well as in a right lower lobe nodule. Right upper lobe subcentimeter calcified granuloma.\par - There is a mildly FDG-avid small peripheral left lower lobe opacity (SUV 3.7; image 104). These findings are unchanged as compared to CT dated 1/21/2022.\par \par MRI of Brain on 02/23/2022:\par - NO EVIDENCE OF INTRACRANIAL HEMORRHAGE, ACUTE TERRITORIAL INFARCT OR AREA OF ABNORMAL ENHANCEMENT TO SUGGEST METASTATIC DISEASE. \par \par PFTs on 02/23/2022: FVC 70%, FEV1 79%, DLCO 94%. \par \par Patient is s/p FB, [place] Robotic-assisted navigation bronchoscopy guided, radial EBUS guided transbronchial needle aspiration, transbronchial forceps biopsy of the right middle lobe under fluoroscopic guidance, bronchoalveolar lavage of right middle lobe, and endobronchial ultrasound guided lymph node sampling of level VII lymph node on 03/15/2022 by Dr. Moran. 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 Level 7 showed negative for malignant cells. PD-L1 0%. Level 7 flow cytometry revealed heterogeneous population of T cells (with increased CD4 to CD8 ratio=9:1 and no loss of pan T cell antigen). B cells are polytypic by Kappa and lambda labeling. \par \par Dr. Domingo plan to start on neoadjuvant chemo for total of 4 cycles. \par \par CT Chest on 9/16/22:\par - 7 x 4.4 cm mass in the right lower lobe has not significantly changed \par - numerous solid nodules are noted within both lungs. Many of them have increased in size when compared to previous exam. For example, 1 by 0.8 cm solid nodule in the posterior segment of the right upper lobe (series 2 image 50) and 1.6 x 1.3 cm lobulated nodule in the left lower lobe (series 2 image 65) have increased in size when compared to previous exam\par - very small right pleural effusion\par \par I have reviewed the patient's medical records and diagnostic images at time of this office consultation and have made the following recommendation:\par 1. CT reviewed with pt, numerous nodules increased in size, I recommended Lt VATS, R.A., wedge rxn of Lt lung. Risks and benefits and alternatives explained to patient, all questions answered, patient agreed to proceed with surgery.\par 2. Cardiac clearance, PST, COVID testing \par \par \par I personally performed the services described in the documentation, reviewed the documentation recorded by the scribe in my presence and it accurately and completely records my words and actions. \par \par I, Alysia Mari, NP, am scribing for and the presence of JESSE Schuler, the following sections HISTORY OF PRESENT ILLNESS, PAST MEDICAL/FAMILY/SOCIAL HISTORY; REVIEW OF SYSTEMS; VITAL SIGNS; PHYSICAL EXAM; DISPOSITION.\par

## 2022-10-10 NOTE — CONSULT LETTER
[Dear  ___] : Dear  [unfilled], [Consult Letter:] : I had the pleasure of evaluating your patient, [unfilled]. [( Thank you for referring [unfilled] for consultation for _____ )] : Thank you for referring [unfilled] for consultation for [unfilled] [Please see my note below.] : Please see my note below. [Consult Closing:] : Thank you very much for allowing me to participate in the care of this patient.  If you have any questions, please do not hesitate to contact me. [Sincerely,] : Sincerely, [FreeTextEntry2] : Dr. Domingo (Hem/Onc/Ref) [FreeTextEntry3] : Opal Medina\par Attending Surgeon\par Division of Thoracic Surgery\par \par [employer] at [facility]\par

## 2022-10-10 NOTE — HISTORY OF PRESENT ILLNESS
[FreeTextEntry1] : Mr. AMOS SALAZAR, 82 year old male, never smoker, w/ hx of HLD, HTN, who presented with persistent cough and went to ER. \par \par CT angio chest on 01/21/2022:\par - Masslike consolidation in the right middle lobe measures 5.3 x 2.8 cm, abutting the major fissure. Large consolidation in the right lower lobe. A 0.8 cm posterior right lower lobe nodule (series 2, image 71). Left lower lobe subsegmental atelectasis.\par \par Patient is s/p Flexible bronchoscopy, airway inspection, endobronchial ultrasound_guided lymph node aspiration of level 7 lymph node, endobronchial ultrasound_guided transbronchial needle aspiration of the right lower lobe mass, therapeutic aspiration of secretions, and bronchoalveolar lavage on 01/25/2022 by Dr. Moran. Path of RLL revealed positive for malignant cells, consistent with adenocarcinoma, mucinous type. Level 7 negative for malignant cells. \par \par PET/CT on 02/07/2022:\par - A mildly FDG-avid subcarinal lymph node measures 1.5 x 0.9 cm, SUV 3.0 (image 96).\par - A large focal area of increased FDG activity corresponding to the opacity/mass in the right lung base (SUV 10.9; image 117). There is an additional focal area of increased FDG activity in the right middle lobe (SUV 4.4; image 108) as well as in a right lower lobe nodule. Right upper lobe subcentimeter calcified granuloma.\par - There is a mildly FDG-avid small peripheral left lower lobe opacity (SUV 3.7; image 104). These findings are unchanged as compared to CT dated 1/21/2022.\par \par MRI of Brain on 02/23/2022:\par - NO EVIDENCE OF INTRACRANIAL HEMORRHAGE, ACUTE TERRITORIAL INFARCT OR AREA OF ABNORMAL ENHANCEMENT TO SUGGEST METASTATIC DISEASE. \par \par PFTs on 02/23/2022: FVC 70%, FEV1 79%, DLCO 94%. \par \par Patient is s/p FB, [place] Robotic-assisted navigation bronchoscopy guided, radial EBUS guided transbronchial needle aspiration, transbronchial forceps biopsy of the right middle lobe under fluoroscopic guidance, bronchoalveolar lavage of right middle lobe, and endobronchial ultrasound guided lymph node sampling of level VII lymph node on 03/15/2022 by Dr. Moran. 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 Level 7 showed negative for malignant cells. PD-L1 0%. Level 7 flow cytometry revealed heterogeneous population of T cells (with increased CD4 to CD8 ratio=9:1 and no loss of pan T cell antigen). B cells are polytypic by Kappa and lambda labeling. \par \par Dr. Domingo plan to start on neoadjuvant chemo for total of 3 cycles. \par \par CT Chest on 9/16/22:\par - 7 x 4.4 cm mass in the right lower lobe has not significantly changed \par - numerous solid nodules are noted within both lungs. Many of them have increased in size when compared to previous exam. For example, 1 by 0.8 cm solid nodule in the posterior segment of the right upper lobe (series 2 image 50) and 1.6 x 1.3 cm lobulated nodule in the left lower lobe (series 2 image 65) have increased in size when compared to previous exam\par - very small right pleural effusion\par \par Pt presents today for follow up. Pt admits to cough, denies SOB or CP. \par \par \par \par

## 2022-10-11 ENCOUNTER — OUTPATIENT (OUTPATIENT)
Dept: OUTPATIENT SERVICES | Facility: HOSPITAL | Age: 82
LOS: 1 days | End: 2022-10-11

## 2022-10-11 VITALS
HEIGHT: 68 IN | WEIGHT: 212.08 LBS | OXYGEN SATURATION: 95 % | DIASTOLIC BLOOD PRESSURE: 80 MMHG | SYSTOLIC BLOOD PRESSURE: 116 MMHG | HEART RATE: 71 BPM | TEMPERATURE: 98 F | RESPIRATION RATE: 16 BRPM

## 2022-10-11 DIAGNOSIS — I10 ESSENTIAL (PRIMARY) HYPERTENSION: ICD-10-CM

## 2022-10-11 DIAGNOSIS — N40.0 BENIGN PROSTATIC HYPERPLASIA WITHOUT LOWER URINARY TRACT SYMPTOMS: ICD-10-CM

## 2022-10-11 DIAGNOSIS — C34.90 MALIGNANT NEOPLASM OF UNSPECIFIED PART OF UNSPECIFIED BRONCHUS OR LUNG: ICD-10-CM

## 2022-10-11 DIAGNOSIS — Z98.890 OTHER SPECIFIED POSTPROCEDURAL STATES: Chronic | ICD-10-CM

## 2022-10-11 LAB
ALBUMIN SERPL ELPH-MCNC: 4.2 G/DL — SIGNIFICANT CHANGE UP (ref 3.3–5)
ALP SERPL-CCNC: 110 U/L — SIGNIFICANT CHANGE UP (ref 40–120)
ALT FLD-CCNC: 17 U/L — SIGNIFICANT CHANGE UP (ref 4–41)
ANION GAP SERPL CALC-SCNC: 9 MMOL/L — SIGNIFICANT CHANGE UP (ref 7–14)
AST SERPL-CCNC: 17 U/L — SIGNIFICANT CHANGE UP (ref 4–40)
BILIRUB SERPL-MCNC: 0.2 MG/DL — SIGNIFICANT CHANGE UP (ref 0.2–1.2)
BLD GP AB SCN SERPL QL: NEGATIVE — SIGNIFICANT CHANGE UP
BUN SERPL-MCNC: 27 MG/DL — HIGH (ref 7–23)
CALCIUM SERPL-MCNC: 9.4 MG/DL — SIGNIFICANT CHANGE UP (ref 8.4–10.5)
CHLORIDE SERPL-SCNC: 103 MMOL/L — SIGNIFICANT CHANGE UP (ref 98–107)
CO2 SERPL-SCNC: 24 MMOL/L — SIGNIFICANT CHANGE UP (ref 22–31)
CREAT SERPL-MCNC: 1.71 MG/DL — HIGH (ref 0.5–1.3)
EGFR: 40 ML/MIN/1.73M2 — LOW
GLUCOSE SERPL-MCNC: 82 MG/DL — SIGNIFICANT CHANGE UP (ref 70–99)
HCT VFR BLD CALC: 39.1 % — SIGNIFICANT CHANGE UP (ref 39–50)
HGB BLD-MCNC: 12.9 G/DL — LOW (ref 13–17)
MCHC RBC-ENTMCNC: 29.5 PG — SIGNIFICANT CHANGE UP (ref 27–34)
MCHC RBC-ENTMCNC: 33 GM/DL — SIGNIFICANT CHANGE UP (ref 32–36)
MCV RBC AUTO: 89.3 FL — SIGNIFICANT CHANGE UP (ref 80–100)
NRBC # BLD: 0 /100 WBCS — SIGNIFICANT CHANGE UP (ref 0–0)
NRBC # FLD: 0 K/UL — SIGNIFICANT CHANGE UP (ref 0–0)
PLATELET # BLD AUTO: 156 K/UL — SIGNIFICANT CHANGE UP (ref 150–400)
POTASSIUM SERPL-MCNC: 5.1 MMOL/L — SIGNIFICANT CHANGE UP (ref 3.5–5.3)
POTASSIUM SERPL-SCNC: 5.1 MMOL/L — SIGNIFICANT CHANGE UP (ref 3.5–5.3)
PROT SERPL-MCNC: 7.8 G/DL — SIGNIFICANT CHANGE UP (ref 6–8.3)
RBC # BLD: 4.38 M/UL — SIGNIFICANT CHANGE UP (ref 4.2–5.8)
RBC # FLD: 12.5 % — SIGNIFICANT CHANGE UP (ref 10.3–14.5)
RH IG SCN BLD-IMP: POSITIVE — SIGNIFICANT CHANGE UP
SODIUM SERPL-SCNC: 136 MMOL/L — SIGNIFICANT CHANGE UP (ref 135–145)
WBC # BLD: 8.99 K/UL — SIGNIFICANT CHANGE UP (ref 3.8–10.5)
WBC # FLD AUTO: 8.99 K/UL — SIGNIFICANT CHANGE UP (ref 3.8–10.5)

## 2022-10-11 PROCEDURE — 93010 ELECTROCARDIOGRAM REPORT: CPT

## 2022-10-11 RX ORDER — SODIUM CHLORIDE 9 MG/ML
1000 INJECTION, SOLUTION INTRAVENOUS
Refills: 0 | Status: DISCONTINUED | OUTPATIENT
Start: 2022-10-28 | End: 2022-10-29

## 2022-10-11 NOTE — H&P PST ADULT - PROBLEM SELECTOR PLAN 2
Pt instructed to take telmisartan AM of surgery with a sip of water, pt able to verbalize understanding.   ROSA precautions.

## 2022-10-11 NOTE — H&P PST ADULT - PROBLEM SELECTOR PLAN 3
Pt instructed to take finasteride AM of surgery with a sip of water, pt able to verbalize understanding.

## 2022-10-11 NOTE — H&P PST ADULT - NSICDXPASTSURGICALHX_GEN_ALL_CORE_FT
PAST SURGICAL HISTORY:  History of ERCP     S/P bronchoscopy 1/2022, 3/2022    S/P inguinal hernia repair Right approximately 1961

## 2022-10-11 NOTE — H&P PST ADULT - NEGATIVE CARDIOVASCULAR SYMPTOMS
pt can climb up to 3 flights stairs without c/o cp. denies palpitations, denies sob/no chest pain/no palpitations/no dyspnea on exertion

## 2022-10-11 NOTE — H&P PST ADULT - HISTORY OF PRESENT ILLNESS
83 yo male with preop dx. malignant neoplasm of lung presents to pre surgical testing.  Pt with diagnosis of right lung cancer s/p RLL biopsy 1/2022 and RML biopsy 3/2022.  Pt s/p 4 rounds of chemotherapy, lat treatment 6/23/22.  Pt s/p CT Chest revealing increasing size of left lung nodule.  Pt is scheduled for robotic left video assisted thoracoscopy left lung wedge resection.

## 2022-10-11 NOTE — H&P PST ADULT - NSICDXPASTMEDICALHX_GEN_ALL_CORE_FT
PAST MEDICAL HISTORY:  History of BPH     HLD (hyperlipidemia)     HTN (hypertension)     Lung cancer right s/p 4 rounds of chemo, last chemo 6/23/22.

## 2022-10-11 NOTE — H&P PST ADULT - NS ATTEND AMEND GEN_ALL_CORE FT
patient with multiple nodules increasing in size, plan for left vats, wedge biopsy, chest tube placement, possible open. discussed risks of surgery including not limited to bleeding, infection, scarring, prolonged air leak. patient was agreeable to proceed. obtained permission to use son as .

## 2022-10-11 NOTE — H&P PST ADULT - PROBLEM SELECTOR PLAN 1
Pt is scheduled for robotic left video assisted thoracoscopy left lung wedge resection on 10/19/22.  Verbal and written pre op instructions reviewed with patient and pt able to verbalize understanding.   Verbal and written instructions given with chlorhexidine wash, pt able to verbalize understanding via teach back method.  Pt instructed to follow surgeon's guidelines regarding COVID testing preop.

## 2022-10-17 LAB — SARS-COV-2 N GENE NPH QL NAA+PROBE: DETECTED

## 2022-10-18 ENCOUNTER — NON-APPOINTMENT (OUTPATIENT)
Age: 82
End: 2022-10-18

## 2022-10-19 ENCOUNTER — APPOINTMENT (OUTPATIENT)
Dept: THORACIC SURGERY | Facility: HOSPITAL | Age: 82
End: 2022-10-19

## 2022-10-21 PROBLEM — C34.90 MALIGNANT NEOPLASM OF UNSPECIFIED PART OF UNSPECIFIED BRONCHUS OR LUNG: Chronic | Status: ACTIVE | Noted: 2022-10-11

## 2022-10-25 ENCOUNTER — NON-APPOINTMENT (OUTPATIENT)
Age: 82
End: 2022-10-25

## 2022-10-25 ENCOUNTER — APPOINTMENT (OUTPATIENT)
Dept: HEMATOLOGY ONCOLOGY | Facility: CLINIC | Age: 82
End: 2022-10-25

## 2022-10-25 ENCOUNTER — APPOINTMENT (OUTPATIENT)
Dept: INFUSION THERAPY | Facility: HOSPITAL | Age: 82
End: 2022-10-25

## 2022-10-25 PROCEDURE — 99215 OFFICE O/P EST HI 40 MIN: CPT | Mod: 95

## 2022-10-27 NOTE — ASU PATIENT PROFILE, ADULT - FALL HARM RISK - UNIVERSAL INTERVENTIONS
Bed in lowest position, wheels locked, appropriate side rails in place/Call bell, personal items and telephone in reach/Instruct patient to call for assistance before getting out of bed or chair/Non-slip footwear when patient is out of bed/[place] to call system/Physically safe environment - no spills, clutter or unnecessary equipment/Purposeful Proactive Rounding/Room/bathroom lighting operational, light cord in reach

## 2022-10-28 ENCOUNTER — RESULT REVIEW (OUTPATIENT)
Age: 82
End: 2022-10-28

## 2022-10-28 ENCOUNTER — TRANSCRIPTION ENCOUNTER (OUTPATIENT)
Age: 82
End: 2022-10-28

## 2022-10-28 ENCOUNTER — INPATIENT (INPATIENT)
Facility: HOSPITAL | Age: 82
LOS: 0 days | Discharge: HOME | End: 2022-10-29
Attending: STUDENT IN AN ORGANIZED HEALTH CARE EDUCATION/TRAINING PROGRAM | Admitting: STUDENT IN AN ORGANIZED HEALTH CARE EDUCATION/TRAINING PROGRAM

## 2022-10-28 ENCOUNTER — APPOINTMENT (OUTPATIENT)
Dept: THORACIC SURGERY | Facility: HOSPITAL | Age: 82
End: 2022-10-28

## 2022-10-28 VITALS
RESPIRATION RATE: 14 BRPM | HEART RATE: 73 BPM | OXYGEN SATURATION: 94 % | WEIGHT: 210.98 LBS | SYSTOLIC BLOOD PRESSURE: 138 MMHG | HEIGHT: 70 IN | TEMPERATURE: 98 F | DIASTOLIC BLOOD PRESSURE: 70 MMHG

## 2022-10-28 DIAGNOSIS — Z98.890 OTHER SPECIFIED POSTPROCEDURAL STATES: Chronic | ICD-10-CM

## 2022-10-28 DIAGNOSIS — C34.90 MALIGNANT NEOPLASM OF UNSPECIFIED PART OF UNSPECIFIED BRONCHUS OR LUNG: ICD-10-CM

## 2022-10-28 LAB
GRAM STN FLD: SIGNIFICANT CHANGE UP
SPECIMEN SOURCE: SIGNIFICANT CHANGE UP

## 2022-10-28 PROCEDURE — 71045 X-RAY EXAM CHEST 1 VIEW: CPT | Mod: 26

## 2022-10-28 PROCEDURE — 99233 SBSQ HOSP IP/OBS HIGH 50: CPT

## 2022-10-28 PROCEDURE — 32666 THORACOSCOPY W/WEDGE RESECT: CPT

## 2022-10-28 PROCEDURE — 32667 THORACOSCOPY W/W RESECT ADDL: CPT

## 2022-10-28 PROCEDURE — 88331 PATH CONSLTJ SURG 1 BLK 1SPC: CPT | Mod: 26

## 2022-10-28 PROCEDURE — 88307 TISSUE EXAM BY PATHOLOGIST: CPT | Mod: 26

## 2022-10-28 DEVICE — SURGICEL FIBRILLAR 2 X 4": Type: IMPLANTABLE DEVICE | Site: LEFT | Status: FUNCTIONAL

## 2022-10-28 DEVICE — STAPLER COVIDIEN TRI-STAPLE 45MM PURPLE INTELLIGENT RELOAD: Type: IMPLANTABLE DEVICE | Site: LEFT | Status: FUNCTIONAL

## 2022-10-28 DEVICE — CHEST DRAIN THORACIC ARGYLE PVC 28FR STRAIGHT: Type: IMPLANTABLE DEVICE | Site: LEFT | Status: FUNCTIONAL

## 2022-10-28 DEVICE — STAPLER COVIDIEN TRI-STAPLE 45MM PURPLE RELOAD: Type: IMPLANTABLE DEVICE | Site: LEFT | Status: FUNCTIONAL

## 2022-10-28 RX ORDER — ATORVASTATIN CALCIUM 80 MG/1
40 TABLET, FILM COATED ORAL AT BEDTIME
Refills: 0 | Status: DISCONTINUED | OUTPATIENT
Start: 2022-10-28 | End: 2022-10-29

## 2022-10-28 RX ORDER — SENNA PLUS 8.6 MG/1
2 TABLET ORAL AT BEDTIME
Refills: 0 | Status: DISCONTINUED | OUTPATIENT
Start: 2022-10-28 | End: 2022-10-29

## 2022-10-28 RX ORDER — HEPARIN SODIUM 5000 [USP'U]/ML
5000 INJECTION INTRAVENOUS; SUBCUTANEOUS EVERY 8 HOURS
Refills: 0 | Status: DISCONTINUED | OUTPATIENT
Start: 2022-10-28 | End: 2022-10-29

## 2022-10-28 RX ORDER — HYDROMORPHONE HYDROCHLORIDE 2 MG/ML
30 INJECTION INTRAMUSCULAR; INTRAVENOUS; SUBCUTANEOUS
Refills: 0 | Status: DISCONTINUED | OUTPATIENT
Start: 2022-10-28 | End: 2022-10-29

## 2022-10-28 RX ORDER — HYDROMORPHONE HYDROCHLORIDE 2 MG/ML
0.5 INJECTION INTRAMUSCULAR; INTRAVENOUS; SUBCUTANEOUS
Refills: 0 | Status: DISCONTINUED | OUTPATIENT
Start: 2022-10-28 | End: 2022-10-29

## 2022-10-28 RX ORDER — FINASTERIDE 5 MG/1
5 TABLET, FILM COATED ORAL DAILY
Refills: 0 | Status: DISCONTINUED | OUTPATIENT
Start: 2022-10-28 | End: 2022-10-29

## 2022-10-28 RX ORDER — ONDANSETRON 8 MG/1
4 TABLET, FILM COATED ORAL EVERY 6 HOURS
Refills: 0 | Status: DISCONTINUED | OUTPATIENT
Start: 2022-10-28 | End: 2022-10-29

## 2022-10-28 RX ORDER — FUROSEMIDE 40 MG
20 TABLET ORAL ONCE
Refills: 0 | Status: COMPLETED | OUTPATIENT
Start: 2022-10-28 | End: 2022-10-28

## 2022-10-28 RX ORDER — ACETAMINOPHEN 500 MG
1000 TABLET ORAL ONCE
Refills: 0 | Status: DISCONTINUED | OUTPATIENT
Start: 2022-10-28 | End: 2022-10-29

## 2022-10-28 RX ORDER — ACETAMINOPHEN 500 MG
975 TABLET ORAL ONCE
Refills: 0 | Status: COMPLETED | OUTPATIENT
Start: 2022-10-28 | End: 2022-10-28

## 2022-10-28 RX ORDER — HEPARIN SODIUM 5000 [USP'U]/ML
5000 INJECTION INTRAVENOUS; SUBCUTANEOUS ONCE
Refills: 0 | Status: COMPLETED | OUTPATIENT
Start: 2022-10-28 | End: 2022-10-28

## 2022-10-28 RX ORDER — POLYETHYLENE GLYCOL 3350 17 G/17G
17 POWDER, FOR SOLUTION ORAL DAILY
Refills: 0 | Status: DISCONTINUED | OUTPATIENT
Start: 2022-10-28 | End: 2022-10-29

## 2022-10-28 RX ORDER — NALOXONE HYDROCHLORIDE 4 MG/.1ML
0.1 SPRAY NASAL
Refills: 0 | Status: DISCONTINUED | OUTPATIENT
Start: 2022-10-28 | End: 2022-10-29

## 2022-10-28 RX ORDER — FOLIC ACID 0.8 MG
1 TABLET ORAL DAILY
Refills: 0 | Status: DISCONTINUED | OUTPATIENT
Start: 2022-10-28 | End: 2022-10-29

## 2022-10-28 RX ADMIN — HYDROMORPHONE HYDROCHLORIDE 30 MILLILITER(S): 2 INJECTION INTRAMUSCULAR; INTRAVENOUS; SUBCUTANEOUS at 19:18

## 2022-10-28 RX ADMIN — Medication 975 MILLIGRAM(S): at 12:39

## 2022-10-28 RX ADMIN — Medication 1 MILLIGRAM(S): at 18:24

## 2022-10-28 RX ADMIN — HEPARIN SODIUM 5000 UNIT(S): 5000 INJECTION INTRAVENOUS; SUBCUTANEOUS at 21:06

## 2022-10-28 RX ADMIN — POLYETHYLENE GLYCOL 3350 17 GRAM(S): 17 POWDER, FOR SOLUTION ORAL at 18:24

## 2022-10-28 RX ADMIN — SODIUM CHLORIDE 30 MILLILITER(S): 9 INJECTION, SOLUTION INTRAVENOUS at 12:38

## 2022-10-28 RX ADMIN — FINASTERIDE 5 MILLIGRAM(S): 5 TABLET, FILM COATED ORAL at 18:26

## 2022-10-28 RX ADMIN — SENNA PLUS 2 TABLET(S): 8.6 TABLET ORAL at 21:06

## 2022-10-28 RX ADMIN — HEPARIN SODIUM 5000 UNIT(S): 5000 INJECTION INTRAVENOUS; SUBCUTANEOUS at 12:39

## 2022-10-28 RX ADMIN — ATORVASTATIN CALCIUM 40 MILLIGRAM(S): 80 TABLET, FILM COATED ORAL at 21:06

## 2022-10-28 RX ADMIN — HYDROMORPHONE HYDROCHLORIDE 30 MILLILITER(S): 2 INJECTION INTRAMUSCULAR; INTRAVENOUS; SUBCUTANEOUS at 15:56

## 2022-10-28 RX ADMIN — Medication 20 MILLIGRAM(S): at 18:24

## 2022-10-28 RX ADMIN — SODIUM CHLORIDE 30 MILLILITER(S): 9 INJECTION, SOLUTION INTRAVENOUS at 15:57

## 2022-10-28 NOTE — BRIEF OPERATIVE NOTE - NSICDXBRIEFPROCEDURE_GEN_ALL_CORE_FT
PROCEDURES:  Bronchoscopy, flexible, by CT surgery 28-Oct-2022 15:30:35  Brendon Del Angel  Thoracoscopic wedge resection of left lung 28-Oct-2022 15:31:21 Left upper and Left lower lobe wedge Resection Brendon Del Angel

## 2022-10-28 NOTE — PATIENT PROFILE ADULT - FALL HARM RISK - HARM RISK INTERVENTIONS
Assistance with ambulation/Assistance OOB with selected safe patient handling equipment/Communicate Risk of Fall with Harm to all staff/Monitor gait and stability/Reinforce activity limits and safety measures with patient and family/Review medications for side effects contributing to fall risk/Sit up slowly, dangle for a short time, stand at bedside before walking/Tailored Fall Risk Interventions/Toileting schedule using arm’s reach rule for commode and bathroom/Use of alarms - bed, chair and/or voice tab/Visual Cue: Yellow wristband and red socks/Bed in lowest position, wheels locked, appropriate side rails in place/Call bell, personal items and telephone in reach/Instruct patient to call for assistance before getting out of bed or chair/Non-slip footwear when patient is out of bed/[place] to call system/Physically safe environment - no spills, clutter or unnecessary equipment/Purposeful Proactive Rounding/Room/bathroom lighting operational, light cord in reach

## 2022-10-29 ENCOUNTER — TRANSCRIPTION ENCOUNTER (OUTPATIENT)
Age: 82
End: 2022-10-29

## 2022-10-29 VITALS
DIASTOLIC BLOOD PRESSURE: 61 MMHG | SYSTOLIC BLOOD PRESSURE: 136 MMHG | HEART RATE: 77 BPM | TEMPERATURE: 98 F | OXYGEN SATURATION: 92 % | RESPIRATION RATE: 18 BRPM

## 2022-10-29 LAB
ANION GAP SERPL CALC-SCNC: 13 MMOL/L — SIGNIFICANT CHANGE UP (ref 7–14)
BASOPHILS # BLD AUTO: 0.01 K/UL — SIGNIFICANT CHANGE UP (ref 0–0.2)
BASOPHILS NFR BLD AUTO: 0.1 % — SIGNIFICANT CHANGE UP (ref 0–2)
BUN SERPL-MCNC: 32 MG/DL — HIGH (ref 7–23)
CALCIUM SERPL-MCNC: 8 MG/DL — LOW (ref 8.4–10.5)
CHLORIDE SERPL-SCNC: 104 MMOL/L — SIGNIFICANT CHANGE UP (ref 98–107)
CO2 SERPL-SCNC: 18 MMOL/L — LOW (ref 22–31)
CREAT SERPL-MCNC: 1.59 MG/DL — HIGH (ref 0.5–1.3)
EGFR: 44 ML/MIN/1.73M2 — LOW
EOSINOPHIL # BLD AUTO: 0 K/UL — SIGNIFICANT CHANGE UP (ref 0–0.5)
EOSINOPHIL NFR BLD AUTO: 0 % — SIGNIFICANT CHANGE UP (ref 0–6)
GLUCOSE SERPL-MCNC: 129 MG/DL — HIGH (ref 70–99)
HCT VFR BLD CALC: 37.1 % — LOW (ref 39–50)
HGB BLD-MCNC: 12.1 G/DL — LOW (ref 13–17)
IANC: 6.79 K/UL — SIGNIFICANT CHANGE UP (ref 1.8–7.4)
IMM GRANULOCYTES NFR BLD AUTO: 0.6 % — SIGNIFICANT CHANGE UP (ref 0–0.9)
LYMPHOCYTES # BLD AUTO: 1.34 K/UL — SIGNIFICANT CHANGE UP (ref 1–3.3)
LYMPHOCYTES # BLD AUTO: 15.6 % — SIGNIFICANT CHANGE UP (ref 13–44)
MAGNESIUM SERPL-MCNC: 1.8 MG/DL — SIGNIFICANT CHANGE UP (ref 1.6–2.6)
MCHC RBC-ENTMCNC: 28.9 PG — SIGNIFICANT CHANGE UP (ref 27–34)
MCHC RBC-ENTMCNC: 32.6 GM/DL — SIGNIFICANT CHANGE UP (ref 32–36)
MCV RBC AUTO: 88.5 FL — SIGNIFICANT CHANGE UP (ref 80–100)
MONOCYTES # BLD AUTO: 0.39 K/UL — SIGNIFICANT CHANGE UP (ref 0–0.9)
MONOCYTES NFR BLD AUTO: 4.5 % — SIGNIFICANT CHANGE UP (ref 2–14)
NEUTROPHILS # BLD AUTO: 6.79 K/UL — SIGNIFICANT CHANGE UP (ref 1.8–7.4)
NEUTROPHILS NFR BLD AUTO: 79.2 % — HIGH (ref 43–77)
NIGHT BLUE STAIN TISS: SIGNIFICANT CHANGE UP
NRBC # BLD: 0 /100 WBCS — SIGNIFICANT CHANGE UP (ref 0–0)
NRBC # FLD: 0 K/UL — SIGNIFICANT CHANGE UP (ref 0–0)
PHOSPHATE SERPL-MCNC: 2.1 MG/DL — LOW (ref 2.5–4.5)
PLATELET # BLD AUTO: 190 K/UL — SIGNIFICANT CHANGE UP (ref 150–400)
POTASSIUM SERPL-MCNC: 5 MMOL/L — SIGNIFICANT CHANGE UP (ref 3.5–5.3)
POTASSIUM SERPL-SCNC: 5 MMOL/L — SIGNIFICANT CHANGE UP (ref 3.5–5.3)
RBC # BLD: 4.19 M/UL — LOW (ref 4.2–5.8)
RBC # FLD: 12.5 % — SIGNIFICANT CHANGE UP (ref 10.3–14.5)
SODIUM SERPL-SCNC: 135 MMOL/L — SIGNIFICANT CHANGE UP (ref 135–145)
SPECIMEN SOURCE: SIGNIFICANT CHANGE UP
WBC # BLD: 8.58 K/UL — SIGNIFICANT CHANGE UP (ref 3.8–10.5)
WBC # FLD AUTO: 8.58 K/UL — SIGNIFICANT CHANGE UP (ref 3.8–10.5)

## 2022-10-29 PROCEDURE — 71045 X-RAY EXAM CHEST 1 VIEW: CPT | Mod: 26

## 2022-10-29 PROCEDURE — 99233 SBSQ HOSP IP/OBS HIGH 50: CPT

## 2022-10-29 PROCEDURE — 71045 X-RAY EXAM CHEST 1 VIEW: CPT | Mod: 26,77

## 2022-10-29 RX ORDER — MAGNESIUM SULFATE 500 MG/ML
2 VIAL (ML) INJECTION ONCE
Refills: 0 | Status: COMPLETED | OUTPATIENT
Start: 2022-10-29 | End: 2022-10-29

## 2022-10-29 RX ORDER — LIDOCAINE 4 G/100G
1 CREAM TOPICAL EVERY 24 HOURS
Refills: 0 | Status: DISCONTINUED | OUTPATIENT
Start: 2022-10-29 | End: 2022-10-29

## 2022-10-29 RX ORDER — ACETAMINOPHEN 500 MG
2 TABLET ORAL
Qty: 0 | Refills: 0 | DISCHARGE
Start: 2022-10-29

## 2022-10-29 RX ORDER — HYDROMORPHONE HYDROCHLORIDE 2 MG/ML
0.3 INJECTION INTRAMUSCULAR; INTRAVENOUS; SUBCUTANEOUS
Refills: 0 | Status: DISCONTINUED | OUTPATIENT
Start: 2022-10-29 | End: 2022-10-29

## 2022-10-29 RX ORDER — BENZOCAINE AND MENTHOL 5; 1 G/100ML; G/100ML
1 LIQUID ORAL EVERY 4 HOURS
Refills: 0 | Status: DISCONTINUED | OUTPATIENT
Start: 2022-10-29 | End: 2022-10-29

## 2022-10-29 RX ORDER — ACETAMINOPHEN 500 MG
650 TABLET ORAL EVERY 6 HOURS
Refills: 0 | Status: DISCONTINUED | OUTPATIENT
Start: 2022-10-29 | End: 2022-10-29

## 2022-10-29 RX ORDER — OXYCODONE HYDROCHLORIDE 5 MG/1
5 TABLET ORAL
Refills: 0 | Status: DISCONTINUED | OUTPATIENT
Start: 2022-10-29 | End: 2022-10-29

## 2022-10-29 RX ADMIN — Medication 650 MILLIGRAM(S): at 12:22

## 2022-10-29 RX ADMIN — OXYCODONE HYDROCHLORIDE 5 MILLIGRAM(S): 5 TABLET ORAL at 18:18

## 2022-10-29 RX ADMIN — Medication 63.75 MILLIMOLE(S): at 09:56

## 2022-10-29 RX ADMIN — Medication 650 MILLIGRAM(S): at 18:08

## 2022-10-29 RX ADMIN — LIDOCAINE 1 PATCH: 4 CREAM TOPICAL at 12:23

## 2022-10-29 RX ADMIN — HEPARIN SODIUM 5000 UNIT(S): 5000 INJECTION INTRAVENOUS; SUBCUTANEOUS at 12:23

## 2022-10-29 RX ADMIN — HEPARIN SODIUM 5000 UNIT(S): 5000 INJECTION INTRAVENOUS; SUBCUTANEOUS at 04:59

## 2022-10-29 RX ADMIN — LIDOCAINE 1 PATCH: 4 CREAM TOPICAL at 18:47

## 2022-10-29 RX ADMIN — OXYCODONE HYDROCHLORIDE 5 MILLIGRAM(S): 5 TABLET ORAL at 18:35

## 2022-10-29 RX ADMIN — Medication 25 GRAM(S): at 05:00

## 2022-10-29 RX ADMIN — POLYETHYLENE GLYCOL 3350 17 GRAM(S): 17 POWDER, FOR SOLUTION ORAL at 18:10

## 2022-10-29 RX ADMIN — Medication 1 MILLIGRAM(S): at 18:09

## 2022-10-29 NOTE — PROGRESS NOTE ADULT - SUBJECTIVE AND OBJECTIVE BOX
Anesthesia Pain Management Service    SUBJECTIVE:    Therapy:	  [ x] IV PCA	   [ ] Epidural           [ ] s/p Spinal Opoid              [ ] Postpartum infusion	  [ ] Patient controlled regional anesthesia (PCRA)    [ ] prn Analgesics    OBJECTIVE:   [ x] No new signs     [ ] Other:    Side Effects:  [x ] None			[ ] Other:    Assessment of Catheter Site:		[ ] Intact		[ ] Other:    ASSESSMENT/PLAN  [ ] Continue current therapy    [ x] Therapy changed to:    [ ] IV PCA       [ ] Epidural     [x ] prn Analgesics     Comments:
The CXR after chest tube removal was negative for PTX and that was confirmed by radiology.
          CHIEF COMPLAINT: FOLLOW UP IN ICU FOR POSTOPERATIVE CARE OF PATIENT WHO IS S/P  JUANPABLO and LLL wedge resection            PROCEDURES:         LVATS, left upper and Left lower lobe wedge resection 28-Oct-2022        ISSUES:       Lung nodules   Chest tube in place    Postoperative pain   HTN   Hyperlipidemia         INTERVAL EVENTS:      OR today. Extubated in OR. Transferred to CTICU.               HISTORY:      Patient reports moderate pain at chest wall incision sites which is worse with coughing and deep breathing without associated fever or dyspnea. Pain is improved with use of pain meds.           PHYSICAL EXAM:      Gen: Comfortable, No acute distress     Eyes: Sclera white, Conjunctiva normal, Eyelids normal, Pupils symmetrical      ENT: Mucous membranes moist,  ,  ,       Neck: Trachea midline,  ,  ,  ,  ,  ,       CV: Rate regular, Rhythm regular,  ,  ,       Resp: Breath sounds clear, No accessory muscles use, L chest tube in place,  ,       Abd: Soft, Non-distended, Non-tender, Bowel sounds normal,  ,  ,       Skin: Warm, No peripheral edema of lower extremities,  ,       : No tillman     Neuro: Moving all 4 extremities,       Psych: A&Ox3               ASSESSMENT AND PLAN:           NEURO:     Post-operative Pain - Stable. Pain control with PCA and Tylenol IV PRN.              RESPIRATORY:   Hypoxia - Wean nasal cannula for goal O2sat above 92. Obtain CXR. Incentive spirometry. Chest PT and frequent suctioning. Continue bronchodilators. OOB to chair & ambulate w/ assistance. Continuous pulse oximetry for support & to prevent decompensation.     Chest tube – Pleurevac regulated suctioning. Monitor chest tube output.              CARDIOVASCULAR:     Hemodynamically stable - Not on pressors. Continue hemodynamic monitoring.     Telemetry (medical test) - Reviewed by me today independently. Normal sinus rhythm.     HTN - Hold home ARB for now in postop setting        RENAL:     Stable - Monitor IOs and electrolytes. Keep K above 4.0 and Mg above 2.0.             GASTROINTESTINAL:     GI prophylaxis not indicated     Zofran and Reglan IV PRN for nausea     Regular consistency diet                 HEMATOLOGIC:     No signs of active bleeding. Monitor Hgb in CBC in AM     DVT prophylaxis with heparin subQ and SCDs.            INFECTIOUS DISEASE:     All surgical sites appear clean. No signs of active infection. Will monitor for fever and leukocytosis.             ENDOCRINE:     Stable – Monitor glucose fingersticks for goal 120-180.           ONCOLOGY:     Lung nodule - Improved. S/P resection. Follow up final pathology.          Pertinent clinical, laboratory, radiographic, hemodynamic, echocardiographic, respiratory data, microbiologic data and chart were reviewed by myself and analyzed frequently throughout the course of the day and night by myself.     Plan discussed at length with the CTICU staff and Attending CT Surgeon -   Dr Opal Medina    Patient's status was discussed with patient at bedside.      ________________________________________________     _________________________  VITAL SIGNS:  Vital Signs Last 24 Hrs  T(C): 35.5 (28 Oct 2022 15:27), Max: 36.6 (28 Oct 2022 12:19)  T(F): 95.9 (28 Oct 2022 15:27), Max: 97.8 (28 Oct 2022 12:19)  HR: 58 (28 Oct 2022 16:00) (58 - 73)  BP: 107/62 (28 Oct 2022 16:00) (107/62 - 138/70)  BP(mean): 76 (28 Oct 2022 16:00) (76 - 85)  RR: 16 (28 Oct 2022 16:00) (12 - 20)  SpO2: 95% (28 Oct 2022 16:00) (94% - 97%)    Parameters below as of 28 Oct 2022 15:27  Patient On (Oxygen Delivery Method): nasal cannula  O2 Flow (L/min): 2    I/Os:   I&O's Detail    28 Oct 2022 07:01  -  28 Oct 2022 16:07  --------------------------------------------------------  IN:    Lactated Ringers: 60 mL  Total IN: 60 mL    OUT:  Total OUT: 0 mL    Total NET: 60 mL              MEDICATIONS:  MEDICATIONS  (STANDING):  atorvastatin 40 milliGRAM(s) Oral at bedtime  finasteride 5 milliGRAM(s) Oral daily  folic acid 1 milliGRAM(s) Oral daily  heparin   Injectable 5000 Unit(s) SubCutaneous every 8 hours  HYDROmorphone PCA (1 mG/mL) 30 milliLiter(s) PCA Continuous PCA Continuous  lactated ringers. 1000 milliLiter(s) (30 mL/Hr) IV Continuous <Continuous>  polyethylene glycol 3350 17 Gram(s) Oral daily  senna 2 Tablet(s) Oral at bedtime    MEDICATIONS  (PRN):  HYDROmorphone PCA (1 mG/mL) Rescue Clinician Bolus 0.5 milliGRAM(s) IV Push every 15 minutes PRN for Pain Scale GREATER THAN 6  naloxone Injectable 0.1 milliGRAM(s) IV Push every 3 minutes PRN For ANY of the following changes in patient status:  A. RR LESS THAN 10 breaths per minute, B. Oxygen saturation LESS THAN 90%, C. Sedation score of 6  ondansetron Injectable 4 milliGRAM(s) IV Push every 6 hours PRN Nausea      LABS:  Laboratory data was independently reviewed by me today.                       RADIOLOGY:   Radiology images were independently reviewed by me today. Reports were reviewed by me today.              
Anesthesia Pain Management Service    SUBJECTIVE: Patient is doing well with IV PCA and no significant problems reported.  The patient is complaining more of throat pain than CT flank pain.    Pain Scale Score	At rest: ___ 	With Activity: __has pain when he coughs_ 	[X ] Refer to charted pain scores    THERAPY:    [ ] IV PCA Morphine		[ ] 5 mg/mL	[ ] 1 mg/mL  [X ] IV PCA Hydromorphone	[ ] 5 mg/mL	[X ] 1 mg/mL  [ ] IV PCA Fentanyl		[ ] 50 micrograms/mL    Demand dose __0.2_ lockout __6_ (minutes) Continuous Rate _0__ Total: _1.6__   mg used (in past 24 hrs)      MEDICATIONS  (STANDING):  acetaminophen     Tablet .. 650 milliGRAM(s) Oral every 6 hours  atorvastatin 40 milliGRAM(s) Oral at bedtime  finasteride 5 milliGRAM(s) Oral daily  folic acid 1 milliGRAM(s) Oral daily  heparin   Injectable 5000 Unit(s) SubCutaneous every 8 hours  lactated ringers. 1000 milliLiter(s) (30 mL/Hr) IV Continuous <Continuous>  lidocaine   4% Patch 1 Patch Transdermal every 24 hours  polyethylene glycol 3350 17 Gram(s) Oral daily  senna 2 Tablet(s) Oral at bedtime  sodium phosphate IVPB 15 milliMole(s) IV Intermittent once    MEDICATIONS  (PRN):  benzocaine 15 mG/menthol 3.6 mG Lozenge 1 Lozenge Oral every 4 hours PRN Sore Throat  HYDROmorphone  Injectable 0.3 milliGRAM(s) IV Push every 3 hours PRN Severe breakthrough Pain (7 - 10)  naloxone Injectable 0.1 milliGRAM(s) IV Push every 3 minutes PRN For ANY of the following changes in patient status:  A. RR LESS THAN 10 breaths per minute, B. Oxygen saturation LESS THAN 90%, C. Sedation score of 6  ondansetron Injectable 4 milliGRAM(s) IV Push every 6 hours PRN Nausea  oxyCODONE    IR 5 milliGRAM(s) Oral every 3 hours PRN Moderateto Severe Pain (4 - 10)      OBJECTIVE:  Patient is sitting up in chair, with CT x1.     Sedation Score:	[ X] Alert	[ ] Drowsy 	[ ] Arousable	[ ] Asleep	[ ] Unresponsive    Side Effects:	[X ] None	[ ] Nausea	[ ] Vomiting	[ ] Pruritus  		[ ] Other:    Vital Signs Last 24 Hrs  T(C): 36.8 (29 Oct 2022 04:00), Max: 36.8 (29 Oct 2022 04:00)  T(F): 98.3 (29 Oct 2022 04:00), Max: 98.3 (29 Oct 2022 04:00)  HR: 60 (29 Oct 2022 08:00) (56 - 74)  BP: 133/54 (29 Oct 2022 08:00) (104/62 - 138/70)  BP(mean): 76 (29 Oct 2022 08:00) (69 - 101)  RR: 18 (29 Oct 2022 08:00) (12 - 23)  SpO2: 95% (29 Oct 2022 08:00) (94% - 98%)    Parameters below as of 29 Oct 2022 08:00  Patient On (Oxygen Delivery Method): nasal cannula w/ humidification  O2 Flow (L/min): 2      ASSESSMENT/ PLAN    Therapy to  be:	[ ] Continue   [ X] Discontinued   [X ] Change to prn Analgesics    Documentation and Verification of current medications:   [X] Done	[ ] Not done, not elligible    Comments: IV Dilaudid PCA discontinued. Added Cepacol lozenge PRN. PRN Oral/IV opioids and/or Adjuvant non-opioid medication to be ordered at this point.    
MARIEAMOS                     [id number]    HPI:  81 yo male with preop dx. malignant neoplasm of lung presents to pre surgical testing.  Pt with diagnosis of right lung cancer s/p RLL biopsy 1/2022 and RML biopsy 3/2022.  Pt s/p 4 rounds of chemotherapy, lat treatment 6/23/22.  Pt s/p CT Chest revealing increasing size of left lung nodule.  Pt is scheduled for robotic left video assisted thoracoscopy left lung wedge resection.  (11 Oct 2022 08:08)    CHIEF COMPLAINT: FOLLOW UP IN ICU FOR POSTOPERATIVE CARE OF PATIENT WHO IS S/P  JUANPABLO and LLL wedge resection     PROCEDURES:         LVATS, left upper and Left lower lobe wedge resection 28-Oct-2022        ISSUES:       Lung nodules   Chest tube in place    Postoperative pain   HTN   Hyperlipidemia    PAST MEDICAL & SURGICAL HISTORY:  HTN (hypertension)      HLD (hyperlipidemia)      History of BPH      Lung cancer  right s/p 4 rounds of chemo, last chemo 6/23/22.      History of ERCP      S/P bronchoscopy  1/2022, 3/2022      S/P inguinal hernia repair  Right approximately 1961                VITAL SIGNS:  Vital Signs Last 24 Hrs  T(C): 36.8 (29 Oct 2022 04:00), Max: 36.8 (29 Oct 2022 04:00)  T(F): 98.3 (29 Oct 2022 04:00), Max: 98.3 (29 Oct 2022 04:00)  HR: 64 (29 Oct 2022 07:00) (56 - 74)  BP: 134/59 (29 Oct 2022 07:00) (104/62 - 138/70)  BP(mean): 79 (29 Oct 2022 07:00) (69 - 101)  RR: 18 (29 Oct 2022 07:00) (12 - 23)  SpO2: 94% (29 Oct 2022 07:00) (94% - 98%)    Parameters below as of 29 Oct 2022 07:00  Patient On (Oxygen Delivery Method): nasal cannula w/ humidification  O2 Flow (L/min): 2      I/Os:   I&O's Detail    28 Oct 2022 07:01  -  29 Oct 2022 07:00  --------------------------------------------------------  IN:    IV PiggyBack: 50 mL    Lactated Ringers: 480 mL    Oral Fluid: 360 mL  Total IN: 890 mL    OUT:    Chest Tube (mL): 75 mL    Voided (mL): 850 mL  Total OUT: 925 mL    Total NET: -35 mL          CAPILLARY BLOOD GLUCOSE          =======================MEDICATIONS===================  MEDICATIONS  (STANDING):  acetaminophen   IVPB .. 1000 milliGRAM(s) IV Intermittent once  atorvastatin 40 milliGRAM(s) Oral at bedtime  finasteride 5 milliGRAM(s) Oral daily  folic acid 1 milliGRAM(s) Oral daily  heparin   Injectable 5000 Unit(s) SubCutaneous every 8 hours  HYDROmorphone PCA (1 mG/mL) 30 milliLiter(s) PCA Continuous PCA Continuous  lactated ringers. 1000 milliLiter(s) (30 mL/Hr) IV Continuous <Continuous>  polyethylene glycol 3350 17 Gram(s) Oral daily  senna 2 Tablet(s) Oral at bedtime  sodium phosphate IVPB 15 milliMole(s) IV Intermittent once    MEDICATIONS  (PRN):  HYDROmorphone PCA (1 mG/mL) Rescue Clinician Bolus 0.5 milliGRAM(s) IV Push every 15 minutes PRN for Pain Scale GREATER THAN 6  naloxone Injectable 0.1 milliGRAM(s) IV Push every 3 minutes PRN For ANY of the following changes in patient status:  A. RR LESS THAN 10 breaths per minute, B. Oxygen saturation LESS THAN 90%, C. Sedation score of 6  ondansetron Injectable 4 milliGRAM(s) IV Push every 6 hours PRN Nausea      PHYSICAL EXAM============================  General:                         Awake, alert, not in any distress  Neuro:                            Moving all extremities to commands.   Respiratory:	Air entry fair and  bilateral conducted sounds                                           Effort even and unlabored.  CV:		Regular rate and rhythm. Normal S1/S2                                          Distal pulses present.  Abdomen:	                     Soft, non-distended. Bowel sounds present   Skin:		No rash.  Extremities:	Warm, no cyanosis or edema.  Palpable pulses    ============================LABS=========================                        12.1   8.58  )-----------( 190      ( 29 Oct 2022 03:34 )             37.1     10-29    135  |  104  |  32<H>  ----------------------------<  129<H>  5.0   |  18<L>  |  1.59<H>    Ca    8.0<L>      29 Oct 2022 03:34  Phos  2.1     10-29  Mg     1.80     10-29        ASSESSMENT AND PLAN:      NEURO:   Nonfocal. OOBTC  Post-operative Pain - Stable. Pain control with PCA and Tylenol IV PRN.              RESPIRATORY:   Hypoxia - Wean nasal cannula for goal O2sat above 92. Obtain CXR. Incentive spirometry. Chest PT and frequent suctioning. Continue bronchodilators. OOB to chair & ambulate w/ assistance. Continuous pulse oximetry for support & to prevent decompensation.     Chest tube – Pleurevac regulated suctioning. Monitor chest tube output.              CARDIOVASCULAR:     Hemodynamically stable - Not on pressors. Continue hemodynamic monitoring.     Telemetry (medical test) - Reviewed by me today independently. Normal sinus rhythm.     HTN - Hold home ARB for now         RENAL:     Stable - Monitor IOs and electrolytes. Keep K above 4.0 and Mg above 2.0.             GASTROINTESTINAL:     GI prophylaxis not indicated     Zofran and Reglan IV PRN for nausea     Regular consistency diet                 HEMATOLOGIC:     No signs of active bleeding. Monitor Hgb in CBC in AM     DVT prophylaxis with heparin subQ and SCDs.            INFECTIOUS DISEASE:     All surgical sites appear clean. No signs of active infection. Will monitor for fever and leukocytosis.             ENDOCRINE:     Stable – Monitor glucose fingersticks for goal 120-180.           ONCOLOGY:     Lung nodule - Improved. S/P resection. Follow up final pathology.          Pertinent clinical, laboratory, radiographic, hemodynamic, echocardiographic, respiratory data, microbiologic data and chart were reviewed by myself and analyzed frequently throughout the course of the day and night by myself.     Plan discussed at length with the CTICU staff and Attending CT Surgeon -   Dr Opal Medina    Patient's status was discussed with patient at bedside.       Clinton GAOP

## 2022-10-29 NOTE — DISCHARGE NOTE PROVIDER - NSDCFUADDINST_GEN_ALL_CORE_FT
Keep the chest tube dressings in place for two days and then remove them so that you can shower.  Wash with soap and water and leave the wound open to air to dry.  Some drainage is normal but watch for pus, increased redness, fevers, or difficulty breathing and if noted, call Dr Mdeina.

## 2022-10-29 NOTE — DISCHARGE NOTE PROVIDER - NSDCCPTREATMENT_GEN_ALL_CORE_FT
PRINCIPAL PROCEDURE  Procedure: Thoracoscopic wedge resection of left lung  Findings and Treatment: Left upper and Left lower lobe wedge Resection      SECONDARY PROCEDURE  Procedure: Bronchoscopy, flexible, by CT surgery  Findings and Treatment:

## 2022-10-29 NOTE — PHYSICAL THERAPY INITIAL EVALUATION ADULT - ADDITIONAL COMMENTS
Home
Patient lives with his family in private home, no steps to bedroom/bathroom. Patient was independent in all ADL prior to admission and not using an assistive device prior to admission.

## 2022-10-29 NOTE — ASSESSMENT
[FreeTextEntry1] : 81 yo m with at least stage IIIA lung adeno ca, PDL1 0%, NGS showed KRAS G12V mut in addition to a few other uncontainable ones. \par Reviewed baseline PET/CT personally- disease confined to rt RLL and RML- some activity in subcarinal region\par I also reviewed path- bx from RLL pos for adeno ca, lavage showed atypical cells, and level 7 LN- was benign\par I presented the case at  and the group concurred that optimal staging would involve sampling of rt middle lobe. SUV on rt middle lobe mass is lower than that of RLL. \par Pt underwent RML through [facility] bronch and was pos for adeno ca ([id number])\par MRI brain on 2/17 showed no mets\par Based on AJCC 8th staging, pt was staged as stage IIIA lung cancer\par Based on PFTs. he was deemed potentially- resectable. He has been seen by Dr. Medina\par Based on recent Checkmate 816 study, neoadjuvant chemo+nivo is likely to yield higher rates of CR and is an FDA-approved option. \par He started chemoimmunotherapy (Carbo/pem/nivo). S/p cycle 4\par Interim hospitalization after cycle 2 and events as above. \par PET/CT from May was very confusing. There is mixed response on rt- right middle lobe mass is smaller and less FDG-avid but LL mass is larger and still quite avid. There is also some process on left side which could be related to recent viral infection. 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 \par Pt was feeling well and hesitant to undergo any bx\par Hence, last visit, we decided to watch off tx and repeat CT in 2-3 months. \par The scans since last visit show stable 7 x 4.4 cm mass in the right lower lobe which has not significantly changed when compared to previous exam. Numerous solid nodules are noted within both lungs. Many of them have increased in size when compared to previous exam as described above.\par ctDNA levels are also rising slowly. \par I discussed that a bx would be extremely helpful. Since pt responded to chemoIO previously, i would favor taxane/IO. Will also evaluate for clinical trials. \par

## 2022-10-29 NOTE — PHYSICAL THERAPY INITIAL EVALUATION ADULT - DID THE PATIENT HAVE SURGERY?
Bronchoscopy, flexible, by CT surgery, Thoracoscopic wedge resection of left lung, Left upper and Left lower lobe wedge Resection/yes

## 2022-10-29 NOTE — DISCHARGE NOTE NURSING/CASE MANAGEMENT/SOCIAL WORK - NSDCPEFALRISK_GEN_ALL_CORE
For information on Fall & Injury Prevention, visit: [email or web address] OR  [email or web address] OR  https://www.cdc.gov/steadi/patient.html

## 2022-10-29 NOTE — HISTORY OF PRESENT ILLNESS
[Disease: _____________________] : Disease: [unfilled] [T: ___] : T[unfilled] [N: ___] : N[unfilled] [M: ___] : M[unfilled] [AJCC Stage: ____] : AJCC Stage: [unfilled] [Home] : at home, [unfilled] , at the time of the visit. [Medical Office: ([facility])___] : at the medical office located in  [Family Member] : family member [Verbal consent obtained from patient] : the patient, [unfilled] [de-identified] : Mr. Del Rio is a pleasant 82 M, Iranian (very little English), speaking with PMH of HTN and BPH, never smoker but previously worked in building maintenance presented initially 1/21/22 with chronic dry cough x1 year and worsened dyspnea on exertion for 3 weeks, admitted to [facility] after found on CT to have to have a 5.3 x 2.8 cm masslike consolidation in the RLL. On 1/25, he underwent inpatient bronchoscopy and endobronchial ultrasound with RLL BAL, TBNA of station 7 lymph node, RLL Mass transbronchial biopsy which was suspicious for malignancy. \par He saw Dr. Moran and was recommended for PET/CT\par 2/7/22: \par large FDG-avid consolidations in right middle lobe and right lower lobe, unchanged as compared to CT dated 1/21/2022, compatible with known adenocarcinoma. Small FDG-avid nodule in right lung is suspicious for another site of disease. A mildly FDG-avid left lower lobe peripheral opacity, also unchanged on CT, is indeterminate.\par 2. Mildly FDG-avid subcarinal lymph node is nonspecific.\par Pt does not wish to use  phone. Grand-daughter provided the translation as per patient request. Other than cough, he has no other symptoms. He has tried OTC cough meds with no help. \par \par 2/18/22: Pt seen vis tel. No new complaints. He still has cough which is persistent and dry. \par \par 4/7/22: Pt seen via tele. 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 \par \par 5/3/22: Pt seen in tx room. He received cycle 1 3 weeks ago. Tolerated well with absolutely no AEs. He has since seen Dr. Medina who will see him again after scans post 3 cycles of tx. Pt here for cycle 2. \par \par 5/24/22: Since last visit, pt has had an eventful course. He was recently hospitalized with c/o fever, URI symptoms, myalgia and was found to have non-COVID coronavirus infection. Pt also febrile/tachycardic on admission, which improved with supportive care/abx. CXR suggested RLL consolidation (thought his could be known lung mass). He was started on vanc/zosyn in ED 5/20, followed by ceftriaxone and azithromycin for possible superimposed PNA in the setting of immunosuppression. Pt improved with all this and is now completing oral course of abx (last day tomorrow). Incidentally, pt was also noted to have ROSS (acute kidney injury) with admission SCr 1.88, baseline 1.2-1.3 thought to be related to sepsis/poor PO intake and dehydration. He was treated with IV hydration and home ARB was held (he continues to not take this at this time). \par He presents today for planned 3/3 cycle of chemoIO. He notes no fever, chills and cough has markedly improved. \par \par 6/16/22: Pt was diagnosed with corona virus infection, after ER visit for fever and cough. Cough has decreased and pt is fine now. \par \par 6/30/22: Cough improved. Otherwise feeling well\par \par 7/19/22: saw Dr. Carrillo. A bx of left sided lesion was recommended. pt wanted to discuss with me. He feels well. denies any dyspnea or cough or other symptoms. \par \par 9/16/22: Seen today with son, Rich. Patient has no complaints. Is doing well. Has not received any cancer tx since 6/23. \par \par 10/25/22: Pt seen vis televisit. He is feeling well. Has been active and notes no difference in status since last visit. [de-identified] : adeno ca

## 2022-10-29 NOTE — DISCHARGE NOTE PROVIDER - NSDCMRMEDTOKEN_GEN_ALL_CORE_FT
acetaminophen 325 mg oral tablet: 2 tab(s) orally every 6 hours, As Needed - for mild pain  finasteride 5 mg oral tablet: 1 tab(s) orally once a day PM.  folic acid 1 mg oral tablet: 1 tab(s) orally once a day PM.  rosuvastatin 10 mg oral tablet: 1 tab(s) orally once a day  telmisartan 20 mg oral tablet: 1 tab(s) orally once a day

## 2022-10-29 NOTE — DISCHARGE NOTE PROVIDER - CARE PROVIDER_API CALL
Opal Medina)  Surgery; Thoracic Surgery  [phone] [place] [address]  Phone: [phone]  Fax: [phone]  Established Patient  Follow Up Time: 2 weeks

## 2022-10-29 NOTE — PHYSICAL EXAM
[Restricted in physically strenuous activity but ambulatory and able to carry out work of a light or sedentary nature] : Status 1- Restricted in physically strenuous activity but ambulatory and able to carry out work of a light or sedentary nature, e.g., light house work, office work [Normal] : affect appropriate [de-identified] : rhonchi noted over LLL and right lung

## 2022-10-29 NOTE — DISCHARGE NOTE PROVIDER - HOSPITAL COURSE
This 82 year old male with a h/o R lung cancer for which he had undergone a RLL biopsy in 1/2022, a RML biopsy in 3/2022, and four rounds of chemotherapy that finished on 6/23/22.  A follow up CT Chest revealed an  enlarging left lung nodule and for that the pt underwent a FB, L VATS, JUANPABLO & LLL Wedge resections on 10/28/22.  His post-op course was unremarkable and he was discharged home after his chest tube was removed on 10/29/22.

## 2022-10-29 NOTE — DISCHARGE NOTE PROVIDER - NSDCCPCAREPLAN_GEN_ALL_CORE_FT
PRINCIPAL DISCHARGE DIAGNOSIS  Diagnosis: Malignant neoplasm of lung  Assessment and Plan of Treatment: R side; Enlarging nodule was on the left side

## 2022-10-29 NOTE — DISCHARGE NOTE NURSING/CASE MANAGEMENT/SOCIAL WORK - NSDCVIVACCINE_GEN_ALL_CORE_FT
Tdap; 02-Oct-2017 21:58; Nica Joyce (RN); [employer]; [id number]; IntraMuscular; Deltoid Right.; 0.5 milliLiter(s); VIS (VIS Published: 09-May-2013, VIS Presented: 02-Oct-2017);

## 2022-10-29 NOTE — DISCHARGE NOTE PROVIDER - PROVIDER TOKENS
As we discussed, at this time you do not have a drainable abscess. However, it is important for you to get these reevaluated in the next 2-3 days. Therefore,  if you do not have a primary care doctor to follow up with please return to the emergency department for reevaluation. Return sooner with any fevers, vomiting, worsening symptoms, or additional concerns. PROVIDER:[TOKEN:[[id number]],FOLLOWUP:[2 weeks],ESTABLISHEDPATIENT:[T]]

## 2022-10-29 NOTE — DISCHARGE NOTE PROVIDER - NSDCFUADDAPPT_GEN_ALL_CORE_FT
See Dr Medina in 2 weeks- call for an appointment and bring a new chest X-ray with you when you come.

## 2022-10-29 NOTE — DISCHARGE NOTE NURSING/CASE MANAGEMENT/SOCIAL WORK - PATIENT PORTAL LINK FT
You can access the FollowMyHealth Patient Portal offered by [facility] by registering at the following website: [email or web address]. By joining [employer]’s FollowMyHealth portal, you will also be able to view your health information using other applications (apps) compatible with our system.

## 2022-11-02 LAB
CULTURE RESULTS: SIGNIFICANT CHANGE UP
SPECIMEN SOURCE: SIGNIFICANT CHANGE UP
SURGICAL PATHOLOGY STUDY: SIGNIFICANT CHANGE UP

## 2022-11-10 ENCOUNTER — OUTPATIENT (OUTPATIENT)
Dept: OUTPATIENT SERVICES | Facility: HOSPITAL | Age: 82
LOS: 1 days | End: 2022-11-10
Payer: MEDICARE

## 2022-11-10 DIAGNOSIS — Z98.890 OTHER SPECIFIED POSTPROCEDURAL STATES: Chronic | ICD-10-CM

## 2022-11-10 DIAGNOSIS — Z90.2 ACQUIRED ABSENCE OF LUNG [PART OF]: ICD-10-CM

## 2022-11-10 PROCEDURE — 71046 X-RAY EXAM CHEST 2 VIEWS: CPT

## 2022-11-10 PROCEDURE — 71046 X-RAY EXAM CHEST 2 VIEWS: CPT | Mod: 26

## 2022-11-14 ENCOUNTER — APPOINTMENT (OUTPATIENT)
Dept: THORACIC SURGERY | Facility: CLINIC | Age: 82
End: 2022-11-14

## 2022-11-14 VITALS
RESPIRATION RATE: 16 BRPM | HEART RATE: 75 BPM | OXYGEN SATURATION: 92 % | HEIGHT: 68 IN | DIASTOLIC BLOOD PRESSURE: 69 MMHG | BODY MASS INDEX: 31.83 KG/M2 | WEIGHT: 210 LBS | SYSTOLIC BLOOD PRESSURE: 125 MMHG

## 2022-11-14 PROCEDURE — 99024 POSTOP FOLLOW-UP VISIT: CPT

## 2022-11-14 NOTE — PHYSICAL EXAM
[] : no respiratory distress [Auscultation Breath Sounds / Voice Sounds] : lungs were clear to auscultation bilaterally [Heart Rate And Rhythm] : heart rate was normal and rhythm regular [Heart Sounds] : normal S1 and S2 [Heart Sounds Gallop] : no gallops [Murmurs] : no murmurs [Heart Sounds Pericardial Friction Rub] : no pericardial rub [Clean] : clean [Dry] : dry [Healing Well] : healing well [No Edema] : no edema [FreeTextEntry2] : suture removed in office 11/14/22, surgical site c/d/i

## 2022-11-14 NOTE — CONSULT LETTER
[Dear  ___] : Dear  [unfilled], [Consult Letter:] : I had the pleasure of evaluating your patient, [unfilled]. [( Thank you for referring [unfilled] for consultation for _____ )] : Thank you for referring [unfilled] for consultation for [unfilled] [Please see my note below.] : Please see my note below. [Consult Closing:] : Thank you very much for allowing me to participate in the care of this patient.  If you have any questions, please do not hesitate to contact me. [Sincerely,] : Sincerely, [FreeTextEntry2] : Dr. Domingo (Hem/Onc/Ref) [FreeTextEntry3] : Opal Medina\par Attending Surgeon\par Division of Thoracic Surgery\par \par [employer] [facility]\par

## 2022-11-14 NOTE — DISCUSSION/SUMMARY
[Doing Well] : is doing well [1] : 1 [Remove Sutures/Staples] : removed sutures/staples [de-identified] : tylenol prn for pain

## 2022-11-14 NOTE — ASSESSMENT
[FreeTextEntry1] : Mr. AMOS SALAZAR, 82 year old male, never smoker, w/ hx of HLD, HTN, who presented with persistent cough and went to ER. \par \par CT angio chest on 01/21/2022:\par - Masslike consolidation in the right middle lobe measures 5.3 x 2.8 cm, abutting the major fissure. Large consolidation in the right lower lobe. A 0.8 cm posterior right lower lobe nodule (series 2, image 71). Left lower lobe subsegmental atelectasis.\par \par Patient is s/p Flexible bronchoscopy, airway inspection, endobronchial ultrasound_guided lymph node aspiration of level 7 lymph node, endobronchial ultrasound_guided transbronchial needle aspiration of the right lower lobe mass, therapeutic aspiration of secretions, and bronchoalveolar lavage on 01/25/2022 by Dr. Moran. Path of RLL revealed positive for malignant cells, consistent with adenocarcinoma, mucinous type. Level 7 negative for malignant cells. \par \par PET/CT on 02/07/2022:\par - A mildly FDG-avid subcarinal lymph node measures 1.5 x 0.9 cm, SUV 3.0 (image 96).\par - A large focal area of increased FDG activity corresponding to the opacity/mass in the right lung base (SUV 10.9; image 117). There is an additional focal area of increased FDG activity in the right middle lobe (SUV 4.4; image 108) as well as in a right lower lobe nodule. Right upper lobe subcentimeter calcified granuloma.\par - There is a mildly FDG-avid small peripheral left lower lobe opacity (SUV 3.7; image 104). These findings are unchanged as compared to CT dated 1/21/2022.\par \par MRI of Brain on 02/23/2022:\par - NO EVIDENCE OF INTRACRANIAL HEMORRHAGE, ACUTE TERRITORIAL INFARCT OR AREA OF ABNORMAL ENHANCEMENT TO SUGGEST METASTATIC DISEASE. \par \par PFTs on 02/23/2022: FVC 70%, FEV1 79%, DLCO 94%. \par \par Patient is s/p FB, Lavinia Robotic-assisted navigation bronchoscopy guided, radial EBUS guided transbronchial needle aspiration, transbronchial forceps biopsy of the right middle lobe under fluoroscopic guidance, bronchoalveolar lavage of right middle lobe, and endobronchial ultrasound guided lymph node sampling of level VII lymph node on 03/15/2022 by Dr. Moran. 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 Level 7 showed negative for malignant cells. PD-L1 0%. Level 7 flow cytometry revealed heterogeneous population of T cells (with increased CD4 to CD8 ratio=9:1 and no loss of pan T cell antigen). B cells are polytypic by Kappa and lambda labeling. \par \par Dr. Domingo plan to start on neoadjuvant chemo for total of 3 cycles. \par \par CT Chest on 9/16/22:\par - 7 x 4.4 cm mass in the right lower lobe has not significantly changed \par - numerous solid nodules are noted within both lungs. Many of them have increased in size when compared to previous exam. For example, 1 by 0.8 cm solid nodule in the posterior segment of the right upper lobe (series 2 image 50) and 1.6 x 1.3 cm lobulated nodule in the left lower lobe (series 2 image 65) have increased in size when compared to previous exam\par - very small right pleural effusion\par \par Now s/p Lt VATS, JUANPABLO and LLL wedge rxn on 10/28/22. Path revealed invasive mucinous AdenoCA. \par \par CXR done on 11/11/22 revealing fluid in lungs. \par \par I have reviewed the patient's medical records and diagnostic images at time of this office consultation and have made the following recommendation:\par \par 1. Repeat CXR and follow up appointment in 6 weeks\par \par \par \par I, Dr. PYLE, JESSE REDDING, personally performed the evaluation and management (E/M) services for this established patient who presents today with (a) new problem(s)/exacerbation of (an) existing condition(s).  That E/M includes conducting the examination, assessing all new/exacerbated conditions, and establishing a new plan of care.  Today, my ACP, Marybeth Taylor NP was here to observe my evaluation and management services for this new problem/exacerbated condition to be followed going forward.\par \par \par

## 2022-11-18 ENCOUNTER — OUTPATIENT (OUTPATIENT)
Dept: OUTPATIENT SERVICES | Facility: HOSPITAL | Age: 82
LOS: 1 days | Discharge: HOME | End: 2022-11-18

## 2022-11-18 ENCOUNTER — RX RENEWAL (OUTPATIENT)
Age: 82
End: 2022-11-18

## 2022-11-18 DIAGNOSIS — Z98.890 OTHER SPECIFIED POSTPROCEDURAL STATES: Chronic | ICD-10-CM

## 2022-11-18 DIAGNOSIS — C26.0 MALIGNANT NEOPLASM OF INTESTINAL TRACT, PART UNSPECIFIED: ICD-10-CM

## 2022-11-25 ENCOUNTER — APPOINTMENT (OUTPATIENT)
Dept: HEMATOLOGY ONCOLOGY | Facility: CLINIC | Age: 82
End: 2022-11-25
Payer: MEDICARE

## 2022-11-25 PROCEDURE — 99215 OFFICE O/P EST HI 40 MIN: CPT | Mod: 95

## 2022-11-26 LAB
CULTURE RESULTS: SIGNIFICANT CHANGE UP
SPECIMEN SOURCE: SIGNIFICANT CHANGE UP

## 2022-11-28 NOTE — PHYSICAL EXAM
[Restricted in physically strenuous activity but ambulatory and able to carry out work of a light or sedentary nature] : Status 1- Restricted in physically strenuous activity but ambulatory and able to carry out work of a light or sedentary nature, e.g., light house work, office work [Normal] : affect appropriate [de-identified] : rhonchi noted over LLL and right lung

## 2022-11-28 NOTE — HISTORY OF PRESENT ILLNESS
[Disease: _____________________] : Disease: [unfilled] [T: ___] : T[unfilled] [N: ___] : N[unfilled] [M: ___] : M[unfilled] [AJCC Stage: ____] : AJCC Stage: [unfilled] [Home] : at home, [unfilled] , at the time of the visit. [Medical Office: ([facility])___] : at the medical office located in  [Family Member] : family member [Verbal consent obtained from patient] : the patient, [unfilled] [de-identified] : Mr. Del Rio is a pleasant 82 M, Welsh (very little English), speaking with PMH of HTN and BPH, never smoker but previously worked in building maintenance presented initially 1/21/22 with chronic dry cough x1 year and worsened dyspnea on exertion for 3 weeks, admitted to [facility] after found on CT to have to have a 5.3 x 2.8 cm masslike consolidation in the RLL. On 1/25, he underwent inpatient bronchoscopy and endobronchial ultrasound with RLL BAL, TBNA of station 7 lymph node, RLL Mass transbronchial biopsy which was suspicious for malignancy. \par He saw Dr. Moran and was recommended for PET/CT\par 2/7/22: \par large FDG-avid consolidations in right middle lobe and right lower lobe, unchanged as compared to CT dated 1/21/2022, compatible with known adenocarcinoma. Small FDG-avid nodule in right lung is suspicious for another site of disease. A mildly FDG-avid left lower lobe peripheral opacity, also unchanged on CT, is indeterminate.\par 2. Mildly FDG-avid subcarinal lymph node is nonspecific.\par Pt does not wish to use  phone. Grand-daughter provided the translation as per patient request. Other than cough, he has no other symptoms. He has tried OTC cough meds with no help. \par \par 2/18/22: Pt seen vis tel. No new complaints. He still has cough which is persistent and dry. \par \par 4/7/22: Pt seen via tele. 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 \par \par 5/3/22: Pt seen in tx room. He received cycle 1 3 weeks ago. Tolerated well with absolutely no AEs. He has since seen Dr. Medina who will see him again after scans post 3 cycles of tx. Pt here for cycle 2. \par \par 5/24/22: Since last visit, pt has had an eventful course. He was recently hospitalized with c/o fever, URI symptoms, myalgia and was found to have non-COVID coronavirus infection. Pt also febrile/tachycardic on admission, which improved with supportive care/abx. CXR suggested RLL consolidation (thought his could be known lung mass). He was started on vanc/zosyn in ED 5/20, followed by ceftriaxone and azithromycin for possible superimposed PNA in the setting of immunosuppression. Pt improved with all this and is now completing oral course of abx (last day tomorrow). Incidentally, pt was also noted to have ROSS (acute kidney injury) with admission SCr 1.88, baseline 1.2-1.3 thought to be related to sepsis/poor PO intake and dehydration. He was treated with IV hydration and home ARB was held (he continues to not take this at this time). \par He presents today for planned 3/3 cycle of chemoIO. He notes no fever, chills and cough has markedly improved. \par \par 6/16/22: Pt was diagnosed with corona virus infection, after ER visit for fever and cough. Cough has decreased and pt is fine now. \par \par 6/30/22: Cough improved. Otherwise feeling well\par \par 7/19/22: saw Dr. Moran. A bx of left sided lesion was recommended. pt wanted to discuss with me. He feels well. denies any dyspnea or cough or other symptoms. \par \par 9/16/22: Seen today with son, Rich. Patient has no complaints. Is doing well. Has not received any cancer tx since 6/23. \par \par 10/25/22: Pt seen vis televisit. He is feeling well. Has been active and notes no difference in status since last visit.\par \par 11/25/22: Per pt's son, everything is stable. Pt remains active. Since last visit, he has had Lt VATS, JUANPABLO and LLL wedge rxn on 10/28/22. Path revealed invasive mucinous AdenoCA. \par  [de-identified] : adeno ca

## 2022-11-28 NOTE — ASSESSMENT
[FreeTextEntry1] : 81 yo m with at least stage IIIA lung adeno ca, PDL1 0%, NGS showed KRAS G12V mut in addition to a few other unactionable ones. \par Reviewed baseline PET/CT personally- disease confined to rt RLL and RML- some activity in subcarinal region\par I also reviewed path- bx from RLL pos for adeno ca, lavage showed atypical cells, and level 7 LN- was benign\par I presented the case at  and the group concurred that optimal staging would involve sampling of rt middle lobe. SUV on rt middle lobe mass is lower than that of RLL. \par Pt underwent RML through ventura bronch and was pos for adeno ca ([id number])\par MRI brain on 2/17 showed no mets\par Based on AJCC 8th staging, pt was staged as stage IIIA lung cancer\par Based on PFTs. he was deemed potentially- resectable by Dr. Medina\par Based on recent Checkmate 816 study, neoadjuvant chemo+nivo was thought likely to yield higher rates of CR and is an FDA-approved option. \par He started chemoimmunotherapy (Carbo/pem/nivo). S/p cycle 4\par Interim hospitalization after cycle 2 and events as above. \par PET/CT from May was very confusing. There was mixed response on rt- right middle lobe mass is smaller and less FDG-avid but LL mass was larger and still quite avid. There is also some process on left side which was thought to be related to recent viral infection. \par The PET/CT picture suggesting POD was not in agreement with clinical exam or tumor-informed ctDNA testing through [employer] in which level decreased from 0.91 MTM/ml to undetectable level in June (subsequently was detectable at 0.05 MTM/ml in July).\par Repeat CT without contrast a month later, on 6/28/22 showed persistent changes, and to my eye, these findings were more c/w organizing PNA likely from IO rather than true POD. \par However, I recommended a bx. Pt was feeling well and hesitant to undergo any bx\par Hence, last visit, we decided to watch off tx and repeat CT in 2-3 months. \par The scans since last visit show stable 7 x 4.4 cm mass in the right lower lobe which has not significantly changed when compared to previous exam. Numerous solid nodules are noted within both lungs. Many of them have increased in size when compared to previous exam as described above.\par ctDNA levels are also rising slowly. \par Pt has now undergone surgical bx confirming malignancy. \par Discussed the incurable nature of disease and palliative intent of tx.\par Since pt responded to chemoIO previously, I would favor taxane/IO. we will also evaluate for clinical trials. \par Patient's son, who I spoke with at depth today noted that the patient wishes to get input from multiple family members and will get back to me regarding intent to continue cancer directed tx.\par I also discussed the alternate option of focusing on symptoms without cancer directed tx through [facility]/hospice\par I will follow up in 2 weeks\par \par \par \par First-line nivolumab plus ipilimumab combined with two cycles of chemotherapy in patients with non-small-cell lung cancer (CheckMate 9LA): an international, randomised, open-label, phase 3 trial\par \par or \par \par Tecentriq q 3 weeks\par \par or \par \par [facility]

## 2022-12-06 ENCOUNTER — APPOINTMENT (OUTPATIENT)
Dept: HEMATOLOGY ONCOLOGY | Facility: CLINIC | Age: 82
End: 2022-12-06

## 2022-12-06 VITALS
WEIGHT: 211.64 LBS | TEMPERATURE: 97.8 F | BODY MASS INDEX: 32.18 KG/M2 | HEART RATE: 79 BPM | DIASTOLIC BLOOD PRESSURE: 74 MMHG | SYSTOLIC BLOOD PRESSURE: 135 MMHG | OXYGEN SATURATION: 94 % | RESPIRATION RATE: 16 BRPM

## 2022-12-06 PROCEDURE — 99215 OFFICE O/P EST HI 40 MIN: CPT

## 2022-12-06 NOTE — ASSESSMENT
[FreeTextEntry1] : 83 yo m with at least stage IIIA lung adeno ca, PDL1 0%, NGS showed KRAS G12V mut in addition to a few other unactionable ones. \par Reviewed baseline PET/CT personally- disease confined to rt RLL and RML- some activity in subcarinal region\par I also reviewed path- bx from RLL pos for adeno ca, lavage showed atypical cells, and level 7 LN- was benign\par I presented the case at  and the group concurred that optimal staging would involve sampling of rt middle lobe. SUV on rt middle lobe mass is lower than that of RLL. \par Pt underwent RML through ventura bronch and was pos for adeno ca ([id number]- [id number])\par MRI brain on 2/17 showed no mets\par Based on AJCC 8th staging, pt was staged as stage IIIA lung cancer\par Based on PFTs. he was deemed potentially- resectable by Dr. Medina\par Based on recent Checkmate 816 study, neoadjuvant chemo+nivo was thought likely to yield higher rates of CR and is an FDA-approved option. \par He started chemoimmunotherapy (Carbo/pem/nivo). S/p cycle 4\par Interim hospitalization after cycle 2 and events as above. \par PET/CT from May 2022 was very confusing. There was mixed response on rt- right middle lobe mass is smaller and less FDG-avid but LL mass was larger and still quite avid. There is also some process on left side which was thought to be related to recent viral infection. \par The PET/CT picture suggesting POD was not in agreement with clinical exam or tumor-informed ctDNA testing through [employer] in which level decreased from 0.91 MTM/ml to undetectable level in June (subsequently was detectable at 0.05 MTM/ml in July).\par Repeat CT without contrast a month later, on 6/28/22 showed persistent changes, and to my eye, these findings were more c/w organizing PNA likely from IO rather than true POD. \par However, I recommended a bx. Pt was feeling well and hesitant to undergo any bx\par Hence, last visit, we decided to watch off tx and repeat CT in 2-3 months. \par The scans since last visit show stable 7 x 4.4 cm mass in the right lower lobe which has not significantly changed when compared to previous exam. Numerous solid nodules are noted within both lungs. Many of them have increased in size when compared to previous exam as described above.\par ctDNA levels are also rising slowly. \par Pt has now undergone surgical bx confirming malignancy. \par Discussed the incurable nature of disease and palliative intent of tx.\par Since pt responded to chemoIO previously, I would favor taxane/IO. However, pt is not interested in chemo given potential AEs but is amenable to IO alone. \par The following regimen was discussed\par Tecentriq q 3 weeks\par \par We discussed the regimen in detail including potential benefits and AEs in detail. We answered all questions. we went over schedule and other pertinent details. Pt signed informed consent.\par  Patient Will Remove Sutures At Home?: No

## 2022-12-06 NOTE — HISTORY OF PRESENT ILLNESS
[Disease: _____________________] : Disease: [unfilled] [T: ___] : T[unfilled] [N: ___] : N[unfilled] [M: ___] : M[unfilled] [AJCC Stage: ____] : AJCC Stage: [unfilled] [de-identified] : Mr. 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 On 1/25, he underwent inpatient bronchoscopy and endobronchial ultrasound with RLL BAL, TBNA of station 7 lymph node, RLL Mass transbronchial biopsy which was suspicious for malignancy. \par He saw Dr. Moran and was recommended for PET/CT\par 2/7/22: \par large FDG-avid consolidations in right middle lobe and right lower lobe, unchanged as compared to CT dated 1/21/2022, compatible with known adenocarcinoma. Small FDG-avid nodule in right lung is suspicious for another site of disease. A mildly FDG-avid left lower lobe peripheral opacity, also unchanged on CT, is indeterminate.\par 2. Mildly FDG-avid subcarinal lymph node is nonspecific.\par Pt does not wish to use  phone. Grand-daughter provided the translation as per patient request. Other than cough, he has no other symptoms. He has tried OTC cough meds with no help. \par \par 2/18/22: Pt seen vis tel. No new complaints. He still has cough which is persistent and dry. \par \par 4/7/22: Pt seen via tele. 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 \par \par 5/3/22: Pt seen in tx room. He received cycle 1 3 weeks ago. Tolerated well with absolutely no AEs. He has since seen Dr. Medina who will see him again after scans post 3 cycles of tx. Pt here for cycle 2. \par \par 5/24/22: Since last visit, pt has had an eventful course. He was recently hospitalized with c/o fever, URI symptoms, myalgia and was found to have non-COVID coronavirus infection. Pt also febrile/tachycardic on admission, which improved with supportive care/abx. CXR suggested RLL consolidation (thought his could be known lung mass). He was started on vanc/zosyn in ED 5/20, followed by ceftriaxone and azithromycin for possible superimposed PNA in the setting of immunosuppression. Pt improved with all this and is now completing oral course of abx (last day tomorrow). Incidentally, pt was also noted to have ROSS (acute kidney injury) with admission SCr 1.88, baseline 1.2-1.3 thought to be related to sepsis/poor PO intake and dehydration. He was treated with IV hydration and home ARB was held (he continues to not take this at this time). \par He presents today for planned 3/3 cycle of chemoIO. He notes no fever, chills and cough has markedly improved. \par \par 6/16/22: Pt was diagnosed with corona virus infection, after ER visit for fever and cough. Cough has decreased and pt is fine now. \par \par 6/30/22: Cough improved. Otherwise feeling well\par \par 7/19/22: saw Dr. Moran. A bx of left sided lesion was recommended. pt wanted to discuss with me. He feels well. denies any dyspnea or cough or other symptoms. \par \par 9/16/22: Seen today with son, Rich. Patient has no complaints. Is doing well. Has not received any cancer tx since 6/23. \par \par 10/25/22: Pt seen vis televisit. He is feeling well. Has been active and notes no difference in status since last visit.\par \par 11/25/22: Per pt's son, everything is stable. Pt remains active. Since last visit, he has had Lt VATS, JUANPABLO and LLL wedge rxn on 10/28/22. Path revealed invasive mucinous AdenoCA. \par \par 12/6/22: Pt here for follow up. He has cough but otherwise no symptoms. \par  [de-identified] : adeno ca

## 2022-12-06 NOTE — REVIEW OF SYSTEMS
[Fever] : no fever [Fatigue] : no fatigue [Dysphagia] : no dysphagia [Chest Pain] : no chest pain [Shortness Of Breath] : no shortness of breath [Cough] : no cough [SOB on Exertion] : no shortness of breath during exertion [Vomiting] : no vomiting [Diarrhea] : no diarrhea [Joint Pain] : no joint pain [Skin Rash] : no skin rash [Negative] : Allergic/Immunologic

## 2022-12-06 NOTE — PHYSICAL EXAM
[Restricted in physically strenuous activity but ambulatory and able to carry out work of a light or sedentary nature] : Status 1- Restricted in physically strenuous activity but ambulatory and able to carry out work of a light or sedentary nature, e.g., light house work, office work [Normal] : affect appropriate [de-identified] : rhonchi noted over LLL and right lung

## 2022-12-14 ENCOUNTER — RESULT REVIEW (OUTPATIENT)
Age: 82
End: 2022-12-14

## 2022-12-14 ENCOUNTER — APPOINTMENT (OUTPATIENT)
Dept: INFUSION THERAPY | Facility: HOSPITAL | Age: 82
End: 2022-12-14

## 2022-12-14 ENCOUNTER — NON-APPOINTMENT (OUTPATIENT)
Age: 82
End: 2022-12-14

## 2022-12-14 DIAGNOSIS — Z51.11 ENCOUNTER FOR ANTINEOPLASTIC CHEMOTHERAPY: ICD-10-CM

## 2022-12-14 LAB
ALBUMIN SERPL ELPH-MCNC: 3.8 G/DL — SIGNIFICANT CHANGE UP (ref 3.3–5)
ALP SERPL-CCNC: 102 U/L — SIGNIFICANT CHANGE UP (ref 40–120)
ALT FLD-CCNC: 21 U/L — SIGNIFICANT CHANGE UP (ref 10–45)
ANION GAP SERPL CALC-SCNC: 8 MMOL/L — SIGNIFICANT CHANGE UP (ref 5–17)
AST SERPL-CCNC: 19 U/L — SIGNIFICANT CHANGE UP (ref 10–40)
BASOPHILS # BLD AUTO: 0.04 K/UL — SIGNIFICANT CHANGE UP (ref 0–0.2)
BASOPHILS NFR BLD AUTO: 0.5 % — SIGNIFICANT CHANGE UP (ref 0–2)
BILIRUB SERPL-MCNC: 0.3 MG/DL — SIGNIFICANT CHANGE UP (ref 0.2–1.2)
BUN SERPL-MCNC: 17 MG/DL — SIGNIFICANT CHANGE UP (ref 7–23)
CALCIUM SERPL-MCNC: 9.1 MG/DL — SIGNIFICANT CHANGE UP (ref 8.4–10.5)
CEA SERPL-MCNC: 3.9 NG/ML — HIGH (ref 0–3.8)
CHLORIDE SERPL-SCNC: 108 MMOL/L — SIGNIFICANT CHANGE UP (ref 96–108)
CO2 SERPL-SCNC: 24 MMOL/L — SIGNIFICANT CHANGE UP (ref 22–31)
CREAT SERPL-MCNC: 1.39 MG/DL — HIGH (ref 0.5–1.3)
EGFR: 51 ML/MIN/1.73M2 — LOW
EOSINOPHIL # BLD AUTO: 0.32 K/UL — SIGNIFICANT CHANGE UP (ref 0–0.5)
EOSINOPHIL NFR BLD AUTO: 3.6 % — SIGNIFICANT CHANGE UP (ref 0–6)
GLUCOSE SERPL-MCNC: 90 MG/DL — SIGNIFICANT CHANGE UP (ref 70–99)
HCT VFR BLD CALC: 37.1 % — LOW (ref 39–50)
HGB BLD-MCNC: 12 G/DL — LOW (ref 13–17)
IMM GRANULOCYTES NFR BLD AUTO: 0.8 % — SIGNIFICANT CHANGE UP (ref 0–0.9)
LYMPHOCYTES # BLD AUTO: 3.75 K/UL — HIGH (ref 1–3.3)
LYMPHOCYTES # BLD AUTO: 42.6 % — SIGNIFICANT CHANGE UP (ref 13–44)
MCHC RBC-ENTMCNC: 28.6 PG — SIGNIFICANT CHANGE UP (ref 27–34)
MCHC RBC-ENTMCNC: 32.3 G/DL — SIGNIFICANT CHANGE UP (ref 32–36)
MCV RBC AUTO: 88.3 FL — SIGNIFICANT CHANGE UP (ref 80–100)
MONOCYTES # BLD AUTO: 0.64 K/UL — SIGNIFICANT CHANGE UP (ref 0–0.9)
MONOCYTES NFR BLD AUTO: 7.3 % — SIGNIFICANT CHANGE UP (ref 2–14)
NEUTROPHILS # BLD AUTO: 3.98 K/UL — SIGNIFICANT CHANGE UP (ref 1.8–7.4)
NEUTROPHILS NFR BLD AUTO: 45.2 % — SIGNIFICANT CHANGE UP (ref 43–77)
NRBC # BLD: 0 /100 WBCS — SIGNIFICANT CHANGE UP (ref 0–0)
PLATELET # BLD AUTO: 187 K/UL — SIGNIFICANT CHANGE UP (ref 150–400)
POTASSIUM SERPL-MCNC: 5.1 MMOL/L — SIGNIFICANT CHANGE UP (ref 3.5–5.3)
POTASSIUM SERPL-SCNC: 5.1 MMOL/L — SIGNIFICANT CHANGE UP (ref 3.5–5.3)
PROT SERPL-MCNC: 7.1 G/DL — SIGNIFICANT CHANGE UP (ref 6–8.3)
RBC # BLD: 4.2 M/UL — SIGNIFICANT CHANGE UP (ref 4.2–5.8)
RBC # FLD: 13.9 % — SIGNIFICANT CHANGE UP (ref 10.3–14.5)
SODIUM SERPL-SCNC: 140 MMOL/L — SIGNIFICANT CHANGE UP (ref 135–145)
TSH SERPL-MCNC: 1.62 UIU/ML — SIGNIFICANT CHANGE UP (ref 0.27–4.2)
WBC # BLD: 8.8 K/UL — SIGNIFICANT CHANGE UP (ref 3.8–10.5)
WBC # FLD AUTO: 8.8 K/UL — SIGNIFICANT CHANGE UP (ref 3.8–10.5)

## 2022-12-19 ENCOUNTER — NON-APPOINTMENT (OUTPATIENT)
Age: 82
End: 2022-12-19

## 2022-12-27 ENCOUNTER — OUTPATIENT (OUTPATIENT)
Dept: OUTPATIENT SERVICES | Facility: HOSPITAL | Age: 82
LOS: 1 days | End: 2022-12-27
Payer: MEDICARE

## 2022-12-27 DIAGNOSIS — Z98.890 OTHER SPECIFIED POSTPROCEDURAL STATES: Chronic | ICD-10-CM

## 2022-12-27 DIAGNOSIS — C34.91 MALIGNANT NEOPLASM OF UNSPECIFIED PART OF RIGHT BRONCHUS OR LUNG: ICD-10-CM

## 2022-12-27 PROCEDURE — 71046 X-RAY EXAM CHEST 2 VIEWS: CPT

## 2022-12-27 PROCEDURE — 71046 X-RAY EXAM CHEST 2 VIEWS: CPT | Mod: 26

## 2022-12-29 ENCOUNTER — APPOINTMENT (OUTPATIENT)
Dept: THORACIC SURGERY | Facility: CLINIC | Age: 82
End: 2022-12-29
Payer: MEDICARE

## 2022-12-29 VITALS
RESPIRATION RATE: 18 BRPM | HEART RATE: 85 BPM | WEIGHT: 210 LBS | SYSTOLIC BLOOD PRESSURE: 138 MMHG | BODY MASS INDEX: 31.1 KG/M2 | OXYGEN SATURATION: 95 % | HEIGHT: 69 IN | DIASTOLIC BLOOD PRESSURE: 69 MMHG

## 2022-12-29 PROCEDURE — 99024 POSTOP FOLLOW-UP VISIT: CPT

## 2022-12-29 NOTE — CONSULT LETTER
[FreeTextEntry2] : Dr. Domingo (Hem/Onc/Ref) [FreeTextEntry3] : Opal Medina\par Attending Surgeon\par Division of Thoracic Surgery\par \par [employer] [facility]\par

## 2022-12-29 NOTE — PHYSICAL EXAM
[] : no respiratory distress [Respiration, Rhythm And Depth] : normal respiratory rhythm and effort [Exaggerated Use Of Accessory Muscles For Inspiration] : no accessory muscle use [Auscultation Breath Sounds / Voice Sounds] : lungs were clear to auscultation bilaterally [Clean] : clean [Dry] : dry [Healing Well] : healing well [No Edema] : no edema [Bleeding] : no active bleeding [Foul Odor] : no foul smell [Purulent Drainage] : no purulent drainage [Serosanguinous Drainage] : no serosanguinous drainage [Erythema] : not erythematous [Warm] : not warm [Tender] : not tender

## 2022-12-29 NOTE — ASSESSMENT
[FreeTextEntry1] : Mr. AMOS SALAZAR, 82 year old male, never smoker, w/ hx of HLD, HTN, who presented with persistent cough and went to ER. \par \par CT angio chest on 01/21/2022:\par - Masslike consolidation in the right middle lobe measures 5.3 x 2.8 cm, abutting the major fissure. Large consolidation in the right lower lobe. A 0.8 cm posterior right lower lobe nodule (series 2, image 71). Left lower lobe subsegmental atelectasis.\par \par Patient is s/p Flexible bronchoscopy, airway inspection, endobronchial ultrasound_guided lymph node aspiration of level 7 lymph node, endobronchial ultrasound_guided transbronchial needle aspiration of the right lower lobe mass, therapeutic aspiration of secretions, and bronchoalveolar lavage on 01/25/2022 by Dr. Moran. Path of RLL revealed positive for malignant cells, consistent with adenocarcinoma, mucinous type. Level 7 negative for malignant cells. \par \par PET/CT on 02/07/2022:\par - A mildly FDG-avid subcarinal lymph node measures 1.5 x 0.9 cm, SUV 3.0 (image 96).\par - A large focal area of increased FDG activity corresponding to the opacity/mass in the right lung base (SUV 10.9; image 117). There is an additional focal area of increased FDG activity in the right middle lobe (SUV 4.4; image 108) as well as in a right lower lobe nodule. Right upper lobe subcentimeter calcified granuloma.\par - There is a mildly FDG-avid small peripheral left lower lobe opacity (SUV 3.7; image 104). These findings are unchanged as compared to CT dated 1/21/2022.\par \par MRI of Brain on 02/23/2022:\par - NO EVIDENCE OF INTRACRANIAL HEMORRHAGE, ACUTE TERRITORIAL INFARCT OR AREA OF ABNORMAL ENHANCEMENT TO SUGGEST METASTATIC DISEASE. \par \par PFTs on 02/23/2022: FVC 70%, FEV1 79%, DLCO 94%. \par \par Patient is s/p FB, [place] [employer]-assisted navigation bronchoscopy guided, radial EBUS guided transbronchial needle aspiration, transbronchial forceps biopsy of the right middle lobe under fluoroscopic guidance, bronchoalveolar lavage of right middle lobe, and endobronchial ultrasound guided lymph node sampling of level VII lymph node on 03/15/2022 by Dr. Moran. 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 Level 7 showed negative for malignant cells. PD-L1 0%. Level 7 flow cytometry revealed heterogeneous population of T cells (with increased CD4 to CD8 ratio=9:1 and no loss of pan T cell antigen). B cells are polytypic by Kappa and lambda labeling. \par \par Dr. Domingo plan to start on neoadjuvant chemo for total of 3 cycles. \par \par CT Chest on 9/16/22:\par - 7 x 4.4 cm mass in the right lower lobe has not significantly changed \par - numerous solid nodules are noted within both lungs. Many of them have increased in size when compared to previous exam. For example, 1 by 0.8 cm solid nodule in the posterior segment of the right upper lobe (series 2 image 50) and 1.6 x 1.3 cm lobulated nodule in the left lower lobe (series 2 image 65) have increased in size when compared to previous exam\par - very small right pleural effusion\par \par S/p 4 Cycles of Chemo; Currently on immunotherapy \par \par Now 2 mons s/p Lt VATS, JUANPABLO and LLL wedge rxn on 10/28/22. Path revealed invasive mucinous AdenoCA. \par \par CXR on 12/27/22: Increased multinodular ill-defined opacities in the bilateral mid and lower zones\par \par I have reviewed the patient's medical records and diagnostic images at time of this office consultation and have made the following recommendation:\par 1. CXR with stable findings. Patient is overall feeling well and surgical site has healed nicely. No need for further Thoracic surgery follow up. Continue follow up w/ oncology and return to clinic as needed. \par \par Recommendations reviewed with patient during this office visit, and all questions answered; Patient instructed on the importance of follow up and verbalizes understanding.\par \par \par I, JESSE Schuler, personally performed the evaluation and management (E/M) services for this established patient. That E/M includes conducting the examination, assessing all new/exacerbated conditions, and establishing a new plan of care. Today, My ACP, Rhona Coppola, was here to observe my evaluation and management services for this patient to be followed going forward.\par \par \par \par \par \par \par \par \par \par

## 2022-12-29 NOTE — DISCUSSION/SUMMARY
[Doing Well] : is doing well [Excellent Pain Control] : has excellent pain control [No Sign of Infection] : is showing no signs of infection [Clinical Recheck] : clinical recheck

## 2023-01-01 ENCOUNTER — TRANSCRIPTION ENCOUNTER (OUTPATIENT)
Age: 83
End: 2023-01-01

## 2023-01-01 ENCOUNTER — APPOINTMENT (OUTPATIENT)
Dept: INFUSION THERAPY | Facility: HOSPITAL | Age: 83
End: 2023-01-01

## 2023-01-01 ENCOUNTER — RESULT REVIEW (OUTPATIENT)
Age: 83
End: 2023-01-01

## 2023-01-01 ENCOUNTER — APPOINTMENT (OUTPATIENT)
Dept: NUCLEAR MEDICINE | Facility: IMAGING CENTER | Age: 83
End: 2023-01-01
Payer: MEDICARE

## 2023-01-01 ENCOUNTER — APPOINTMENT (OUTPATIENT)
Dept: HEMATOLOGY ONCOLOGY | Facility: CLINIC | Age: 83
End: 2023-01-01
Payer: MEDICARE

## 2023-01-01 ENCOUNTER — NON-APPOINTMENT (OUTPATIENT)
Age: 83
End: 2023-01-01

## 2023-01-01 ENCOUNTER — LABORATORY RESULT (OUTPATIENT)
Age: 83
End: 2023-01-01

## 2023-01-01 ENCOUNTER — OUTPATIENT (OUTPATIENT)
Dept: OUTPATIENT SERVICES | Facility: HOSPITAL | Age: 83
LOS: 1 days | Discharge: HOME | End: 2023-01-01

## 2023-01-01 ENCOUNTER — APPOINTMENT (OUTPATIENT)
Dept: PULMONOLOGY | Facility: CLINIC | Age: 83
End: 2023-01-01
Payer: MEDICARE

## 2023-01-01 ENCOUNTER — RX RENEWAL (OUTPATIENT)
Age: 83
End: 2023-01-01

## 2023-01-01 ENCOUNTER — APPOINTMENT (OUTPATIENT)
Dept: INTERVENTIONAL RADIOLOGY/VASCULAR | Facility: CLINIC | Age: 83
End: 2023-01-01
Payer: MEDICARE

## 2023-01-01 ENCOUNTER — OUTPATIENT (OUTPATIENT)
Dept: OUTPATIENT SERVICES | Facility: HOSPITAL | Age: 83
LOS: 1 days | End: 2023-01-01
Payer: MEDICARE

## 2023-01-01 ENCOUNTER — APPOINTMENT (OUTPATIENT)
Dept: HEMATOLOGY ONCOLOGY | Facility: CLINIC | Age: 83
End: 2023-01-01

## 2023-01-01 ENCOUNTER — INPATIENT (INPATIENT)
Facility: HOSPITAL | Age: 83
LOS: 5 days | Discharge: HOME | End: 2023-11-30
Attending: INTERNAL MEDICINE | Admitting: INTERNAL MEDICINE
Payer: MEDICARE

## 2023-01-01 ENCOUNTER — APPOINTMENT (OUTPATIENT)
Dept: CT IMAGING | Facility: IMAGING CENTER | Age: 83
End: 2023-01-01
Payer: MEDICARE

## 2023-01-01 ENCOUNTER — INPATIENT (INPATIENT)
Facility: HOSPITAL | Age: 83
LOS: 2 days | Discharge: HOME | End: 2023-08-14
Attending: INTERNAL MEDICINE | Admitting: INTERNAL MEDICINE
Payer: MEDICARE

## 2023-01-01 VITALS
TEMPERATURE: 97.3 F | DIASTOLIC BLOOD PRESSURE: 69 MMHG | HEART RATE: 90 BPM | WEIGHT: 185 LBS | HEIGHT: 69 IN | OXYGEN SATURATION: 94 % | RESPIRATION RATE: 18 BRPM | BODY MASS INDEX: 27.4 KG/M2 | SYSTOLIC BLOOD PRESSURE: 119 MMHG

## 2023-01-01 VITALS
SYSTOLIC BLOOD PRESSURE: 129 MMHG | HEART RATE: 94 BPM | RESPIRATION RATE: 16 BRPM | OXYGEN SATURATION: 95 % | DIASTOLIC BLOOD PRESSURE: 70 MMHG | TEMPERATURE: 99 F

## 2023-01-01 VITALS
RESPIRATION RATE: 18 BRPM | HEART RATE: 88 BPM | SYSTOLIC BLOOD PRESSURE: 122 MMHG | TEMPERATURE: 98 F | DIASTOLIC BLOOD PRESSURE: 53 MMHG | OXYGEN SATURATION: 96 %

## 2023-01-01 VITALS
WEIGHT: 180 LBS | HEART RATE: 106 BPM | DIASTOLIC BLOOD PRESSURE: 67 MMHG | SYSTOLIC BLOOD PRESSURE: 134 MMHG | OXYGEN SATURATION: 91 % | BODY MASS INDEX: 27.28 KG/M2 | HEIGHT: 68 IN

## 2023-01-01 VITALS
HEART RATE: 100 BPM | RESPIRATION RATE: 16 BRPM | BODY MASS INDEX: 28.32 KG/M2 | TEMPERATURE: 97.6 F | DIASTOLIC BLOOD PRESSURE: 68 MMHG | SYSTOLIC BLOOD PRESSURE: 133 MMHG | WEIGHT: 191.8 LBS | OXYGEN SATURATION: 92 %

## 2023-01-01 VITALS
SYSTOLIC BLOOD PRESSURE: 110 MMHG | DIASTOLIC BLOOD PRESSURE: 62 MMHG | HEART RATE: 92 BPM | TEMPERATURE: 98 F | RESPIRATION RATE: 18 BRPM | OXYGEN SATURATION: 93 %

## 2023-01-01 VITALS
HEART RATE: 88 BPM | OXYGEN SATURATION: 95 % | RESPIRATION RATE: 17 BRPM | TEMPERATURE: 98 F | DIASTOLIC BLOOD PRESSURE: 64 MMHG | SYSTOLIC BLOOD PRESSURE: 132 MMHG

## 2023-01-01 VITALS
DIASTOLIC BLOOD PRESSURE: 52 MMHG | RESPIRATION RATE: 24 BRPM | WEIGHT: 220.46 LBS | TEMPERATURE: 99 F | HEART RATE: 106 BPM | SYSTOLIC BLOOD PRESSURE: 129 MMHG | OXYGEN SATURATION: 92 %

## 2023-01-01 DIAGNOSIS — Z98.890 OTHER SPECIFIED POSTPROCEDURAL STATES: Chronic | ICD-10-CM

## 2023-01-01 DIAGNOSIS — J90 PLEURAL EFFUSION, NOT ELSEWHERE CLASSIFIED: ICD-10-CM

## 2023-01-01 DIAGNOSIS — U07.1 COVID-19: ICD-10-CM

## 2023-01-01 DIAGNOSIS — Z51.11 ENCOUNTER FOR ANTINEOPLASTIC CHEMOTHERAPY: ICD-10-CM

## 2023-01-01 DIAGNOSIS — E78.5 HYPERLIPIDEMIA, UNSPECIFIED: ICD-10-CM

## 2023-01-01 DIAGNOSIS — C34.90 MALIGNANT NEOPLASM OF UNSPECIFIED PART OF UNSPECIFIED BRONCHUS OR LUNG: ICD-10-CM

## 2023-01-01 DIAGNOSIS — R52 PAIN, UNSPECIFIED: ICD-10-CM

## 2023-01-01 DIAGNOSIS — C26.0 MALIGNANT NEOPLASM OF INTESTINAL TRACT, PART UNSPECIFIED: ICD-10-CM

## 2023-01-01 DIAGNOSIS — J18.9 PNEUMONIA, UNSPECIFIED ORGANISM: ICD-10-CM

## 2023-01-01 DIAGNOSIS — N18.9 CHRONIC KIDNEY DISEASE, UNSPECIFIED: ICD-10-CM

## 2023-01-01 DIAGNOSIS — I10 ESSENTIAL (PRIMARY) HYPERTENSION: ICD-10-CM

## 2023-01-01 DIAGNOSIS — R60.0 LOCALIZED EDEMA: ICD-10-CM

## 2023-01-01 DIAGNOSIS — J96.01 ACUTE RESPIRATORY FAILURE WITH HYPOXIA: ICD-10-CM

## 2023-01-01 DIAGNOSIS — C34.91 MALIGNANT NEOPLASM OF UNSPECIFIED PART OF RIGHT BRONCHUS OR LUNG: ICD-10-CM

## 2023-01-01 DIAGNOSIS — N40.0 BENIGN PROSTATIC HYPERPLASIA WITHOUT LOWER URINARY TRACT SYMPTOMS: ICD-10-CM

## 2023-01-01 DIAGNOSIS — Z29.9 ENCOUNTER FOR PROPHYLACTIC MEASURES, UNSPECIFIED: ICD-10-CM

## 2023-01-01 DIAGNOSIS — R11.2 NAUSEA WITH VOMITING, UNSPECIFIED: ICD-10-CM

## 2023-01-01 LAB
-  BLOOD PCR PANEL: SIGNIFICANT CHANGE UP
A1C WITH ESTIMATED AVERAGE GLUCOSE RESULT: 5.9 % — HIGH (ref 4–5.6)
A1C WITH ESTIMATED AVERAGE GLUCOSE RESULT: 5.9 % — HIGH (ref 4–5.6)
ALBUMIN FLD-MCNC: 2.4 G/DL — SIGNIFICANT CHANGE UP
ALBUMIN SERPL ELPH-MCNC: 2.4 G/DL — LOW (ref 3.3–5)
ALBUMIN SERPL ELPH-MCNC: 2.5 G/DL — LOW (ref 3.3–5)
ALBUMIN SERPL ELPH-MCNC: 2.5 G/DL — LOW (ref 3.3–5)
ALBUMIN SERPL ELPH-MCNC: 2.8 G/DL — LOW (ref 3.3–5)
ALBUMIN SERPL ELPH-MCNC: 3 G/DL — LOW (ref 3.3–5)
ALBUMIN SERPL ELPH-MCNC: 3.1 G/DL — LOW (ref 3.3–5)
ALBUMIN SERPL ELPH-MCNC: 3.3 G/DL — SIGNIFICANT CHANGE UP (ref 3.3–5)
ALBUMIN SERPL ELPH-MCNC: 3.3 G/DL — SIGNIFICANT CHANGE UP (ref 3.3–5)
ALBUMIN SERPL ELPH-MCNC: 3.5 G/DL — SIGNIFICANT CHANGE UP (ref 3.3–5)
ALBUMIN SERPL ELPH-MCNC: 3.6 G/DL — SIGNIFICANT CHANGE UP (ref 3.3–5)
ALBUMIN SERPL ELPH-MCNC: 3.6 G/DL — SIGNIFICANT CHANGE UP (ref 3.3–5)
ALBUMIN SERPL ELPH-MCNC: 3.7 G/DL — SIGNIFICANT CHANGE UP (ref 3.3–5)
ALBUMIN SERPL ELPH-MCNC: 3.8 G/DL — SIGNIFICANT CHANGE UP (ref 3.3–5)
ALBUMIN SERPL ELPH-MCNC: 3.8 G/DL — SIGNIFICANT CHANGE UP (ref 3.3–5)
ALP SERPL-CCNC: 108 U/L — SIGNIFICANT CHANGE UP (ref 40–120)
ALP SERPL-CCNC: 108 U/L — SIGNIFICANT CHANGE UP (ref 40–120)
ALP SERPL-CCNC: 118 U/L — SIGNIFICANT CHANGE UP (ref 40–120)
ALP SERPL-CCNC: 125 U/L — HIGH (ref 40–120)
ALP SERPL-CCNC: 125 U/L — HIGH (ref 40–120)
ALP SERPL-CCNC: 128 U/L — HIGH (ref 40–120)
ALP SERPL-CCNC: 133 U/L — HIGH (ref 40–120)
ALP SERPL-CCNC: 150 U/L — HIGH (ref 40–120)
ALP SERPL-CCNC: 150 U/L — HIGH (ref 40–120)
ALP SERPL-CCNC: 164 U/L — HIGH (ref 40–120)
ALP SERPL-CCNC: 187 U/L — HIGH (ref 40–120)
ALP SERPL-CCNC: 187 U/L — HIGH (ref 40–120)
ALP SERPL-CCNC: 190 U/L — HIGH (ref 40–120)
ALP SERPL-CCNC: 190 U/L — HIGH (ref 40–120)
ALP SERPL-CCNC: 204 U/L — HIGH (ref 40–120)
ALP SERPL-CCNC: 204 U/L — HIGH (ref 40–120)
ALP SERPL-CCNC: 225 U/L — HIGH (ref 40–120)
ALP SERPL-CCNC: 225 U/L — HIGH (ref 40–120)
ALP SERPL-CCNC: 226 U/L — HIGH (ref 40–120)
ALP SERPL-CCNC: 226 U/L — HIGH (ref 40–120)
ALP SERPL-CCNC: 232 U/L — HIGH (ref 40–120)
ALP SERPL-CCNC: 244 U/L — HIGH (ref 40–120)
ALP SERPL-CCNC: 292 U/L — HIGH (ref 40–120)
ALT FLD-CCNC: 10 U/L — SIGNIFICANT CHANGE UP (ref 10–45)
ALT FLD-CCNC: 10 U/L — SIGNIFICANT CHANGE UP (ref 10–45)
ALT FLD-CCNC: 16 U/L — SIGNIFICANT CHANGE UP (ref 10–45)
ALT FLD-CCNC: 19 U/L — SIGNIFICANT CHANGE UP (ref 10–45)
ALT FLD-CCNC: 19 U/L — SIGNIFICANT CHANGE UP (ref 10–45)
ALT FLD-CCNC: 20 U/L — SIGNIFICANT CHANGE UP (ref 10–45)
ALT FLD-CCNC: 23 U/L — SIGNIFICANT CHANGE UP (ref 10–45)
ALT FLD-CCNC: 24 U/L — SIGNIFICANT CHANGE UP (ref 4–41)
ALT FLD-CCNC: 27 U/L — SIGNIFICANT CHANGE UP (ref 10–45)
ALT FLD-CCNC: 32 U/L — SIGNIFICANT CHANGE UP (ref 4–41)
ALT FLD-CCNC: 32 U/L — SIGNIFICANT CHANGE UP (ref 4–41)
ALT FLD-CCNC: 34 U/L — SIGNIFICANT CHANGE UP (ref 4–41)
ALT FLD-CCNC: 40 U/L — SIGNIFICANT CHANGE UP (ref 4–41)
ALT FLD-CCNC: 40 U/L — SIGNIFICANT CHANGE UP (ref 4–41)
ALT FLD-CCNC: 42 U/L — HIGH (ref 4–41)
ALT FLD-CCNC: 44 U/L — HIGH (ref 4–41)
ALT FLD-CCNC: 44 U/L — HIGH (ref 4–41)
ANION GAP SERPL CALC-SCNC: 10 MMOL/L — SIGNIFICANT CHANGE UP (ref 7–14)
ANION GAP SERPL CALC-SCNC: 11 MMOL/L — SIGNIFICANT CHANGE UP (ref 5–17)
ANION GAP SERPL CALC-SCNC: 11 MMOL/L — SIGNIFICANT CHANGE UP (ref 7–14)
ANION GAP SERPL CALC-SCNC: 12 MMOL/L — SIGNIFICANT CHANGE UP (ref 5–17)
ANION GAP SERPL CALC-SCNC: 12 MMOL/L — SIGNIFICANT CHANGE UP (ref 7–14)
ANION GAP SERPL CALC-SCNC: 12 MMOL/L — SIGNIFICANT CHANGE UP (ref 7–14)
ANION GAP SERPL CALC-SCNC: 13 MMOL/L — SIGNIFICANT CHANGE UP (ref 5–17)
ANION GAP SERPL CALC-SCNC: 13 MMOL/L — SIGNIFICANT CHANGE UP (ref 7–14)
ANION GAP SERPL CALC-SCNC: 9 MMOL/L — SIGNIFICANT CHANGE UP (ref 7–14)
APPEARANCE UR: ABNORMAL
APTT BLD: 29.1 SEC — SIGNIFICANT CHANGE UP (ref 24.5–35.6)
APTT BLD: 30.7 SEC — SIGNIFICANT CHANGE UP (ref 27.5–35.5)
APTT BLD: 32.2 SEC — SIGNIFICANT CHANGE UP (ref 24.5–35.6)
APTT BLD: 32.2 SEC — SIGNIFICANT CHANGE UP (ref 24.5–35.6)
AST SERPL-CCNC: 17 U/L — SIGNIFICANT CHANGE UP (ref 10–40)
AST SERPL-CCNC: 18 U/L — SIGNIFICANT CHANGE UP (ref 10–40)
AST SERPL-CCNC: 19 U/L — SIGNIFICANT CHANGE UP (ref 10–40)
AST SERPL-CCNC: 19 U/L — SIGNIFICANT CHANGE UP (ref 10–40)
AST SERPL-CCNC: 19 U/L — SIGNIFICANT CHANGE UP (ref 4–40)
AST SERPL-CCNC: 19 U/L — SIGNIFICANT CHANGE UP (ref 4–40)
AST SERPL-CCNC: 20 U/L — SIGNIFICANT CHANGE UP (ref 10–40)
AST SERPL-CCNC: 20 U/L — SIGNIFICANT CHANGE UP (ref 10–40)
AST SERPL-CCNC: 22 U/L — SIGNIFICANT CHANGE UP (ref 10–40)
AST SERPL-CCNC: 22 U/L — SIGNIFICANT CHANGE UP (ref 10–40)
AST SERPL-CCNC: 22 U/L — SIGNIFICANT CHANGE UP (ref 4–40)
AST SERPL-CCNC: 24 U/L — SIGNIFICANT CHANGE UP (ref 10–40)
AST SERPL-CCNC: 24 U/L — SIGNIFICANT CHANGE UP (ref 10–40)
AST SERPL-CCNC: 30 U/L — SIGNIFICANT CHANGE UP (ref 4–40)
AST SERPL-CCNC: 31 U/L — SIGNIFICANT CHANGE UP (ref 4–40)
AST SERPL-CCNC: 31 U/L — SIGNIFICANT CHANGE UP (ref 4–40)
AST SERPL-CCNC: 32 U/L — SIGNIFICANT CHANGE UP (ref 4–40)
AST SERPL-CCNC: 40 U/L — SIGNIFICANT CHANGE UP (ref 4–40)
AST SERPL-CCNC: 44 U/L — HIGH (ref 4–40)
AST SERPL-CCNC: 44 U/L — HIGH (ref 4–40)
B PERT DNA SPEC QL NAA+PROBE: SIGNIFICANT CHANGE UP
B PERT IGG+IGM PNL SER: ABNORMAL
B PERT+PARAPERT DNA PNL SPEC NAA+PROBE: SIGNIFICANT CHANGE UP
BACTERIA # UR AUTO: NEGATIVE /HPF — SIGNIFICANT CHANGE UP
BACTERIA # UR AUTO: NEGATIVE /HPF — SIGNIFICANT CHANGE UP
BASE EXCESS BLDV CALC-SCNC: -1.8 MMOL/L — SIGNIFICANT CHANGE UP (ref -2–3)
BASOPHILS # BLD AUTO: 0 K/UL — SIGNIFICANT CHANGE UP (ref 0–0.2)
BASOPHILS # BLD AUTO: 0 K/UL — SIGNIFICANT CHANGE UP (ref 0–0.2)
BASOPHILS # BLD AUTO: 0.01 K/UL — SIGNIFICANT CHANGE UP (ref 0–0.2)
BASOPHILS # BLD AUTO: 0.02 K/UL — SIGNIFICANT CHANGE UP (ref 0–0.2)
BASOPHILS # BLD AUTO: 0.03 K/UL — SIGNIFICANT CHANGE UP (ref 0–0.2)
BASOPHILS # BLD AUTO: 0.04 K/UL — SIGNIFICANT CHANGE UP (ref 0–0.2)
BASOPHILS # BLD AUTO: 0.05 K/UL — SIGNIFICANT CHANGE UP (ref 0–0.2)
BASOPHILS # BLD AUTO: 0.06 K/UL — SIGNIFICANT CHANGE UP (ref 0–0.2)
BASOPHILS # BLD AUTO: 0.06 K/UL — SIGNIFICANT CHANGE UP (ref 0–0.2)
BASOPHILS NFR BLD AUTO: 0 % — SIGNIFICANT CHANGE UP (ref 0–2)
BASOPHILS NFR BLD AUTO: 0 % — SIGNIFICANT CHANGE UP (ref 0–2)
BASOPHILS NFR BLD AUTO: 0.1 % — SIGNIFICANT CHANGE UP (ref 0–2)
BASOPHILS NFR BLD AUTO: 0.2 % — SIGNIFICANT CHANGE UP (ref 0–2)
BASOPHILS NFR BLD AUTO: 0.3 % — SIGNIFICANT CHANGE UP (ref 0–2)
BASOPHILS NFR BLD AUTO: 0.4 % — SIGNIFICANT CHANGE UP (ref 0–2)
BASOPHILS NFR BLD AUTO: 0.5 % — SIGNIFICANT CHANGE UP (ref 0–2)
BASOPHILS NFR BLD AUTO: 0.7 % — SIGNIFICANT CHANGE UP (ref 0–2)
BASOPHILS NFR BLD AUTO: 0.7 % — SIGNIFICANT CHANGE UP (ref 0–2)
BILIRUB SERPL-MCNC: 0.2 MG/DL — SIGNIFICANT CHANGE UP (ref 0.2–1.2)
BILIRUB SERPL-MCNC: 0.3 MG/DL — SIGNIFICANT CHANGE UP (ref 0.2–1.2)
BILIRUB SERPL-MCNC: 0.5 MG/DL — SIGNIFICANT CHANGE UP (ref 0.2–1.2)
BILIRUB SERPL-MCNC: 0.6 MG/DL — SIGNIFICANT CHANGE UP (ref 0.2–1.2)
BILIRUB SERPL-MCNC: 0.6 MG/DL — SIGNIFICANT CHANGE UP (ref 0.2–1.2)
BILIRUB SERPL-MCNC: <0.2 MG/DL — SIGNIFICANT CHANGE UP (ref 0.2–1.2)
BILIRUB SERPL-MCNC: <0.2 MG/DL — SIGNIFICANT CHANGE UP (ref 0.2–1.2)
BILIRUB UR-MCNC: ABNORMAL
BILIRUB UR-MCNC: ABNORMAL
BILIRUB UR-MCNC: NEGATIVE — SIGNIFICANT CHANGE UP
BILIRUB UR-MCNC: NEGATIVE — SIGNIFICANT CHANGE UP
BLD GP AB SCN SERPL QL: NEGATIVE — SIGNIFICANT CHANGE UP
BLD GP AB SCN SERPL QL: NEGATIVE — SIGNIFICANT CHANGE UP
BLOOD GAS VENOUS COMPREHENSIVE RESULT: SIGNIFICANT CHANGE UP
BORDETELLA PARAPERTUSSIS (RAPRVP): SIGNIFICANT CHANGE UP
BUN SERPL-MCNC: 22 MG/DL — SIGNIFICANT CHANGE UP (ref 7–23)
BUN SERPL-MCNC: 23 MG/DL — SIGNIFICANT CHANGE UP (ref 7–23)
BUN SERPL-MCNC: 23 MG/DL — SIGNIFICANT CHANGE UP (ref 7–23)
BUN SERPL-MCNC: 24 MG/DL — HIGH (ref 7–23)
BUN SERPL-MCNC: 24 MG/DL — HIGH (ref 7–23)
BUN SERPL-MCNC: 25 MG/DL — HIGH (ref 7–23)
BUN SERPL-MCNC: 26 MG/DL — HIGH (ref 7–23)
BUN SERPL-MCNC: 28 MG/DL — HIGH (ref 7–23)
BUN SERPL-MCNC: 28 MG/DL — HIGH (ref 7–23)
BUN SERPL-MCNC: 30 MG/DL — HIGH (ref 7–23)
BUN SERPL-MCNC: 30 MG/DL — HIGH (ref 7–23)
BUN SERPL-MCNC: 32 MG/DL — HIGH (ref 7–23)
BUN SERPL-MCNC: 33 MG/DL — HIGH (ref 7–23)
BUN SERPL-MCNC: 36 MG/DL — HIGH (ref 7–23)
BUN SERPL-MCNC: 36 MG/DL — HIGH (ref 7–23)
BUN SERPL-MCNC: 39 MG/DL — HIGH (ref 7–23)
BUN SERPL-MCNC: 41 MG/DL — HIGH (ref 7–23)
BUN SERPL-MCNC: 41 MG/DL — HIGH (ref 7–23)
BUN SERPL-MCNC: 42 MG/DL — HIGH (ref 7–23)
BUN SERPL-MCNC: 45 MG/DL — HIGH (ref 7–23)
BUN SERPL-MCNC: 49 MG/DL — HIGH (ref 7–23)
BUN SERPL-MCNC: 49 MG/DL — HIGH (ref 7–23)
C PNEUM DNA SPEC QL NAA+PROBE: SIGNIFICANT CHANGE UP
CALCIUM SERPL-MCNC: 8.8 MG/DL — SIGNIFICANT CHANGE UP (ref 8.4–10.5)
CALCIUM SERPL-MCNC: 8.9 MG/DL — SIGNIFICANT CHANGE UP (ref 8.4–10.5)
CALCIUM SERPL-MCNC: 8.9 MG/DL — SIGNIFICANT CHANGE UP (ref 8.4–10.5)
CALCIUM SERPL-MCNC: 9 MG/DL — SIGNIFICANT CHANGE UP (ref 8.4–10.5)
CALCIUM SERPL-MCNC: 9.2 MG/DL — SIGNIFICANT CHANGE UP (ref 8.4–10.5)
CALCIUM SERPL-MCNC: 9.3 MG/DL — SIGNIFICANT CHANGE UP (ref 8.4–10.5)
CALCIUM SERPL-MCNC: 9.5 MG/DL — SIGNIFICANT CHANGE UP (ref 8.4–10.5)
CALCIUM SERPL-MCNC: 9.9 MG/DL — SIGNIFICANT CHANGE UP (ref 8.4–10.5)
CAST: 5 /LPF — HIGH (ref 0–4)
CAST: 5 /LPF — HIGH (ref 0–4)
CEA SERPL-MCNC: 13.1 NG/ML — HIGH (ref 0–3.8)
CEA SERPL-MCNC: 13.1 NG/ML — HIGH (ref 0–3.8)
CEA SERPL-MCNC: 14.5 NG/ML — HIGH (ref 0–3.8)
CEA SERPL-MCNC: 14.5 NG/ML — HIGH (ref 0–3.8)
CEA SERPL-MCNC: 4.9 NG/ML — HIGH (ref 0–3.8)
CEA SERPL-MCNC: 5 NG/ML — HIGH (ref 0–3.8)
CEA SERPL-MCNC: 5.2 NG/ML — HIGH (ref 0–3.8)
CEA SERPL-MCNC: 5.4 NG/ML — HIGH (ref 0–3.8)
CEA SERPL-MCNC: 6.3 NG/ML — HIGH (ref 0–3.8)
CEA SERPL-MCNC: 7.7 NG/ML — HIGH (ref 0–3.8)
CHLORIDE BLDV-SCNC: 101 MMOL/L — SIGNIFICANT CHANGE UP (ref 96–108)
CHLORIDE BLDV-SCNC: 101 MMOL/L — SIGNIFICANT CHANGE UP (ref 96–108)
CHLORIDE BLDV-SCNC: 104 MMOL/L — SIGNIFICANT CHANGE UP (ref 96–108)
CHLORIDE SERPL-SCNC: 100 MMOL/L — SIGNIFICANT CHANGE UP (ref 98–107)
CHLORIDE SERPL-SCNC: 101 MMOL/L — SIGNIFICANT CHANGE UP (ref 98–107)
CHLORIDE SERPL-SCNC: 101 MMOL/L — SIGNIFICANT CHANGE UP (ref 98–107)
CHLORIDE SERPL-SCNC: 102 MMOL/L — SIGNIFICANT CHANGE UP (ref 96–108)
CHLORIDE SERPL-SCNC: 102 MMOL/L — SIGNIFICANT CHANGE UP (ref 98–107)
CHLORIDE SERPL-SCNC: 103 MMOL/L — SIGNIFICANT CHANGE UP (ref 98–107)
CHLORIDE SERPL-SCNC: 104 MMOL/L — SIGNIFICANT CHANGE UP (ref 96–108)
CHLORIDE SERPL-SCNC: 104 MMOL/L — SIGNIFICANT CHANGE UP (ref 96–108)
CHLORIDE SERPL-SCNC: 105 MMOL/L — SIGNIFICANT CHANGE UP (ref 96–108)
CHLORIDE SERPL-SCNC: 106 MMOL/L — SIGNIFICANT CHANGE UP (ref 96–108)
CHLORIDE SERPL-SCNC: 106 MMOL/L — SIGNIFICANT CHANGE UP (ref 98–107)
CHLORIDE SERPL-SCNC: 106 MMOL/L — SIGNIFICANT CHANGE UP (ref 98–107)
CHLORIDE SERPL-SCNC: 99 MMOL/L — SIGNIFICANT CHANGE UP (ref 98–107)
CHLORIDE SERPL-SCNC: 99 MMOL/L — SIGNIFICANT CHANGE UP (ref 98–107)
CHOLEST SERPL-MCNC: 107 MG/DL — SIGNIFICANT CHANGE UP
CHOLEST SERPL-MCNC: 107 MG/DL — SIGNIFICANT CHANGE UP
CO2 BLDV-SCNC: 24.3 MMOL/L — SIGNIFICANT CHANGE UP (ref 22–26)
CO2 BLDV-SCNC: 24.3 MMOL/L — SIGNIFICANT CHANGE UP (ref 22–26)
CO2 BLDV-SCNC: 25.7 MMOL/L — SIGNIFICANT CHANGE UP (ref 22–26)
CO2 SERPL-SCNC: 19 MMOL/L — LOW (ref 22–31)
CO2 SERPL-SCNC: 20 MMOL/L — LOW (ref 22–31)
CO2 SERPL-SCNC: 21 MMOL/L — LOW (ref 22–31)
CO2 SERPL-SCNC: 22 MMOL/L — SIGNIFICANT CHANGE UP (ref 22–31)
CO2 SERPL-SCNC: 23 MMOL/L — SIGNIFICANT CHANGE UP (ref 22–31)
CO2 SERPL-SCNC: 24 MMOL/L — SIGNIFICANT CHANGE UP (ref 22–31)
CO2 SERPL-SCNC: 24 MMOL/L — SIGNIFICANT CHANGE UP (ref 22–31)
COLOR FLD: SIGNIFICANT CHANGE UP
COLOR SPEC: SIGNIFICANT CHANGE UP
COMMENT - FLUIDS: SIGNIFICANT CHANGE UP
CREAT SERPL-MCNC: 0.98 MG/DL — SIGNIFICANT CHANGE UP (ref 0.5–1.3)
CREAT SERPL-MCNC: 0.98 MG/DL — SIGNIFICANT CHANGE UP (ref 0.5–1.3)
CREAT SERPL-MCNC: 1.1 MG/DL — SIGNIFICANT CHANGE UP (ref 0.5–1.3)
CREAT SERPL-MCNC: 1.1 MG/DL — SIGNIFICANT CHANGE UP (ref 0.5–1.3)
CREAT SERPL-MCNC: 1.21 MG/DL — SIGNIFICANT CHANGE UP (ref 0.5–1.3)
CREAT SERPL-MCNC: 1.21 MG/DL — SIGNIFICANT CHANGE UP (ref 0.5–1.3)
CREAT SERPL-MCNC: 1.23 MG/DL — SIGNIFICANT CHANGE UP (ref 0.5–1.3)
CREAT SERPL-MCNC: 1.23 MG/DL — SIGNIFICANT CHANGE UP (ref 0.5–1.3)
CREAT SERPL-MCNC: 1.27 MG/DL — SIGNIFICANT CHANGE UP (ref 0.5–1.3)
CREAT SERPL-MCNC: 1.27 MG/DL — SIGNIFICANT CHANGE UP (ref 0.5–1.3)
CREAT SERPL-MCNC: 1.34 MG/DL — HIGH (ref 0.5–1.3)
CREAT SERPL-MCNC: 1.34 MG/DL — HIGH (ref 0.5–1.3)
CREAT SERPL-MCNC: 1.37 MG/DL — HIGH (ref 0.5–1.3)
CREAT SERPL-MCNC: 1.42 MG/DL — HIGH (ref 0.5–1.3)
CREAT SERPL-MCNC: 1.46 MG/DL — HIGH (ref 0.5–1.3)
CREAT SERPL-MCNC: 1.49 MG/DL — HIGH (ref 0.5–1.3)
CREAT SERPL-MCNC: 1.49 MG/DL — HIGH (ref 0.5–1.3)
CREAT SERPL-MCNC: 1.5 MG/DL — HIGH (ref 0.5–1.3)
CREAT SERPL-MCNC: 1.51 MG/DL — HIGH (ref 0.5–1.3)
CREAT SERPL-MCNC: 1.51 MG/DL — HIGH (ref 0.5–1.3)
CREAT SERPL-MCNC: 1.55 MG/DL — HIGH (ref 0.5–1.3)
CREAT SERPL-MCNC: 1.55 MG/DL — HIGH (ref 0.5–1.3)
CREAT SERPL-MCNC: 1.56 MG/DL — HIGH (ref 0.5–1.3)
CREAT SERPL-MCNC: 1.58 MG/DL — HIGH (ref 0.5–1.3)
CREAT SERPL-MCNC: 1.59 MG/DL — HIGH (ref 0.5–1.3)
CREAT SERPL-MCNC: 1.59 MG/DL — HIGH (ref 0.5–1.3)
CREAT SERPL-MCNC: 1.63 MG/DL — HIGH (ref 0.5–1.3)
CULTURE RESULTS: ABNORMAL
CULTURE RESULTS: ABNORMAL
CULTURE RESULTS: NO GROWTH — SIGNIFICANT CHANGE UP
CULTURE RESULTS: SIGNIFICANT CHANGE UP
D DIMER BLD IA.RAPID-MCNC: 630 NG/ML DDU — HIGH
D DIMER BLD IA.RAPID-MCNC: 630 NG/ML DDU — HIGH
DIFF PNL FLD: NEGATIVE — SIGNIFICANT CHANGE UP
EGFR: 42 ML/MIN/1.73M2 — LOW
EGFR: 43 ML/MIN/1.73M2 — LOW
EGFR: 43 ML/MIN/1.73M2 — LOW
EGFR: 44 ML/MIN/1.73M2 — LOW
EGFR: 44 ML/MIN/1.73M2 — LOW
EGFR: 45 ML/MIN/1.73M2 — LOW
EGFR: 45 ML/MIN/1.73M2 — LOW
EGFR: 46 ML/MIN/1.73M2 — LOW
EGFR: 47 ML/MIN/1.73M2 — LOW
EGFR: 47 ML/MIN/1.73M2 — LOW
EGFR: 48 ML/MIN/1.73M2 — LOW
EGFR: 50 ML/MIN/1.73M2 — LOW
EGFR: 52 ML/MIN/1.73M2 — LOW
EGFR: 53 ML/MIN/1.73M2 — LOW
EGFR: 53 ML/MIN/1.73M2 — LOW
EGFR: 57 ML/MIN/1.73M2 — LOW
EGFR: 57 ML/MIN/1.73M2 — LOW
EGFR: 59 ML/MIN/1.73M2 — LOW
EGFR: 59 ML/MIN/1.73M2 — LOW
EGFR: 60 ML/MIN/1.73M2 — SIGNIFICANT CHANGE UP
EGFR: 60 ML/MIN/1.73M2 — SIGNIFICANT CHANGE UP
EGFR: 67 ML/MIN/1.73M2 — SIGNIFICANT CHANGE UP
EGFR: 67 ML/MIN/1.73M2 — SIGNIFICANT CHANGE UP
EGFR: 77 ML/MIN/1.73M2 — SIGNIFICANT CHANGE UP
EGFR: 77 ML/MIN/1.73M2 — SIGNIFICANT CHANGE UP
EOSINOPHIL # BLD AUTO: 0 K/UL — SIGNIFICANT CHANGE UP (ref 0–0.5)
EOSINOPHIL # BLD AUTO: 0.01 K/UL — SIGNIFICANT CHANGE UP (ref 0–0.5)
EOSINOPHIL # BLD AUTO: 0.01 K/UL — SIGNIFICANT CHANGE UP (ref 0–0.5)
EOSINOPHIL # BLD AUTO: 0.03 K/UL — SIGNIFICANT CHANGE UP (ref 0–0.5)
EOSINOPHIL # BLD AUTO: 0.03 K/UL — SIGNIFICANT CHANGE UP (ref 0–0.5)
EOSINOPHIL # BLD AUTO: 0.05 K/UL — SIGNIFICANT CHANGE UP (ref 0–0.5)
EOSINOPHIL # BLD AUTO: 0.07 K/UL — SIGNIFICANT CHANGE UP (ref 0–0.5)
EOSINOPHIL # BLD AUTO: 0.1 K/UL — SIGNIFICANT CHANGE UP (ref 0–0.5)
EOSINOPHIL # BLD AUTO: 0.1 K/UL — SIGNIFICANT CHANGE UP (ref 0–0.5)
EOSINOPHIL # BLD AUTO: 0.14 K/UL — SIGNIFICANT CHANGE UP (ref 0–0.5)
EOSINOPHIL # BLD AUTO: 0.14 K/UL — SIGNIFICANT CHANGE UP (ref 0–0.5)
EOSINOPHIL # BLD AUTO: 0.16 K/UL — SIGNIFICANT CHANGE UP (ref 0–0.5)
EOSINOPHIL # BLD AUTO: 0.18 K/UL — SIGNIFICANT CHANGE UP (ref 0–0.5)
EOSINOPHIL # BLD AUTO: 0.19 K/UL — SIGNIFICANT CHANGE UP (ref 0–0.5)
EOSINOPHIL # BLD AUTO: 0.21 K/UL — SIGNIFICANT CHANGE UP (ref 0–0.5)
EOSINOPHIL # BLD AUTO: 0.23 K/UL — SIGNIFICANT CHANGE UP (ref 0–0.5)
EOSINOPHIL # BLD AUTO: 0.24 K/UL — SIGNIFICANT CHANGE UP (ref 0–0.5)
EOSINOPHIL # BLD AUTO: 0.28 K/UL — SIGNIFICANT CHANGE UP (ref 0–0.5)
EOSINOPHIL # BLD AUTO: 0.39 K/UL — SIGNIFICANT CHANGE UP (ref 0–0.5)
EOSINOPHIL # BLD AUTO: 0.39 K/UL — SIGNIFICANT CHANGE UP (ref 0–0.5)
EOSINOPHIL # BLD AUTO: 0.56 K/UL — HIGH (ref 0–0.5)
EOSINOPHIL # BLD AUTO: 0.56 K/UL — HIGH (ref 0–0.5)
EOSINOPHIL # FLD: 1 % — SIGNIFICANT CHANGE UP
EOSINOPHIL NFR BLD AUTO: 0 % — SIGNIFICANT CHANGE UP (ref 0–6)
EOSINOPHIL NFR BLD AUTO: 0.1 % — SIGNIFICANT CHANGE UP (ref 0–6)
EOSINOPHIL NFR BLD AUTO: 0.1 % — SIGNIFICANT CHANGE UP (ref 0–6)
EOSINOPHIL NFR BLD AUTO: 0.3 % — SIGNIFICANT CHANGE UP (ref 0–6)
EOSINOPHIL NFR BLD AUTO: 0.3 % — SIGNIFICANT CHANGE UP (ref 0–6)
EOSINOPHIL NFR BLD AUTO: 0.5 % — SIGNIFICANT CHANGE UP (ref 0–6)
EOSINOPHIL NFR BLD AUTO: 0.6 % — SIGNIFICANT CHANGE UP (ref 0–6)
EOSINOPHIL NFR BLD AUTO: 0.6 % — SIGNIFICANT CHANGE UP (ref 0–6)
EOSINOPHIL NFR BLD AUTO: 0.9 % — SIGNIFICANT CHANGE UP (ref 0–6)
EOSINOPHIL NFR BLD AUTO: 1.2 % — SIGNIFICANT CHANGE UP (ref 0–6)
EOSINOPHIL NFR BLD AUTO: 1.2 % — SIGNIFICANT CHANGE UP (ref 0–6)
EOSINOPHIL NFR BLD AUTO: 1.3 % — SIGNIFICANT CHANGE UP (ref 0–6)
EOSINOPHIL NFR BLD AUTO: 1.3 % — SIGNIFICANT CHANGE UP (ref 0–6)
EOSINOPHIL NFR BLD AUTO: 1.7 % — SIGNIFICANT CHANGE UP (ref 0–6)
EOSINOPHIL NFR BLD AUTO: 1.9 % — SIGNIFICANT CHANGE UP (ref 0–6)
EOSINOPHIL NFR BLD AUTO: 2.2 % — SIGNIFICANT CHANGE UP (ref 0–6)
EOSINOPHIL NFR BLD AUTO: 2.5 % — SIGNIFICANT CHANGE UP (ref 0–6)
EOSINOPHIL NFR BLD AUTO: 2.5 % — SIGNIFICANT CHANGE UP (ref 0–6)
EOSINOPHIL NFR BLD AUTO: 2.8 % — SIGNIFICANT CHANGE UP (ref 0–6)
EOSINOPHIL NFR BLD AUTO: 2.9 % — SIGNIFICANT CHANGE UP (ref 0–6)
EOSINOPHIL NFR BLD AUTO: 2.9 % — SIGNIFICANT CHANGE UP (ref 0–6)
EOSINOPHIL NFR BLD AUTO: 3.1 % — SIGNIFICANT CHANGE UP (ref 0–6)
EOSINOPHIL NFR BLD AUTO: 3.5 % — SIGNIFICANT CHANGE UP (ref 0–6)
EOSINOPHIL NFR BLD AUTO: 3.5 % — SIGNIFICANT CHANGE UP (ref 0–6)
ESTIMATED AVERAGE GLUCOSE: 123 — SIGNIFICANT CHANGE UP
ESTIMATED AVERAGE GLUCOSE: 123 — SIGNIFICANT CHANGE UP
FLUAV SUBTYP SPEC NAA+PROBE: SIGNIFICANT CHANGE UP
FLUBV RNA SPEC QL NAA+PROBE: SIGNIFICANT CHANGE UP
FLUID INTAKE SUBSTANCE CLASS: SIGNIFICANT CHANGE UP
GAS PNL BLDV: 132 MMOL/L — LOW (ref 136–145)
GAS PNL BLDV: 132 MMOL/L — LOW (ref 136–145)
GAS PNL BLDV: 135 MMOL/L — LOW (ref 136–145)
GAS PNL BLDV: SIGNIFICANT CHANGE UP
GAS PNL BLDV: SIGNIFICANT CHANGE UP
GLUCOSE BLDC GLUCOMTR-MCNC: 131 MG/DL — HIGH (ref 70–99)
GLUCOSE BLDC GLUCOMTR-MCNC: 131 MG/DL — HIGH (ref 70–99)
GLUCOSE BLDV-MCNC: 110 MG/DL — HIGH (ref 70–99)
GLUCOSE BLDV-MCNC: 126 MG/DL — HIGH (ref 70–99)
GLUCOSE BLDV-MCNC: 126 MG/DL — HIGH (ref 70–99)
GLUCOSE FLD-MCNC: 76 MG/DL — SIGNIFICANT CHANGE UP
GLUCOSE SERPL-MCNC: 102 MG/DL — HIGH (ref 70–99)
GLUCOSE SERPL-MCNC: 104 MG/DL — HIGH (ref 70–99)
GLUCOSE SERPL-MCNC: 104 MG/DL — HIGH (ref 70–99)
GLUCOSE SERPL-MCNC: 105 MG/DL — HIGH (ref 70–99)
GLUCOSE SERPL-MCNC: 106 MG/DL — HIGH (ref 70–99)
GLUCOSE SERPL-MCNC: 119 MG/DL — HIGH (ref 70–99)
GLUCOSE SERPL-MCNC: 119 MG/DL — HIGH (ref 70–99)
GLUCOSE SERPL-MCNC: 124 MG/DL — HIGH (ref 70–99)
GLUCOSE SERPL-MCNC: 124 MG/DL — HIGH (ref 70–99)
GLUCOSE SERPL-MCNC: 127 MG/DL — HIGH (ref 70–99)
GLUCOSE SERPL-MCNC: 158 MG/DL — HIGH (ref 70–99)
GLUCOSE SERPL-MCNC: 160 MG/DL — HIGH (ref 70–99)
GLUCOSE SERPL-MCNC: 180 MG/DL — HIGH (ref 70–99)
GLUCOSE SERPL-MCNC: 180 MG/DL — HIGH (ref 70–99)
GLUCOSE SERPL-MCNC: 190 MG/DL — HIGH (ref 70–99)
GLUCOSE SERPL-MCNC: 190 MG/DL — HIGH (ref 70–99)
GLUCOSE SERPL-MCNC: 91 MG/DL — SIGNIFICANT CHANGE UP (ref 70–99)
GLUCOSE SERPL-MCNC: 92 MG/DL — SIGNIFICANT CHANGE UP (ref 70–99)
GLUCOSE SERPL-MCNC: 92 MG/DL — SIGNIFICANT CHANGE UP (ref 70–99)
GLUCOSE SERPL-MCNC: 93 MG/DL — SIGNIFICANT CHANGE UP (ref 70–99)
GLUCOSE SERPL-MCNC: 93 MG/DL — SIGNIFICANT CHANGE UP (ref 70–99)
GLUCOSE SERPL-MCNC: 97 MG/DL — SIGNIFICANT CHANGE UP (ref 70–99)
GLUCOSE SERPL-MCNC: 98 MG/DL — SIGNIFICANT CHANGE UP (ref 70–99)
GLUCOSE UR QL: NEGATIVE MG/DL — SIGNIFICANT CHANGE UP
GRAM STN FLD: ABNORMAL
GRAM STN FLD: ABNORMAL
GRAM STN FLD: SIGNIFICANT CHANGE UP
HADV DNA SPEC QL NAA+PROBE: SIGNIFICANT CHANGE UP
HCO3 BLDV-SCNC: 23 MMOL/L — SIGNIFICANT CHANGE UP (ref 22–29)
HCO3 BLDV-SCNC: 23 MMOL/L — SIGNIFICANT CHANGE UP (ref 22–29)
HCO3 BLDV-SCNC: 24 MMOL/L — SIGNIFICANT CHANGE UP (ref 22–29)
HCOV 229E RNA SPEC QL NAA+PROBE: SIGNIFICANT CHANGE UP
HCOV HKU1 RNA SPEC QL NAA+PROBE: SIGNIFICANT CHANGE UP
HCOV NL63 RNA SPEC QL NAA+PROBE: SIGNIFICANT CHANGE UP
HCOV OC43 RNA SPEC QL NAA+PROBE: SIGNIFICANT CHANGE UP
HCT VFR BLD CALC: 25 % — LOW (ref 39–50)
HCT VFR BLD CALC: 25 % — LOW (ref 39–50)
HCT VFR BLD CALC: 25.2 % — LOW (ref 39–50)
HCT VFR BLD CALC: 25.2 % — LOW (ref 39–50)
HCT VFR BLD CALC: 25.8 % — LOW (ref 39–50)
HCT VFR BLD CALC: 25.8 % — LOW (ref 39–50)
HCT VFR BLD CALC: 27 % — LOW (ref 39–50)
HCT VFR BLD CALC: 27 % — LOW (ref 39–50)
HCT VFR BLD CALC: 27.1 % — LOW (ref 39–50)
HCT VFR BLD CALC: 27.1 % — LOW (ref 39–50)
HCT VFR BLD CALC: 27.6 % — LOW (ref 39–50)
HCT VFR BLD CALC: 27.7 % — LOW (ref 39–50)
HCT VFR BLD CALC: 27.8 % — LOW (ref 39–50)
HCT VFR BLD CALC: 27.8 % — LOW (ref 39–50)
HCT VFR BLD CALC: 28.1 % — LOW (ref 39–50)
HCT VFR BLD CALC: 29.5 % — LOW (ref 39–50)
HCT VFR BLD CALC: 29.5 % — LOW (ref 39–50)
HCT VFR BLD CALC: 30.9 % — LOW (ref 39–50)
HCT VFR BLD CALC: 31.6 % — LOW (ref 39–50)
HCT VFR BLD CALC: 31.8 % — LOW (ref 39–50)
HCT VFR BLD CALC: 31.8 % — LOW (ref 39–50)
HCT VFR BLD CALC: 32.2 % — LOW (ref 39–50)
HCT VFR BLD CALC: 32.7 % — LOW (ref 39–50)
HCT VFR BLD CALC: 32.7 % — LOW (ref 39–50)
HCT VFR BLD CALC: 32.9 % — LOW (ref 39–50)
HCT VFR BLD CALC: 33.1 % — LOW (ref 39–50)
HCT VFR BLD CALC: 33.4 % — LOW (ref 39–50)
HCT VFR BLD CALC: 33.6 % — LOW (ref 39–50)
HCT VFR BLD CALC: 33.6 % — LOW (ref 39–50)
HCT VFR BLD CALC: 36.2 % — LOW (ref 39–50)
HCT VFR BLDA CALC: 28 % — LOW (ref 39–51)
HCT VFR BLDA CALC: 28 % — LOW (ref 39–51)
HCT VFR BLDA CALC: 32 % — LOW (ref 39–51)
HDLC SERPL-MCNC: 18 MG/DL — LOW
HDLC SERPL-MCNC: 18 MG/DL — LOW
HGB BLD CALC-MCNC: 10.6 G/DL — LOW (ref 12.6–17.4)
HGB BLD CALC-MCNC: 9.2 G/DL — LOW (ref 12.6–17.4)
HGB BLD CALC-MCNC: 9.2 G/DL — LOW (ref 12.6–17.4)
HGB BLD-MCNC: 10.3 G/DL — LOW (ref 13–17)
HGB BLD-MCNC: 10.4 G/DL — LOW (ref 13–17)
HGB BLD-MCNC: 10.4 G/DL — LOW (ref 13–17)
HGB BLD-MCNC: 10.5 G/DL — LOW (ref 13–17)
HGB BLD-MCNC: 10.7 G/DL — LOW (ref 13–17)
HGB BLD-MCNC: 10.8 G/DL — LOW (ref 13–17)
HGB BLD-MCNC: 10.8 G/DL — LOW (ref 13–17)
HGB BLD-MCNC: 10.9 G/DL — LOW (ref 13–17)
HGB BLD-MCNC: 10.9 G/DL — LOW (ref 13–17)
HGB BLD-MCNC: 11.6 G/DL — LOW (ref 13–17)
HGB BLD-MCNC: 7.9 G/DL — LOW (ref 13–17)
HGB BLD-MCNC: 8.2 G/DL — LOW (ref 13–17)
HGB BLD-MCNC: 8.2 G/DL — LOW (ref 13–17)
HGB BLD-MCNC: 8.6 G/DL — LOW (ref 13–17)
HGB BLD-MCNC: 8.7 G/DL — LOW (ref 13–17)
HGB BLD-MCNC: 8.8 G/DL — LOW (ref 13–17)
HGB BLD-MCNC: 8.9 G/DL — LOW (ref 13–17)
HGB BLD-MCNC: 8.9 G/DL — LOW (ref 13–17)
HGB BLD-MCNC: 9.1 G/DL — LOW (ref 13–17)
HGB BLD-MCNC: 9.5 G/DL — LOW (ref 13–17)
HGB BLD-MCNC: 9.5 G/DL — LOW (ref 13–17)
HGB BLD-MCNC: 9.9 G/DL — LOW (ref 13–17)
HMPV RNA SPEC QL NAA+PROBE: SIGNIFICANT CHANGE UP
HPIV1 RNA SPEC QL NAA+PROBE: SIGNIFICANT CHANGE UP
HPIV2 RNA SPEC QL NAA+PROBE: SIGNIFICANT CHANGE UP
HPIV3 RNA SPEC QL NAA+PROBE: SIGNIFICANT CHANGE UP
HPIV4 RNA SPEC QL NAA+PROBE: SIGNIFICANT CHANGE UP
IANC: 4.28 K/UL — SIGNIFICANT CHANGE UP (ref 1.8–7.4)
IANC: 4.28 K/UL — SIGNIFICANT CHANGE UP (ref 1.8–7.4)
IANC: 4.79 K/UL — SIGNIFICANT CHANGE UP (ref 1.8–7.4)
IANC: 4.79 K/UL — SIGNIFICANT CHANGE UP (ref 1.8–7.4)
IANC: 4.98 K/UL — SIGNIFICANT CHANGE UP (ref 1.8–7.4)
IANC: 4.98 K/UL — SIGNIFICANT CHANGE UP (ref 1.8–7.4)
IANC: 5.17 K/UL — SIGNIFICANT CHANGE UP (ref 1.8–7.4)
IANC: 5.7 K/UL — SIGNIFICANT CHANGE UP (ref 1.8–7.4)
IANC: 5.7 K/UL — SIGNIFICANT CHANGE UP (ref 1.8–7.4)
IANC: 6.99 K/UL — SIGNIFICANT CHANGE UP (ref 1.8–7.4)
IANC: 7.08 K/UL — SIGNIFICANT CHANGE UP (ref 1.8–7.4)
IANC: 8.7 K/UL — HIGH (ref 1.8–7.4)
IANC: 8.7 K/UL — HIGH (ref 1.8–7.4)
IMM GRANULOCYTES NFR BLD AUTO: 0.7 % — SIGNIFICANT CHANGE UP (ref 0–0.9)
IMM GRANULOCYTES NFR BLD AUTO: 0.7 % — SIGNIFICANT CHANGE UP (ref 0–0.9)
IMM GRANULOCYTES NFR BLD AUTO: 0.8 % — SIGNIFICANT CHANGE UP (ref 0–0.9)
IMM GRANULOCYTES NFR BLD AUTO: 0.9 % — SIGNIFICANT CHANGE UP (ref 0–0.9)
IMM GRANULOCYTES NFR BLD AUTO: 1 % — HIGH (ref 0–0.9)
IMM GRANULOCYTES NFR BLD AUTO: 1.1 % — HIGH (ref 0–0.9)
IMM GRANULOCYTES NFR BLD AUTO: 1.2 % — HIGH (ref 0–0.9)
IMM GRANULOCYTES NFR BLD AUTO: 2.6 % — HIGH (ref 0–0.9)
IMM GRANULOCYTES NFR BLD AUTO: 4.7 % — HIGH (ref 0–0.9)
IMM GRANULOCYTES NFR BLD AUTO: 4.7 % — HIGH (ref 0–0.9)
IMM GRANULOCYTES NFR BLD AUTO: 5.3 % — HIGH (ref 0–0.9)
IMM GRANULOCYTES NFR BLD AUTO: 5.3 % — HIGH (ref 0–0.9)
INR BLD: 1.19 RATIO — HIGH (ref 0.88–1.16)
INR BLD: 1.3 RATIO — HIGH (ref 0.85–1.18)
INR BLD: 1.34 RATIO — HIGH (ref 0.85–1.18)
INR BLD: 1.34 RATIO — HIGH (ref 0.85–1.18)
KETONES UR-MCNC: ABNORMAL MG/DL
LACTATE BLDV-MCNC: 0.9 MMOL/L — SIGNIFICANT CHANGE UP (ref 0.5–2)
LACTATE BLDV-MCNC: 0.9 MMOL/L — SIGNIFICANT CHANGE UP (ref 0.5–2)
LACTATE BLDV-MCNC: 1.5 MMOL/L — SIGNIFICANT CHANGE UP (ref 0.5–2)
LDH SERPL L TO P-CCNC: 525 U/L — SIGNIFICANT CHANGE UP
LEGIONELLA AG UR QL: NEGATIVE — SIGNIFICANT CHANGE UP
LEGIONELLA AG UR QL: NEGATIVE — SIGNIFICANT CHANGE UP
LEUKOCYTE ESTERASE UR-ACNC: ABNORMAL
LEUKOCYTE ESTERASE UR-ACNC: ABNORMAL
LEUKOCYTE ESTERASE UR-ACNC: NEGATIVE — SIGNIFICANT CHANGE UP
LEUKOCYTE ESTERASE UR-ACNC: NEGATIVE — SIGNIFICANT CHANGE UP
LIPID PNL WITH DIRECT LDL SERPL: 71 MG/DL — SIGNIFICANT CHANGE UP
LIPID PNL WITH DIRECT LDL SERPL: 71 MG/DL — SIGNIFICANT CHANGE UP
LYMPHOCYTES # BLD AUTO: 0.94 K/UL — LOW (ref 1–3.3)
LYMPHOCYTES # BLD AUTO: 0.94 K/UL — LOW (ref 1–3.3)
LYMPHOCYTES # BLD AUTO: 1.74 K/UL — SIGNIFICANT CHANGE UP (ref 1–3.3)
LYMPHOCYTES # BLD AUTO: 1.76 K/UL — SIGNIFICANT CHANGE UP (ref 1–3.3)
LYMPHOCYTES # BLD AUTO: 1.76 K/UL — SIGNIFICANT CHANGE UP (ref 1–3.3)
LYMPHOCYTES # BLD AUTO: 1.79 K/UL — SIGNIFICANT CHANGE UP (ref 1–3.3)
LYMPHOCYTES # BLD AUTO: 1.79 K/UL — SIGNIFICANT CHANGE UP (ref 1–3.3)
LYMPHOCYTES # BLD AUTO: 1.98 K/UL — SIGNIFICANT CHANGE UP (ref 1–3.3)
LYMPHOCYTES # BLD AUTO: 1.98 K/UL — SIGNIFICANT CHANGE UP (ref 1–3.3)
LYMPHOCYTES # BLD AUTO: 12.3 % — LOW (ref 13–44)
LYMPHOCYTES # BLD AUTO: 12.3 % — LOW (ref 13–44)
LYMPHOCYTES # BLD AUTO: 13 % — SIGNIFICANT CHANGE UP (ref 13–44)
LYMPHOCYTES # BLD AUTO: 13 % — SIGNIFICANT CHANGE UP (ref 13–44)
LYMPHOCYTES # BLD AUTO: 14.9 % — SIGNIFICANT CHANGE UP (ref 13–44)
LYMPHOCYTES # BLD AUTO: 14.9 % — SIGNIFICANT CHANGE UP (ref 13–44)
LYMPHOCYTES # BLD AUTO: 15.4 % — SIGNIFICANT CHANGE UP (ref 13–44)
LYMPHOCYTES # BLD AUTO: 15.4 % — SIGNIFICANT CHANGE UP (ref 13–44)
LYMPHOCYTES # BLD AUTO: 16.4 % — SIGNIFICANT CHANGE UP (ref 13–44)
LYMPHOCYTES # BLD AUTO: 16.4 % — SIGNIFICANT CHANGE UP (ref 13–44)
LYMPHOCYTES # BLD AUTO: 19.1 % — SIGNIFICANT CHANGE UP (ref 13–44)
LYMPHOCYTES # BLD AUTO: 2.06 K/UL — SIGNIFICANT CHANGE UP (ref 1–3.3)
LYMPHOCYTES # BLD AUTO: 2.11 K/UL — SIGNIFICANT CHANGE UP (ref 1–3.3)
LYMPHOCYTES # BLD AUTO: 2.11 K/UL — SIGNIFICANT CHANGE UP (ref 1–3.3)
LYMPHOCYTES # BLD AUTO: 2.2 K/UL — SIGNIFICANT CHANGE UP (ref 1–3.3)
LYMPHOCYTES # BLD AUTO: 2.2 K/UL — SIGNIFICANT CHANGE UP (ref 1–3.3)
LYMPHOCYTES # BLD AUTO: 2.46 K/UL — SIGNIFICANT CHANGE UP (ref 1–3.3)
LYMPHOCYTES # BLD AUTO: 2.5 K/UL — SIGNIFICANT CHANGE UP (ref 1–3.3)
LYMPHOCYTES # BLD AUTO: 2.63 K/UL — SIGNIFICANT CHANGE UP (ref 1–3.3)
LYMPHOCYTES # BLD AUTO: 2.65 K/UL — SIGNIFICANT CHANGE UP (ref 1–3.3)
LYMPHOCYTES # BLD AUTO: 2.65 K/UL — SIGNIFICANT CHANGE UP (ref 1–3.3)
LYMPHOCYTES # BLD AUTO: 2.69 K/UL — SIGNIFICANT CHANGE UP (ref 1–3.3)
LYMPHOCYTES # BLD AUTO: 2.77 K/UL — SIGNIFICANT CHANGE UP (ref 1–3.3)
LYMPHOCYTES # BLD AUTO: 2.81 K/UL — SIGNIFICANT CHANGE UP (ref 1–3.3)
LYMPHOCYTES # BLD AUTO: 2.84 K/UL — SIGNIFICANT CHANGE UP (ref 1–3.3)
LYMPHOCYTES # BLD AUTO: 22.8 % — SIGNIFICANT CHANGE UP (ref 13–44)
LYMPHOCYTES # BLD AUTO: 22.8 % — SIGNIFICANT CHANGE UP (ref 13–44)
LYMPHOCYTES # BLD AUTO: 23.5 % — SIGNIFICANT CHANGE UP (ref 13–44)
LYMPHOCYTES # BLD AUTO: 23.8 % — SIGNIFICANT CHANGE UP (ref 13–44)
LYMPHOCYTES # BLD AUTO: 23.8 % — SIGNIFICANT CHANGE UP (ref 13–44)
LYMPHOCYTES # BLD AUTO: 24.4 % — SIGNIFICANT CHANGE UP (ref 13–44)
LYMPHOCYTES # BLD AUTO: 25.1 % — SIGNIFICANT CHANGE UP (ref 13–44)
LYMPHOCYTES # BLD AUTO: 25.1 % — SIGNIFICANT CHANGE UP (ref 13–44)
LYMPHOCYTES # BLD AUTO: 25.7 % — SIGNIFICANT CHANGE UP (ref 13–44)
LYMPHOCYTES # BLD AUTO: 25.7 % — SIGNIFICANT CHANGE UP (ref 13–44)
LYMPHOCYTES # BLD AUTO: 27.7 % — SIGNIFICANT CHANGE UP (ref 13–44)
LYMPHOCYTES # BLD AUTO: 27.8 % — SIGNIFICANT CHANGE UP (ref 13–44)
LYMPHOCYTES # BLD AUTO: 27.8 % — SIGNIFICANT CHANGE UP (ref 13–44)
LYMPHOCYTES # BLD AUTO: 3.08 K/UL — SIGNIFICANT CHANGE UP (ref 1–3.3)
LYMPHOCYTES # BLD AUTO: 3.63 K/UL — HIGH (ref 1–3.3)
LYMPHOCYTES # BLD AUTO: 3.63 K/UL — HIGH (ref 1–3.3)
LYMPHOCYTES # BLD AUTO: 30.6 % — SIGNIFICANT CHANGE UP (ref 13–44)
LYMPHOCYTES # BLD AUTO: 31.7 % — SIGNIFICANT CHANGE UP (ref 13–44)
LYMPHOCYTES # BLD AUTO: 32.9 % — SIGNIFICANT CHANGE UP (ref 13–44)
LYMPHOCYTES # BLD AUTO: 33.5 % — SIGNIFICANT CHANGE UP (ref 13–44)
LYMPHOCYTES # BLD AUTO: 35.8 % — SIGNIFICANT CHANGE UP (ref 13–44)
LYMPHOCYTES # FLD: 93 % — SIGNIFICANT CHANGE UP
M PNEUMO DNA SPEC QL NAA+PROBE: SIGNIFICANT CHANGE UP
MAGNESIUM SERPL-MCNC: 1.7 MG/DL — SIGNIFICANT CHANGE UP (ref 1.6–2.6)
MAGNESIUM SERPL-MCNC: 1.7 MG/DL — SIGNIFICANT CHANGE UP (ref 1.6–2.6)
MAGNESIUM SERPL-MCNC: 1.8 MG/DL — SIGNIFICANT CHANGE UP (ref 1.6–2.6)
MAGNESIUM SERPL-MCNC: 1.8 MG/DL — SIGNIFICANT CHANGE UP (ref 1.6–2.6)
MAGNESIUM SERPL-MCNC: 1.9 MG/DL — SIGNIFICANT CHANGE UP (ref 1.6–2.6)
MAGNESIUM SERPL-MCNC: 2.1 MG/DL — SIGNIFICANT CHANGE UP (ref 1.6–2.6)
MAGNESIUM SERPL-MCNC: 2.3 MG/DL — SIGNIFICANT CHANGE UP (ref 1.6–2.6)
MAGNESIUM SERPL-MCNC: 2.4 MG/DL — SIGNIFICANT CHANGE UP (ref 1.6–2.6)
MCHC RBC-ENTMCNC: 26 PG — LOW (ref 27–34)
MCHC RBC-ENTMCNC: 26.1 PG — LOW (ref 27–34)
MCHC RBC-ENTMCNC: 26.1 PG — LOW (ref 27–34)
MCHC RBC-ENTMCNC: 26.2 PG — LOW (ref 27–34)
MCHC RBC-ENTMCNC: 26.2 PG — LOW (ref 27–34)
MCHC RBC-ENTMCNC: 26.3 PG — LOW (ref 27–34)
MCHC RBC-ENTMCNC: 26.3 PG — LOW (ref 27–34)
MCHC RBC-ENTMCNC: 26.4 PG — LOW (ref 27–34)
MCHC RBC-ENTMCNC: 26.5 PG — LOW (ref 27–34)
MCHC RBC-ENTMCNC: 26.6 PG — LOW (ref 27–34)
MCHC RBC-ENTMCNC: 26.7 PG — LOW (ref 27–34)
MCHC RBC-ENTMCNC: 26.7 PG — LOW (ref 27–34)
MCHC RBC-ENTMCNC: 26.8 PG — LOW (ref 27–34)
MCHC RBC-ENTMCNC: 26.9 PG — LOW (ref 27–34)
MCHC RBC-ENTMCNC: 27 PG — SIGNIFICANT CHANGE UP (ref 27–34)
MCHC RBC-ENTMCNC: 27.1 PG — SIGNIFICANT CHANGE UP (ref 27–34)
MCHC RBC-ENTMCNC: 27.1 PG — SIGNIFICANT CHANGE UP (ref 27–34)
MCHC RBC-ENTMCNC: 27.2 PG — SIGNIFICANT CHANGE UP (ref 27–34)
MCHC RBC-ENTMCNC: 30.9 GM/DL — LOW (ref 32–36)
MCHC RBC-ENTMCNC: 30.9 GM/DL — LOW (ref 32–36)
MCHC RBC-ENTMCNC: 31 GM/DL — LOW (ref 32–36)
MCHC RBC-ENTMCNC: 31 GM/DL — LOW (ref 32–36)
MCHC RBC-ENTMCNC: 31.1 GM/DL — LOW (ref 32–36)
MCHC RBC-ENTMCNC: 31.3 GM/DL — LOW (ref 32–36)
MCHC RBC-ENTMCNC: 31.3 GM/DL — LOW (ref 32–36)
MCHC RBC-ENTMCNC: 31.6 GM/DL — LOW (ref 32–36)
MCHC RBC-ENTMCNC: 31.6 GM/DL — LOW (ref 32–36)
MCHC RBC-ENTMCNC: 31.7 GM/DL — LOW (ref 32–36)
MCHC RBC-ENTMCNC: 31.7 GM/DL — LOW (ref 32–36)
MCHC RBC-ENTMCNC: 31.8 GM/DL — LOW (ref 32–36)
MCHC RBC-ENTMCNC: 31.9 G/DL — LOW (ref 32–36)
MCHC RBC-ENTMCNC: 31.9 GM/DL — LOW (ref 32–36)
MCHC RBC-ENTMCNC: 31.9 GM/DL — LOW (ref 32–36)
MCHC RBC-ENTMCNC: 32 G/DL — SIGNIFICANT CHANGE UP (ref 32–36)
MCHC RBC-ENTMCNC: 32.2 G/DL — SIGNIFICANT CHANGE UP (ref 32–36)
MCHC RBC-ENTMCNC: 32.2 GM/DL — SIGNIFICANT CHANGE UP (ref 32–36)
MCHC RBC-ENTMCNC: 32.2 GM/DL — SIGNIFICANT CHANGE UP (ref 32–36)
MCHC RBC-ENTMCNC: 32.3 G/DL — SIGNIFICANT CHANGE UP (ref 32–36)
MCHC RBC-ENTMCNC: 32.4 G/DL — SIGNIFICANT CHANGE UP (ref 32–36)
MCHC RBC-ENTMCNC: 32.4 G/DL — SIGNIFICANT CHANGE UP (ref 32–36)
MCHC RBC-ENTMCNC: 32.4 GM/DL — SIGNIFICANT CHANGE UP (ref 32–36)
MCHC RBC-ENTMCNC: 32.9 G/DL — SIGNIFICANT CHANGE UP (ref 32–36)
MCHC RBC-ENTMCNC: 33 G/DL — SIGNIFICANT CHANGE UP (ref 32–36)
MCV RBC AUTO: 80.7 FL — SIGNIFICANT CHANGE UP (ref 80–100)
MCV RBC AUTO: 81.7 FL — SIGNIFICANT CHANGE UP (ref 80–100)
MCV RBC AUTO: 82 FL — SIGNIFICANT CHANGE UP (ref 80–100)
MCV RBC AUTO: 82 FL — SIGNIFICANT CHANGE UP (ref 80–100)
MCV RBC AUTO: 82.1 FL — SIGNIFICANT CHANGE UP (ref 80–100)
MCV RBC AUTO: 82.3 FL — SIGNIFICANT CHANGE UP (ref 80–100)
MCV RBC AUTO: 82.4 FL — SIGNIFICANT CHANGE UP (ref 80–100)
MCV RBC AUTO: 82.6 FL — SIGNIFICANT CHANGE UP (ref 80–100)
MCV RBC AUTO: 82.6 FL — SIGNIFICANT CHANGE UP (ref 80–100)
MCV RBC AUTO: 82.8 FL — SIGNIFICANT CHANGE UP (ref 80–100)
MCV RBC AUTO: 83 FL — SIGNIFICANT CHANGE UP (ref 80–100)
MCV RBC AUTO: 83.3 FL — SIGNIFICANT CHANGE UP (ref 80–100)
MCV RBC AUTO: 83.7 FL — SIGNIFICANT CHANGE UP (ref 80–100)
MCV RBC AUTO: 83.8 FL — SIGNIFICANT CHANGE UP (ref 80–100)
MCV RBC AUTO: 83.8 FL — SIGNIFICANT CHANGE UP (ref 80–100)
MCV RBC AUTO: 84 FL — SIGNIFICANT CHANGE UP (ref 80–100)
MCV RBC AUTO: 84.2 FL — SIGNIFICANT CHANGE UP (ref 80–100)
MCV RBC AUTO: 84.2 FL — SIGNIFICANT CHANGE UP (ref 80–100)
MCV RBC AUTO: 84.3 FL — SIGNIFICANT CHANGE UP (ref 80–100)
MCV RBC AUTO: 84.3 FL — SIGNIFICANT CHANGE UP (ref 80–100)
MCV RBC AUTO: 84.9 FL — SIGNIFICANT CHANGE UP (ref 80–100)
MCV RBC AUTO: 84.9 FL — SIGNIFICANT CHANGE UP (ref 80–100)
MCV RBC AUTO: 85.4 FL — SIGNIFICANT CHANGE UP (ref 80–100)
MCV RBC AUTO: 85.4 FL — SIGNIFICANT CHANGE UP (ref 80–100)
METHOD TYPE: SIGNIFICANT CHANGE UP
MONOCYTES # BLD AUTO: 0.09 K/UL — SIGNIFICANT CHANGE UP (ref 0–0.9)
MONOCYTES # BLD AUTO: 0.17 K/UL — SIGNIFICANT CHANGE UP (ref 0–0.9)
MONOCYTES # BLD AUTO: 0.17 K/UL — SIGNIFICANT CHANGE UP (ref 0–0.9)
MONOCYTES # BLD AUTO: 0.56 K/UL — SIGNIFICANT CHANGE UP (ref 0–0.9)
MONOCYTES # BLD AUTO: 0.56 K/UL — SIGNIFICANT CHANGE UP (ref 0–0.9)
MONOCYTES # BLD AUTO: 0.58 K/UL — SIGNIFICANT CHANGE UP (ref 0–0.9)
MONOCYTES # BLD AUTO: 0.58 K/UL — SIGNIFICANT CHANGE UP (ref 0–0.9)
MONOCYTES # BLD AUTO: 0.6 K/UL — SIGNIFICANT CHANGE UP (ref 0–0.9)
MONOCYTES # BLD AUTO: 0.6 K/UL — SIGNIFICANT CHANGE UP (ref 0–0.9)
MONOCYTES # BLD AUTO: 0.64 K/UL — SIGNIFICANT CHANGE UP (ref 0–0.9)
MONOCYTES # BLD AUTO: 0.65 K/UL — SIGNIFICANT CHANGE UP (ref 0–0.9)
MONOCYTES # BLD AUTO: 0.68 K/UL — SIGNIFICANT CHANGE UP (ref 0–0.9)
MONOCYTES # BLD AUTO: 0.73 K/UL — SIGNIFICANT CHANGE UP (ref 0–0.9)
MONOCYTES # BLD AUTO: 0.73 K/UL — SIGNIFICANT CHANGE UP (ref 0–0.9)
MONOCYTES # BLD AUTO: 0.74 K/UL — SIGNIFICANT CHANGE UP (ref 0–0.9)
MONOCYTES # BLD AUTO: 0.75 K/UL — SIGNIFICANT CHANGE UP (ref 0–0.9)
MONOCYTES # BLD AUTO: 0.77 K/UL — SIGNIFICANT CHANGE UP (ref 0–0.9)
MONOCYTES # BLD AUTO: 0.77 K/UL — SIGNIFICANT CHANGE UP (ref 0–0.9)
MONOCYTES # BLD AUTO: 0.8 K/UL — SIGNIFICANT CHANGE UP (ref 0–0.9)
MONOCYTES # BLD AUTO: 0.81 K/UL — SIGNIFICANT CHANGE UP (ref 0–0.9)
MONOCYTES # BLD AUTO: 0.81 K/UL — SIGNIFICANT CHANGE UP (ref 0–0.9)
MONOCYTES # BLD AUTO: 0.87 K/UL — SIGNIFICANT CHANGE UP (ref 0–0.9)
MONOCYTES # BLD AUTO: 0.87 K/UL — SIGNIFICANT CHANGE UP (ref 0–0.9)
MONOCYTES # BLD AUTO: 0.89 K/UL — SIGNIFICANT CHANGE UP (ref 0–0.9)
MONOCYTES # BLD AUTO: 0.89 K/UL — SIGNIFICANT CHANGE UP (ref 0–0.9)
MONOCYTES # BLD AUTO: 0.9 K/UL — SIGNIFICANT CHANGE UP (ref 0–0.9)
MONOCYTES # BLD AUTO: 0.9 K/UL — SIGNIFICANT CHANGE UP (ref 0–0.9)
MONOCYTES NFR BLD AUTO: 1 % — LOW (ref 2–14)
MONOCYTES NFR BLD AUTO: 10.2 % — SIGNIFICANT CHANGE UP (ref 2–14)
MONOCYTES NFR BLD AUTO: 10.2 % — SIGNIFICANT CHANGE UP (ref 2–14)
MONOCYTES NFR BLD AUTO: 10.9 % — SIGNIFICANT CHANGE UP (ref 2–14)
MONOCYTES NFR BLD AUTO: 10.9 % — SIGNIFICANT CHANGE UP (ref 2–14)
MONOCYTES NFR BLD AUTO: 4.6 % — SIGNIFICANT CHANGE UP (ref 2–14)
MONOCYTES NFR BLD AUTO: 4.6 % — SIGNIFICANT CHANGE UP (ref 2–14)
MONOCYTES NFR BLD AUTO: 5.1 % — SIGNIFICANT CHANGE UP (ref 2–14)
MONOCYTES NFR BLD AUTO: 5.1 % — SIGNIFICANT CHANGE UP (ref 2–14)
MONOCYTES NFR BLD AUTO: 6.6 % — SIGNIFICANT CHANGE UP (ref 2–14)
MONOCYTES NFR BLD AUTO: 7 % — SIGNIFICANT CHANGE UP (ref 2–14)
MONOCYTES NFR BLD AUTO: 7.1 % — SIGNIFICANT CHANGE UP (ref 2–14)
MONOCYTES NFR BLD AUTO: 7.4 % — SIGNIFICANT CHANGE UP (ref 2–14)
MONOCYTES NFR BLD AUTO: 7.6 % — SIGNIFICANT CHANGE UP (ref 2–14)
MONOCYTES NFR BLD AUTO: 7.7 % — SIGNIFICANT CHANGE UP (ref 2–14)
MONOCYTES NFR BLD AUTO: 7.8 % — SIGNIFICANT CHANGE UP (ref 2–14)
MONOCYTES NFR BLD AUTO: 7.9 % — SIGNIFICANT CHANGE UP (ref 2–14)
MONOCYTES NFR BLD AUTO: 8 % — SIGNIFICANT CHANGE UP (ref 2–14)
MONOS+MACROS # FLD: 1 % — SIGNIFICANT CHANGE UP
MRSA PCR RESULT.: SIGNIFICANT CHANGE UP
MYELOCYTES NFR BLD: 1 % — HIGH (ref 0–0)
MYELOCYTES NFR BLD: 1 % — HIGH (ref 0–0)
NEUTROPHILS # BLD AUTO: 10.06 K/UL — HIGH (ref 1.8–7.4)
NEUTROPHILS # BLD AUTO: 10.06 K/UL — HIGH (ref 1.8–7.4)
NEUTROPHILS # BLD AUTO: 10.77 K/UL — HIGH (ref 1.8–7.4)
NEUTROPHILS # BLD AUTO: 10.77 K/UL — HIGH (ref 1.8–7.4)
NEUTROPHILS # BLD AUTO: 14.39 K/UL — HIGH (ref 1.8–7.4)
NEUTROPHILS # BLD AUTO: 14.39 K/UL — HIGH (ref 1.8–7.4)
NEUTROPHILS # BLD AUTO: 4.07 K/UL — SIGNIFICANT CHANGE UP (ref 1.8–7.4)
NEUTROPHILS # BLD AUTO: 4.28 K/UL — SIGNIFICANT CHANGE UP (ref 1.8–7.4)
NEUTROPHILS # BLD AUTO: 4.28 K/UL — SIGNIFICANT CHANGE UP (ref 1.8–7.4)
NEUTROPHILS # BLD AUTO: 4.69 K/UL — SIGNIFICANT CHANGE UP (ref 1.8–7.4)
NEUTROPHILS # BLD AUTO: 4.77 K/UL — SIGNIFICANT CHANGE UP (ref 1.8–7.4)
NEUTROPHILS # BLD AUTO: 4.79 K/UL — SIGNIFICANT CHANGE UP (ref 1.8–7.4)
NEUTROPHILS # BLD AUTO: 4.79 K/UL — SIGNIFICANT CHANGE UP (ref 1.8–7.4)
NEUTROPHILS # BLD AUTO: 4.98 K/UL — SIGNIFICANT CHANGE UP (ref 1.8–7.4)
NEUTROPHILS # BLD AUTO: 4.98 K/UL — SIGNIFICANT CHANGE UP (ref 1.8–7.4)
NEUTROPHILS # BLD AUTO: 5.16 K/UL — SIGNIFICANT CHANGE UP (ref 1.8–7.4)
NEUTROPHILS # BLD AUTO: 5.17 K/UL — SIGNIFICANT CHANGE UP (ref 1.8–7.4)
NEUTROPHILS # BLD AUTO: 5.57 K/UL — SIGNIFICANT CHANGE UP (ref 1.8–7.4)
NEUTROPHILS # BLD AUTO: 5.7 K/UL — SIGNIFICANT CHANGE UP (ref 1.8–7.4)
NEUTROPHILS # BLD AUTO: 5.7 K/UL — SIGNIFICANT CHANGE UP (ref 1.8–7.4)
NEUTROPHILS # BLD AUTO: 6.8 K/UL — SIGNIFICANT CHANGE UP (ref 1.8–7.4)
NEUTROPHILS # BLD AUTO: 6.8 K/UL — SIGNIFICANT CHANGE UP (ref 1.8–7.4)
NEUTROPHILS # BLD AUTO: 6.99 K/UL — SIGNIFICANT CHANGE UP (ref 1.8–7.4)
NEUTROPHILS # BLD AUTO: 7.08 K/UL — SIGNIFICANT CHANGE UP (ref 1.8–7.4)
NEUTROPHILS # BLD AUTO: 7.19 K/UL — SIGNIFICANT CHANGE UP (ref 1.8–7.4)
NEUTROPHILS # BLD AUTO: 8.7 K/UL — HIGH (ref 1.8–7.4)
NEUTROPHILS # BLD AUTO: 8.7 K/UL — HIGH (ref 1.8–7.4)
NEUTROPHILS # BLD AUTO: 9.42 K/UL — HIGH (ref 1.8–7.4)
NEUTROPHILS # BLD AUTO: 9.42 K/UL — HIGH (ref 1.8–7.4)
NEUTROPHILS NFR BLD AUTO: 52.6 % — SIGNIFICANT CHANGE UP (ref 43–77)
NEUTROPHILS NFR BLD AUTO: 55.3 % — SIGNIFICANT CHANGE UP (ref 43–77)
NEUTROPHILS NFR BLD AUTO: 55.3 % — SIGNIFICANT CHANGE UP (ref 43–77)
NEUTROPHILS NFR BLD AUTO: 56.2 % — SIGNIFICANT CHANGE UP (ref 43–77)
NEUTROPHILS NFR BLD AUTO: 56.4 % — SIGNIFICANT CHANGE UP (ref 43–77)
NEUTROPHILS NFR BLD AUTO: 57.7 % — SIGNIFICANT CHANGE UP (ref 43–77)
NEUTROPHILS NFR BLD AUTO: 57.7 % — SIGNIFICANT CHANGE UP (ref 43–77)
NEUTROPHILS NFR BLD AUTO: 60.8 % — SIGNIFICANT CHANGE UP (ref 43–77)
NEUTROPHILS NFR BLD AUTO: 60.8 % — SIGNIFICANT CHANGE UP (ref 43–77)
NEUTROPHILS NFR BLD AUTO: 61.7 % — SIGNIFICANT CHANGE UP (ref 43–77)
NEUTROPHILS NFR BLD AUTO: 63.7 % — SIGNIFICANT CHANGE UP (ref 43–77)
NEUTROPHILS NFR BLD AUTO: 63.7 % — SIGNIFICANT CHANGE UP (ref 43–77)
NEUTROPHILS NFR BLD AUTO: 64.4 % — SIGNIFICANT CHANGE UP (ref 43–77)
NEUTROPHILS NFR BLD AUTO: 64.4 % — SIGNIFICANT CHANGE UP (ref 43–77)
NEUTROPHILS NFR BLD AUTO: 65 % — SIGNIFICANT CHANGE UP (ref 43–77)
NEUTROPHILS NFR BLD AUTO: 67.5 % — SIGNIFICANT CHANGE UP (ref 43–77)
NEUTROPHILS NFR BLD AUTO: 67.8 % — SIGNIFICANT CHANGE UP (ref 43–77)
NEUTROPHILS NFR BLD AUTO: 67.8 % — SIGNIFICANT CHANGE UP (ref 43–77)
NEUTROPHILS NFR BLD AUTO: 74.8 % — SIGNIFICANT CHANGE UP (ref 43–77)
NEUTROPHILS NFR BLD AUTO: 75.7 % — SIGNIFICANT CHANGE UP (ref 43–77)
NEUTROPHILS NFR BLD AUTO: 75.7 % — SIGNIFICANT CHANGE UP (ref 43–77)
NEUTROPHILS NFR BLD AUTO: 75.8 % — SIGNIFICANT CHANGE UP (ref 43–77)
NEUTROPHILS NFR BLD AUTO: 75.8 % — SIGNIFICANT CHANGE UP (ref 43–77)
NEUTROPHILS NFR BLD AUTO: 78.8 % — HIGH (ref 43–77)
NEUTROPHILS NFR BLD AUTO: 85 % — HIGH (ref 43–77)
NEUTROPHILS NFR BLD AUTO: 85 % — HIGH (ref 43–77)
NEUTROPHILS-BODY FLUID: 4 % — SIGNIFICANT CHANGE UP
NITRITE UR-MCNC: NEGATIVE — SIGNIFICANT CHANGE UP
NON HDL CHOLESTEROL: 89 MG/DL — SIGNIFICANT CHANGE UP
NON HDL CHOLESTEROL: 89 MG/DL — SIGNIFICANT CHANGE UP
NON-GYNECOLOGICAL CYTOLOGY STUDY: SIGNIFICANT CHANGE UP
NRBC # BLD: 0 /100 WBCS — SIGNIFICANT CHANGE UP (ref 0–0)
NRBC # BLD: 0 /100 — SIGNIFICANT CHANGE UP (ref 0–0)
NRBC # BLD: 0 /100 — SIGNIFICANT CHANGE UP (ref 0–0)
NRBC # BLD: SIGNIFICANT CHANGE UP /100 WBCS (ref 0–0)
NRBC # BLD: SIGNIFICANT CHANGE UP /100 WBCS (ref 0–0)
NRBC # FLD: 0 K/UL — SIGNIFICANT CHANGE UP (ref 0–0)
ORGANISM # SPEC MICROSCOPIC CNT: SIGNIFICANT CHANGE UP
ORGANISM # SPEC MICROSCOPIC CNT: SIGNIFICANT CHANGE UP
OTHER CELLS FLD MANUAL: 1 % — SIGNIFICANT CHANGE UP
PCO2 BLDV: 39 MMHG — LOW (ref 42–55)
PCO2 BLDV: 39 MMHG — LOW (ref 42–55)
PCO2 BLDV: 46 MMHG — SIGNIFICANT CHANGE UP (ref 42–55)
PH BLDV: 7.33 — SIGNIFICANT CHANGE UP (ref 7.32–7.43)
PH BLDV: 7.38 — SIGNIFICANT CHANGE UP (ref 7.32–7.43)
PH BLDV: 7.38 — SIGNIFICANT CHANGE UP (ref 7.32–7.43)
PH FLD: 7.51 — SIGNIFICANT CHANGE UP
PH UR: 5 — SIGNIFICANT CHANGE UP (ref 5–8)
PH UR: 5 — SIGNIFICANT CHANGE UP (ref 5–8)
PH UR: 5.5 — SIGNIFICANT CHANGE UP (ref 5–8)
PH UR: 5.5 — SIGNIFICANT CHANGE UP (ref 5–8)
PHOSPHATE SERPL-MCNC: 3 MG/DL — SIGNIFICANT CHANGE UP (ref 2.5–4.5)
PHOSPHATE SERPL-MCNC: 3.2 MG/DL — SIGNIFICANT CHANGE UP (ref 2.5–4.5)
PHOSPHATE SERPL-MCNC: 3.3 MG/DL — SIGNIFICANT CHANGE UP (ref 2.5–4.5)
PHOSPHATE SERPL-MCNC: 3.3 MG/DL — SIGNIFICANT CHANGE UP (ref 2.5–4.5)
PHOSPHATE SERPL-MCNC: 3.4 MG/DL — SIGNIFICANT CHANGE UP (ref 2.5–4.5)
PHOSPHATE SERPL-MCNC: 3.4 MG/DL — SIGNIFICANT CHANGE UP (ref 2.5–4.5)
PHOSPHATE SERPL-MCNC: 3.5 MG/DL — SIGNIFICANT CHANGE UP (ref 2.5–4.5)
PHOSPHATE SERPL-MCNC: 3.5 MG/DL — SIGNIFICANT CHANGE UP (ref 2.5–4.5)
PHOSPHATE SERPL-MCNC: 3.7 MG/DL — SIGNIFICANT CHANGE UP (ref 2.5–4.5)
PHOSPHATE SERPL-MCNC: 4.2 MG/DL — SIGNIFICANT CHANGE UP (ref 2.5–4.5)
PHOSPHATE SERPL-MCNC: 4.2 MG/DL — SIGNIFICANT CHANGE UP (ref 2.5–4.5)
PLAT MORPH BLD: NORMAL — SIGNIFICANT CHANGE UP
PLAT MORPH BLD: NORMAL — SIGNIFICANT CHANGE UP
PLATELET # BLD AUTO: 234 K/UL — SIGNIFICANT CHANGE UP (ref 150–400)
PLATELET # BLD AUTO: 239 K/UL — SIGNIFICANT CHANGE UP (ref 150–400)
PLATELET # BLD AUTO: 245 K/UL — SIGNIFICANT CHANGE UP (ref 150–400)
PLATELET # BLD AUTO: 258 K/UL — SIGNIFICANT CHANGE UP (ref 150–400)
PLATELET # BLD AUTO: 266 K/UL — SIGNIFICANT CHANGE UP (ref 150–400)
PLATELET # BLD AUTO: 266 K/UL — SIGNIFICANT CHANGE UP (ref 150–400)
PLATELET # BLD AUTO: 273 K/UL — SIGNIFICANT CHANGE UP (ref 150–400)
PLATELET # BLD AUTO: 284 K/UL — SIGNIFICANT CHANGE UP (ref 150–400)
PLATELET # BLD AUTO: 285 K/UL — SIGNIFICANT CHANGE UP (ref 150–400)
PLATELET # BLD AUTO: 299 K/UL — SIGNIFICANT CHANGE UP (ref 150–400)
PLATELET # BLD AUTO: 299 K/UL — SIGNIFICANT CHANGE UP (ref 150–400)
PLATELET # BLD AUTO: 333 K/UL — SIGNIFICANT CHANGE UP (ref 150–400)
PLATELET # BLD AUTO: 348 K/UL — SIGNIFICANT CHANGE UP (ref 150–400)
PLATELET # BLD AUTO: 348 K/UL — SIGNIFICANT CHANGE UP (ref 150–400)
PLATELET # BLD AUTO: 349 K/UL — SIGNIFICANT CHANGE UP (ref 150–400)
PLATELET # BLD AUTO: 349 K/UL — SIGNIFICANT CHANGE UP (ref 150–400)
PLATELET # BLD AUTO: 361 K/UL — SIGNIFICANT CHANGE UP (ref 150–400)
PLATELET # BLD AUTO: 361 K/UL — SIGNIFICANT CHANGE UP (ref 150–400)
PLATELET # BLD AUTO: 370 K/UL — SIGNIFICANT CHANGE UP (ref 150–400)
PLATELET # BLD AUTO: 384 K/UL — SIGNIFICANT CHANGE UP (ref 150–400)
PLATELET # BLD AUTO: 384 K/UL — SIGNIFICANT CHANGE UP (ref 150–400)
PLATELET # BLD AUTO: 388 K/UL — SIGNIFICANT CHANGE UP (ref 150–400)
PLATELET # BLD AUTO: 388 K/UL — SIGNIFICANT CHANGE UP (ref 150–400)
PLATELET # BLD AUTO: 404 K/UL — HIGH (ref 150–400)
PLATELET # BLD AUTO: 404 K/UL — HIGH (ref 150–400)
PLATELET # BLD AUTO: 406 K/UL — HIGH (ref 150–400)
PLATELET # BLD AUTO: 406 K/UL — HIGH (ref 150–400)
PLATELET # BLD AUTO: 409 K/UL — HIGH (ref 150–400)
PLATELET # BLD AUTO: 409 K/UL — HIGH (ref 150–400)
PLATELET # BLD AUTO: 412 K/UL — HIGH (ref 150–400)
PLATELET # BLD AUTO: 412 K/UL — HIGH (ref 150–400)
PO2 BLDV: 24 MMHG — LOW (ref 25–45)
PO2 BLDV: 64 MMHG — HIGH (ref 25–45)
PO2 BLDV: 64 MMHG — HIGH (ref 25–45)
POTASSIUM BLDV-SCNC: 4.7 MMOL/L — SIGNIFICANT CHANGE UP (ref 3.5–5.1)
POTASSIUM BLDV-SCNC: 4.7 MMOL/L — SIGNIFICANT CHANGE UP (ref 3.5–5.1)
POTASSIUM BLDV-SCNC: 5.1 MMOL/L — SIGNIFICANT CHANGE UP (ref 3.5–5.1)
POTASSIUM SERPL-MCNC: 4.1 MMOL/L — SIGNIFICANT CHANGE UP (ref 3.5–5.3)
POTASSIUM SERPL-MCNC: 4.1 MMOL/L — SIGNIFICANT CHANGE UP (ref 3.5–5.3)
POTASSIUM SERPL-MCNC: 4.2 MMOL/L — SIGNIFICANT CHANGE UP (ref 3.5–5.3)
POTASSIUM SERPL-MCNC: 4.2 MMOL/L — SIGNIFICANT CHANGE UP (ref 3.5–5.3)
POTASSIUM SERPL-MCNC: 4.3 MMOL/L — SIGNIFICANT CHANGE UP (ref 3.5–5.3)
POTASSIUM SERPL-MCNC: 4.5 MMOL/L — SIGNIFICANT CHANGE UP (ref 3.5–5.3)
POTASSIUM SERPL-MCNC: 4.6 MMOL/L — SIGNIFICANT CHANGE UP (ref 3.5–5.3)
POTASSIUM SERPL-MCNC: 4.6 MMOL/L — SIGNIFICANT CHANGE UP (ref 3.5–5.3)
POTASSIUM SERPL-MCNC: 4.7 MMOL/L — SIGNIFICANT CHANGE UP (ref 3.5–5.3)
POTASSIUM SERPL-MCNC: 4.8 MMOL/L — SIGNIFICANT CHANGE UP (ref 3.5–5.3)
POTASSIUM SERPL-MCNC: 4.8 MMOL/L — SIGNIFICANT CHANGE UP (ref 3.5–5.3)
POTASSIUM SERPL-MCNC: 4.9 MMOL/L — SIGNIFICANT CHANGE UP (ref 3.5–5.3)
POTASSIUM SERPL-MCNC: 4.9 MMOL/L — SIGNIFICANT CHANGE UP (ref 3.5–5.3)
POTASSIUM SERPL-MCNC: 5 MMOL/L — SIGNIFICANT CHANGE UP (ref 3.5–5.3)
POTASSIUM SERPL-MCNC: 5.4 MMOL/L — HIGH (ref 3.5–5.3)
POTASSIUM SERPL-SCNC: 4.1 MMOL/L — SIGNIFICANT CHANGE UP (ref 3.5–5.3)
POTASSIUM SERPL-SCNC: 4.1 MMOL/L — SIGNIFICANT CHANGE UP (ref 3.5–5.3)
POTASSIUM SERPL-SCNC: 4.2 MMOL/L — SIGNIFICANT CHANGE UP (ref 3.5–5.3)
POTASSIUM SERPL-SCNC: 4.2 MMOL/L — SIGNIFICANT CHANGE UP (ref 3.5–5.3)
POTASSIUM SERPL-SCNC: 4.3 MMOL/L — SIGNIFICANT CHANGE UP (ref 3.5–5.3)
POTASSIUM SERPL-SCNC: 4.5 MMOL/L — SIGNIFICANT CHANGE UP (ref 3.5–5.3)
POTASSIUM SERPL-SCNC: 4.6 MMOL/L — SIGNIFICANT CHANGE UP (ref 3.5–5.3)
POTASSIUM SERPL-SCNC: 4.6 MMOL/L — SIGNIFICANT CHANGE UP (ref 3.5–5.3)
POTASSIUM SERPL-SCNC: 4.7 MMOL/L — SIGNIFICANT CHANGE UP (ref 3.5–5.3)
POTASSIUM SERPL-SCNC: 4.8 MMOL/L — SIGNIFICANT CHANGE UP (ref 3.5–5.3)
POTASSIUM SERPL-SCNC: 4.8 MMOL/L — SIGNIFICANT CHANGE UP (ref 3.5–5.3)
POTASSIUM SERPL-SCNC: 4.9 MMOL/L — SIGNIFICANT CHANGE UP (ref 3.5–5.3)
POTASSIUM SERPL-SCNC: 4.9 MMOL/L — SIGNIFICANT CHANGE UP (ref 3.5–5.3)
POTASSIUM SERPL-SCNC: 5 MMOL/L — SIGNIFICANT CHANGE UP (ref 3.5–5.3)
POTASSIUM SERPL-SCNC: 5.4 MMOL/L — HIGH (ref 3.5–5.3)
PROCALCITONIN SERPL-MCNC: 0.33 NG/ML — HIGH (ref 0.02–0.1)
PROT FLD-MCNC: 4.4 G/DL — SIGNIFICANT CHANGE UP
PROT SERPL-MCNC: 6.5 G/DL — SIGNIFICANT CHANGE UP (ref 6–8.3)
PROT SERPL-MCNC: 6.5 G/DL — SIGNIFICANT CHANGE UP (ref 6–8.3)
PROT SERPL-MCNC: 6.6 G/DL — SIGNIFICANT CHANGE UP (ref 6–8.3)
PROT SERPL-MCNC: 6.6 G/DL — SIGNIFICANT CHANGE UP (ref 6–8.3)
PROT SERPL-MCNC: 6.7 G/DL — SIGNIFICANT CHANGE UP (ref 6–8.3)
PROT SERPL-MCNC: 6.7 G/DL — SIGNIFICANT CHANGE UP (ref 6–8.3)
PROT SERPL-MCNC: 6.8 G/DL — SIGNIFICANT CHANGE UP (ref 6–8.3)
PROT SERPL-MCNC: 6.8 G/DL — SIGNIFICANT CHANGE UP (ref 6–8.3)
PROT SERPL-MCNC: 7.2 G/DL — SIGNIFICANT CHANGE UP (ref 6–8.3)
PROT SERPL-MCNC: 7.3 G/DL — SIGNIFICANT CHANGE UP (ref 6–8.3)
PROT SERPL-MCNC: 7.3 G/DL — SIGNIFICANT CHANGE UP (ref 6–8.3)
PROT SERPL-MCNC: 7.4 G/DL — SIGNIFICANT CHANGE UP (ref 6–8.3)
PROT SERPL-MCNC: 7.5 G/DL — SIGNIFICANT CHANGE UP (ref 6–8.3)
PROT SERPL-MCNC: 7.9 G/DL — SIGNIFICANT CHANGE UP (ref 6–8.3)
PROT SERPL-MCNC: 8 G/DL — SIGNIFICANT CHANGE UP (ref 6–8.3)
PROT SERPL-MCNC: 8.1 G/DL — SIGNIFICANT CHANGE UP (ref 6–8.3)
PROT SERPL-MCNC: 8.5 G/DL — HIGH (ref 6–8.3)
PROT SERPL-MCNC: 8.6 G/DL — HIGH (ref 6–8.3)
PROT UR-MCNC: 30 MG/DL
PROTHROM AB SERPL-ACNC: 13.8 SEC — HIGH (ref 10.5–13.4)
PROTHROM AB SERPL-ACNC: 14.5 SEC — HIGH (ref 9.5–13)
PROTHROM AB SERPL-ACNC: 15 SEC — HIGH (ref 9.5–13)
PROTHROM AB SERPL-ACNC: 15 SEC — HIGH (ref 9.5–13)
RAPID RVP RESULT: DETECTED
RAPID RVP RESULT: DETECTED
RAPID RVP RESULT: SIGNIFICANT CHANGE UP
RBC # BLD: 2.97 M/UL — LOW (ref 4.2–5.8)
RBC # BLD: 2.97 M/UL — LOW (ref 4.2–5.8)
RBC # BLD: 2.99 M/UL — LOW (ref 4.2–5.8)
RBC # BLD: 2.99 M/UL — LOW (ref 4.2–5.8)
RBC # BLD: 3.08 M/UL — LOW (ref 4.2–5.8)
RBC # BLD: 3.08 M/UL — LOW (ref 4.2–5.8)
RBC # BLD: 3.23 M/UL — LOW (ref 4.2–5.8)
RBC # BLD: 3.23 M/UL — LOW (ref 4.2–5.8)
RBC # BLD: 3.28 M/UL — LOW (ref 4.2–5.8)
RBC # BLD: 3.28 M/UL — LOW (ref 4.2–5.8)
RBC # BLD: 3.29 M/UL — LOW (ref 4.2–5.8)
RBC # BLD: 3.29 M/UL — LOW (ref 4.2–5.8)
RBC # BLD: 3.31 M/UL — LOW (ref 4.2–5.8)
RBC # BLD: 3.35 M/UL — LOW (ref 4.2–5.8)
RBC # BLD: 3.35 M/UL — LOW (ref 4.2–5.8)
RBC # BLD: 3.39 M/UL — LOW (ref 4.2–5.8)
RBC # BLD: 3.48 M/UL — LOW (ref 4.2–5.8)
RBC # BLD: 3.58 M/UL — LOW (ref 4.2–5.8)
RBC # BLD: 3.58 M/UL — LOW (ref 4.2–5.8)
RBC # BLD: 3.68 M/UL — LOW (ref 4.2–5.8)
RBC # BLD: 3.85 M/UL — LOW (ref 4.2–5.8)
RBC # BLD: 3.86 M/UL — LOW (ref 4.2–5.8)
RBC # BLD: 3.86 M/UL — LOW (ref 4.2–5.8)
RBC # BLD: 3.89 M/UL — LOW (ref 4.2–5.8)
RBC # BLD: 3.95 M/UL — LOW (ref 4.2–5.8)
RBC # BLD: 3.99 M/UL — LOW (ref 4.2–5.8)
RBC # BLD: 4.08 M/UL — LOW (ref 4.2–5.8)
RBC # BLD: 4.08 M/UL — LOW (ref 4.2–5.8)
RBC # BLD: 4.4 M/UL — SIGNIFICANT CHANGE UP (ref 4.2–5.8)
RBC # FLD: 14.4 % — SIGNIFICANT CHANGE UP (ref 10.3–14.5)
RBC # FLD: 14.8 % — HIGH (ref 10.3–14.5)
RBC # FLD: 15.3 % — HIGH (ref 10.3–14.5)
RBC # FLD: 15.3 % — HIGH (ref 10.3–14.5)
RBC # FLD: 15.6 % — HIGH (ref 10.3–14.5)
RBC # FLD: 15.7 % — HIGH (ref 10.3–14.5)
RBC # FLD: 15.8 % — HIGH (ref 10.3–14.5)
RBC # FLD: 15.9 % — HIGH (ref 10.3–14.5)
RBC # FLD: 15.9 % — HIGH (ref 10.3–14.5)
RBC # FLD: 16 % — HIGH (ref 10.3–14.5)
RBC # FLD: 16.1 % — HIGH (ref 10.3–14.5)
RBC # FLD: 16.1 % — HIGH (ref 10.3–14.5)
RBC # FLD: 16.2 % — HIGH (ref 10.3–14.5)
RBC # FLD: 16.3 % — HIGH (ref 10.3–14.5)
RBC # FLD: 16.4 % — HIGH (ref 10.3–14.5)
RBC # FLD: 16.5 % — HIGH (ref 10.3–14.5)
RBC # FLD: 16.5 % — HIGH (ref 10.3–14.5)
RBC BLD AUTO: SIGNIFICANT CHANGE UP
RBC BLD AUTO: SIGNIFICANT CHANGE UP
RBC CASTS # UR COMP ASSIST: 2 /HPF — SIGNIFICANT CHANGE UP (ref 0–4)
RBC CASTS # UR COMP ASSIST: 2 /HPF — SIGNIFICANT CHANGE UP (ref 0–4)
RCV VOL RI: HIGH /UL (ref 0–0)
REVIEW: SIGNIFICANT CHANGE UP
REVIEW: SIGNIFICANT CHANGE UP
RH IG SCN BLD-IMP: POSITIVE — SIGNIFICANT CHANGE UP
RH IG SCN BLD-IMP: POSITIVE — SIGNIFICANT CHANGE UP
RSV RNA SPEC QL NAA+PROBE: SIGNIFICANT CHANGE UP
RV+EV RNA SPEC QL NAA+PROBE: SIGNIFICANT CHANGE UP
S AUREUS DNA NOSE QL NAA+PROBE: SIGNIFICANT CHANGE UP
S PNEUM AG UR QL: NEGATIVE — SIGNIFICANT CHANGE UP
S PNEUM AG UR QL: NEGATIVE — SIGNIFICANT CHANGE UP
SAO2 % BLDV: 28.4 % — LOW (ref 67–88)
SAO2 % BLDV: 91.5 % — HIGH (ref 67–88)
SAO2 % BLDV: 91.5 % — HIGH (ref 67–88)
SARS-COV-2 RNA SPEC QL NAA+PROBE: DETECTED
SARS-COV-2 RNA SPEC QL NAA+PROBE: DETECTED
SARS-COV-2 RNA SPEC QL NAA+PROBE: SIGNIFICANT CHANGE UP
SODIUM SERPL-SCNC: 132 MMOL/L — LOW (ref 135–145)
SODIUM SERPL-SCNC: 133 MMOL/L — LOW (ref 135–145)
SODIUM SERPL-SCNC: 134 MMOL/L — LOW (ref 135–145)
SODIUM SERPL-SCNC: 134 MMOL/L — LOW (ref 135–145)
SODIUM SERPL-SCNC: 135 MMOL/L — SIGNIFICANT CHANGE UP (ref 135–145)
SODIUM SERPL-SCNC: 135 MMOL/L — SIGNIFICANT CHANGE UP (ref 135–145)
SODIUM SERPL-SCNC: 136 MMOL/L — SIGNIFICANT CHANGE UP (ref 135–145)
SODIUM SERPL-SCNC: 137 MMOL/L — SIGNIFICANT CHANGE UP (ref 135–145)
SODIUM SERPL-SCNC: 138 MMOL/L — SIGNIFICANT CHANGE UP (ref 135–145)
SODIUM SERPL-SCNC: 139 MMOL/L — SIGNIFICANT CHANGE UP (ref 135–145)
SODIUM SERPL-SCNC: 139 MMOL/L — SIGNIFICANT CHANGE UP (ref 135–145)
SP GR SPEC: 1.02 — SIGNIFICANT CHANGE UP (ref 1–1.03)
SPECIMEN SOURCE: SIGNIFICANT CHANGE UP
SQUAMOUS # UR AUTO: 2 /HPF — SIGNIFICANT CHANGE UP (ref 0–5)
SQUAMOUS # UR AUTO: 2 /HPF — SIGNIFICANT CHANGE UP (ref 0–5)
TM INTERPRETATION: SIGNIFICANT CHANGE UP
TOTAL NUCLEATED CELL COUNT, BODY FLUID: 3583 /UL — SIGNIFICANT CHANGE UP
TRIGL SERPL-MCNC: 91 MG/DL — SIGNIFICANT CHANGE UP
TRIGL SERPL-MCNC: 91 MG/DL — SIGNIFICANT CHANGE UP
TSH SERPL-MCNC: 0.41 UIU/ML — SIGNIFICANT CHANGE UP (ref 0.27–4.2)
TSH SERPL-MCNC: 0.44 UIU/ML — SIGNIFICANT CHANGE UP (ref 0.27–4.2)
TSH SERPL-MCNC: 0.44 UIU/ML — SIGNIFICANT CHANGE UP (ref 0.27–4.2)
TSH SERPL-MCNC: 0.48 UIU/ML — SIGNIFICANT CHANGE UP (ref 0.27–4.2)
TSH SERPL-MCNC: 1.02 UIU/ML — SIGNIFICANT CHANGE UP (ref 0.27–4.2)
TSH SERPL-MCNC: 1.61 UIU/ML — SIGNIFICANT CHANGE UP (ref 0.27–4.2)
TSH SERPL-MCNC: 1.61 UIU/ML — SIGNIFICANT CHANGE UP (ref 0.27–4.2)
TSH SERPL-MCNC: 1.64 UIU/ML — SIGNIFICANT CHANGE UP (ref 0.27–4.2)
TSH SERPL-MCNC: 1.98 UIU/ML — SIGNIFICANT CHANGE UP (ref 0.27–4.2)
TSH SERPL-MCNC: 2.38 UIU/ML — SIGNIFICANT CHANGE UP (ref 0.27–4.2)
TSH SERPL-MCNC: 2.65 UIU/ML — SIGNIFICANT CHANGE UP (ref 0.27–4.2)
TUBE TYPE: SIGNIFICANT CHANGE UP
UROBILINOGEN FLD QL: 0.2 MG/DL — SIGNIFICANT CHANGE UP (ref 0.2–1)
UROBILINOGEN FLD QL: 0.2 MG/DL — SIGNIFICANT CHANGE UP (ref 0.2–1)
UROBILINOGEN FLD QL: 1 MG/DL — SIGNIFICANT CHANGE UP (ref 0.2–1)
UROBILINOGEN FLD QL: 1 MG/DL — SIGNIFICANT CHANGE UP (ref 0.2–1)
WBC # BLD: 10.49 K/UL — SIGNIFICANT CHANGE UP (ref 3.8–10.5)
WBC # BLD: 10.56 K/UL — HIGH (ref 3.8–10.5)
WBC # BLD: 10.56 K/UL — HIGH (ref 3.8–10.5)
WBC # BLD: 10.76 K/UL — HIGH (ref 3.8–10.5)
WBC # BLD: 11.46 K/UL — HIGH (ref 3.8–10.5)
WBC # BLD: 11.46 K/UL — HIGH (ref 3.8–10.5)
WBC # BLD: 12.59 K/UL — HIGH (ref 3.8–10.5)
WBC # BLD: 12.59 K/UL — HIGH (ref 3.8–10.5)
WBC # BLD: 13.29 K/UL — HIGH (ref 3.8–10.5)
WBC # BLD: 13.29 K/UL — HIGH (ref 3.8–10.5)
WBC # BLD: 15.89 K/UL — HIGH (ref 3.8–10.5)
WBC # BLD: 15.89 K/UL — HIGH (ref 3.8–10.5)
WBC # BLD: 16.93 K/UL — HIGH (ref 3.8–10.5)
WBC # BLD: 16.93 K/UL — HIGH (ref 3.8–10.5)
WBC # BLD: 5.63 K/UL — SIGNIFICANT CHANGE UP (ref 3.8–10.5)
WBC # BLD: 5.63 K/UL — SIGNIFICANT CHANGE UP (ref 3.8–10.5)
WBC # BLD: 6.54 K/UL — SIGNIFICANT CHANGE UP (ref 3.8–10.5)
WBC # BLD: 6.54 K/UL — SIGNIFICANT CHANGE UP (ref 3.8–10.5)
WBC # BLD: 7.41 K/UL — SIGNIFICANT CHANGE UP (ref 3.8–10.5)
WBC # BLD: 7.41 K/UL — SIGNIFICANT CHANGE UP (ref 3.8–10.5)
WBC # BLD: 7.52 K/UL — SIGNIFICANT CHANGE UP (ref 3.8–10.5)
WBC # BLD: 7.52 K/UL — SIGNIFICANT CHANGE UP (ref 3.8–10.5)
WBC # BLD: 7.63 K/UL — SIGNIFICANT CHANGE UP (ref 3.8–10.5)
WBC # BLD: 7.63 K/UL — SIGNIFICANT CHANGE UP (ref 3.8–10.5)
WBC # BLD: 7.73 K/UL — SIGNIFICANT CHANGE UP (ref 3.8–10.5)
WBC # BLD: 8.2 K/UL — SIGNIFICANT CHANGE UP (ref 3.8–10.5)
WBC # BLD: 8.2 K/UL — SIGNIFICANT CHANGE UP (ref 3.8–10.5)
WBC # BLD: 8.47 K/UL — SIGNIFICANT CHANGE UP (ref 3.8–10.5)
WBC # BLD: 8.49 K/UL — SIGNIFICANT CHANGE UP (ref 3.8–10.5)
WBC # BLD: 9.04 K/UL — SIGNIFICANT CHANGE UP (ref 3.8–10.5)
WBC # BLD: 9.12 K/UL — SIGNIFICANT CHANGE UP (ref 3.8–10.5)
WBC # BLD: 9.17 K/UL — SIGNIFICANT CHANGE UP (ref 3.8–10.5)
WBC # BLD: 9.26 K/UL — SIGNIFICANT CHANGE UP (ref 3.8–10.5)
WBC # BLD: 9.26 K/UL — SIGNIFICANT CHANGE UP (ref 3.8–10.5)
WBC # BLD: 9.35 K/UL — SIGNIFICANT CHANGE UP (ref 3.8–10.5)
WBC # FLD AUTO: 10.49 K/UL — SIGNIFICANT CHANGE UP (ref 3.8–10.5)
WBC # FLD AUTO: 10.56 K/UL — HIGH (ref 3.8–10.5)
WBC # FLD AUTO: 10.56 K/UL — HIGH (ref 3.8–10.5)
WBC # FLD AUTO: 10.76 K/UL — HIGH (ref 3.8–10.5)
WBC # FLD AUTO: 11.46 K/UL — HIGH (ref 3.8–10.5)
WBC # FLD AUTO: 11.46 K/UL — HIGH (ref 3.8–10.5)
WBC # FLD AUTO: 12.59 K/UL — HIGH (ref 3.8–10.5)
WBC # FLD AUTO: 12.59 K/UL — HIGH (ref 3.8–10.5)
WBC # FLD AUTO: 13.29 K/UL — HIGH (ref 3.8–10.5)
WBC # FLD AUTO: 13.29 K/UL — HIGH (ref 3.8–10.5)
WBC # FLD AUTO: 15.89 K/UL — HIGH (ref 3.8–10.5)
WBC # FLD AUTO: 15.89 K/UL — HIGH (ref 3.8–10.5)
WBC # FLD AUTO: 16.93 K/UL — HIGH (ref 3.8–10.5)
WBC # FLD AUTO: 16.93 K/UL — HIGH (ref 3.8–10.5)
WBC # FLD AUTO: 5.63 K/UL — SIGNIFICANT CHANGE UP (ref 3.8–10.5)
WBC # FLD AUTO: 5.63 K/UL — SIGNIFICANT CHANGE UP (ref 3.8–10.5)
WBC # FLD AUTO: 6.54 K/UL — SIGNIFICANT CHANGE UP (ref 3.8–10.5)
WBC # FLD AUTO: 6.54 K/UL — SIGNIFICANT CHANGE UP (ref 3.8–10.5)
WBC # FLD AUTO: 7.41 K/UL — SIGNIFICANT CHANGE UP (ref 3.8–10.5)
WBC # FLD AUTO: 7.41 K/UL — SIGNIFICANT CHANGE UP (ref 3.8–10.5)
WBC # FLD AUTO: 7.52 K/UL — SIGNIFICANT CHANGE UP (ref 3.8–10.5)
WBC # FLD AUTO: 7.52 K/UL — SIGNIFICANT CHANGE UP (ref 3.8–10.5)
WBC # FLD AUTO: 7.63 K/UL — SIGNIFICANT CHANGE UP (ref 3.8–10.5)
WBC # FLD AUTO: 7.63 K/UL — SIGNIFICANT CHANGE UP (ref 3.8–10.5)
WBC # FLD AUTO: 7.73 K/UL — SIGNIFICANT CHANGE UP (ref 3.8–10.5)
WBC # FLD AUTO: 8.2 K/UL — SIGNIFICANT CHANGE UP (ref 3.8–10.5)
WBC # FLD AUTO: 8.2 K/UL — SIGNIFICANT CHANGE UP (ref 3.8–10.5)
WBC # FLD AUTO: 8.47 K/UL — SIGNIFICANT CHANGE UP (ref 3.8–10.5)
WBC # FLD AUTO: 8.49 K/UL — SIGNIFICANT CHANGE UP (ref 3.8–10.5)
WBC # FLD AUTO: 9.04 K/UL — SIGNIFICANT CHANGE UP (ref 3.8–10.5)
WBC # FLD AUTO: 9.12 K/UL — SIGNIFICANT CHANGE UP (ref 3.8–10.5)
WBC # FLD AUTO: 9.17 K/UL — SIGNIFICANT CHANGE UP (ref 3.8–10.5)
WBC # FLD AUTO: 9.26 K/UL — SIGNIFICANT CHANGE UP (ref 3.8–10.5)
WBC # FLD AUTO: 9.26 K/UL — SIGNIFICANT CHANGE UP (ref 3.8–10.5)
WBC # FLD AUTO: 9.35 K/UL — SIGNIFICANT CHANGE UP (ref 3.8–10.5)
WBC UR QL: 0 /HPF — SIGNIFICANT CHANGE UP (ref 0–5)
WBC UR QL: 0 /HPF — SIGNIFICANT CHANGE UP (ref 0–5)

## 2023-01-01 PROCEDURE — C1729: CPT

## 2023-01-01 PROCEDURE — 99213 OFFICE O/P EST LOW 20 MIN: CPT

## 2023-01-01 PROCEDURE — 94729 DIFFUSING CAPACITY: CPT

## 2023-01-01 PROCEDURE — 99213 OFFICE O/P EST LOW 20 MIN: CPT | Mod: 25

## 2023-01-01 PROCEDURE — 99214 OFFICE O/P EST MOD 30 MIN: CPT

## 2023-01-01 PROCEDURE — 88305 TISSUE EXAM BY PATHOLOGIST: CPT

## 2023-01-01 PROCEDURE — 84157 ASSAY OF PROTEIN OTHER: CPT

## 2023-01-01 PROCEDURE — 99214 OFFICE O/P EST MOD 30 MIN: CPT | Mod: 95

## 2023-01-01 PROCEDURE — 82945 GLUCOSE OTHER FLUID: CPT

## 2023-01-01 PROCEDURE — 99232 SBSQ HOSP IP/OBS MODERATE 35: CPT | Mod: GC

## 2023-01-01 PROCEDURE — 83615 LACTATE (LD) (LDH) ENZYME: CPT

## 2023-01-01 PROCEDURE — 99232 SBSQ HOSP IP/OBS MODERATE 35: CPT

## 2023-01-01 PROCEDURE — 88108 CYTOPATH CONCENTRATE TECH: CPT | Mod: 26,59

## 2023-01-01 PROCEDURE — 88305 TISSUE EXAM BY PATHOLOGIST: CPT | Mod: 26

## 2023-01-01 PROCEDURE — 99233 SBSQ HOSP IP/OBS HIGH 50: CPT

## 2023-01-01 PROCEDURE — 99239 HOSP IP/OBS DSCHRG MGMT >30: CPT

## 2023-01-01 PROCEDURE — 36415 COLL VENOUS BLD VENIPUNCTURE: CPT

## 2023-01-01 PROCEDURE — 99285 EMERGENCY DEPT VISIT HI MDM: CPT | Mod: GC

## 2023-01-01 PROCEDURE — 32555 ASPIRATE PLEURA W/ IMAGING: CPT | Mod: RT

## 2023-01-01 PROCEDURE — 83986 ASSAY PH BODY FLUID NOS: CPT

## 2023-01-01 PROCEDURE — 78815 PET IMAGE W/CT SKULL-THIGH: CPT | Mod: 26,PS,MH

## 2023-01-01 PROCEDURE — 78815 PET IMAGE W/CT SKULL-THIGH: CPT

## 2023-01-01 PROCEDURE — 94726 PLETHYSMOGRAPHY LUNG VOLUMES: CPT

## 2023-01-01 PROCEDURE — 88184 FLOWCYTOMETRY/ TC 1 MARKER: CPT

## 2023-01-01 PROCEDURE — 87075 CULTR BACTERIA EXCEPT BLOOD: CPT

## 2023-01-01 PROCEDURE — 87102 FUNGUS ISOLATION CULTURE: CPT

## 2023-01-01 PROCEDURE — 76604 US EXAM CHEST: CPT

## 2023-01-01 PROCEDURE — 94060 EVALUATION OF WHEEZING: CPT

## 2023-01-01 PROCEDURE — 99223 1ST HOSP IP/OBS HIGH 75: CPT

## 2023-01-01 PROCEDURE — 87070 CULTURE OTHR SPECIMN AEROBIC: CPT

## 2023-01-01 PROCEDURE — 71275 CT ANGIOGRAPHY CHEST: CPT | Mod: 26,MA

## 2023-01-01 PROCEDURE — 87205 SMEAR GRAM STAIN: CPT

## 2023-01-01 PROCEDURE — 71250 CT THORAX DX C-: CPT | Mod: 26,MH

## 2023-01-01 PROCEDURE — 88189 FLOWCYTOMETRY/READ 16 & >: CPT

## 2023-01-01 PROCEDURE — 89051 BODY FLUID CELL COUNT: CPT

## 2023-01-01 PROCEDURE — 88112 CYTOPATH CELL ENHANCE TECH: CPT | Mod: 26

## 2023-01-01 PROCEDURE — 99223 1ST HOSP IP/OBS HIGH 75: CPT | Mod: FS,57

## 2023-01-01 PROCEDURE — A9552: CPT

## 2023-01-01 PROCEDURE — 93970 EXTREMITY STUDY: CPT | Mod: 26

## 2023-01-01 PROCEDURE — ZZZZZ: CPT

## 2023-01-01 PROCEDURE — 88185 FLOWCYTOMETRY/TC ADD-ON: CPT

## 2023-01-01 PROCEDURE — 85610 PROTHROMBIN TIME: CPT

## 2023-01-01 PROCEDURE — 71045 X-RAY EXAM CHEST 1 VIEW: CPT | Mod: 26

## 2023-01-01 PROCEDURE — 85730 THROMBOPLASTIN TIME PARTIAL: CPT

## 2023-01-01 PROCEDURE — 99214 OFFICE O/P EST MOD 30 MIN: CPT | Mod: 25

## 2023-01-01 PROCEDURE — 71250 CT THORAX DX C-: CPT | Mod: 26

## 2023-01-01 PROCEDURE — 88112 CYTOPATH CELL ENHANCE TECH: CPT

## 2023-01-01 PROCEDURE — 99497 ADVNCD CARE PLAN 30 MIN: CPT | Mod: 25

## 2023-01-01 PROCEDURE — 93010 ELECTROCARDIOGRAM REPORT: CPT

## 2023-01-01 PROCEDURE — 32555 ASPIRATE PLEURA W/ IMAGING: CPT

## 2023-01-01 PROCEDURE — 82042 OTHER SOURCE ALBUMIN QUAN EA: CPT

## 2023-01-01 RX ORDER — SODIUM CHLORIDE 9 MG/ML
1000 INJECTION INTRAMUSCULAR; INTRAVENOUS; SUBCUTANEOUS
Refills: 0 | Status: DISCONTINUED | OUTPATIENT
Start: 2023-01-01 | End: 2023-01-01

## 2023-01-01 RX ORDER — AZITHROMYCIN 500 MG/1
500 TABLET, FILM COATED ORAL EVERY 24 HOURS
Refills: 0 | Status: COMPLETED | OUTPATIENT
Start: 2023-01-01 | End: 2023-01-01

## 2023-01-01 RX ORDER — FOLIC ACID 0.8 MG
1 TABLET ORAL DAILY
Refills: 0 | Status: DISCONTINUED | OUTPATIENT
Start: 2023-01-01 | End: 2023-01-01

## 2023-01-01 RX ORDER — ONDANSETRON 8 MG/1
4 TABLET, FILM COATED ORAL EVERY 8 HOURS
Refills: 0 | Status: DISCONTINUED | OUTPATIENT
Start: 2023-01-01 | End: 2023-01-01

## 2023-01-01 RX ORDER — AZITHROMYCIN 500 MG/1
500 TABLET, FILM COATED ORAL ONCE
Refills: 0 | Status: COMPLETED | OUTPATIENT
Start: 2023-01-01 | End: 2023-01-01

## 2023-01-01 RX ORDER — CHLORHEXIDINE GLUCONATE 213 G/1000ML
1 SOLUTION TOPICAL DAILY
Refills: 0 | Status: DISCONTINUED | OUTPATIENT
Start: 2023-01-01 | End: 2023-01-01

## 2023-01-01 RX ORDER — CHLORHEXIDINE GLUCONATE 213 G/1000ML
1 SOLUTION TOPICAL
Refills: 0 | Status: DISCONTINUED | OUTPATIENT
Start: 2023-01-01 | End: 2023-01-01

## 2023-01-01 RX ORDER — LANOLIN ALCOHOL/MO/W.PET/CERES
3 CREAM (GRAM) TOPICAL AT BEDTIME
Refills: 0 | Status: DISCONTINUED | OUTPATIENT
Start: 2023-01-01 | End: 2023-01-01

## 2023-01-01 RX ORDER — REMDESIVIR 5 MG/ML
100 INJECTION INTRAVENOUS EVERY 24 HOURS
Refills: 0 | Status: DISCONTINUED | OUTPATIENT
Start: 2023-01-01 | End: 2023-01-01

## 2023-01-01 RX ORDER — RIVAROXABAN 15 MG-20MG
1 KIT ORAL
Qty: 30 | Refills: 0
Start: 2023-01-01 | End: 2023-01-01

## 2023-01-01 RX ORDER — HYDROCODONE BITARTRATE AND HOMATROPINE METHYLBROMIDE 5; 1.5 MG/5ML; MG/5ML
5 SOLUTION ORAL
Qty: 100 | Refills: 0
Start: 2023-01-01 | End: 2023-01-01

## 2023-01-01 RX ORDER — ATORVASTATIN CALCIUM 80 MG/1
40 TABLET, FILM COATED ORAL AT BEDTIME
Refills: 0 | Status: DISCONTINUED | OUTPATIENT
Start: 2023-01-01 | End: 2023-01-01

## 2023-01-01 RX ORDER — LEVALBUTEROL 1.25 MG/.5ML
1 SOLUTION, CONCENTRATE RESPIRATORY (INHALATION) EVERY 6 HOURS
Refills: 0 | Status: COMPLETED | OUTPATIENT
Start: 2023-01-01 | End: 2023-01-01

## 2023-01-01 RX ORDER — AZITHROMYCIN 500 MG/1
500 TABLET, FILM COATED ORAL EVERY 24 HOURS
Refills: 0 | Status: DISCONTINUED | OUTPATIENT
Start: 2023-01-01 | End: 2023-01-01

## 2023-01-01 RX ORDER — AZITHROMYCIN 500 MG/1
TABLET, FILM COATED ORAL
Refills: 0 | Status: DISCONTINUED | OUTPATIENT
Start: 2023-01-01 | End: 2023-01-01

## 2023-01-01 RX ORDER — SODIUM CHLORIDE 9 MG/ML
3100 INJECTION INTRAMUSCULAR; INTRAVENOUS; SUBCUTANEOUS ONCE
Refills: 0 | Status: COMPLETED | OUTPATIENT
Start: 2023-01-01 | End: 2023-01-01

## 2023-01-01 RX ORDER — ACETAMINOPHEN 500 MG
650 TABLET ORAL EVERY 6 HOURS
Refills: 0 | Status: DISCONTINUED | OUTPATIENT
Start: 2023-01-01 | End: 2023-01-01

## 2023-01-01 RX ORDER — AZITHROMYCIN 250 MG/1
250 TABLET, FILM COATED ORAL
Qty: 6 | Refills: 0 | Status: ACTIVE | COMMUNITY
Start: 2023-01-01 | End: 1900-01-01

## 2023-01-01 RX ORDER — ENOXAPARIN SODIUM 100 MG/ML
40 INJECTION SUBCUTANEOUS EVERY 12 HOURS
Refills: 0 | Status: DISCONTINUED | OUTPATIENT
Start: 2023-01-01 | End: 2023-01-01

## 2023-01-01 RX ORDER — ACETAMINOPHEN 500 MG
975 TABLET ORAL ONCE
Refills: 0 | Status: COMPLETED | OUTPATIENT
Start: 2023-01-01 | End: 2023-01-01

## 2023-01-01 RX ORDER — ENOXAPARIN SODIUM 100 MG/ML
80 INJECTION SUBCUTANEOUS EVERY 12 HOURS
Refills: 0 | Status: DISCONTINUED | OUTPATIENT
Start: 2023-01-01 | End: 2023-01-01

## 2023-01-01 RX ORDER — REMDESIVIR 5 MG/ML
INJECTION INTRAVENOUS
Refills: 0 | Status: DISCONTINUED | OUTPATIENT
Start: 2023-01-01 | End: 2023-01-01

## 2023-01-01 RX ORDER — ACETAMINOPHEN 500 MG
1000 TABLET ORAL ONCE
Refills: 0 | Status: COMPLETED | OUTPATIENT
Start: 2023-01-01 | End: 2023-01-01

## 2023-01-01 RX ORDER — DEXAMETHASONE 4 MG/1
4 TABLET ORAL
Qty: 42 | Refills: 3 | Status: ACTIVE | COMMUNITY
Start: 2023-01-01 | End: 1900-01-01

## 2023-01-01 RX ORDER — FINASTERIDE 5 MG/1
5 TABLET, FILM COATED ORAL DAILY
Refills: 0 | Status: DISCONTINUED | OUTPATIENT
Start: 2023-01-01 | End: 2023-01-01

## 2023-01-01 RX ORDER — PROMETHAZINE HYDROCHLORIDE AND CODEINE PHOSPHATE 6.25; 1 MG/5ML; MG/5ML
6.25-1 SOLUTION ORAL
Qty: 600 | Refills: 0 | Status: ACTIVE | COMMUNITY
Start: 2023-05-10 | End: 1900-01-01

## 2023-01-01 RX ORDER — HEPARIN SODIUM 5000 [USP'U]/ML
5000 INJECTION INTRAVENOUS; SUBCUTANEOUS EVERY 8 HOURS
Refills: 0 | Status: DISCONTINUED | OUTPATIENT
Start: 2023-01-01 | End: 2023-01-01

## 2023-01-01 RX ORDER — VANCOMYCIN HCL 1 G
1000 VIAL (EA) INTRAVENOUS ONCE
Refills: 0 | Status: COMPLETED | OUTPATIENT
Start: 2023-01-01 | End: 2023-01-01

## 2023-01-01 RX ORDER — CEFTRIAXONE 500 MG/1
1000 INJECTION, POWDER, FOR SOLUTION INTRAMUSCULAR; INTRAVENOUS EVERY 24 HOURS
Refills: 0 | Status: COMPLETED | OUTPATIENT
Start: 2023-01-01 | End: 2023-01-01

## 2023-01-01 RX ORDER — LOSARTAN POTASSIUM 100 MG/1
25 TABLET, FILM COATED ORAL DAILY
Refills: 0 | Status: DISCONTINUED | OUTPATIENT
Start: 2023-01-01 | End: 2023-01-01

## 2023-01-01 RX ORDER — LOSARTAN POTASSIUM 100 MG/1
50 TABLET, FILM COATED ORAL DAILY
Refills: 0 | Status: DISCONTINUED | OUTPATIENT
Start: 2023-01-01 | End: 2023-01-01

## 2023-01-01 RX ORDER — CEFEPIME 1 G/1
2000 INJECTION, POWDER, FOR SOLUTION INTRAMUSCULAR; INTRAVENOUS ONCE
Refills: 0 | Status: COMPLETED | OUTPATIENT
Start: 2023-01-01 | End: 2023-01-01

## 2023-01-01 RX ORDER — REMDESIVIR 5 MG/ML
200 INJECTION INTRAVENOUS EVERY 24 HOURS
Refills: 0 | Status: COMPLETED | OUTPATIENT
Start: 2023-01-01 | End: 2023-01-01

## 2023-01-01 RX ORDER — LANOLIN ALCOHOL/MO/W.PET/CERES
1 CREAM (GRAM) TOPICAL
Qty: 0 | Refills: 0 | DISCHARGE
Start: 2023-01-01

## 2023-01-01 RX ORDER — DEXAMETHASONE 4 MG/1
4 TABLET ORAL
Qty: 10 | Refills: 0 | Status: DISCONTINUED | COMMUNITY
Start: 2023-01-01 | End: 2023-01-01

## 2023-01-01 RX ORDER — PIPERACILLIN AND TAZOBACTAM 4; .5 G/20ML; G/20ML
3.38 INJECTION, POWDER, LYOPHILIZED, FOR SOLUTION INTRAVENOUS EVERY 8 HOURS
Refills: 0 | Status: DISCONTINUED | OUTPATIENT
Start: 2023-01-01 | End: 2023-01-01

## 2023-01-01 RX ORDER — ENOXAPARIN SODIUM 100 MG/ML
40 INJECTION SUBCUTANEOUS
Qty: 30 | Refills: 0
Start: 2023-01-01 | End: 2023-01-01

## 2023-01-01 RX ADMIN — Medication 975 MILLIGRAM(S): at 06:05

## 2023-01-01 RX ADMIN — Medication 1 MILLIGRAM(S): at 11:05

## 2023-01-01 RX ADMIN — Medication 1000 MILLIGRAM(S): at 17:25

## 2023-01-01 RX ADMIN — PIPERACILLIN AND TAZOBACTAM 25 GRAM(S): 4; .5 INJECTION, POWDER, LYOPHILIZED, FOR SOLUTION INTRAVENOUS at 06:05

## 2023-01-01 RX ADMIN — PIPERACILLIN AND TAZOBACTAM 25 GRAM(S): 4; .5 INJECTION, POWDER, LYOPHILIZED, FOR SOLUTION INTRAVENOUS at 22:51

## 2023-01-01 RX ADMIN — HEPARIN SODIUM 5000 UNIT(S): 5000 INJECTION INTRAVENOUS; SUBCUTANEOUS at 05:37

## 2023-01-01 RX ADMIN — Medication 100 MILLIGRAM(S): at 05:37

## 2023-01-01 RX ADMIN — HEPARIN SODIUM 5000 UNIT(S): 5000 INJECTION INTRAVENOUS; SUBCUTANEOUS at 13:11

## 2023-01-01 RX ADMIN — FINASTERIDE 5 MILLIGRAM(S): 5 TABLET, FILM COATED ORAL at 12:13

## 2023-01-01 RX ADMIN — LEVALBUTEROL 1 PUFF(S): 1.25 SOLUTION, CONCENTRATE RESPIRATORY (INHALATION) at 15:59

## 2023-01-01 RX ADMIN — LOSARTAN POTASSIUM 50 MILLIGRAM(S): 100 TABLET, FILM COATED ORAL at 07:24

## 2023-01-01 RX ADMIN — LEVALBUTEROL 1 PUFF(S): 1.25 SOLUTION, CONCENTRATE RESPIRATORY (INHALATION) at 07:24

## 2023-01-01 RX ADMIN — PIPERACILLIN AND TAZOBACTAM 25 GRAM(S): 4; .5 INJECTION, POWDER, LYOPHILIZED, FOR SOLUTION INTRAVENOUS at 22:36

## 2023-01-01 RX ADMIN — Medication 1 MILLIGRAM(S): at 13:20

## 2023-01-01 RX ADMIN — HEPARIN SODIUM 5000 UNIT(S): 5000 INJECTION INTRAVENOUS; SUBCUTANEOUS at 21:19

## 2023-01-01 RX ADMIN — CEFTRIAXONE 100 MILLIGRAM(S): 500 INJECTION, POWDER, FOR SOLUTION INTRAMUSCULAR; INTRAVENOUS at 22:11

## 2023-01-01 RX ADMIN — CHLORHEXIDINE GLUCONATE 1 APPLICATION(S): 213 SOLUTION TOPICAL at 13:04

## 2023-01-01 RX ADMIN — Medication 1000 MILLIGRAM(S): at 15:48

## 2023-01-01 RX ADMIN — Medication 650 MILLIGRAM(S): at 23:39

## 2023-01-01 RX ADMIN — HEPARIN SODIUM 5000 UNIT(S): 5000 INJECTION INTRAVENOUS; SUBCUTANEOUS at 13:14

## 2023-01-01 RX ADMIN — ATORVASTATIN CALCIUM 40 MILLIGRAM(S): 80 TABLET, FILM COATED ORAL at 22:07

## 2023-01-01 RX ADMIN — CHLORHEXIDINE GLUCONATE 1 APPLICATION(S): 213 SOLUTION TOPICAL at 05:22

## 2023-01-01 RX ADMIN — FINASTERIDE 5 MILLIGRAM(S): 5 TABLET, FILM COATED ORAL at 13:04

## 2023-01-01 RX ADMIN — HEPARIN SODIUM 5000 UNIT(S): 5000 INJECTION INTRAVENOUS; SUBCUTANEOUS at 21:41

## 2023-01-01 RX ADMIN — FINASTERIDE 5 MILLIGRAM(S): 5 TABLET, FILM COATED ORAL at 13:10

## 2023-01-01 RX ADMIN — PIPERACILLIN AND TAZOBACTAM 25 GRAM(S): 4; .5 INJECTION, POWDER, LYOPHILIZED, FOR SOLUTION INTRAVENOUS at 14:35

## 2023-01-01 RX ADMIN — ATORVASTATIN CALCIUM 40 MILLIGRAM(S): 80 TABLET, FILM COATED ORAL at 21:40

## 2023-01-01 RX ADMIN — Medication 100 MILLIGRAM(S): at 22:50

## 2023-01-01 RX ADMIN — Medication 200 MILLIGRAM(S): at 19:52

## 2023-01-01 RX ADMIN — Medication 650 MILLIGRAM(S): at 21:52

## 2023-01-01 RX ADMIN — Medication 100 MILLIGRAM(S): at 05:43

## 2023-01-01 RX ADMIN — Medication 100 MILLIGRAM(S): at 18:02

## 2023-01-01 RX ADMIN — CEFTRIAXONE 100 MILLIGRAM(S): 500 INJECTION, POWDER, FOR SOLUTION INTRAMUSCULAR; INTRAVENOUS at 23:10

## 2023-01-01 RX ADMIN — CHLORHEXIDINE GLUCONATE 1 APPLICATION(S): 213 SOLUTION TOPICAL at 11:56

## 2023-01-01 RX ADMIN — FINASTERIDE 5 MILLIGRAM(S): 5 TABLET, FILM COATED ORAL at 11:55

## 2023-01-01 RX ADMIN — HEPARIN SODIUM 5000 UNIT(S): 5000 INJECTION INTRAVENOUS; SUBCUTANEOUS at 22:11

## 2023-01-01 RX ADMIN — Medication 100 MILLIGRAM(S): at 19:13

## 2023-01-01 RX ADMIN — PIPERACILLIN AND TAZOBACTAM 25 GRAM(S): 4; .5 INJECTION, POWDER, LYOPHILIZED, FOR SOLUTION INTRAVENOUS at 15:25

## 2023-01-01 RX ADMIN — HEPARIN SODIUM 5000 UNIT(S): 5000 INJECTION INTRAVENOUS; SUBCUTANEOUS at 22:07

## 2023-01-01 RX ADMIN — ATORVASTATIN CALCIUM 40 MILLIGRAM(S): 80 TABLET, FILM COATED ORAL at 21:19

## 2023-01-01 RX ADMIN — HEPARIN SODIUM 5000 UNIT(S): 5000 INJECTION INTRAVENOUS; SUBCUTANEOUS at 13:02

## 2023-01-01 RX ADMIN — Medication 975 MILLIGRAM(S): at 07:30

## 2023-01-01 RX ADMIN — Medication 1000 MILLIGRAM(S): at 16:03

## 2023-01-01 RX ADMIN — FINASTERIDE 5 MILLIGRAM(S): 5 TABLET, FILM COATED ORAL at 11:05

## 2023-01-01 RX ADMIN — REMDESIVIR 200 MILLIGRAM(S): 5 INJECTION INTRAVENOUS at 16:04

## 2023-01-01 RX ADMIN — ATORVASTATIN CALCIUM 40 MILLIGRAM(S): 80 TABLET, FILM COATED ORAL at 22:24

## 2023-01-01 RX ADMIN — PIPERACILLIN AND TAZOBACTAM 25 GRAM(S): 4; .5 INJECTION, POWDER, LYOPHILIZED, FOR SOLUTION INTRAVENOUS at 21:06

## 2023-01-01 RX ADMIN — Medication 650 MILLIGRAM(S): at 13:10

## 2023-01-01 RX ADMIN — CHLORHEXIDINE GLUCONATE 1 APPLICATION(S): 213 SOLUTION TOPICAL at 11:08

## 2023-01-01 RX ADMIN — LEVALBUTEROL 1 PUFF(S): 1.25 SOLUTION, CONCENTRATE RESPIRATORY (INHALATION) at 21:41

## 2023-01-01 RX ADMIN — CHLORHEXIDINE GLUCONATE 1 APPLICATION(S): 213 SOLUTION TOPICAL at 07:02

## 2023-01-01 RX ADMIN — Medication 975 MILLIGRAM(S): at 23:26

## 2023-01-01 RX ADMIN — Medication 1 MILLIGRAM(S): at 12:13

## 2023-01-01 RX ADMIN — AZITHROMYCIN 255 MILLIGRAM(S): 500 TABLET, FILM COATED ORAL at 00:05

## 2023-01-01 RX ADMIN — LOSARTAN POTASSIUM 50 MILLIGRAM(S): 100 TABLET, FILM COATED ORAL at 06:13

## 2023-01-01 RX ADMIN — HEPARIN SODIUM 5000 UNIT(S): 5000 INJECTION INTRAVENOUS; SUBCUTANEOUS at 14:09

## 2023-01-01 RX ADMIN — CEFTRIAXONE 100 MILLIGRAM(S): 500 INJECTION, POWDER, FOR SOLUTION INTRAMUSCULAR; INTRAVENOUS at 23:20

## 2023-01-01 RX ADMIN — FINASTERIDE 5 MILLIGRAM(S): 5 TABLET, FILM COATED ORAL at 12:06

## 2023-01-01 RX ADMIN — CHLORHEXIDINE GLUCONATE 1 APPLICATION(S): 213 SOLUTION TOPICAL at 06:59

## 2023-01-01 RX ADMIN — HEPARIN SODIUM 5000 UNIT(S): 5000 INJECTION INTRAVENOUS; SUBCUTANEOUS at 05:17

## 2023-01-01 RX ADMIN — ATORVASTATIN CALCIUM 40 MILLIGRAM(S): 80 TABLET, FILM COATED ORAL at 22:37

## 2023-01-01 RX ADMIN — Medication 100 MILLIGRAM(S): at 15:51

## 2023-01-01 RX ADMIN — LOSARTAN POTASSIUM 25 MILLIGRAM(S): 100 TABLET, FILM COATED ORAL at 05:35

## 2023-01-01 RX ADMIN — Medication 100 MILLIGRAM(S): at 07:25

## 2023-01-01 RX ADMIN — REMDESIVIR 200 MILLIGRAM(S): 5 INJECTION INTRAVENOUS at 12:05

## 2023-01-01 RX ADMIN — AZITHROMYCIN 255 MILLIGRAM(S): 500 TABLET, FILM COATED ORAL at 02:42

## 2023-01-01 RX ADMIN — FINASTERIDE 5 MILLIGRAM(S): 5 TABLET, FILM COATED ORAL at 12:26

## 2023-01-01 RX ADMIN — CHLORHEXIDINE GLUCONATE 1 APPLICATION(S): 213 SOLUTION TOPICAL at 05:45

## 2023-01-01 RX ADMIN — REMDESIVIR 200 MILLIGRAM(S): 5 INJECTION INTRAVENOUS at 13:02

## 2023-01-01 RX ADMIN — CEFTRIAXONE 100 MILLIGRAM(S): 500 INJECTION, POWDER, FOR SOLUTION INTRAMUSCULAR; INTRAVENOUS at 01:10

## 2023-01-01 RX ADMIN — HEPARIN SODIUM 5000 UNIT(S): 5000 INJECTION INTRAVENOUS; SUBCUTANEOUS at 06:13

## 2023-01-01 RX ADMIN — HEPARIN SODIUM 5000 UNIT(S): 5000 INJECTION INTRAVENOUS; SUBCUTANEOUS at 13:36

## 2023-01-01 RX ADMIN — AZITHROMYCIN 255 MILLIGRAM(S): 500 TABLET, FILM COATED ORAL at 23:46

## 2023-01-01 RX ADMIN — LEVALBUTEROL 1 PUFF(S): 1.25 SOLUTION, CONCENTRATE RESPIRATORY (INHALATION) at 06:56

## 2023-01-01 RX ADMIN — Medication 100 MILLIGRAM(S): at 13:22

## 2023-01-01 RX ADMIN — AZITHROMYCIN 255 MILLIGRAM(S): 500 TABLET, FILM COATED ORAL at 00:21

## 2023-01-01 RX ADMIN — Medication 1 MILLIGRAM(S): at 12:05

## 2023-01-01 RX ADMIN — Medication 1 MILLIGRAM(S): at 12:25

## 2023-01-01 RX ADMIN — ATORVASTATIN CALCIUM 40 MILLIGRAM(S): 80 TABLET, FILM COATED ORAL at 22:50

## 2023-01-01 RX ADMIN — HEPARIN SODIUM 5000 UNIT(S): 5000 INJECTION INTRAVENOUS; SUBCUTANEOUS at 07:25

## 2023-01-01 RX ADMIN — FINASTERIDE 5 MILLIGRAM(S): 5 TABLET, FILM COATED ORAL at 11:07

## 2023-01-01 RX ADMIN — Medication 250 MILLIGRAM(S): at 16:25

## 2023-01-01 RX ADMIN — LOSARTAN POTASSIUM 50 MILLIGRAM(S): 100 TABLET, FILM COATED ORAL at 06:55

## 2023-01-01 RX ADMIN — AZITHROMYCIN 255 MILLIGRAM(S): 500 TABLET, FILM COATED ORAL at 00:07

## 2023-01-01 RX ADMIN — LOSARTAN POTASSIUM 50 MILLIGRAM(S): 100 TABLET, FILM COATED ORAL at 05:37

## 2023-01-01 RX ADMIN — FINASTERIDE 5 MILLIGRAM(S): 5 TABLET, FILM COATED ORAL at 19:14

## 2023-01-01 RX ADMIN — LEVALBUTEROL 1 PUFF(S): 1.25 SOLUTION, CONCENTRATE RESPIRATORY (INHALATION) at 22:24

## 2023-01-01 RX ADMIN — ATORVASTATIN CALCIUM 40 MILLIGRAM(S): 80 TABLET, FILM COATED ORAL at 22:11

## 2023-01-01 RX ADMIN — Medication 975 MILLIGRAM(S): at 21:19

## 2023-01-01 RX ADMIN — FINASTERIDE 5 MILLIGRAM(S): 5 TABLET, FILM COATED ORAL at 13:20

## 2023-01-01 RX ADMIN — PIPERACILLIN AND TAZOBACTAM 25 GRAM(S): 4; .5 INJECTION, POWDER, LYOPHILIZED, FOR SOLUTION INTRAVENOUS at 05:51

## 2023-01-01 RX ADMIN — CEFTRIAXONE 100 MILLIGRAM(S): 500 INJECTION, POWDER, FOR SOLUTION INTRAMUSCULAR; INTRAVENOUS at 22:07

## 2023-01-01 RX ADMIN — REMDESIVIR 200 MILLIGRAM(S): 5 INJECTION INTRAVENOUS at 12:25

## 2023-01-01 RX ADMIN — Medication 400 MILLIGRAM(S): at 15:33

## 2023-01-01 RX ADMIN — LOSARTAN POTASSIUM 25 MILLIGRAM(S): 100 TABLET, FILM COATED ORAL at 06:06

## 2023-01-01 RX ADMIN — ENOXAPARIN SODIUM 40 MILLIGRAM(S): 100 INJECTION SUBCUTANEOUS at 06:56

## 2023-01-01 RX ADMIN — HEPARIN SODIUM 5000 UNIT(S): 5000 INJECTION INTRAVENOUS; SUBCUTANEOUS at 06:55

## 2023-01-01 RX ADMIN — LEVALBUTEROL 1 PUFF(S): 1.25 SOLUTION, CONCENTRATE RESPIRATORY (INHALATION) at 16:40

## 2023-01-01 RX ADMIN — Medication 100 MILLIGRAM(S): at 00:55

## 2023-01-01 RX ADMIN — CHLORHEXIDINE GLUCONATE 1 APPLICATION(S): 213 SOLUTION TOPICAL at 06:13

## 2023-01-01 RX ADMIN — LEVALBUTEROL 1 PUFF(S): 1.25 SOLUTION, CONCENTRATE RESPIRATORY (INHALATION) at 11:05

## 2023-01-01 RX ADMIN — CEFEPIME 100 MILLIGRAM(S): 1 INJECTION, POWDER, FOR SOLUTION INTRAMUSCULAR; INTRAVENOUS at 15:52

## 2023-01-01 RX ADMIN — CHLORHEXIDINE GLUCONATE 1 APPLICATION(S): 213 SOLUTION TOPICAL at 15:27

## 2023-01-01 RX ADMIN — SODIUM CHLORIDE 3100 MILLILITER(S): 9 INJECTION INTRAMUSCULAR; INTRAVENOUS; SUBCUTANEOUS at 11:11

## 2023-01-01 RX ADMIN — PIPERACILLIN AND TAZOBACTAM 25 GRAM(S): 4; .5 INJECTION, POWDER, LYOPHILIZED, FOR SOLUTION INTRAVENOUS at 05:35

## 2023-01-01 RX ADMIN — SODIUM CHLORIDE 75 MILLILITER(S): 9 INJECTION INTRAMUSCULAR; INTRAVENOUS; SUBCUTANEOUS at 11:12

## 2023-01-01 RX ADMIN — Medication 1 MILLIGRAM(S): at 13:11

## 2023-01-01 RX ADMIN — LOSARTAN POTASSIUM 50 MILLIGRAM(S): 100 TABLET, FILM COATED ORAL at 05:11

## 2023-01-01 RX ADMIN — LOSARTAN POTASSIUM 25 MILLIGRAM(S): 100 TABLET, FILM COATED ORAL at 05:52

## 2023-01-01 RX ADMIN — CEFEPIME 2000 MILLIGRAM(S): 1 INJECTION, POWDER, FOR SOLUTION INTRAMUSCULAR; INTRAVENOUS at 16:22

## 2023-01-01 RX ADMIN — ENOXAPARIN SODIUM 80 MILLIGRAM(S): 100 INJECTION SUBCUTANEOUS at 16:48

## 2023-01-01 RX ADMIN — Medication 100 MILLIGRAM(S): at 11:55

## 2023-01-01 RX ADMIN — LEVALBUTEROL 1 PUFF(S): 1.25 SOLUTION, CONCENTRATE RESPIRATORY (INHALATION) at 09:46

## 2023-01-01 RX ADMIN — ENOXAPARIN SODIUM 40 MILLIGRAM(S): 100 INJECTION SUBCUTANEOUS at 18:36

## 2023-01-01 RX ADMIN — Medication 650 MILLIGRAM(S): at 12:21

## 2023-01-01 RX ADMIN — Medication 100 MILLIGRAM(S): at 00:32

## 2023-01-04 LAB
CULTURE RESULTS: SIGNIFICANT CHANGE UP
SPECIMEN SOURCE: SIGNIFICANT CHANGE UP

## 2023-01-05 ENCOUNTER — RESULT REVIEW (OUTPATIENT)
Age: 83
End: 2023-01-05

## 2023-01-05 ENCOUNTER — APPOINTMENT (OUTPATIENT)
Dept: INFUSION THERAPY | Facility: HOSPITAL | Age: 83
End: 2023-01-05

## 2023-01-05 ENCOUNTER — APPOINTMENT (OUTPATIENT)
Dept: HEMATOLOGY ONCOLOGY | Facility: CLINIC | Age: 83
End: 2023-01-05
Payer: MEDICARE

## 2023-01-05 LAB
BASOPHILS # BLD AUTO: 0.05 K/UL — SIGNIFICANT CHANGE UP (ref 0–0.2)
BASOPHILS NFR BLD AUTO: 0.6 % — SIGNIFICANT CHANGE UP (ref 0–2)
CEA SERPL-MCNC: 4 NG/ML — HIGH (ref 0–3.8)
EOSINOPHIL # BLD AUTO: 0.24 K/UL — SIGNIFICANT CHANGE UP (ref 0–0.5)
EOSINOPHIL NFR BLD AUTO: 2.9 % — SIGNIFICANT CHANGE UP (ref 0–6)
HCT VFR BLD CALC: 35.8 % — LOW (ref 39–50)
HGB BLD-MCNC: 11.7 G/DL — LOW (ref 13–17)
IMM GRANULOCYTES NFR BLD AUTO: 1.3 % — HIGH (ref 0–0.9)
LYMPHOCYTES # BLD AUTO: 2.93 K/UL — SIGNIFICANT CHANGE UP (ref 1–3.3)
LYMPHOCYTES # BLD AUTO: 35 % — SIGNIFICANT CHANGE UP (ref 13–44)
MCHC RBC-ENTMCNC: 28.6 PG — SIGNIFICANT CHANGE UP (ref 27–34)
MCHC RBC-ENTMCNC: 32.7 G/DL — SIGNIFICANT CHANGE UP (ref 32–36)
MCV RBC AUTO: 87.5 FL — SIGNIFICANT CHANGE UP (ref 80–100)
MONOCYTES # BLD AUTO: 0.66 K/UL — SIGNIFICANT CHANGE UP (ref 0–0.9)
MONOCYTES NFR BLD AUTO: 7.9 % — SIGNIFICANT CHANGE UP (ref 2–14)
NEUTROPHILS # BLD AUTO: 4.39 K/UL — SIGNIFICANT CHANGE UP (ref 1.8–7.4)
NEUTROPHILS NFR BLD AUTO: 52.3 % — SIGNIFICANT CHANGE UP (ref 43–77)
NRBC # BLD: 0 /100 WBCS — SIGNIFICANT CHANGE UP (ref 0–0)
PLATELET # BLD AUTO: 206 K/UL — SIGNIFICANT CHANGE UP (ref 150–400)
RBC # BLD: 4.09 M/UL — LOW (ref 4.2–5.8)
RBC # FLD: 13.8 % — SIGNIFICANT CHANGE UP (ref 10.3–14.5)
TSH SERPL-MCNC: 1.31 UIU/ML — SIGNIFICANT CHANGE UP (ref 0.27–4.2)
WBC # BLD: 8.38 K/UL — SIGNIFICANT CHANGE UP (ref 3.8–10.5)
WBC # FLD AUTO: 8.38 K/UL — SIGNIFICANT CHANGE UP (ref 3.8–10.5)

## 2023-01-05 PROCEDURE — 99215 OFFICE O/P EST HI 40 MIN: CPT

## 2023-01-06 LAB
ALBUMIN SERPL ELPH-MCNC: 4 G/DL — SIGNIFICANT CHANGE UP (ref 3.3–5)
ALP SERPL-CCNC: 119 U/L — SIGNIFICANT CHANGE UP (ref 40–120)
ALT FLD-CCNC: 19 U/L — SIGNIFICANT CHANGE UP (ref 10–45)
ANION GAP SERPL CALC-SCNC: 17 MMOL/L — SIGNIFICANT CHANGE UP (ref 5–17)
AST SERPL-CCNC: 19 U/L — SIGNIFICANT CHANGE UP (ref 10–40)
BILIRUB SERPL-MCNC: 0.3 MG/DL — SIGNIFICANT CHANGE UP (ref 0.2–1.2)
BUN SERPL-MCNC: 37 MG/DL — HIGH (ref 7–23)
CALCIUM SERPL-MCNC: 9.1 MG/DL — SIGNIFICANT CHANGE UP (ref 8.4–10.5)
CHLORIDE SERPL-SCNC: 107 MMOL/L — SIGNIFICANT CHANGE UP (ref 96–108)
CO2 SERPL-SCNC: 16 MMOL/L — LOW (ref 22–31)
CREAT SERPL-MCNC: 1.63 MG/DL — HIGH (ref 0.5–1.3)
EGFR: 42 ML/MIN/1.73M2 — LOW
GLUCOSE SERPL-MCNC: 124 MG/DL — HIGH (ref 70–99)
POTASSIUM SERPL-MCNC: 4.8 MMOL/L — SIGNIFICANT CHANGE UP (ref 3.5–5.3)
POTASSIUM SERPL-SCNC: 4.8 MMOL/L — SIGNIFICANT CHANGE UP (ref 3.5–5.3)
PROT SERPL-MCNC: 6.9 G/DL — SIGNIFICANT CHANGE UP (ref 6–8.3)
SODIUM SERPL-SCNC: 140 MMOL/L — SIGNIFICANT CHANGE UP (ref 135–145)

## 2023-01-09 NOTE — ASSESSMENT
[FreeTextEntry1] : 81 yo m with at least stage IIIA lung adeno ca, PDL1 0%, NGS showed KRAS G12V mut in addition to a few other un-actionable ones. \par Baseline PET/CT personally- disease confined to rt RLL and RML- some activity in subcarinal region\par Bx from RLL pos for adeno ca, lavage showed atypical cells, and level 7 LN- was benign\par I presented the case at  and the group concurred that optimal staging would involve sampling of rt middle lobe. SUV on rt middle lobe mass is lower than that of RLL. \par Pt underwent RML through ventura bronch and was pos for adeno ca ([id number])\par MRI brain on 2/17 showed no mets\par Based on AJCC 8th staging, pt was staged as stage IIIA lung cancer\par Based on PFTs. he was deemed potentially- resectable by Dr. Medina\par Based on recent Checkmate 816 study, neoadjuvant chemo+nivo was thought likely to yield higher rates of CR and is an FDA-approved option. \par He started chemoimmunotherapy (Carbo/pem/nivo). S/p cycle 4\par PET/CT from May 2022 after 4 cycles was very confusing. There was mixed response on rt- right middle lobe mass is smaller and less FDG-avid but LL mass was larger and still quite avid. There is also some process on left side which was thought to be related to recent viral infection. \par The PET/CT picture suggesting POD was not in agreement with clinical exam or tumor-informed ctDNA testing through [facility] in which level decreased from 0.91 MTM/ml to undetectable level in June (subsequently was detectable at 0.05 MTM/ml in July).\par Repeat CT without contrast a month later, on 6/28/22 showed persistent changes, and to my eye, these findings were more c/w organizing PNA likely from IO rather than true POD. \par However, I recommended a bx. Pt was feeling well and hesitant to undergo any bx\par Hence, we decided to watch off tx and repeat CT in 2-3 months. \par The scans since last visit show stable 7 x 4.4 cm mass in the right lower lobe which has not significantly changed when compared to previous exam. Numerous solid nodules are noted within both lungs. Many of them have increased in size when compared to previous exam as described above.\par ctDNA levels were also rising slowly. \par Pt finally underwent surgical bx on 10/28/22 confirming malignancy. \par Discussed the incurable nature of disease and palliative intent of tx.\par Since pt responded to chemoIO previously, I would favor taxane/IO. However, pt was not interested in chemo given potential AEs but was amenable to IO alone. \par Hence, he has started the following treatment\par Tecentriq q 3 weeks\par He started this on 12/14. Tolerating well with no AEs\par Continue current tx\par Continue to track ctDNA\par Scans after 3rd tx\par OV in 6 weeks

## 2023-01-09 NOTE — HISTORY OF PRESENT ILLNESS
[Disease: _____________________] : Disease: [unfilled] [T: ___] : T[unfilled] [N: ___] : N[unfilled] [M: ___] : M[unfilled] [AJCC Stage: ____] : AJCC Stage: [unfilled] [de-identified] : Mr. Del Rio is a pleasant 82 M, [place] (very little English), speaking with PMH of HTN and BPH, never smoker but previously worked in building maintenance presented initially 1/21/22 with chronic dry cough x1 year and worsened dyspnea on exertion for 3 weeks, admitted to [facility] after found on CT to have to have a 5.3 x 2.8 cm masslike consolidation in the RLL. On 1/25, he underwent inpatient bronchoscopy and endobronchial ultrasound with RLL BAL, TBNA of station 7 lymph node, RLL Mass transbronchial biopsy which was suspicious for malignancy. \par He saw Dr. Moran and was recommended for PET/CT\par 2/7/22: \par large FDG-avid consolidations in right middle lobe and right lower lobe, unchanged as compared to CT dated 1/21/2022, compatible with known adenocarcinoma. Small FDG-avid nodule in right lung is suspicious for another site of disease. A mildly FDG-avid left lower lobe peripheral opacity, also unchanged on CT, is indeterminate.\par 2. Mildly FDG-avid subcarinal lymph node is nonspecific.\par Pt does not wish to use  phone. Grand-daughter provided the translation as per patient request. Other than cough, he has no other symptoms. He has tried OTC cough meds with no help. \par \par 2/18/22: Pt seen vis tel. No new complaints. He still has cough which is persistent and dry. \par \par 4/7/22: Pt seen via tele. 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 \par \par 5/3/22: Pt seen in tx room. He received cycle 1 3 weeks ago. Tolerated well with absolutely no AEs. He has since seen Dr. Medina who will see him again after scans post 3 cycles of tx. Pt here for cycle 2. \par \par 5/24/22: Since last visit, pt has had an eventful course. He was recently hospitalized with c/o fever, URI symptoms, myalgia and was found to have non-COVID coronavirus infection. Pt also febrile/tachycardic on admission, which improved with supportive care/abx. CXR suggested RLL consolidation (thought his could be known lung mass). He was started on vanc/zosyn in ED 5/20, followed by ceftriaxone and azithromycin for possible superimposed PNA in the setting of immunosuppression. Pt improved with all this and is now completing oral course of abx (last day tomorrow). Incidentally, pt was also noted to have ROSS (acute kidney injury) with admission SCr 1.88, baseline 1.2-1.3 thought to be related to sepsis/poor PO intake and dehydration. He was treated with IV hydration and home ARB was held (he continues to not take this at this time). \par He presents today for planned 3/3 cycle of chemoIO. He notes no fever, chills and cough has markedly improved. \par \par 6/16/22: Pt was diagnosed with corona virus infection, after ER visit for fever and cough. Cough has decreased and pt is fine now. \par \par 6/30/22: Cough improved. Otherwise feeling well\par \par 7/19/22: saw Dr. Moran. A bx of left sided lesion was recommended. pt wanted to discuss with me. He feels well. denies any dyspnea or cough or other symptoms. \par \par 9/16/22: Seen today with son, Rich. Patient has no complaints. Is doing well. Has not received any cancer tx since 6/23. \par \par 10/25/22: Pt seen vis televisit. He is feeling well. Has been active and notes no difference in status since last visit.\par \par 11/25/22: Per pt's son, everything is stable. Pt remains active. Since last visit, he has had Lt VATS, JUANPABLO and LLL wedge rxn on 10/28/22. Path revealed invasive mucinous AdenoCA. \par \par 12/6/22: Pt here for follow up. He has cough but otherwise no symptoms. \par \par 1/5/23: Cough is persistent. No other complaints.\par \par  [de-identified] : adeno ca

## 2023-01-09 NOTE — PHYSICAL EXAM
[Restricted in physically strenuous activity but ambulatory and able to carry out work of a light or sedentary nature] : Status 1- Restricted in physically strenuous activity but ambulatory and able to carry out work of a light or sedentary nature, e.g., light house work, office work [Normal] : affect appropriate [de-identified] : rhonchi noted over LLL and right lung

## 2023-01-19 ENCOUNTER — OUTPATIENT (OUTPATIENT)
Dept: OUTPATIENT SERVICES | Facility: HOSPITAL | Age: 83
LOS: 1 days | Discharge: HOME | End: 2023-01-19

## 2023-01-19 DIAGNOSIS — Z98.890 OTHER SPECIFIED POSTPROCEDURAL STATES: Chronic | ICD-10-CM

## 2023-01-19 DIAGNOSIS — C26.0 MALIGNANT NEOPLASM OF INTESTINAL TRACT, PART UNSPECIFIED: ICD-10-CM

## 2023-01-23 ENCOUNTER — EMERGENCY (EMERGENCY)
Facility: HOSPITAL | Age: 83
LOS: 1 days | Discharge: HOME | End: 2023-01-23
Attending: STUDENT IN AN ORGANIZED HEALTH CARE EDUCATION/TRAINING PROGRAM | Admitting: STUDENT IN AN ORGANIZED HEALTH CARE EDUCATION/TRAINING PROGRAM
Payer: MEDICARE

## 2023-01-23 VITALS
SYSTOLIC BLOOD PRESSURE: 137 MMHG | RESPIRATION RATE: 18 BRPM | OXYGEN SATURATION: 95 % | HEART RATE: 87 BPM | DIASTOLIC BLOOD PRESSURE: 62 MMHG

## 2023-01-23 DIAGNOSIS — Z98.890 OTHER SPECIFIED POSTPROCEDURAL STATES: Chronic | ICD-10-CM

## 2023-01-23 LAB
ALBUMIN SERPL ELPH-MCNC: 3.6 G/DL — SIGNIFICANT CHANGE UP (ref 3.3–5)
ALP SERPL-CCNC: 123 U/L — HIGH (ref 40–120)
ALT FLD-CCNC: 20 U/L — SIGNIFICANT CHANGE UP (ref 4–41)
ANION GAP SERPL CALC-SCNC: 12 MMOL/L — SIGNIFICANT CHANGE UP (ref 7–14)
AST SERPL-CCNC: 18 U/L — SIGNIFICANT CHANGE UP (ref 4–40)
B PERT DNA SPEC QL NAA+PROBE: SIGNIFICANT CHANGE UP
B PERT+PARAPERT DNA PNL SPEC NAA+PROBE: SIGNIFICANT CHANGE UP
BASE EXCESS BLDV CALC-SCNC: -2.5 MMOL/L — LOW (ref -2–3)
BASOPHILS # BLD AUTO: 0.04 K/UL — SIGNIFICANT CHANGE UP (ref 0–0.2)
BASOPHILS NFR BLD AUTO: 0.4 % — SIGNIFICANT CHANGE UP (ref 0–2)
BILIRUB SERPL-MCNC: 0.3 MG/DL — SIGNIFICANT CHANGE UP (ref 0.2–1.2)
BLOOD GAS VENOUS COMPREHENSIVE RESULT: SIGNIFICANT CHANGE UP
BORDETELLA PARAPERTUSSIS (RAPRVP): SIGNIFICANT CHANGE UP
BUN SERPL-MCNC: 31 MG/DL — HIGH (ref 7–23)
C PNEUM DNA SPEC QL NAA+PROBE: SIGNIFICANT CHANGE UP
CALCIUM SERPL-MCNC: 9.3 MG/DL — SIGNIFICANT CHANGE UP (ref 8.4–10.5)
CHLORIDE BLDV-SCNC: 107 MMOL/L — SIGNIFICANT CHANGE UP (ref 96–108)
CHLORIDE SERPL-SCNC: 105 MMOL/L — SIGNIFICANT CHANGE UP (ref 98–107)
CO2 BLDV-SCNC: 23.7 MMOL/L — SIGNIFICANT CHANGE UP (ref 22–26)
CO2 SERPL-SCNC: 20 MMOL/L — LOW (ref 22–31)
CREAT SERPL-MCNC: 1.53 MG/DL — HIGH (ref 0.5–1.3)
EGFR: 46 ML/MIN/1.73M2 — LOW
EOSINOPHIL # BLD AUTO: 0.29 K/UL — SIGNIFICANT CHANGE UP (ref 0–0.5)
EOSINOPHIL NFR BLD AUTO: 2.6 % — SIGNIFICANT CHANGE UP (ref 0–6)
FLUAV SUBTYP SPEC NAA+PROBE: SIGNIFICANT CHANGE UP
FLUBV RNA SPEC QL NAA+PROBE: SIGNIFICANT CHANGE UP
GAS PNL BLDV: 134 MMOL/L — LOW (ref 136–145)
GLUCOSE BLDV-MCNC: 91 MG/DL — SIGNIFICANT CHANGE UP (ref 70–99)
GLUCOSE SERPL-MCNC: 94 MG/DL — SIGNIFICANT CHANGE UP (ref 70–99)
HADV DNA SPEC QL NAA+PROBE: SIGNIFICANT CHANGE UP
HCO3 BLDV-SCNC: 22 MMOL/L — SIGNIFICANT CHANGE UP (ref 22–29)
HCOV 229E RNA SPEC QL NAA+PROBE: SIGNIFICANT CHANGE UP
HCOV HKU1 RNA SPEC QL NAA+PROBE: SIGNIFICANT CHANGE UP
HCOV NL63 RNA SPEC QL NAA+PROBE: SIGNIFICANT CHANGE UP
HCOV OC43 RNA SPEC QL NAA+PROBE: SIGNIFICANT CHANGE UP
HCT VFR BLD CALC: 35.8 % — LOW (ref 39–50)
HCT VFR BLDA CALC: 36 % — LOW (ref 39–51)
HGB BLD CALC-MCNC: 12.1 G/DL — LOW (ref 13–17)
HGB BLD-MCNC: 11.4 G/DL — LOW (ref 13–17)
HMPV RNA SPEC QL NAA+PROBE: SIGNIFICANT CHANGE UP
HPIV1 RNA SPEC QL NAA+PROBE: SIGNIFICANT CHANGE UP
HPIV2 RNA SPEC QL NAA+PROBE: SIGNIFICANT CHANGE UP
HPIV3 RNA SPEC QL NAA+PROBE: SIGNIFICANT CHANGE UP
HPIV4 RNA SPEC QL NAA+PROBE: SIGNIFICANT CHANGE UP
IANC: 6.17 K/UL — SIGNIFICANT CHANGE UP (ref 1.8–7.4)
IMM GRANULOCYTES NFR BLD AUTO: 1.2 % — HIGH (ref 0–0.9)
LACTATE BLDV-MCNC: 1.1 MMOL/L — SIGNIFICANT CHANGE UP (ref 0.5–2)
LYMPHOCYTES # BLD AUTO: 3.58 K/UL — HIGH (ref 1–3.3)
LYMPHOCYTES # BLD AUTO: 32.5 % — SIGNIFICANT CHANGE UP (ref 13–44)
M PNEUMO DNA SPEC QL NAA+PROBE: SIGNIFICANT CHANGE UP
MCHC RBC-ENTMCNC: 27.7 PG — SIGNIFICANT CHANGE UP (ref 27–34)
MCHC RBC-ENTMCNC: 31.8 GM/DL — LOW (ref 32–36)
MCV RBC AUTO: 87.1 FL — SIGNIFICANT CHANGE UP (ref 80–100)
MONOCYTES # BLD AUTO: 0.82 K/UL — SIGNIFICANT CHANGE UP (ref 0–0.9)
MONOCYTES NFR BLD AUTO: 7.4 % — SIGNIFICANT CHANGE UP (ref 2–14)
NEUTROPHILS # BLD AUTO: 6.17 K/UL — SIGNIFICANT CHANGE UP (ref 1.8–7.4)
NEUTROPHILS NFR BLD AUTO: 55.9 % — SIGNIFICANT CHANGE UP (ref 43–77)
NRBC # BLD: 0 /100 WBCS — SIGNIFICANT CHANGE UP (ref 0–0)
NRBC # FLD: 0 K/UL — SIGNIFICANT CHANGE UP (ref 0–0)
NT-PROBNP SERPL-SCNC: 76 PG/ML — SIGNIFICANT CHANGE UP
PCO2 BLDV: 39 MMHG — LOW (ref 42–55)
PH BLDV: 7.37 — SIGNIFICANT CHANGE UP (ref 7.32–7.43)
PLATELET # BLD AUTO: 266 K/UL — SIGNIFICANT CHANGE UP (ref 150–400)
PO2 BLDV: 42 MMHG — SIGNIFICANT CHANGE UP
POTASSIUM BLDV-SCNC: 5.1 MMOL/L — SIGNIFICANT CHANGE UP (ref 3.5–5.1)
POTASSIUM SERPL-MCNC: 5.3 MMOL/L — SIGNIFICANT CHANGE UP (ref 3.5–5.3)
POTASSIUM SERPL-SCNC: 5.3 MMOL/L — SIGNIFICANT CHANGE UP (ref 3.5–5.3)
PROT SERPL-MCNC: 8.3 G/DL — SIGNIFICANT CHANGE UP (ref 6–8.3)
RAPID RVP RESULT: SIGNIFICANT CHANGE UP
RBC # BLD: 4.11 M/UL — LOW (ref 4.2–5.8)
RBC # FLD: 13.3 % — SIGNIFICANT CHANGE UP (ref 10.3–14.5)
RSV RNA SPEC QL NAA+PROBE: SIGNIFICANT CHANGE UP
RV+EV RNA SPEC QL NAA+PROBE: SIGNIFICANT CHANGE UP
SAO2 % BLDV: 73.7 % — SIGNIFICANT CHANGE UP
SARS-COV-2 RNA SPEC QL NAA+PROBE: SIGNIFICANT CHANGE UP
SODIUM SERPL-SCNC: 137 MMOL/L — SIGNIFICANT CHANGE UP (ref 135–145)
TROPONIN T, HIGH SENSITIVITY RESULT: 19 NG/L — SIGNIFICANT CHANGE UP
WBC # BLD: 11.03 K/UL — HIGH (ref 3.8–10.5)
WBC # FLD AUTO: 11.03 K/UL — HIGH (ref 3.8–10.5)

## 2023-01-23 PROCEDURE — 71046 X-RAY EXAM CHEST 2 VIEWS: CPT | Mod: 26

## 2023-01-23 PROCEDURE — 99284 EMERGENCY DEPT VISIT MOD MDM: CPT | Mod: CS

## 2023-01-23 PROCEDURE — 71275 CT ANGIOGRAPHY CHEST: CPT | Mod: 26,MA

## 2023-01-23 RX ORDER — ONDANSETRON 8 MG/1
4 TABLET, FILM COATED ORAL ONCE
Refills: 0 | Status: COMPLETED | OUTPATIENT
Start: 2023-01-23 | End: 2023-01-23

## 2023-01-23 RX ADMIN — ONDANSETRON 4 MILLIGRAM(S): 8 TABLET, FILM COATED ORAL at 19:57

## 2023-01-23 NOTE — ED PROVIDER NOTE - NS ED ROS FT
CONST: no fevers, no chills  EYES: no pain, no vision changes  ENT: no sore throat, no ear pain, no change in hearing  CV: + Chest pain  RESP: + Cough, dyspnea on exertion.  ABD: no abdominal pain, no diarrhea. + Nausea, vomiting  : no dysuria, no flank pain, no hematuria  MSK: no back pain, no extremity pain  NEURO: no headache or additional neurologic complaints  SKIN:  no rash

## 2023-01-23 NOTE — ED ADULT TRIAGE NOTE - CHIEF COMPLAINT QUOTE
Pt with PMH of lung cancer on immunotherapy, c/o 3-4 days of SOB, chest pain, nausea. Pt sent from urgent care for eval. Negative covid test at urgent care.

## 2023-01-23 NOTE — ED ADULT NURSE REASSESSMENT NOTE - NS ED NURSE REASSESS COMMENT FT1
Pt received at change of shift, A&Ox4, respirations even/unlabored on O2 @ 2LPM via NC. Nonproductive cough noted. Pt went for x-ray with ID band in place. Results pending. Will continue to monitor.

## 2023-01-23 NOTE — ED PROVIDER NOTE - CLINICAL SUMMARY MEDICAL DECISION MAKING FREE TEXT BOX
82-year-old male patient with lung cancer currently on immunotherapy who presents emergency department for 3 days of worsening shortness of breath, cough, chest pain.  Patient tachypneic when speaking, but not requiring any supplemental oxygen at this time.  On exam, found with grunting on expiratory phase.  Will assess with blood work, EKG, CTA.  Rule out PE versus infectious etiology.

## 2023-01-23 NOTE — ED PROVIDER NOTE - OBJECTIVE STATEMENT
82-year-old male patient past medical history of R lung cancer for which he had undergone a RLL biopsy in 1/2022, a RML biopsy in 3/2022, and four rounds of chemotherapy that finished on 6/23/22, Later found with enlarging left lung nodule and for that the pt underwent a FB, L VATS, JUANPABLO & LLL Wedge resections on 10/28/22 who presents to the emergency department for worsening dyspnea on exertion, cough, pleuritic chest pain since 3 days ago. Patient endorses baseline cough worsened and was followed with SOB.  Chest pain associated with coughing.  Of note, endorsing nausea and emesis x1. Patient receives immunotherapy every 3 weeks.

## 2023-01-23 NOTE — ED PROVIDER NOTE - PATIENT PORTAL LINK FT
You can access the FollowMyHealth Patient Portal offered by [facility] by registering at the following website: [email or web address]. By joining [facility]’s FollowMyHealth portal, you will also be able to view your health information using other applications (apps) compatible with our system.

## 2023-01-23 NOTE — ED PROVIDER NOTE - PROGRESS NOTE DETAILS
CTA demonstrates increased R lung mass and pulmonary nodules. VS and blood work unremarkable. Patient not requiring supplemental oxygen. Spoke with son who is aware. Plan to discharge with Heme/onc and pulmonology follow up for further management

## 2023-01-23 NOTE — ED PROVIDER NOTE - NSFOLLOWUPINSTRUCTIONS_ED_ALL_ED_FT
You were seen for difficulty breathing. Fortunately, no signs of pulmonary emboli, but there are signs of increased size of lung mass and pulmonary lung nodules. It is important to follow up with pulmonology and with hematology/oncology for further management.     Please find your results attached. If you have worsening difficulty breathing or any other concerns please return to the ED.

## 2023-01-23 NOTE — ED PROVIDER NOTE - PHYSICAL EXAMINATION
GENERAL: Awake, alert, NAD  HEENT: NC/AT, moist mucous membranes, EOMI  LUNGS: Tachypneic when speaking.  Grunting heard on expiratory phase.  CARDIAC: RRR, no m/r/g  ABDOMEN: Soft, non tender, non distended, no rebound, no guarding  BACK: No midline spinal tenderness, no CVA tenderness  EXT: No edema, no calf tenderness, no deformities.  NEURO: A&Ox3. Moving all extremities.  SKIN: Warm and dry. No rash.  PSYCH: Normal affect.

## 2023-01-23 NOTE — ED PROVIDER NOTE - ATTENDING CONTRIBUTION TO CARE
83 yo M hx lung cancer s/p RLL biopsy 1/2022, and chemotherapy, VATS JUANPABLO And LLL wed resection 10/28/2022, presenting with complaints of cough x 3 days, now with chest pain with pleuritic chest pain with cough and shortness of breath. Denies fevers/chills. One episode of nausea. On exam, no accessory muscle use, b/l lower lung fields with crackles, no wheezing. No JVD, no LE edema, heart rrr, abd soft nt/nd. r/o pna, r/o PE, lower suspicion for ACS. Plan for CTA, labs, ekg, xr, trop, reassess

## 2023-01-23 NOTE — ED ADULT NURSE NOTE - OBJECTIVE STATEMENT
81 yo male, awake and alert, AOx4, ambulatory, [facility] with c/o worsening cough with chest pain and SOB. Pt reports chest pain radiating to back when coughing for the last 3/4 days and 1 episode of vomiting yesterday after eating. Pt states SOB is worse with movement.  Pt has a pmh of lung cancer. Last immunotherapy treatment was 3 weeks ago. Pt denies any other significant pmh. Pt denies current nausea, recent illness, recent falls, fever, chills. Pt's son assists with translation at bedside. Negative covid test at urgent care.

## 2023-01-24 VITALS
SYSTOLIC BLOOD PRESSURE: 115 MMHG | TEMPERATURE: 98 F | RESPIRATION RATE: 20 BRPM | OXYGEN SATURATION: 97 % | DIASTOLIC BLOOD PRESSURE: 58 MMHG | HEART RATE: 83 BPM

## 2023-01-24 LAB — TROPONIN T, HIGH SENSITIVITY RESULT: 22 NG/L — SIGNIFICANT CHANGE UP

## 2023-01-25 ENCOUNTER — APPOINTMENT (OUTPATIENT)
Dept: INFUSION THERAPY | Facility: HOSPITAL | Age: 83
End: 2023-01-25

## 2023-02-15 ENCOUNTER — RESULT REVIEW (OUTPATIENT)
Age: 83
End: 2023-02-15

## 2023-02-15 ENCOUNTER — NON-APPOINTMENT (OUTPATIENT)
Age: 83
End: 2023-02-15

## 2023-02-15 ENCOUNTER — APPOINTMENT (OUTPATIENT)
Dept: HEMATOLOGY ONCOLOGY | Facility: CLINIC | Age: 83
End: 2023-02-15
Payer: MEDICARE

## 2023-02-15 ENCOUNTER — APPOINTMENT (OUTPATIENT)
Dept: INFUSION THERAPY | Facility: HOSPITAL | Age: 83
End: 2023-02-15

## 2023-02-15 DIAGNOSIS — Z51.11 ENCOUNTER FOR ANTINEOPLASTIC CHEMOTHERAPY: ICD-10-CM

## 2023-02-15 LAB
ALBUMIN SERPL ELPH-MCNC: 3.8 G/DL — SIGNIFICANT CHANGE UP (ref 3.3–5)
ALP SERPL-CCNC: 133 U/L — HIGH (ref 40–120)
ALT FLD-CCNC: 19 U/L — SIGNIFICANT CHANGE UP (ref 10–45)
ANION GAP SERPL CALC-SCNC: 14 MMOL/L — SIGNIFICANT CHANGE UP (ref 5–17)
AST SERPL-CCNC: 15 U/L — SIGNIFICANT CHANGE UP (ref 10–40)
BASOPHILS # BLD AUTO: 0.05 K/UL — SIGNIFICANT CHANGE UP (ref 0–0.2)
BASOPHILS NFR BLD AUTO: 0.5 % — SIGNIFICANT CHANGE UP (ref 0–2)
BILIRUB SERPL-MCNC: 0.2 MG/DL — SIGNIFICANT CHANGE UP (ref 0.2–1.2)
BUN SERPL-MCNC: 28 MG/DL — HIGH (ref 7–23)
CALCIUM SERPL-MCNC: 9.2 MG/DL — SIGNIFICANT CHANGE UP (ref 8.4–10.5)
CEA SERPL-MCNC: 4.8 NG/ML — HIGH (ref 0–3.8)
CHLORIDE SERPL-SCNC: 104 MMOL/L — SIGNIFICANT CHANGE UP (ref 96–108)
CO2 SERPL-SCNC: 21 MMOL/L — LOW (ref 22–31)
CREAT SERPL-MCNC: 1.65 MG/DL — HIGH (ref 0.5–1.3)
EGFR: 41 ML/MIN/1.73M2 — LOW
EOSINOPHIL # BLD AUTO: 0.51 K/UL — HIGH (ref 0–0.5)
EOSINOPHIL NFR BLD AUTO: 5.5 % — SIGNIFICANT CHANGE UP (ref 0–6)
GLUCOSE SERPL-MCNC: 85 MG/DL — SIGNIFICANT CHANGE UP (ref 70–99)
HCT VFR BLD CALC: 34.7 % — LOW (ref 39–50)
HGB BLD-MCNC: 11.3 G/DL — LOW (ref 13–17)
IMM GRANULOCYTES NFR BLD AUTO: 1.1 % — HIGH (ref 0–0.9)
LYMPHOCYTES # BLD AUTO: 3.54 K/UL — HIGH (ref 1–3.3)
LYMPHOCYTES # BLD AUTO: 37.9 % — SIGNIFICANT CHANGE UP (ref 13–44)
MCHC RBC-ENTMCNC: 27.7 PG — SIGNIFICANT CHANGE UP (ref 27–34)
MCHC RBC-ENTMCNC: 32.6 G/DL — SIGNIFICANT CHANGE UP (ref 32–36)
MCV RBC AUTO: 85 FL — SIGNIFICANT CHANGE UP (ref 80–100)
MONOCYTES # BLD AUTO: 0.73 K/UL — SIGNIFICANT CHANGE UP (ref 0–0.9)
MONOCYTES NFR BLD AUTO: 7.8 % — SIGNIFICANT CHANGE UP (ref 2–14)
NEUTROPHILS # BLD AUTO: 4.4 K/UL — SIGNIFICANT CHANGE UP (ref 1.8–7.4)
NEUTROPHILS NFR BLD AUTO: 47.2 % — SIGNIFICANT CHANGE UP (ref 43–77)
NRBC # BLD: 0 /100 WBCS — SIGNIFICANT CHANGE UP (ref 0–0)
PLATELET # BLD AUTO: 217 K/UL — SIGNIFICANT CHANGE UP (ref 150–400)
POTASSIUM SERPL-MCNC: 5 MMOL/L — SIGNIFICANT CHANGE UP (ref 3.5–5.3)
POTASSIUM SERPL-SCNC: 5 MMOL/L — SIGNIFICANT CHANGE UP (ref 3.5–5.3)
PROT SERPL-MCNC: 7.4 G/DL — SIGNIFICANT CHANGE UP (ref 6–8.3)
RBC # BLD: 4.08 M/UL — LOW (ref 4.2–5.8)
RBC # FLD: 13.4 % — SIGNIFICANT CHANGE UP (ref 10.3–14.5)
SODIUM SERPL-SCNC: 140 MMOL/L — SIGNIFICANT CHANGE UP (ref 135–145)
TSH SERPL-MCNC: 1.82 UIU/ML — SIGNIFICANT CHANGE UP (ref 0.27–4.2)
WBC # BLD: 9.33 K/UL — SIGNIFICANT CHANGE UP (ref 3.8–10.5)
WBC # FLD AUTO: 9.33 K/UL — SIGNIFICANT CHANGE UP (ref 3.8–10.5)

## 2023-02-15 PROCEDURE — 99214 OFFICE O/P EST MOD 30 MIN: CPT

## 2023-02-15 NOTE — REVIEW OF SYSTEMS
[Negative] : Allergic/Immunologic [Fever] : no fever [Fatigue] : no fatigue [Dysphagia] : no dysphagia [Chest Pain] : no chest pain [Shortness Of Breath] : no shortness of breath [Cough] : cough [SOB on Exertion] : no shortness of breath during exertion [Vomiting] : no vomiting [Diarrhea] : no diarrhea [Joint Pain] : no joint pain [Skin Rash] : no skin rash

## 2023-02-15 NOTE — HISTORY OF PRESENT ILLNESS
[Disease: _____________________] : Disease: [unfilled] [T: ___] : T[unfilled] [N: ___] : N[unfilled] [M: ___] : M[unfilled] [AJCC Stage: ____] : AJCC Stage: [unfilled] [Treatment Protocol] : Treatment Protocol [de-identified] : Mr. 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 On 1/25, he underwent inpatient bronchoscopy and endobronchial ultrasound with RLL BAL, TBNA of station 7 lymph node, RLL Mass transbronchial biopsy which was suspicious for malignancy. \par He saw Dr. Moran and was recommended for PET/CT\par 2/7/22: \par large FDG-avid consolidations in right middle lobe and right lower lobe, unchanged as compared to CT dated 1/21/2022, compatible with known adenocarcinoma. Small FDG-avid nodule in right lung is suspicious for another site of disease. A mildly FDG-avid left lower lobe peripheral opacity, also unchanged on CT, is indeterminate.\par 2. Mildly FDG-avid subcarinal lymph node is nonspecific.\par Pt does not wish to use  phone. Grand-daughter provided the translation as per patient request. Other than cough, he has no other symptoms. He has tried OTC cough meds with no help. \par \par 2/18/22: Pt seen vis tel. No new complaints. He still has cough which is persistent and dry. \par \par 4/7/22: Pt seen via tele. 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 \par \par 5/3/22: Pt seen in tx room. He received cycle 1 3 weeks ago. Tolerated well with absolutely no AEs. He has since seen Dr. Medina who will see him again after scans post 3 cycles of tx. Pt here for cycle 2. \par \par 5/24/22: Since last visit, pt has had an eventful course. He was recently hospitalized with c/o fever, URI symptoms, myalgia and was found to have non-COVID coronavirus infection. Pt also febrile/tachycardic on admission, which improved with supportive care/abx. CXR suggested RLL consolidation (thought his could be known lung mass). He was started on vanc/zosyn in ED 5/20, followed by ceftriaxone and azithromycin for possible superimposed PNA in the setting of immunosuppression. Pt improved with all this and is now completing oral course of abx (last day tomorrow). Incidentally, pt was also noted to have ROSS (acute kidney injury) with admission SCr 1.88, baseline 1.2-1.3 thought to be related to sepsis/poor PO intake and dehydration. He was treated with IV hydration and home ARB was held (he continues to not take this at this time). \par He presents today for planned 3/3 cycle of chemoIO. He notes no fever, chills and cough has markedly improved. \par \par 6/16/22: Pt was diagnosed with corona virus infection, after ER visit for fever and cough. Cough has decreased and pt is fine now. \par \par 6/30/22: Cough improved. Otherwise feeling well\par \par 7/19/22: saw Dr. Moran. A bx of left sided lesion was recommended. pt wanted to discuss with me. He feels well. denies any dyspnea or cough or other symptoms. \par \par 9/16/22: Seen today with son, Rich. Patient has no complaints. Is doing well. Has not received any cancer tx since 6/23. \par \par 10/25/22: Pt seen vis televisit. He is feeling well. Has been active and notes no difference in status since last visit.\par \par 11/25/22: Per pt's son, everything is stable. Pt remains active. Since last visit, he has had Lt VATS, JUANPABLO and LLL wedge rxn on 10/28/22. Path revealed invasive mucinous AdenoCA. \par \par 12/6/22: Pt here for follow up. He has cough but otherwise no symptoms. \par \par 1/5/23: Cough is persistent. No other complaints.\par \par 2/15/23: Patient seen today for follow up in treatment room. Has been tolerating well with no AEs to report. Ongoing cough not adequately relieved by OTC cough syrup  [de-identified] : adeno ca [FreeTextEntry1] : Tecentriq q 3 weeks on 12/14

## 2023-02-15 NOTE — PHYSICAL EXAM
[Restricted in physically strenuous activity but ambulatory and able to carry out work of a light or sedentary nature] : Status 1- Restricted in physically strenuous activity but ambulatory and able to carry out work of a light or sedentary nature, e.g., light house work, office work [Normal] : affect appropriate [de-identified] : rhonchi noted over LLL and right lung

## 2023-02-15 NOTE — ASSESSMENT
[FreeTextEntry1] : 81 yo m with at least stage IIIA lung adeno ca, PDL1 0%, NGS showed KRAS G12V mut in addition to a few other un-actionable ones. \par Baseline PET/CT personally- disease confined to rt RLL and RML- some activity in subcarinal region. Bx from RLL pos for adeno ca, lavage showed atypical cells, and level 7 LN- was benign. I presented the case at  and the group concurred that optimal staging would involve sampling of rt middle lobe. SUV on rt middle lobe mass is lower than that of RLL. Pt underwent RML through ventura bronch and was pos for adeno ca ([id number]- [id number]). MRI brain on 2/17 showed no mets. Based on AJCC 8th staging, pt was staged as stage IIIA lung cancer. Based on PFTs he was deemed potentially resectable by Dr. Medina. Based on recent Checkmate 816 study, neoadjuvant chemo+nivo was thought likely to yield higher rates of CR and is an FDA-approved option. He started chemoimmunotherapy (Carbo/pem/nivo) for 4 cycles. PET/CT from May 2022 after 4 cycles was very confusing. There was mixed response on rt- right middle lobe mass is smaller and less FDG-avid but LL mass was larger and still quite avid. There is also some process on left side which was thought to be related to recent viral infection. \par The PET/CT picture suggesting POD was not in agreement with clinical exam or tumor-informed ctDNA testing through [facility] in which level decreased from 0.91 MTM/ml to undetectable level in June (subsequently was detectable at 0.05 MTM/ml in July). Repeat CT without contrast a month later, on 6/28/22 showed persistent changes, and to my eye, these findings were more c/w organizing PNA likely from IO rather than true POD. However, I recommended a bx. Pt was feeling well and hesitant to undergo any bx. Hence, we decided to watch off tx and repeat CT in 2-3 months. The scans done in September show stable 7 x 4.4 cm mass in the right lower lobe which has not significantly changed when compared to previous exam. Numerous solid nodules are noted within both lungs. Many of them have increased in size. ctDNA levels were also rising slowly. Pt finally underwent surgical bx on 10/28/22 confirming malignancy. Discussed the incurable nature of disease and palliative intent of tx. Since pt responded to chemoIO previously, I would favor taxane/IO. However, pt was not interested in chemo given potential AEs but was amenable to IO alone. Hence, he started Tecentriq q 3 weeks on 12/14. Tolerating well with no AEs. \par \par Continue current treatment with Tecentriq\par Continue to track ctDNA\par Due for scans, ordered today. \par Cough: prescribed Tessalon TID\par OV in 6 weeks\par

## 2023-03-08 ENCOUNTER — APPOINTMENT (OUTPATIENT)
Dept: HEMATOLOGY ONCOLOGY | Facility: CLINIC | Age: 83
End: 2023-03-08
Payer: MEDICARE

## 2023-03-08 ENCOUNTER — RESULT REVIEW (OUTPATIENT)
Age: 83
End: 2023-03-08

## 2023-03-08 ENCOUNTER — APPOINTMENT (OUTPATIENT)
Dept: INFUSION THERAPY | Facility: HOSPITAL | Age: 83
End: 2023-03-08

## 2023-03-08 LAB
BASOPHILS # BLD AUTO: 0.06 K/UL — SIGNIFICANT CHANGE UP (ref 0–0.2)
BASOPHILS NFR BLD AUTO: 0.6 % — SIGNIFICANT CHANGE UP (ref 0–2)
CEA SERPL-MCNC: 4.8 NG/ML — HIGH (ref 0–3.8)
EOSINOPHIL # BLD AUTO: 0.31 K/UL — SIGNIFICANT CHANGE UP (ref 0–0.5)
EOSINOPHIL NFR BLD AUTO: 3.2 % — SIGNIFICANT CHANGE UP (ref 0–6)
HCT VFR BLD CALC: 35.5 % — LOW (ref 39–50)
HGB BLD-MCNC: 11.6 G/DL — LOW (ref 13–17)
IMM GRANULOCYTES NFR BLD AUTO: 0.7 % — SIGNIFICANT CHANGE UP (ref 0–0.9)
LYMPHOCYTES # BLD AUTO: 3.77 K/UL — HIGH (ref 1–3.3)
LYMPHOCYTES # BLD AUTO: 39.3 % — SIGNIFICANT CHANGE UP (ref 13–44)
MCHC RBC-ENTMCNC: 28 PG — SIGNIFICANT CHANGE UP (ref 27–34)
MCHC RBC-ENTMCNC: 32.7 G/DL — SIGNIFICANT CHANGE UP (ref 32–36)
MCV RBC AUTO: 85.5 FL — SIGNIFICANT CHANGE UP (ref 80–100)
MONOCYTES # BLD AUTO: 0.64 K/UL — SIGNIFICANT CHANGE UP (ref 0–0.9)
MONOCYTES NFR BLD AUTO: 6.7 % — SIGNIFICANT CHANGE UP (ref 2–14)
NEUTROPHILS # BLD AUTO: 4.75 K/UL — SIGNIFICANT CHANGE UP (ref 1.8–7.4)
NEUTROPHILS NFR BLD AUTO: 49.5 % — SIGNIFICANT CHANGE UP (ref 43–77)
NRBC # BLD: 0 /100 WBCS — SIGNIFICANT CHANGE UP (ref 0–0)
PLATELET # BLD AUTO: 224 K/UL — SIGNIFICANT CHANGE UP (ref 150–400)
RBC # BLD: 4.15 M/UL — LOW (ref 4.2–5.8)
RBC # FLD: 14 % — SIGNIFICANT CHANGE UP (ref 10.3–14.5)
TSH SERPL-MCNC: 1.32 UIU/ML — SIGNIFICANT CHANGE UP (ref 0.27–4.2)
WBC # BLD: 9.6 K/UL — SIGNIFICANT CHANGE UP (ref 3.8–10.5)
WBC # FLD AUTO: 9.6 K/UL — SIGNIFICANT CHANGE UP (ref 3.8–10.5)

## 2023-03-08 PROCEDURE — 99214 OFFICE O/P EST MOD 30 MIN: CPT

## 2023-03-08 NOTE — REVIEW OF SYSTEMS
[Cough] : cough [Negative] : Allergic/Immunologic [Fever] : no fever [Fatigue] : no fatigue [Dysphagia] : no dysphagia [Chest Pain] : no chest pain [Shortness Of Breath] : no shortness of breath [SOB on Exertion] : no shortness of breath during exertion [Vomiting] : no vomiting [Diarrhea] : no diarrhea [Joint Pain] : no joint pain [Skin Rash] : no skin rash

## 2023-03-08 NOTE — PHYSICAL EXAM
[Restricted in physically strenuous activity but ambulatory and able to carry out work of a light or sedentary nature] : Status 1- Restricted in physically strenuous activity but ambulatory and able to carry out work of a light or sedentary nature, e.g., light house work, office work [Normal] : affect appropriate [de-identified] : rhonchi noted over LLL and right lung

## 2023-03-08 NOTE — ASSESSMENT
[FreeTextEntry1] : 83 yo m with at least stage IIIA lung adeno ca, PDL1 0%, NGS showed KRAS G12V mut in addition to a few other un-actionable ones. \par Baseline PET/CT personally- disease confined to rt RLL and RML- some activity in subcarinal region. Bx from RLL pos for adeno ca, lavage showed atypical cells, and level 7 LN- was benign. I presented the case at  and the group concurred that optimal staging would involve sampling of rt middle lobe. SUV on rt middle lobe mass is lower than that of RLL. Pt underwent RML through ventura bronch and was pos for adeno ca ([id number]- [id number]). MRI brain on 2/17 showed no mets. Based on AJCC 8th staging, pt was staged as stage IIIA lung cancer. Based on PFTs he was deemed potentially resectable by Dr. Medina. Based on recent Checkmate 816 study, neoadjuvant chemo+nivo was thought likely to yield higher rates of CR and is an FDA-approved option. He started chemoimmunotherapy (Carbo/pem/nivo) for 4 cycles. PET/CT from May 2022 after 4 cycles was very confusing. There was mixed response on rt- right middle lobe mass is smaller and less FDG-avid but LL mass was larger and still quite avid. There is also some process on left side which was thought to be related to recent viral infection. \par The PET/CT picture suggesting POD was not in agreement with clinical exam or tumor-informed ctDNA testing through [facility] in which level decreased from 0.91 MTM/ml to undetectable level in June (subsequently was detectable at 0.05 MTM/ml in July). Repeat CT without contrast a month later, on 6/28/22 showed persistent changes, and to my eye, these findings were more c/w organizing PNA likely from IO rather than true POD. However, I recommended a bx. Pt was feeling well and hesitant to undergo any bx. Hence, we decided to watch off tx and repeat CT in 2-3 months. The scans done in September show stable 7 x 4.4 cm mass in the right lower lobe which has not significantly changed when compared to previous exam. Numerous solid nodules are noted within both lungs. Many of them have increased in size. ctDNA levels were also rising slowly. Pt finally underwent surgical bx on 10/28/22 confirming malignancy. Discussed the incurable nature of disease and palliative intent of tx. Since pt responded to chemoIO previously, I would favor taxane/IO. However, pt was not interested in chemo given potential AEs but was amenable to IO alone. Hence, he started Tecentriq q 3 weeks on 12/14. Tolerating well with no AEs. \par \par Continue current treatment with Tecentriq\par Continue to track ctDNA through [facility]- will repeat today \par Due for scans, they were ordered at last visit, advised patient today to schedule them ASAP \par Cough: prescribed Tessalon TID\par OV with next treatment \par

## 2023-03-08 NOTE — HISTORY OF PRESENT ILLNESS
[Disease: _____________________] : Disease: [unfilled] [T: ___] : T[unfilled] [N: ___] : N[unfilled] [M: ___] : M[unfilled] [AJCC Stage: ____] : AJCC Stage: [unfilled] [Treatment Protocol] : Treatment Protocol [de-identified] : Mr. Del Rio is a pleasant 82 M, Portuguese (very little English), speaking with PMH of HTN and BPH, never smoker but previously worked in building maintenance presented initially 1/21/22 with chronic dry cough x1 year and worsened dyspnea on exertion for 3 weeks, admitted to [facility] after found on CT to have to have a 5.3 x 2.8 cm masslike consolidation in the RLL. On 1/25, he underwent inpatient bronchoscopy and endobronchial ultrasound with RLL BAL, TBNA of station 7 lymph node, RLL Mass transbronchial biopsy which was suspicious for malignancy. \par He saw Dr. Moran and was recommended for PET/CT\par 2/7/22: \par large FDG-avid consolidations in right middle lobe and right lower lobe, unchanged as compared to CT dated 1/21/2022, compatible with known adenocarcinoma. Small FDG-avid nodule in right lung is suspicious for another site of disease. A mildly FDG-avid left lower lobe peripheral opacity, also unchanged on CT, is indeterminate.\par 2. Mildly FDG-avid subcarinal lymph node is nonspecific.\par Pt does not wish to use  phone. Grand-daughter provided the translation as per patient request. Other than cough, he has no other symptoms. He has tried OTC cough meds with no help. \par \par 2/18/22: Pt seen vis tel. No new complaints. He still has cough which is persistent and dry. \par \par 4/7/22: Pt seen via tele. 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 \par \par 5/3/22: Pt seen in tx room. He received cycle 1 3 weeks ago. Tolerated well with absolutely no AEs. He has since seen Dr. Medina who will see him again after scans post 3 cycles of tx. Pt here for cycle 2. \par \par 5/24/22: Since last visit, pt has had an eventful course. He was recently hospitalized with c/o fever, URI symptoms, myalgia and was found to have non-COVID coronavirus infection. Pt also febrile/tachycardic on admission, which improved with supportive care/abx. CXR suggested RLL consolidation (thought his could be known lung mass). He was started on vanc/zosyn in ED 5/20, followed by ceftriaxone and azithromycin for possible superimposed PNA in the setting of immunosuppression. Pt improved with all this and is now completing oral course of abx (last day tomorrow). Incidentally, pt was also noted to have ROSS (acute kidney injury) with admission SCr 1.88, baseline 1.2-1.3 thought to be related to sepsis/poor PO intake and dehydration. He was treated with IV hydration and home ARB was held (he continues to not take this at this time). \par He presents today for planned 3/3 cycle of chemoIO. He notes no fever, chills and cough has markedly improved. \par \par 6/16/22: Pt was diagnosed with corona virus infection, after ER visit for fever and cough. Cough has decreased and pt is fine now. \par \par 6/30/22: Cough improved. Otherwise feeling well\par \par 7/19/22: saw Dr. Moran. A bx of left sided lesion was recommended. pt wanted to discuss with me. He feels well. denies any dyspnea or cough or other symptoms. \par \par 9/16/22: Seen today with son, Rich. Patient has no complaints. Is doing well. Has not received any cancer tx since 6/23. \par \par 10/25/22: Pt seen vis televisit. He is feeling well. Has been active and notes no difference in status since last visit.\par \par 11/25/22: Per pt's son, everything is stable. Pt remains active. Since last visit, he has had Lt VATS, JUANPABLO and LLL wedge rxn on 10/28/22. Path revealed invasive mucinous AdenoCA. \par \par 12/6/22: Pt here for follow up. He has cough but otherwise no symptoms. \par \par 1/5/23: Cough is persistent. No other complaints.\par \par 2/15/23: Patient seen today for follow up in treatment room. Has been tolerating well with no AEs to report. Ongoing cough not adequately relieved by OTC cough syrup \par \par 3/8/23: Pt seen today for follow accompanied by his son. Ongoing cough, no other complaints.  [de-identified] : adeno ca [FreeTextEntry1] : Tecentriq q 3 weeks started on 12/14

## 2023-03-09 LAB
ALBUMIN SERPL ELPH-MCNC: 3.7 G/DL — SIGNIFICANT CHANGE UP (ref 3.3–5)
ALP SERPL-CCNC: 127 U/L — HIGH (ref 40–120)
ALT FLD-CCNC: 20 U/L — SIGNIFICANT CHANGE UP (ref 10–45)
ANION GAP SERPL CALC-SCNC: 14 MMOL/L — SIGNIFICANT CHANGE UP (ref 5–17)
AST SERPL-CCNC: 17 U/L — SIGNIFICANT CHANGE UP (ref 10–40)
BILIRUB SERPL-MCNC: 0.2 MG/DL — SIGNIFICANT CHANGE UP (ref 0.2–1.2)
BUN SERPL-MCNC: 30 MG/DL — HIGH (ref 7–23)
CALCIUM SERPL-MCNC: 9.2 MG/DL — SIGNIFICANT CHANGE UP (ref 8.4–10.5)
CHLORIDE SERPL-SCNC: 104 MMOL/L — SIGNIFICANT CHANGE UP (ref 96–108)
CO2 SERPL-SCNC: 18 MMOL/L — LOW (ref 22–31)
CREAT SERPL-MCNC: 1.58 MG/DL — HIGH (ref 0.5–1.3)
EGFR: 44 ML/MIN/1.73M2 — LOW
GLUCOSE SERPL-MCNC: 101 MG/DL — HIGH (ref 70–99)
POTASSIUM SERPL-MCNC: 5.2 MMOL/L — SIGNIFICANT CHANGE UP (ref 3.5–5.3)
POTASSIUM SERPL-SCNC: 5.2 MMOL/L — SIGNIFICANT CHANGE UP (ref 3.5–5.3)
PROT SERPL-MCNC: 7.5 G/DL — SIGNIFICANT CHANGE UP (ref 6–8.3)
SODIUM SERPL-SCNC: 136 MMOL/L — SIGNIFICANT CHANGE UP (ref 135–145)

## 2023-03-22 ENCOUNTER — APPOINTMENT (OUTPATIENT)
Dept: CT IMAGING | Facility: IMAGING CENTER | Age: 83
End: 2023-03-22
Payer: MEDICARE

## 2023-03-22 ENCOUNTER — OUTPATIENT (OUTPATIENT)
Dept: OUTPATIENT SERVICES | Facility: HOSPITAL | Age: 83
LOS: 1 days | End: 2023-03-22
Payer: MEDICARE

## 2023-03-22 DIAGNOSIS — C34.90 MALIGNANT NEOPLASM OF UNSPECIFIED PART OF UNSPECIFIED BRONCHUS OR LUNG: ICD-10-CM

## 2023-03-22 DIAGNOSIS — Z98.890 OTHER SPECIFIED POSTPROCEDURAL STATES: Chronic | ICD-10-CM

## 2023-03-22 PROCEDURE — 71260 CT THORAX DX C+: CPT

## 2023-03-22 PROCEDURE — 71260 CT THORAX DX C+: CPT | Mod: 26,MH

## 2023-03-22 PROCEDURE — 74177 CT ABD & PELVIS W/CONTRAST: CPT | Mod: 26,MH

## 2023-03-22 PROCEDURE — 74177 CT ABD & PELVIS W/CONTRAST: CPT

## 2023-03-23 ENCOUNTER — OUTPATIENT (OUTPATIENT)
Dept: OUTPATIENT SERVICES | Facility: HOSPITAL | Age: 83
LOS: 1 days | Discharge: HOME | End: 2023-03-23

## 2023-03-23 DIAGNOSIS — Z98.890 OTHER SPECIFIED POSTPROCEDURAL STATES: Chronic | ICD-10-CM

## 2023-03-23 DIAGNOSIS — C26.0 MALIGNANT NEOPLASM OF INTESTINAL TRACT, PART UNSPECIFIED: ICD-10-CM

## 2023-03-29 ENCOUNTER — RESULT REVIEW (OUTPATIENT)
Age: 83
End: 2023-03-29

## 2023-03-29 ENCOUNTER — APPOINTMENT (OUTPATIENT)
Dept: INFUSION THERAPY | Facility: HOSPITAL | Age: 83
End: 2023-03-29

## 2023-03-29 ENCOUNTER — APPOINTMENT (OUTPATIENT)
Dept: HEMATOLOGY ONCOLOGY | Facility: CLINIC | Age: 83
End: 2023-03-29
Payer: MEDICARE

## 2023-03-29 DIAGNOSIS — Z51.11 ENCOUNTER FOR ANTINEOPLASTIC CHEMOTHERAPY: ICD-10-CM

## 2023-03-29 LAB
BASOPHILS # BLD AUTO: 0.05 K/UL — SIGNIFICANT CHANGE UP (ref 0–0.2)
BASOPHILS NFR BLD AUTO: 0.5 % — SIGNIFICANT CHANGE UP (ref 0–2)
EOSINOPHIL # BLD AUTO: 0.17 K/UL — SIGNIFICANT CHANGE UP (ref 0–0.5)
EOSINOPHIL NFR BLD AUTO: 1.9 % — SIGNIFICANT CHANGE UP (ref 0–6)
HCT VFR BLD CALC: 35.7 % — LOW (ref 39–50)
HGB BLD-MCNC: 11.5 G/DL — LOW (ref 13–17)
IMM GRANULOCYTES NFR BLD AUTO: 1.4 % — HIGH (ref 0–0.9)
LYMPHOCYTES # BLD AUTO: 2.94 K/UL — SIGNIFICANT CHANGE UP (ref 1–3.3)
LYMPHOCYTES # BLD AUTO: 32.3 % — SIGNIFICANT CHANGE UP (ref 13–44)
MCHC RBC-ENTMCNC: 27.6 PG — SIGNIFICANT CHANGE UP (ref 27–34)
MCHC RBC-ENTMCNC: 32.2 G/DL — SIGNIFICANT CHANGE UP (ref 32–36)
MCV RBC AUTO: 85.8 FL — SIGNIFICANT CHANGE UP (ref 80–100)
MONOCYTES # BLD AUTO: 0.66 K/UL — SIGNIFICANT CHANGE UP (ref 0–0.9)
MONOCYTES NFR BLD AUTO: 7.2 % — SIGNIFICANT CHANGE UP (ref 2–14)
NEUTROPHILS # BLD AUTO: 5.16 K/UL — SIGNIFICANT CHANGE UP (ref 1.8–7.4)
NEUTROPHILS NFR BLD AUTO: 56.7 % — SIGNIFICANT CHANGE UP (ref 43–77)
NRBC # BLD: 0 /100 WBCS — SIGNIFICANT CHANGE UP (ref 0–0)
PLATELET # BLD AUTO: 210 K/UL — SIGNIFICANT CHANGE UP (ref 150–400)
RBC # BLD: 4.16 M/UL — LOW (ref 4.2–5.8)
RBC # FLD: 14.5 % — SIGNIFICANT CHANGE UP (ref 10.3–14.5)
WBC # BLD: 9.11 K/UL — SIGNIFICANT CHANGE UP (ref 3.8–10.5)
WBC # FLD AUTO: 9.11 K/UL — SIGNIFICANT CHANGE UP (ref 3.8–10.5)

## 2023-03-29 PROCEDURE — 99214 OFFICE O/P EST MOD 30 MIN: CPT

## 2023-03-29 NOTE — PHYSICAL EXAM
[Restricted in physically strenuous activity but ambulatory and able to carry out work of a light or sedentary nature] : Status 1- Restricted in physically strenuous activity but ambulatory and able to carry out work of a light or sedentary nature, e.g., light house work, office work [Normal] : affect appropriate [de-identified] : rhonchi noted over LLL and right lung

## 2023-03-29 NOTE — REVIEW OF SYSTEMS
[Fever] : no fever [Fatigue] : no fatigue [Dysphagia] : no dysphagia [Chest Pain] : no chest pain [Shortness Of Breath] : no shortness of breath [Cough] : cough [SOB on Exertion] : no shortness of breath during exertion [Vomiting] : no vomiting [Diarrhea] : no diarrhea [Joint Pain] : no joint pain [Skin Rash] : no skin rash [Negative] : Allergic/Immunologic

## 2023-03-29 NOTE — ASSESSMENT
[FreeTextEntry1] : 81 yo m with at least stage IIIA lung adeno ca, PDL1 0%, NGS showed KRAS G12V mut in addition to a few other un-actionable ones. \par Baseline PET/CT personally- disease confined to rt RLL and RML- some activity in subcarinal region. Bx from RLL pos for adeno ca, lavage showed atypical cells, and level 7 LN- was benign. I presented the case at  and the group concurred that optimal staging would involve sampling of rt middle lobe. SUV on rt middle lobe mass is lower than that of RLL. Pt underwent RML through ventura bronch and was pos for adeno ca (80- [id number]). MRI brain on 2/17 showed no mets. Based on AJCC 8th staging, pt was staged as stage IIIA lung cancer. Based on PFTs he was deemed potentially resectable by Dr. Medina. Based on recent Checkmate 816 study, neoadjuvant chemo+nivo was thought likely to yield higher rates of CR and is an FDA-approved option. He started chemoimmunotherapy (Carbo/pem/nivo) for 4 cycles. PET/CT from May 2022 after 4 cycles was very confusing. There was mixed response on rt- right middle lobe mass is smaller and less FDG-avid but LL mass was larger and still quite avid. There is also some process on left side which was thought to be related to recent viral infection. \par The PET/CT picture suggesting POD was not in agreement with clinical exam or tumor-informed ctDNA testing through [facility] in which level decreased from 0.91 MTM/ml to undetectable level in June (subsequently was detectable at 0.05 MTM/ml in July). Repeat CT without contrast a month later, on 6/28/22 showed persistent changes, and to my eye, these findings were more c/w organizing PNA likely from IO rather than true POD. However, I recommended a bx. Pt was feeling well and hesitant to undergo any bx. Hence, we decided to watch off tx and repeat CT in 2-3 months. The scans done in September show stable 7 x 4.4 cm mass in the right lower lobe which has not significantly changed when compared to previous exam. Numerous solid nodules are noted within both lungs. Many of them have increased in size. ctDNA levels were also rising slowly. Pt finally underwent surgical bx on 10/28/22 confirming malignancy. Discussed the incurable nature of disease and palliative intent of tx. Since pt responded to chemoIO previously, I would favor taxane/IO. However, pt was not interested in chemo given potential AEs but was amenable to IO alone. Hence, he started Tecentriq q 3 weeks on 12/14. Tolerating well with no AEs. CT scan from March 2023 show that a few nodules have increased in size however adequate comparison was limited due to differences in technique and inspiratory effort. \par \par Discussed results of recent CT scan with patient and his son and explained that a few nodules have increased in size however the comparison to his prior scan was limited due to differences in breathing/technique during the test. Explained that these changes may also be inflammatory in nature and that we plan to continue him on the current treatment and rescan in 3 months to assess stability. We will also continue monitoring ctDNA via [facility]. The patient and his son were in agreement with this plan. \par Continue current treatment with Tecentriq\par Continue to track ctDNA through [facility]- was supposed to be done at last treatment but was not, thus will repeat today. \par Scans will be due in June, can repeat earlier if symptoms worsen. \par Cough: prescribed Tessalon TID\par OV with next treatment \par

## 2023-03-29 NOTE — HISTORY OF PRESENT ILLNESS
[Disease: _____________________] : Disease: [unfilled] [T: ___] : T[unfilled] [N: ___] : N[unfilled] [M: ___] : M[unfilled] [AJCC Stage: ____] : AJCC Stage: [unfilled] [de-identified] : Mr. 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 On 1/25, he underwent inpatient bronchoscopy and endobronchial ultrasound with RLL BAL, TBNA of station 7 lymph node, RLL Mass transbronchial biopsy which was suspicious for malignancy. \par He saw Dr. Moran and was recommended for PET/CT\par 2/7/22: \par large FDG-avid consolidations in right middle lobe and right lower lobe, unchanged as compared to CT dated 1/21/2022, compatible with known adenocarcinoma. Small FDG-avid nodule in right lung is suspicious for another site of disease. A mildly FDG-avid left lower lobe peripheral opacity, also unchanged on CT, is indeterminate.\par 2. Mildly FDG-avid subcarinal lymph node is nonspecific.\par Pt does not wish to use  phone. Grand-daughter provided the translation as per patient request. Other than cough, he has no other symptoms. He has tried OTC cough meds with no help. \par \par 2/18/22: Pt seen vis tel. No new complaints. He still has cough which is persistent and dry. \par \par 4/7/22: Pt seen via tele. 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 \par \par 5/3/22: Pt seen in tx room. He received cycle 1 3 weeks ago. Tolerated well with absolutely no AEs. He has since seen Dr. Medina who will see him again after scans post 3 cycles of tx. Pt here for cycle 2. \par \par 5/24/22: Since last visit, pt has had an eventful course. He was recently hospitalized with c/o fever, URI symptoms, myalgia and was found to have non-COVID coronavirus infection. Pt also febrile/tachycardic on admission, which improved with supportive care/abx. CXR suggested RLL consolidation (thought his could be known lung mass). He was started on vanc/zosyn in ED 5/20, followed by ceftriaxone and azithromycin for possible superimposed PNA in the setting of immunosuppression. Pt improved with all this and is now completing oral course of abx (last day tomorrow). Incidentally, pt was also noted to have ROSS (acute kidney injury) with admission SCr 1.88, baseline 1.2-1.3 thought to be related to sepsis/poor PO intake and dehydration. He was treated with IV hydration and home ARB was held (he continues to not take this at this time). \par He presents today for planned 3/3 cycle of chemoIO. He notes no fever, chills and cough has markedly improved. \par \par 6/16/22: Pt was diagnosed with corona virus infection, after ER visit for fever and cough. Cough has decreased and pt is fine now. \par \par 6/30/22: Cough improved. Otherwise feeling well\par \par 7/19/22: saw Dr. Moran. A bx of left sided lesion was recommended. pt wanted to discuss with me. He feels well. denies any dyspnea or cough or other symptoms. \par \par 9/16/22: Seen today with son, Rich. Patient has no complaints. Is doing well. Has not received any cancer tx since 6/23. \par \par 10/25/22: Pt seen vis televisit. He is feeling well. Has been active and notes no difference in status since last visit.\par \par 11/25/22: Per pt's son, everything is stable. Pt remains active. Since last visit, he has had Lt VATS, JUANPABLO and LLL wedge rxn on 10/28/22. Path revealed invasive mucinous AdenoCA. \par \par 12/6/22: Pt here for follow up. He has cough but otherwise no symptoms. \par \par 1/5/23: Cough is persistent. No other complaints.\par \par 2/15/23: Patient seen today for follow up in treatment room. Has been tolerating well with no AEs to report. Ongoing cough not adequately relieved by OTC cough syrup \par \par 3/8/23: Pt seen today for follow accompanied by his son. Ongoing cough, no other complaints. \par \par 3/29/23: patient seen today accompanied by his son. Reports that since he did his CT scan last week, his cough has been more frequent. He has also ran out of Benzonatate. He also notes that right after CT scan was done he had an episode of emesis, no nausea or emesis since then.  [de-identified] : adeno ca [Treatment Protocol] : Treatment Protocol [FreeTextEntry1] : Tecentriq q 3 weeks started on 12/14

## 2023-03-30 LAB
ALBUMIN SERPL ELPH-MCNC: 3.9 G/DL — SIGNIFICANT CHANGE UP (ref 3.3–5)
ALP SERPL-CCNC: 136 U/L — HIGH (ref 40–120)
ALT FLD-CCNC: 16 U/L — SIGNIFICANT CHANGE UP (ref 10–45)
ANION GAP SERPL CALC-SCNC: 16 MMOL/L — SIGNIFICANT CHANGE UP (ref 5–17)
AST SERPL-CCNC: 22 U/L — SIGNIFICANT CHANGE UP (ref 10–40)
BILIRUB SERPL-MCNC: 0.3 MG/DL — SIGNIFICANT CHANGE UP (ref 0.2–1.2)
BUN SERPL-MCNC: 28 MG/DL — HIGH (ref 7–23)
CALCIUM SERPL-MCNC: 9.3 MG/DL — SIGNIFICANT CHANGE UP (ref 8.4–10.5)
CEA SERPL-MCNC: 4.6 NG/ML — HIGH (ref 0–3.8)
CHLORIDE SERPL-SCNC: 106 MMOL/L — SIGNIFICANT CHANGE UP (ref 96–108)
CO2 SERPL-SCNC: 18 MMOL/L — LOW (ref 22–31)
CREAT SERPL-MCNC: 1.59 MG/DL — HIGH (ref 0.5–1.3)
EGFR: 43 ML/MIN/1.73M2 — LOW
GLUCOSE SERPL-MCNC: 104 MG/DL — HIGH (ref 70–99)
POTASSIUM SERPL-MCNC: 5.4 MMOL/L — HIGH (ref 3.5–5.3)
POTASSIUM SERPL-SCNC: 5.4 MMOL/L — HIGH (ref 3.5–5.3)
PROT SERPL-MCNC: 7.3 G/DL — SIGNIFICANT CHANGE UP (ref 6–8.3)
SODIUM SERPL-SCNC: 140 MMOL/L — SIGNIFICANT CHANGE UP (ref 135–145)
TSH SERPL-MCNC: 3.92 UIU/ML — SIGNIFICANT CHANGE UP (ref 0.27–4.2)

## 2023-04-19 ENCOUNTER — RESULT REVIEW (OUTPATIENT)
Age: 83
End: 2023-04-19

## 2023-04-19 ENCOUNTER — APPOINTMENT (OUTPATIENT)
Dept: HEMATOLOGY ONCOLOGY | Facility: CLINIC | Age: 83
End: 2023-04-19
Payer: MEDICARE

## 2023-04-19 ENCOUNTER — APPOINTMENT (OUTPATIENT)
Dept: INFUSION THERAPY | Facility: HOSPITAL | Age: 83
End: 2023-04-19

## 2023-04-19 ENCOUNTER — NON-APPOINTMENT (OUTPATIENT)
Age: 83
End: 2023-04-19

## 2023-04-19 LAB
ALBUMIN SERPL ELPH-MCNC: 3.9 G/DL — SIGNIFICANT CHANGE UP (ref 3.3–5)
ALP SERPL-CCNC: 138 U/L — HIGH (ref 40–120)
ALT FLD-CCNC: 16 U/L — SIGNIFICANT CHANGE UP (ref 10–45)
ANION GAP SERPL CALC-SCNC: 12 MMOL/L — SIGNIFICANT CHANGE UP (ref 5–17)
AST SERPL-CCNC: 15 U/L — SIGNIFICANT CHANGE UP (ref 10–40)
BASOPHILS # BLD AUTO: 0.03 K/UL — SIGNIFICANT CHANGE UP (ref 0–0.2)
BASOPHILS NFR BLD AUTO: 0.4 % — SIGNIFICANT CHANGE UP (ref 0–2)
BILIRUB SERPL-MCNC: 0.2 MG/DL — SIGNIFICANT CHANGE UP (ref 0.2–1.2)
BUN SERPL-MCNC: 36 MG/DL — HIGH (ref 7–23)
CALCIUM SERPL-MCNC: 9.1 MG/DL — SIGNIFICANT CHANGE UP (ref 8.4–10.5)
CEA SERPL-MCNC: 3.9 NG/ML — HIGH (ref 0–3.8)
CHLORIDE SERPL-SCNC: 105 MMOL/L — SIGNIFICANT CHANGE UP (ref 96–108)
CO2 SERPL-SCNC: 22 MMOL/L — SIGNIFICANT CHANGE UP (ref 22–31)
CREAT SERPL-MCNC: 1.7 MG/DL — HIGH (ref 0.5–1.3)
EGFR: 40 ML/MIN/1.73M2 — LOW
EOSINOPHIL # BLD AUTO: 0.21 K/UL — SIGNIFICANT CHANGE UP (ref 0–0.5)
EOSINOPHIL NFR BLD AUTO: 2.5 % — SIGNIFICANT CHANGE UP (ref 0–6)
GLUCOSE SERPL-MCNC: 113 MG/DL — HIGH (ref 70–99)
HCT VFR BLD CALC: 34.8 % — LOW (ref 39–50)
HGB BLD-MCNC: 11 G/DL — LOW (ref 13–17)
IMM GRANULOCYTES NFR BLD AUTO: 1.1 % — HIGH (ref 0–0.9)
LYMPHOCYTES # BLD AUTO: 2.55 K/UL — SIGNIFICANT CHANGE UP (ref 1–3.3)
LYMPHOCYTES # BLD AUTO: 30.4 % — SIGNIFICANT CHANGE UP (ref 13–44)
MCHC RBC-ENTMCNC: 27.3 PG — SIGNIFICANT CHANGE UP (ref 27–34)
MCHC RBC-ENTMCNC: 31.6 G/DL — LOW (ref 32–36)
MCV RBC AUTO: 86.4 FL — SIGNIFICANT CHANGE UP (ref 80–100)
MONOCYTES # BLD AUTO: 0.59 K/UL — SIGNIFICANT CHANGE UP (ref 0–0.9)
MONOCYTES NFR BLD AUTO: 7 % — SIGNIFICANT CHANGE UP (ref 2–14)
NEUTROPHILS # BLD AUTO: 4.93 K/UL — SIGNIFICANT CHANGE UP (ref 1.8–7.4)
NEUTROPHILS NFR BLD AUTO: 58.6 % — SIGNIFICANT CHANGE UP (ref 43–77)
NRBC # BLD: 0 /100 WBCS — SIGNIFICANT CHANGE UP (ref 0–0)
PLATELET # BLD AUTO: 260 K/UL — SIGNIFICANT CHANGE UP (ref 150–400)
POTASSIUM SERPL-MCNC: 5.4 MMOL/L — HIGH (ref 3.5–5.3)
POTASSIUM SERPL-SCNC: 5.4 MMOL/L — HIGH (ref 3.5–5.3)
PROT SERPL-MCNC: 7.2 G/DL — SIGNIFICANT CHANGE UP (ref 6–8.3)
RBC # BLD: 4.03 M/UL — LOW (ref 4.2–5.8)
RBC # FLD: 14.6 % — HIGH (ref 10.3–14.5)
SODIUM SERPL-SCNC: 138 MMOL/L — SIGNIFICANT CHANGE UP (ref 135–145)
TSH SERPL-MCNC: 0.82 UIU/ML — SIGNIFICANT CHANGE UP (ref 0.27–4.2)
WBC # BLD: 8.4 K/UL — SIGNIFICANT CHANGE UP (ref 3.8–10.5)
WBC # FLD AUTO: 8.4 K/UL — SIGNIFICANT CHANGE UP (ref 3.8–10.5)

## 2023-04-19 PROCEDURE — 99214 OFFICE O/P EST MOD 30 MIN: CPT

## 2023-04-19 RX ORDER — BENZONATATE 100 MG/1
100 CAPSULE ORAL 3 TIMES DAILY
Qty: 90 | Refills: 0 | Status: DISCONTINUED | COMMUNITY
Start: 2023-02-15 | End: 2023-04-19

## 2023-04-19 RX ORDER — FINASTERIDE 5 MG/1
1 TABLET, FILM COATED ORAL
Qty: 0 | Refills: 0 | DISCHARGE

## 2023-04-19 RX ORDER — FOLIC ACID 0.8 MG
1 TABLET ORAL
Qty: 0 | Refills: 0 | DISCHARGE

## 2023-04-19 NOTE — PHYSICAL EXAM
[Restricted in physically strenuous activity but ambulatory and able to carry out work of a light or sedentary nature] : Status 1- Restricted in physically strenuous activity but ambulatory and able to carry out work of a light or sedentary nature, e.g., light house work, office work [Normal] : affect appropriate [de-identified] : rhonchi noted over LLL and right lung

## 2023-04-19 NOTE — ASSESSMENT
[FreeTextEntry1] : 81 yo m with at least stage IIIA lung adeno ca, PDL1 0%, NGS showed KRAS G12V mut in addition to a few other un-actionable ones. \par Baseline PET/CT personally- disease confined to rt RLL and RML- some activity in subcarinal region. Bx from RLL pos for adeno ca, lavage showed atypical cells, and level 7 LN- was benign. I presented the case at  and the group concurred that optimal staging would involve sampling of rt middle lobe. SUV on rt middle lobe mass is lower than that of RLL. Pt underwent RML through ventura bronch and was pos for adeno ca ([id number]- [id number]). MRI brain on 2/17 showed no mets. Based on AJCC 8th staging, pt was staged as stage IIIA lung cancer. Based on PFTs he was deemed potentially resectable by Dr. Medina. Based on recent Checkmate 816 study, neoadjuvant chemo+nivo was thought likely to yield higher rates of CR and is an FDA-approved option. He started chemoimmunotherapy (Carbo/pem/nivo) for 4 cycles. PET/CT from May 2022 after 4 cycles was very confusing. There was mixed response on rt- right middle lobe mass is smaller and less FDG-avid but LL mass was larger and still quite avid. There is also some process on left side which was thought to be related to recent viral infection. \par The PET/CT picture suggesting POD was not in agreement with clinical exam or tumor-informed ctDNA testing through [facility] in which level decreased from 0.91 MTM/ml to undetectable level in June (subsequently was detectable at 0.05 MTM/ml in July). Repeat CT without contrast a month later, on 6/28/22 showed persistent changes, and to my eye, these findings were more c/w organizing PNA likely from IO rather than true POD. However, I recommended a bx. Pt was feeling well and hesitant to undergo any bx. Hence, we decided to watch off tx and repeat CT in 2-3 months. The scans done in September show stable 7 x 4.4 cm mass in the right lower lobe which has not significantly changed when compared to previous exam. Numerous solid nodules are noted within both lungs. Many of them have increased in size. ctDNA levels were also rising slowly. Pt finally underwent surgical bx on 10/28/22 confirming malignancy. Discussed the incurable nature of disease and palliative intent of tx. Since pt responded to chemoIO previously, I would favor taxane/IO. However, pt was not interested in chemo given potential AEs but was amenable to IO alone. Hence, he started Tecentriq q 3 weeks on 12/14. Tolerating well with no AEs. CT scan from March 2023 show that a few nodules have increased in size however adequate comparison was limited due to differences in technique and inspiratory effort. ctDNA from March 2023 undetectable. \par \par -Continue current treatment with Tecentriq\par -Continue to track ctDNA through [facility]- most recent result from march 29th 2023 downtrended and is now undetectable \par -Scans will be due in June, can repeat earlier if symptoms worsen. \par -Cough: increase Benzonatate to 200mg TID. Patient informed to call our office if increased dose is not relieving cough \par OV with next treatment \par

## 2023-04-19 NOTE — HISTORY OF PRESENT ILLNESS
[Disease: _____________________] : Disease: [unfilled] [T: ___] : T[unfilled] [N: ___] : N[unfilled] [M: ___] : M[unfilled] [AJCC Stage: ____] : AJCC Stage: [unfilled] [Treatment Protocol] : Treatment Protocol [de-identified] : Mr. 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 On 1/25, he underwent inpatient bronchoscopy and endobronchial ultrasound with RLL BAL, TBNA of station 7 lymph node, RLL Mass transbronchial biopsy which was suspicious for malignancy. \par He saw Dr. Morna and was recommended for PET/CT\par 2/7/22: \par large FDG-avid consolidations in right middle lobe and right lower lobe, unchanged as compared to CT dated 1/21/2022, compatible with known adenocarcinoma. Small FDG-avid nodule in right lung is suspicious for another site of disease. A mildly FDG-avid left lower lobe peripheral opacity, also unchanged on CT, is indeterminate.\par 2. Mildly FDG-avid subcarinal lymph node is nonspecific.\par Pt does not wish to use  phone. Grand-daughter provided the translation as per patient request. Other than cough, he has no other symptoms. He has tried OTC cough meds with no help. \par \par 2/18/22: Pt seen vis tel. No new complaints. He still has cough which is persistent and dry. \par \par 4/7/22: Pt seen via tele. 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 \par \par 5/3/22: Pt seen in tx room. He received cycle 1 3 weeks ago. Tolerated well with absolutely no AEs. He has since seen Dr. Medina who will see him again after scans post 3 cycles of tx. Pt here for cycle 2. \par \par 5/24/22: Since last visit, pt has had an eventful course. He was recently hospitalized with c/o fever, URI symptoms, myalgia and was found to have non-COVID coronavirus infection. Pt also febrile/tachycardic on admission, which improved with supportive care/abx. CXR suggested RLL consolidation (thought his could be known lung mass). He was started on vanc/zosyn in ED 5/20, followed by ceftriaxone and azithromycin for possible superimposed PNA in the setting of immunosuppression. Pt improved with all this and is now completing oral course of abx (last day tomorrow). Incidentally, pt was also noted to have ROSS (acute kidney injury) with admission SCr 1.88, baseline 1.2-1.3 thought to be related to sepsis/poor PO intake and dehydration. He was treated with IV hydration and home ARB was held (he continues to not take this at this time). \par He presents today for planned 3/3 cycle of chemoIO. He notes no fever, chills and cough has markedly improved. \par \par 6/16/22: Pt was diagnosed with corona virus infection, after ER visit for fever and cough. Cough has decreased and pt is fine now. \par \par 6/30/22: Cough improved. Otherwise feeling well\par \par 7/19/22: saw Dr. Moran. A bx of left sided lesion was recommended. pt wanted to discuss with me. He feels well. denies any dyspnea or cough or other symptoms. \par \par 9/16/22: Seen today with son, Rich. Patient has no complaints. Is doing well. Has not received any cancer tx since 6/23. \par \par 10/25/22: Pt seen vis televisit. He is feeling well. Has been active and notes no difference in status since last visit.\par \par 11/25/22: Per pt's son, everything is stable. Pt remains active. Since last visit, he has had Lt VATS, JUANPABLO and LLL wedge rxn on 10/28/22. Path revealed invasive mucinous AdenoCA. \par \par 12/6/22: Pt here for follow up. He has cough but otherwise no symptoms. \par \par 1/5/23: Cough is persistent. No other complaints.\par \par 2/15/23: Patient seen today for follow up in treatment room. Has been tolerating well with no AEs to report. Ongoing cough not adequately relieved by OTC cough syrup \par \par 3/8/23: Pt seen today for follow accompanied by his son. Ongoing cough, no other complaints. \par \par 3/29/23: patient seen today accompanied by his son. Reports that since he did his CT scan last week, his cough has been more frequent. He has also ran out of Benzonatate. He also notes that right after CT scan was done he had an episode of emesis, no nausea or emesis since then. \par \par 4/19/23: seen today accompanied by his son. Continues on tecentriq and is tolerating well thus far. He continues to have dry cough and does not feel that benzonatate is adequately helping him. His breathing is stable, no pain, appetite is good. He is also thinking about traveling to [facility] some time soon  [de-identified] : adeno ca [FreeTextEntry1] : Tecentriq q 3 weeks started on 12/14

## 2023-05-01 NOTE — ED ADULT TRIAGE NOTE - HEART RATE (BEATS/MIN)
Patient called to ask if she was still okay to get her colonoscopy tomorrow. 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 Pt denies any symptoms and reports she has a small red spot on her ankle and stomach, but it is not open. Pt denies any other symptoms. Please advise. EL    [phone]

## 2023-05-10 ENCOUNTER — RESULT REVIEW (OUTPATIENT)
Age: 83
End: 2023-05-10

## 2023-05-10 ENCOUNTER — APPOINTMENT (OUTPATIENT)
Dept: INFUSION THERAPY | Facility: HOSPITAL | Age: 83
End: 2023-05-10

## 2023-05-10 ENCOUNTER — APPOINTMENT (OUTPATIENT)
Dept: HEMATOLOGY ONCOLOGY | Facility: CLINIC | Age: 83
End: 2023-05-10
Payer: MEDICARE

## 2023-05-10 VITALS
TEMPERATURE: 97.2 F | WEIGHT: 209.44 LBS | RESPIRATION RATE: 20 BRPM | SYSTOLIC BLOOD PRESSURE: 127 MMHG | DIASTOLIC BLOOD PRESSURE: 70 MMHG | HEART RATE: 80 BPM | HEIGHT: 69 IN | OXYGEN SATURATION: 96 % | BODY MASS INDEX: 31.02 KG/M2

## 2023-05-10 LAB
ALBUMIN SERPL ELPH-MCNC: 3.7 G/DL — SIGNIFICANT CHANGE UP (ref 3.3–5)
ALP SERPL-CCNC: 131 U/L — HIGH (ref 40–120)
ALT FLD-CCNC: 19 U/L — SIGNIFICANT CHANGE UP (ref 10–45)
ANION GAP SERPL CALC-SCNC: 16 MMOL/L — SIGNIFICANT CHANGE UP (ref 5–17)
AST SERPL-CCNC: 16 U/L — SIGNIFICANT CHANGE UP (ref 10–40)
BASOPHILS # BLD AUTO: 0.04 K/UL — SIGNIFICANT CHANGE UP (ref 0–0.2)
BASOPHILS NFR BLD AUTO: 0.5 % — SIGNIFICANT CHANGE UP (ref 0–2)
BILIRUB SERPL-MCNC: 0.3 MG/DL — SIGNIFICANT CHANGE UP (ref 0.2–1.2)
BUN SERPL-MCNC: 31 MG/DL — HIGH (ref 7–23)
CALCIUM SERPL-MCNC: 8.9 MG/DL — SIGNIFICANT CHANGE UP (ref 8.4–10.5)
CEA SERPL-MCNC: 3.3 NG/ML — SIGNIFICANT CHANGE UP (ref 0–3.8)
CHLORIDE SERPL-SCNC: 105 MMOL/L — SIGNIFICANT CHANGE UP (ref 96–108)
CO2 SERPL-SCNC: 20 MMOL/L — LOW (ref 22–31)
CREAT SERPL-MCNC: 1.5 MG/DL — HIGH (ref 0.5–1.3)
EGFR: 46 ML/MIN/1.73M2 — LOW
EOSINOPHIL # BLD AUTO: 0.25 K/UL — SIGNIFICANT CHANGE UP (ref 0–0.5)
EOSINOPHIL NFR BLD AUTO: 3.2 % — SIGNIFICANT CHANGE UP (ref 0–6)
GLUCOSE SERPL-MCNC: 59 MG/DL — LOW (ref 70–99)
HCT VFR BLD CALC: 33.1 % — LOW (ref 39–50)
HGB BLD-MCNC: 10.5 G/DL — LOW (ref 13–17)
IMM GRANULOCYTES NFR BLD AUTO: 0.9 % — SIGNIFICANT CHANGE UP (ref 0–0.9)
LYMPHOCYTES # BLD AUTO: 2.7 K/UL — SIGNIFICANT CHANGE UP (ref 1–3.3)
LYMPHOCYTES # BLD AUTO: 34.4 % — SIGNIFICANT CHANGE UP (ref 13–44)
MCHC RBC-ENTMCNC: 26.9 PG — LOW (ref 27–34)
MCHC RBC-ENTMCNC: 31.7 G/DL — LOW (ref 32–36)
MCV RBC AUTO: 84.9 FL — SIGNIFICANT CHANGE UP (ref 80–100)
MONOCYTES # BLD AUTO: 0.67 K/UL — SIGNIFICANT CHANGE UP (ref 0–0.9)
MONOCYTES NFR BLD AUTO: 8.5 % — SIGNIFICANT CHANGE UP (ref 2–14)
NEUTROPHILS # BLD AUTO: 4.12 K/UL — SIGNIFICANT CHANGE UP (ref 1.8–7.4)
NEUTROPHILS NFR BLD AUTO: 52.5 % — SIGNIFICANT CHANGE UP (ref 43–77)
NRBC # BLD: 0 /100 WBCS — SIGNIFICANT CHANGE UP (ref 0–0)
PLATELET # BLD AUTO: 248 K/UL — SIGNIFICANT CHANGE UP (ref 150–400)
POTASSIUM SERPL-MCNC: 5.3 MMOL/L — SIGNIFICANT CHANGE UP (ref 3.5–5.3)
POTASSIUM SERPL-SCNC: 5.3 MMOL/L — SIGNIFICANT CHANGE UP (ref 3.5–5.3)
PROT SERPL-MCNC: 7.7 G/DL — SIGNIFICANT CHANGE UP (ref 6–8.3)
RBC # BLD: 3.9 M/UL — LOW (ref 4.2–5.8)
RBC # FLD: 14.7 % — HIGH (ref 10.3–14.5)
SODIUM SERPL-SCNC: 140 MMOL/L — SIGNIFICANT CHANGE UP (ref 135–145)
TSH SERPL-MCNC: 1.15 UIU/ML — SIGNIFICANT CHANGE UP (ref 0.27–4.2)
WBC # BLD: 7.85 K/UL — SIGNIFICANT CHANGE UP (ref 3.8–10.5)
WBC # FLD AUTO: 7.85 K/UL — SIGNIFICANT CHANGE UP (ref 3.8–10.5)

## 2023-05-10 PROCEDURE — 99214 OFFICE O/P EST MOD 30 MIN: CPT

## 2023-05-16 ENCOUNTER — RESULT REVIEW (OUTPATIENT)
Age: 83
End: 2023-05-16

## 2023-05-17 ENCOUNTER — OUTPATIENT (OUTPATIENT)
Dept: OUTPATIENT SERVICES | Facility: HOSPITAL | Age: 83
LOS: 1 days | Discharge: HOME | End: 2023-05-17

## 2023-05-17 DIAGNOSIS — C26.0 MALIGNANT NEOPLASM OF INTESTINAL TRACT, PART UNSPECIFIED: ICD-10-CM

## 2023-05-17 DIAGNOSIS — Z98.890 OTHER SPECIFIED POSTPROCEDURAL STATES: Chronic | ICD-10-CM

## 2023-05-19 NOTE — ASSESSMENT
[FreeTextEntry1] : 83 yo m with at least stage IIIA lung adeno ca, PDL1 0%, NGS showed KRAS G12V mut in addition to a few other un-actionable ones. \par Baseline PET/CT personally- disease confined to rt RLL and RML- some activity in subcarinal region. Bx from RLL pos for adeno ca, lavage showed atypical cells, and level 7 LN- was benign. I presented the case at  and the group concurred that optimal staging would involve sampling of rt middle lobe. SUV on rt middle lobe mass is lower than that of RLL. Pt underwent RML through ventura bronch and was pos for adeno ca ([id number]). MRI brain on 2/17 showed no mets. Based on AJCC 8th staging, pt was staged as stage IIIA lung cancer. Based on PFTs he was deemed potentially resectable by Dr. Medina. Based on recent Checkmate 816 study, neoadjuvant chemo+nivo was thought likely to yield higher rates of CR and is an FDA-approved option. He started chemoimmunotherapy (Carbo/pem/nivo) for 4 cycles. PET/CT from May 2022 after 4 cycles was very confusing. There was mixed response on rt- right middle lobe mass is smaller and less FDG-avid but LL mass was larger and still quite avid. There is also some process on left side which was thought to be related to recent viral infection. \par The PET/CT picture suggesting POD was not in agreement with clinical exam or tumor-informed ctDNA testing through [facility] in which level decreased from 0.91 MTM/ml to undetectable level in June (subsequently was detectable at 0.05 MTM/ml in July). Repeat CT without contrast a month later, on 6/28/22 showed persistent changes, and to my eye, these findings were more c/w organizing PNA likely from IO rather than true POD. However, I recommended a bx. Pt was feeling well and hesitant to undergo any bx. Hence, we decided to watch off tx and repeat CT in 2-3 months. The scans done in September show stable 7 x 4.4 cm mass in the right lower lobe which has not significantly changed when compared to previous exam. Numerous solid nodules are noted within both lungs. Many of them have increased in size. ctDNA levels were also rising slowly. Pt finally underwent surgical bx on 10/28/22 confirming malignancy. Discussed the incurable nature of disease and palliative intent of tx. Since pt responded to chemoIO previously, I would favor taxane/IO. However, pt was not interested in chemo given potential AEs but was amenable to IO alone. Hence, he started Tecentriq q 3 weeks on 12/14. Tolerating well with no AEs. CT scan from March 2023 show that a few nodules have increased in size however adequate comparison was limited due to differences in technique and inspiratory effort. ctDNA from March 2023 undetectable. \par \par -Continue current treatment with Tecentriq\par -Continue to track ctDNA through [facility]- most recent result from march 29th 2023 downtrended and is now undetectable \par -Given worsening SOB and cough, will obtain CT scan early\par -Cough: Start promethazine-codeine q6hrs PRN and continue benzonatate 200mg TID. \par Patient informed to call our office if cough persists and/or if SOB worsens \par OV with next treatment \par

## 2023-05-19 NOTE — PHYSICAL EXAM
[Restricted in physically strenuous activity but ambulatory and able to carry out work of a light or sedentary nature] : Status 1- Restricted in physically strenuous activity but ambulatory and able to carry out work of a light or sedentary nature, e.g., light house work, office work [Normal] : affect appropriate [de-identified] : rhonchi noted over LLL and RLL

## 2023-05-19 NOTE — HISTORY OF PRESENT ILLNESS
[Disease: _____________________] : Disease: [unfilled] [T: ___] : T[unfilled] [N: ___] : N[unfilled] [M: ___] : M[unfilled] [AJCC Stage: ____] : AJCC Stage: [unfilled] [Treatment Protocol] : Treatment Protocol [de-identified] : Mr. Del Rio is a pleasant 82 M, [place] (very little English), speaking with PMH of HTN and BPH, never smoker but previously worked in building maintenance presented initially 1/21/22 with chronic dry cough x1 year and worsened dyspnea on exertion for 3 weeks, admitted to [facility] after found on CT to have to have a 5.3 x 2.8 cm masslike consolidation in the RLL. On 1/25, he underwent inpatient bronchoscopy and endobronchial ultrasound with RLL BAL, TBNA of station 7 lymph node, RLL Mass transbronchial biopsy which was suspicious for malignancy. \par He saw Dr. Moran and was recommended for PET/CT\par 2/7/22: \par large FDG-avid consolidations in right middle lobe and right lower lobe, unchanged as compared to CT dated 1/21/2022, compatible with known adenocarcinoma. Small FDG-avid nodule in right lung is suspicious for another site of disease. A mildly FDG-avid left lower lobe peripheral opacity, also unchanged on CT, is indeterminate.\par 2. Mildly FDG-avid subcarinal lymph node is nonspecific.\par Pt does not wish to use  phone. Grand-daughter provided the translation as per patient request. Other than cough, he has no other symptoms. He has tried OTC cough meds with no help. \par \par 2/18/22: Pt seen vis tel. No new complaints. He still has cough which is persistent and dry. \par \par 4/7/22: Pt seen via tele. 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 \par \par 5/3/22: Pt seen in tx room. He received cycle 1 3 weeks ago. Tolerated well with absolutely no AEs. He has since seen Dr. Medina who will see him again after scans post 3 cycles of tx. Pt here for cycle 2. \par \par 5/24/22: Since last visit, pt has had an eventful course. He was recently hospitalized with c/o fever, URI symptoms, myalgia and was found to have non-COVID coronavirus infection. Pt also febrile/tachycardic on admission, which improved with supportive care/abx. CXR suggested RLL consolidation (thought his could be known lung mass). He was started on vanc/zosyn in ED 5/20, followed by ceftriaxone and azithromycin for possible superimposed PNA in the setting of immunosuppression. Pt improved with all this and is now completing oral course of abx (last day tomorrow). Incidentally, pt was also noted to have ROSS (acute kidney injury) with admission SCr 1.88, baseline 1.2-1.3 thought to be related to sepsis/poor PO intake and dehydration. He was treated with IV hydration and home ARB was held (he continues to not take this at this time). \par He presents today for planned 3/3 cycle of chemoIO. He notes no fever, chills and cough has markedly improved. \par \par 6/16/22: Pt was diagnosed with corona virus infection, after ER visit for fever and cough. Cough has decreased and pt is fine now. \par \par 6/30/22: Cough improved. Otherwise feeling well\par \par 7/19/22: saw Dr. Moran. A bx of left sided lesion was recommended. pt wanted to discuss with me. He feels well. denies any dyspnea or cough or other symptoms. \par \par 9/16/22: Seen today with son, Rich. Patient has no complaints. Is doing well. Has not received any cancer tx since 6/23. \par \par 10/25/22: Pt seen vis televisit. He is feeling well. Has been active and notes no difference in status since last visit.\par \par 11/25/22: Per pt's son, everything is stable. Pt remains active. Since last visit, he has had Lt VATS, JUANPABLO and LLL wedge rxn on 10/28/22. Path revealed invasive mucinous AdenoCA. \par \par 12/6/22: Pt here for follow up. He has cough but otherwise no symptoms. \par \par 1/5/23: Cough is persistent. No other complaints.\par \par 2/15/23: Patient seen today for follow up in treatment room. Has been tolerating well with no AEs to report. Ongoing cough not adequately relieved by OTC cough syrup \par \par 3/8/23: Pt seen today for follow accompanied by his son. Ongoing cough, no other complaints. \par \par 3/29/23: patient seen today accompanied by his son. Reports that since he did his CT scan last week, his cough has been more frequent. He has also ran out of Benzonatate. He also notes that right after CT scan was done he had an episode of emesis, no nausea or emesis since then. \par \par 4/19/23: seen today accompanied by his son. Continues on tecentriq and is tolerating well thus far. He continues to have dry cough and does not feel that benzonatate is adequately helping him. His breathing is stable, no pain, appetite is good. He is also thinking about traveling to [facility] some time soon \par \par 5/10/23: Patient seen today for follow up accompanied by his grand-daughter. Patient reports that since last visit, he has been feeling more SOB with exertion. His cough is persistent, minimally relieved by benzonatate 200mg TID, and is now productive.  [de-identified] : adeno ca [FreeTextEntry1] : Tecentriq q 3 weeks started on 12/14

## 2023-05-19 NOTE — REVIEW OF SYSTEMS
[Cough] : cough [SOB on Exertion] : shortness of breath during exertion [Negative] : Allergic/Immunologic [Fever] : no fever [Fatigue] : no fatigue [Dysphagia] : no dysphagia [Chest Pain] : no chest pain [Shortness Of Breath] : no shortness of breath [Vomiting] : no vomiting [Joint Pain] : no joint pain [Skin Rash] : no skin rash

## 2023-05-24 ENCOUNTER — OUTPATIENT (OUTPATIENT)
Dept: OUTPATIENT SERVICES | Facility: HOSPITAL | Age: 83
LOS: 1 days | End: 2023-05-24
Payer: MEDICARE

## 2023-05-24 ENCOUNTER — APPOINTMENT (OUTPATIENT)
Dept: CT IMAGING | Facility: IMAGING CENTER | Age: 83
End: 2023-05-24
Payer: MEDICARE

## 2023-05-24 DIAGNOSIS — C34.91 MALIGNANT NEOPLASM OF UNSPECIFIED PART OF RIGHT BRONCHUS OR LUNG: ICD-10-CM

## 2023-05-24 PROCEDURE — 71260 CT THORAX DX C+: CPT

## 2023-05-24 PROCEDURE — 74177 CT ABD & PELVIS W/CONTRAST: CPT

## 2023-05-24 PROCEDURE — 71260 CT THORAX DX C+: CPT | Mod: 26,MH

## 2023-05-24 PROCEDURE — 74177 CT ABD & PELVIS W/CONTRAST: CPT | Mod: 26,MH

## 2023-05-31 ENCOUNTER — RESULT REVIEW (OUTPATIENT)
Age: 83
End: 2023-05-31

## 2023-05-31 ENCOUNTER — APPOINTMENT (OUTPATIENT)
Dept: INFUSION THERAPY | Facility: HOSPITAL | Age: 83
End: 2023-05-31

## 2023-05-31 DIAGNOSIS — Z51.11 ENCOUNTER FOR ANTINEOPLASTIC CHEMOTHERAPY: ICD-10-CM

## 2023-05-31 LAB
ALBUMIN SERPL ELPH-MCNC: 3.8 G/DL — SIGNIFICANT CHANGE UP (ref 3.3–5)
ALP SERPL-CCNC: 133 U/L — HIGH (ref 40–120)
ALT FLD-CCNC: 25 U/L — SIGNIFICANT CHANGE UP (ref 10–45)
ANION GAP SERPL CALC-SCNC: 11 MMOL/L — SIGNIFICANT CHANGE UP (ref 5–17)
AST SERPL-CCNC: 27 U/L — SIGNIFICANT CHANGE UP (ref 10–40)
BASOPHILS # BLD AUTO: 0.03 K/UL — SIGNIFICANT CHANGE UP (ref 0–0.2)
BASOPHILS NFR BLD AUTO: 0.4 % — SIGNIFICANT CHANGE UP (ref 0–2)
BILIRUB SERPL-MCNC: 0.3 MG/DL — SIGNIFICANT CHANGE UP (ref 0.2–1.2)
BUN SERPL-MCNC: 29 MG/DL — HIGH (ref 7–23)
CALCIUM SERPL-MCNC: 9.1 MG/DL — SIGNIFICANT CHANGE UP (ref 8.4–10.5)
CEA SERPL-MCNC: 3.3 NG/ML — SIGNIFICANT CHANGE UP (ref 0–3.8)
CHLORIDE SERPL-SCNC: 105 MMOL/L — SIGNIFICANT CHANGE UP (ref 96–108)
CO2 SERPL-SCNC: 21 MMOL/L — LOW (ref 22–31)
CREAT SERPL-MCNC: 1.32 MG/DL — HIGH (ref 0.5–1.3)
EGFR: 54 ML/MIN/1.73M2 — LOW
EOSINOPHIL # BLD AUTO: 0.18 K/UL — SIGNIFICANT CHANGE UP (ref 0–0.5)
EOSINOPHIL NFR BLD AUTO: 2.2 % — SIGNIFICANT CHANGE UP (ref 0–6)
GLUCOSE SERPL-MCNC: 96 MG/DL — SIGNIFICANT CHANGE UP (ref 70–99)
HCT VFR BLD CALC: 32.3 % — LOW (ref 39–50)
HGB BLD-MCNC: 10.4 G/DL — LOW (ref 13–17)
IMM GRANULOCYTES NFR BLD AUTO: 1 % — HIGH (ref 0–0.9)
LYMPHOCYTES # BLD AUTO: 2.91 K/UL — SIGNIFICANT CHANGE UP (ref 1–3.3)
LYMPHOCYTES # BLD AUTO: 36.1 % — SIGNIFICANT CHANGE UP (ref 13–44)
MCHC RBC-ENTMCNC: 26.9 PG — LOW (ref 27–34)
MCHC RBC-ENTMCNC: 32.2 G/DL — SIGNIFICANT CHANGE UP (ref 32–36)
MCV RBC AUTO: 83.5 FL — SIGNIFICANT CHANGE UP (ref 80–100)
MONOCYTES # BLD AUTO: 0.58 K/UL — SIGNIFICANT CHANGE UP (ref 0–0.9)
MONOCYTES NFR BLD AUTO: 7.2 % — SIGNIFICANT CHANGE UP (ref 2–14)
NEUTROPHILS # BLD AUTO: 4.28 K/UL — SIGNIFICANT CHANGE UP (ref 1.8–7.4)
NEUTROPHILS NFR BLD AUTO: 53.1 % — SIGNIFICANT CHANGE UP (ref 43–77)
NRBC # BLD: 0 /100 WBCS — SIGNIFICANT CHANGE UP (ref 0–0)
PLATELET # BLD AUTO: 261 K/UL — SIGNIFICANT CHANGE UP (ref 150–400)
POTASSIUM SERPL-MCNC: 5.7 MMOL/L — HIGH (ref 3.5–5.3)
POTASSIUM SERPL-SCNC: 5.7 MMOL/L — HIGH (ref 3.5–5.3)
PROT SERPL-MCNC: 7.3 G/DL — SIGNIFICANT CHANGE UP (ref 6–8.3)
RBC # BLD: 3.87 M/UL — LOW (ref 4.2–5.8)
RBC # FLD: 14.3 % — SIGNIFICANT CHANGE UP (ref 10.3–14.5)
SODIUM SERPL-SCNC: 137 MMOL/L — SIGNIFICANT CHANGE UP (ref 135–145)
TSH SERPL-MCNC: 1.86 UIU/ML — SIGNIFICANT CHANGE UP (ref 0.27–4.2)
WBC # BLD: 8.06 K/UL — SIGNIFICANT CHANGE UP (ref 3.8–10.5)
WBC # FLD AUTO: 8.06 K/UL — SIGNIFICANT CHANGE UP (ref 3.8–10.5)

## 2023-06-05 ENCOUNTER — NON-APPOINTMENT (OUTPATIENT)
Age: 83
End: 2023-06-05

## 2023-06-06 ENCOUNTER — NON-APPOINTMENT (OUTPATIENT)
Age: 83
End: 2023-06-06

## 2023-06-14 NOTE — REASON FOR VISIT
[de-identified] : Lt VATS, JUANPABLO and LLL wedge rxn [de-identified] : 10/28/22 Hatchet Flap Text: The defect edges were debeveled with a #15 scalpel blade.  Given the location of the defect, shape of the defect and the proximity to free margins a hatchet flap was deemed most appropriate.  Using a sterile surgical marker, an appropriate hatchet flap was drawn incorporating the defect and placing the expected incisions within the relaxed skin tension lines where possible.    The area thus outlined was incised deep to adipose tissue with a #15 scalpel blade.  The skin margins were undermined to an appropriate distance in all directions utilizing iris scissors.

## 2023-06-20 PROBLEM — E78.5 HYPERLIPIDEMIA, UNSPECIFIED HYPERLIPIDEMIA TYPE: Status: ACTIVE | Noted: 2022-04-25

## 2023-06-20 PROBLEM — I10 HYPERTENSION, UNSPECIFIED TYPE: Status: ACTIVE | Noted: 2022-04-25

## 2023-06-21 NOTE — ASSESSMENT
[FreeTextEntry1] : 83 yo m with at least stage IIIA lung adeno ca, PDL1 0%, NGS showed KRAS G12V mut in addition to a few other un-actionable ones. \par Baseline PET/CT personally- disease confined to rt RLL and RML- some activity in subcarinal region. Bx from RLL pos for adeno ca, lavage showed atypical cells, and level 7 LN- was benign. I presented the case at  and the group concurred that optimal staging would involve sampling of rt middle lobe. SUV on rt middle lobe mass is lower than that of RLL. Pt underwent RML through ventura bronch and was pos for adeno ca (80- [id number]). MRI brain on 2/17 showed no mets. Based on AJCC 8th staging, pt was staged as stage IIIA lung cancer. Based on PFTs he was deemed potentially resectable by Dr. Medina. Based on recent Checkmate 816 study, neoadjuvant chemo+nivo was thought likely to yield higher rates of CR and is an FDA-approved option. He started chemoimmunotherapy (Carbo/pem/nivo) for 4 cycles. PET/CT from May 2022 after 4 cycles was very confusing. There was mixed response on rt- right middle lobe mass is smaller and less FDG-avid but LL mass was larger and still quite avid. There is also some process on left side which was thought to be related to recent viral infection. \par The PET/CT picture suggesting POD was not in agreement with clinical exam or tumor-informed ctDNA testing through [facility] in which level decreased from 0.91 MTM/ml to undetectable level in June (subsequently was detectable at 0.05 MTM/ml in July). Repeat CT without contrast a month later, on 6/28/22 showed persistent changes, and to my eye, these findings were more c/w organizing PNA likely from IO rather than true POD. However, I recommended a bx. Pt was feeling well and hesitant to undergo any bx. Hence, we decided to watch off tx and repeat CT in 2-3 months. The scans done in September show stable 7 x 4.4 cm mass in the right lower lobe which has not significantly changed when compared to previous exam. Numerous solid nodules are noted within both lungs. Many of them have increased in size. ctDNA levels were also rising slowly. Pt finally underwent surgical bx on 10/28/22 confirming malignancy. Discussed the incurable nature of disease and palliative intent of tx. Since pt responded to chemoIO previously, I would favor taxane/IO. However, pt was not interested in chemo given potential AEs but was amenable to IO alone. Hence, he started Tecentriq q 3 weeks on 12/14. Tolerating well with no AEs. CT scan from March 2023 show that a few nodules have increased in size however adequate comparison was limited due to differences in technique and inspiratory effort. ctDNA from March 2023 undetectable. CT scans done in May given worsening cough and SOB showed increased right pleural effusion with subtle nodularity along the posterior pleura as well as slight increase in the size of the right upper lobe nodule. \par \par -Continue current treatment with Tecentriq\par -Pleural effusion and pleural nodularity: Evaluated by IR for biopsy and thora however biopsy of nodularity is not possible but will move forward with thoracentesis. Will follow up pathology \par -Will hold off on PET until after thora \par -Continue to track ctDNA through [facility]- most recent result from march 29th 2023 downtrended and is now undetectable. Will check [facility] today \par -Cough: Start promethazine-codeine q6hrs PRN and continue benzonatate 200mg TID. \par Patient informed to call our office if cough persists and/or if SOB worsens \par OV with next treatment \par

## 2023-06-21 NOTE — HISTORY OF PRESENT ILLNESS
[Disease: _____________________] : Disease: [unfilled] [T: ___] : T[unfilled] [N: ___] : N[unfilled] [M: ___] : M[unfilled] [AJCC Stage: ____] : AJCC Stage: [unfilled] [de-identified] : Mr. 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 after found on CT to have to have a 5.3 x 2.8 cm masslike consolidation in the RLL. On 1/25, he underwent inpatient bronchoscopy and endobronchial ultrasound with RLL BAL, TBNA of station 7 lymph node, RLL Mass transbronchial biopsy which was suspicious for malignancy. \par He saw Dr. Moran and was recommended for PET/CT\par 2/7/22: \par large FDG-avid consolidations in right middle lobe and right lower lobe, unchanged as compared to CT dated 1/21/2022, compatible with known adenocarcinoma. Small FDG-avid nodule in right lung is suspicious for another site of disease. A mildly FDG-avid left lower lobe peripheral opacity, also unchanged on CT, is indeterminate.\par 2. Mildly FDG-avid subcarinal lymph node is nonspecific.\par Pt does not wish to use  phone. Grand-daughter provided the translation as per patient request. Other than cough, he has no other symptoms. He has tried OTC cough meds with no help. \par \par 2/18/22: Pt seen vis tel. No new complaints. He still has cough which is persistent and dry. \par \par 4/7/22: Pt seen via tele. 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 \par \par 5/3/22: Pt seen in tx room. He received cycle 1 3 weeks ago. Tolerated well with absolutely no AEs. He has since seen Dr. Medina who will see him again after scans post 3 cycles of tx. Pt here for cycle 2. \par \par 5/24/22: Since last visit, pt has had an eventful course. He was recently hospitalized with c/o fever, URI symptoms, myalgia and was found to have non-COVID coronavirus infection. Pt also febrile/tachycardic on admission, which improved with supportive care/abx. CXR suggested RLL consolidation (thought his could be known lung mass). He was started on vanc/zosyn in ED 5/20, followed by ceftriaxone and azithromycin for possible superimposed PNA in the setting of immunosuppression. Pt improved with all this and is now completing oral course of abx (last day tomorrow). Incidentally, pt was also noted to have ROSS (acute kidney injury) with admission SCr 1.88, baseline 1.2-1.3 thought to be related to sepsis/poor PO intake and dehydration. He was treated with IV hydration and home ARB was held (he continues to not take this at this time). \par He presents today for planned 3/3 cycle of chemoIO. He notes no fever, chills and cough has markedly improved. \par \par 6/16/22: Pt was diagnosed with corona virus infection, after ER visit for fever and cough. Cough has decreased and pt is fine now. \par \par 6/30/22: Cough improved. Otherwise feeling well\par \par 7/19/22: saw Dr. Moran. A bx of left sided lesion was recommended. pt wanted to discuss with me. He feels well. denies any dyspnea or cough or other symptoms. \par \par 9/16/22: Seen today with son, Rich. Patient has no complaints. Is doing well. Has not received any cancer tx since 6/23. \par \par 10/25/22: Pt seen vis televisit. He is feeling well. Has been active and notes no difference in status since last visit.\par \par 11/25/22: Per pt's son, everything is stable. Pt remains active. Since last visit, he has had Lt VATS, JUANPABLO and LLL wedge rxn on 10/28/22. Path revealed invasive mucinous AdenoCA. \par \par 12/6/22: Pt here for follow up. He has cough but otherwise no symptoms. \par \par 1/5/23: Cough is persistent. No other complaints.\par \par 2/15/23: Patient seen today for follow up in treatment room. Has been tolerating well with no AEs to report. Ongoing cough not adequately relieved by OTC cough syrup \par \par 3/8/23: Pt seen today for follow accompanied by his son. Ongoing cough, no other complaints. \par \par 3/29/23: patient seen today accompanied by his son. Reports that since he did his CT scan last week, his cough has been more frequent. He has also ran out of Benzonatate. He also notes that right after CT scan was done he had an episode of emesis, no nausea or emesis since then. \par \par 4/19/23: seen today accompanied by his son. Continues on tecentriq and is tolerating well thus far. He continues to have dry cough and does not feel that benzonatate is adequately helping him. His breathing is stable, no pain, appetite is good. He is also thinking about traveling to [facility] some time soon \par \par 5/10/23: Patient seen today for follow up accompanied by his grand-daughter. Patient reports that since last visit, he has been feeling more SOB with exertion. His cough is persistent, minimally relieved by benzonatate 200mg TID, and is now productive. \par \par 6/21/23: Patient seen today for follow up with his granddaughter. They report that his sough has improved since starting promethazine-codeine. He saw IR yesterday for evaluation for biopsy and thormaribel however biopsy is not possible but will move forward with cassy. He is otherwise doing well  [de-identified] : adeno ca [Treatment Protocol] : Treatment Protocol [FreeTextEntry1] : Tecentriq q 3 weeks started on 12/14

## 2023-06-21 NOTE — REVIEW OF SYSTEMS
[Fever] : no fever [Fatigue] : no fatigue [Dysphagia] : no dysphagia [Chest Pain] : no chest pain [Shortness Of Breath] : no shortness of breath [Cough] : cough [SOB on Exertion] : shortness of breath during exertion [Vomiting] : no vomiting [Joint Pain] : no joint pain [Skin Rash] : no skin rash [Negative] : Allergic/Immunologic

## 2023-06-21 NOTE — PHYSICAL EXAM
[Restricted in physically strenuous activity but ambulatory and able to carry out work of a light or sedentary nature] : Status 1- Restricted in physically strenuous activity but ambulatory and able to carry out work of a light or sedentary nature, e.g., light house work, office work [Normal] : affect appropriate [de-identified] : rhonchi noted over LLL and RLL

## 2023-06-23 NOTE — DATA REVIEWED
[FreeTextEntry1] : \par Test Name	Result	Flag	Reference\par CT Chest w/ IV Cont	(Report)		\par EXAM: [id number] - CT CHEST IC - ORDERED BY: SABRINA FRANKLIN\par \par EXAM: [id number] - CT ABDOMEN AND PELVIS OC IC - ORDERED BY: SABRINA FRANKLIN\par \par \par PROCEDURE DATE: 05/24/2023\par \par \par \par INTERPRETATION: CT CHEST, ABDOMEN AND PELVIS\par \par INDICATION: History of lung cancer. Shortness of breath. Worsening cough.\par \par Scant showed increased nodules.\par \par TECHNIQUE: Enhanced helical images were obtained of the chest, abdomen and pelvis. Coronal and sagittal images were reconstructed. Images were obtained after the uneventful administration of 90 cc of nonionic intravenous contrast (Omnipaque 350). 10 cc of nonionic intravenous contrast (Omnipaque 350) was discarded. Maximum intensity projection images were generated.\par \par COMPARISON: CT chest 3/22/2023 and 1/3/2023.\par \par FINDINGS:\par \par CHEST:\par \par Tubes/Lines: None.\par \par Lungs, airways and pleura: There are bilateral patchy foci of consolidation and bilateral pulmonary nodules. The nodules and consolidation are unchanged allowing for differences in technique. The posterior segment right upper lobe nodule measures 2.2 x 2.2 cm (prior 1.9 x 1.4 cm)\par \par Status post left lower lobe wedge resection.\par \par The right pleural effusion has increased in size. The subtle nodularity of the pleura (series 2 image 60) is new. No pneumothorax. The airways are unremarkable.\par \par Mediastinum: The visualized thyroid gland is normal. The calcified and noncalcified chest lymph nodes measure less than 10 mm the short axis. Hiatal hernia.\par \par The heart is normal in size no pericardial effusion. The aorta is normal in caliber. Aortic calcifications.\par \par Bones And Soft Tissues: The bones are unremarkable. The soft tissues are unremarkable.\par \par \par ABDOMEN AND PELVIS:\par \par Liver, gallbladder and biliary system: Hypodense hepatic lesions in the right lobe of the liver are unchanged. The remainder of the liver is normal. There is no intrahepatic or extrahepatic biliary ductal dilatation. Cholecystectomy.\par \par Pancreas: The pancreas is normal.\par \par Spleen: The perisplenic calcification is unchanged. The spleen is otherwise normal.\par \par Adrenal glands and kidneys: The left adrenal gland is normal. Within the right adrenal gland is a 0.9 x 1.1 cm nodule, unchanged from 1/23/2023.\par \par Bilateral hypodense renal lesions are statistically benign and likely represent cysts. No hydronephrosis. No hydroureter.\par \par Lymph nodes: There are no enlarged retroperitoneal or upper abdominal lymph nodes.\par \par Bowel: The stomach is incompletely distended. The small bowel is normal in caliber. The large bowel is normal in caliber. The appendix is normal. No free air. No free fluid\par \par Vascular: The abdominal aorta is normal in caliber. Major branches aorta approximately patent. Aortic calcifications. The main portal vein is normal.\par \par Bladder and reproductive system: The bladder is incompletely distended. The prostate gland is enlarged.\par \par Bones And Soft Tissues: Spondylosis of the thoracic spine. Umbilical hernia containing fat.\par \par \par IMPRESSION:\par \par CHEST:\par \par 1. Since 3/22/2023, the right pleural effusion has slightly increased in size an there are subtle nodularity along the posterior aspect of the pleura.\par 2. Since 3/22/2023, the right upper lobe posterior segment nodule has slightly increased in size (2.2 x 2.2; prior 1.9 x 1.4 cm).\par 3. However, the additional areas of consolidation and additional opacities are unchanged allowing for differences in technique.\par \par ABDOMEN AND PELVIS:\par \par 1. No change in the right adrenal nodule since 1/23/2020.\par \par --- End of Report ---\par \par \par \par \par \par \par CRISTINA GORDON MD; Attending Radiologist\par This document has been electronically signed. May 25 2023 9:55AM\par

## 2023-06-23 NOTE — PHYSICAL EXAM
[Alert] : alert [No Acute Distress] : no acute distress [Well Nourished] : well nourished [Well Developed] : well developed [Healthy Appearance] : healthy appearance [Normal Voice/Communication] : normal voice communication [No Respiratory Distress] : no respiratory distress [Normal Rate and Effort] : normal respiratory rhythm and effort [No Accessory Muscle Use] : no accessory muscle use [Oriented x3] : oriented to person, place, and time [Normal Insight/Judgement] : insight and judgment were intact [Normal Affect] : the affect was normal [Normal Mood] : the mood was normal [Recent Memory Normal] : recent memory was not impaired [Remote Memory Normal] : remote memory was not impaired [Restricted in physically strenuous activity but ambulatory and able to carry out work of a light or sedentary nature] : Restricted in physically strenuous activity but ambulatory and able to carry out work of a light or sedentary nature, e.g., light house work, office work [de-identified] : intermittent dry cough

## 2023-06-23 NOTE — REASON FOR VISIT
[Consultation] : a consultation visit [FreeTextEntry1] : biopsy of right pleural nodularity/thoracentesis

## 2023-06-23 NOTE — ASSESSMENT
[Other: _____] : [unfilled] [FreeTextEntry1] : This is an 81 y/o male with HLD, HTN, BPH, and stage IIIA lung adenocarcinoma who presents today for consultation for right thoracentesis and biopsy. \par \par 1.  Right pleural effusion\par - pt with lung adenocarcinoma dx 1/2021, s/p Lt VATS, JUANPABLO and LLL wedge rxn on 10/28/22, s/p Carbo/pem/nivo for 4 cycles\par - current treatment with Tecentriq\par - now with recent dyspnea, so sent for imaging\par - 5/24/23 CT chest, the right pleural effusion has slightly increased in size an there are subtle nodularity along the posterior aspect of the pleura.\par - imaging reviewed and d/w patient\par -  Upon review of the imaging, the area of nodularity described does not provide adequate target for a biopsy, will likely not yield enough sample for diagnostic purposes \par - His right effusion has enlarged since his last imaging, this may be contributing to his recent cough/dyspnea. Will proceed with thoracentesis. \par - I reviewed image guided thoracentesis procedure, including the risks (pneumothorax, chest tube placement/hospitalization, bleeding, etc), benefits, alternatives, and expected post procedure course.\par - labs will done at outpatient [facility] lab\par - pt to continue to follow with Dr. Domingo for continued treatment\par \par I have provided the patient the opportunity to ask questions and have answered them to their satisfaction.  They are encouraged to contact our office with any further questions, concerns, or issues.\par \par \par

## 2023-06-23 NOTE — HISTORY OF PRESENT ILLNESS
[Home] : at home, [unfilled] , at the time of the visit. [Medical Office: ([facility])___] : at the medical office located in  [FreeTextEntry1] : Pt's primary language is Guinean,  services offered, declined, prefers to have granddaughter, Claudia Del Rio, translate.  His son, Sindi, is present over the phone for visit. \par \par This is an 83 y/o male with HLD, HTN, BPH, and stage IIIA lung adenocarcinoma who presents today for consultation for right thoracentesis and biopsy. \par \par Pt diagnosed with lung cancer in 1/2021, s/p Lt VATS, JUANPABLO and LLL wedge rxn on 10/28/22 with Dr. Medina.  Path revealed invasive mucinous AdenoCA. Received chemotherapy in 2022 (Carbo/pem/nivo) for 4 cycles.) Now currently getting immunotherapy (Tecentriq). \par \par Pt reports increasing dyspnea over last 2-3 months, so he was sent for imaging.  Pt has been following with Dr. Domingo and is now referred to IR to discuss right thoracentesis and biopsy of subtle nodularity along the posterior aspect of the pleura. Denies hemoptysis, fever, chills.\par \par Onc: Chayo\par Pulm: Sharan\par CTSx: Adam\par \par

## 2023-06-27 NOTE — ASU DISCHARGE PLAN (ADULT/PEDIATRIC) - NURSING INSTRUCTIONS
Please feel free to contact us at [phone] if any problems arise. After 6PM, Monday through Friday, on weekends and on holidays, please call [phone] and ask for the radiology resident on call to be paged.

## 2023-06-27 NOTE — ASU DISCHARGE PLAN (ADULT/PEDIATRIC) - ASU DC SPECIAL INSTRUCTIONSFT
Discharge Instructions  - You have had a right thoracentesis   - You may shower in 24 hours. No soaking or swimming until the site is completely healed.  - Keep the area covered and dry for the next 24 hours.  - Do not perform any heavy lifting for the next few days or until the site is healed.  - You may resume your normal diet.  - You may resume your normal medications however you should wait 48 hours before restarting aspirin, plavix, or blood thinners.  - It is normal to experience some pain over the site for the next few days. You may take apply ice to the area (20 minutes on, 20 minutes off) and take Tylenol for that pain. Do not take more frequently than every 6 hours and do not exceed more than 3000mg of Tylenol in a 24 hour period.    - You were given conscious sedation which may make you drowsy, therefore you need someone to stay with you until the morning following the procedure.  - Do not drive, engage in heavy lifting or strenuous activity, or drink any alcoholic beverages for the next 24 hours.   - You may resume normal activity in 24 hours.    Notify your primary physician and/or Interventional Radiology IMMEDIATELY if you experience any of the following       - Fever of 101F or 38C       - Chills or Rigors/ Shakes       - Swelling and/or Redness in the area around the biopsy site       - Worsening Pain       - Blood soaked bandages or worsening bleeding       - Lightheadedness and/or dizziness upon standing       - Chest Pain/ Tightness       - Shortness of Breath       - Difficulty walking    If you have a problem that you believe requires IMMEDIATE attention, please go to your NEAREST Emergency Room. If you believe your problem can safely wait until you speak to a physician, please call Interventional Radiology for any concerns.    During Normal Weekday Business Hours- You can contact the Interventional Radiology department during normal business hours via telephone.  During Evenings and Weekends- If you need to contact Interventional Radiology during off hours, do so by calling the hospital and requesting to be connected to the Interventional Radiologist on call.

## 2023-06-27 NOTE — ASU PATIENT PROFILE, ADULT - BRADEN SCORE (IF 18 OR LESS ACTIVATE SKIN INJURY RISK INCREASED GUIDELINE), MLM
----- Message from Melanie Hull sent at 11/9/2020 10:47 AM CST -----  Regarding: Mammogram results  Type:  Needs Medical Advice    Who Called:  patient  Would the patient rather a call back or a response via [facility]?  Call back  Best Call Back Number:  [phone]  Additional Information:  needs her Mammogram results       22

## 2023-06-27 NOTE — PROGRESS NOTE ADULT - SUBJECTIVE AND OBJECTIVE BOX
Interventional Radiology    HPI: 83y Male with right lung mass and pleural effusion for thoracentesis    Review of Systems:   Constitutional: No fever, weight loss, or fatigue  Neurological: No headaches, memory loss, loss of strength, numbness, or tremors  Respiratory: No cough, wheezing, chills or hemoptysis  Cardiovascular: No chest pain, palpitations, dizziness, or leg swelling  Gastrointestinal: No abdominal or epigastric pain. No nausea, vomiting, or hematemesis; No diarrhea or constipation. No melena or hematochezia.  Skin: No itching, burning, rashes, or lesions   Musculoskeletal: No joint pain or swelling; No muscle, back, or extremity pain    Allergies: No Known Allergies    Medications (Abx/Cardiac/Anticoagulation/Blood Products)      Data:    T(C): 36.6  HR: 92  BP: 110/62  RR: 18  SpO2: 93%        -INR1.19      Physical Exam  General: No acute distress, nontoxic, A&Ox3  Chest: Non labored breathing  Abdomen: Non-distended, non-tender, no preitoneal signs  Extremities: No swelling  Skin: No rashes or lesions    RADIOLOGY & ADDITIONAL TESTS:    Imaging Reviewed        Plan: 83y Male presents for right thoracentesis for diagnostic/therapeutic purposes.  -Risks/Benefits/alternatives explained with the patient and/or healthcare proxy and witnessed informed consent obtained.

## 2023-06-27 NOTE — ASU PATIENT PROFILE, ADULT - FALL HARM RISK - UNIVERSAL INTERVENTIONS
Bed in lowest position, wheels locked, appropriate side rails in place/Call bell, personal items and telephone in reach/Instruct patient to call for assistance before getting out of bed or chair/Non-slip footwear when patient is out of bed/[place] to call system/Physically safe environment - no spills, clutter or unnecessary equipment/Purposeful Proactive Rounding/Room/bathroom lighting operational, light cord in reach

## 2023-07-31 NOTE — ASSESSMENT
[FreeTextEntry1] : 84 yo m with at least stage IIIA lung adeno ca, PDL1 0%, NGS showed KRAS G12V mut in addition to a few other un-actionable ones.  Baseline PET/CT personally- disease confined to rt RLL and RML- some activity in subcarinal region. Bx from RLL pos for adeno ca, lavage showed atypical cells, and level 7 LN- was benign. I presented the case at  and the group concurred that optimal staging would involve sampling of rt middle lobe. SUV on rt middle lobe mass is lower than that of RLL. Pt underwent RML through ventura bronch and was pos for adeno ca ([id number]). MRI brain on 2/17 showed no mets. Based on AJCC 8th staging, pt was staged as stage IIIA lung cancer. Based on PFTs he was deemed potentially resectable by Dr. Medina. Based on recent Checkmate 816 study, neoadjuvant chemo+nivo was thought likely to yield higher rates of CR and is an FDA-approved option. He started chemoimmunotherapy (Carbo/pem/nivo) for 4 cycles. PET/CT from May 2022 after 4 cycles was very confusing. There was mixed response on rt- right middle lobe mass is smaller and less FDG-avid but LL mass was larger and still quite avid. There is also some process on left side which was thought to be related to recent viral infection.  The PET/CT picture suggesting POD was not in agreement with clinical exam or tumor-informed ctDNA testing through [facility] in which level decreased from 0.91 MTM/ml to undetectable level in June (subsequently was detectable at 0.05 MTM/ml in July). Repeat CT without contrast a month later, on 6/28/22 showed persistent changes, and to my eye, these findings were more c/w organizing PNA likely from IO rather than true POD. Therefore, I recommended a bx. Pt was feeling well and hesitant to undergo any bx. Hence, we decided to watch off tx and repeat CT in 2-3 months. The scans done in September show stable 7 x 4.4 cm mass in the right lower lobe which has not significantly changed when compared to previous exam. Numerous solid nodules are noted within both lungs. Many of them have increased in size. ctDNA levels were also rising slowly. Pt finally underwent surgical bx on 10/28/22 confirming malignancy. Discussed the incurable nature of disease and palliative intent of tx. Since pt responded to chemoIO previously, I favored taxane/IO. However, pt was not interested in chemo given potential AEs but was amenable to IO alone. Hence, he started Tecentriq q 3 weeks on 12/14. Tolerating well with no AEs. CT scan from March 2023 show that a few nodules have increased in size however adequate comparison was limited due to differences in technique and inspiratory effort. ctDNA from March 2023 undetectable. CT scans done in May given worsening cough and SOB showed increased right pleural effusion with subtle nodularity along the posterior pleura as well as slight increase in the size of the right upper lobe nodule. Subsequent PET/CT showed mixed findings, rt upper lobe showed improved FDG-avidity but RML and LLL showed increase,. Pleural fluid showed no malignant cells.  ctDNA has been up and down, but still detectable at relatively high levels.  -Cough: Continue promethazine-codeine q6hrs PRN and continue benzonatate 200mg TID.  We will refer to pulmonology for eval Will CT chest non-con ASAP OV next week

## 2023-07-31 NOTE — PHYSICAL EXAM
[Restricted in physically strenuous activity but ambulatory and able to carry out work of a light or sedentary nature] : Status 1- Restricted in physically strenuous activity but ambulatory and able to carry out work of a light or sedentary nature, e.g., light house work, office work [Normal] : affect appropriate [de-identified] : rhonchi noted over LLL and RLL

## 2023-07-31 NOTE — HISTORY OF PRESENT ILLNESS
[Disease: _____________________] : Disease: [unfilled] [T: ___] : T[unfilled] [N: ___] : N[unfilled] [M: ___] : M[unfilled] [AJCC Stage: ____] : AJCC Stage: [unfilled] [Treatment Protocol] : Treatment Protocol [de-identified] : Mr. Del Rio is a pleasant 83 M, Bulgarian (very little English), speaking with PMH of HTN and BPH, never smoker but previously worked in building maintenance presented initially 1/21/22 with chronic dry cough x1 year and worsened dyspnea on exertion for 3 weeks, admitted to [facility] after found on CT to have to have a 5.3 x 2.8 cm masslike consolidation in the RLL. On 1/25, he underwent inpatient bronchoscopy and endobronchial ultrasound with RLL BAL, TBNA of station 7 lymph node, RLL Mass transbronchial biopsy which was suspicious for malignancy. \par He saw Dr. Moran and was recommended for PET/CT\par 2/7/22: \par large FDG-avid consolidations in right middle lobe and right lower lobe, unchanged as compared to CT dated 1/21/2022, compatible with known adenocarcinoma. Small FDG-avid nodule in right lung is suspicious for another site of disease. A mildly FDG-avid left lower lobe peripheral opacity, also unchanged on CT, is indeterminate.\par 2. Mildly FDG-avid subcarinal lymph node is nonspecific.\par Pt does not wish to use  phone. Grand-daughter provided the translation as per patient request. Other than cough, he has no other symptoms. He has tried OTC cough meds with no help. \par \par 2/18/22: Pt seen vis tel. No new complaints. He still has cough which is persistent and dry. \par \par 4/7/22: Pt seen via tele. 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 \par \par 5/3/22: Pt seen in tx room. He received cycle 1 3 weeks ago. Tolerated well with absolutely no AEs. He has since seen Dr. Medina who will see him again after scans post 3 cycles of tx. Pt here for cycle 2. \par \par 5/24/22: Since last visit, pt has had an eventful course. He was recently hospitalized with c/o fever, URI symptoms, myalgia and was found to have non-COVID coronavirus infection. Pt also febrile/tachycardic on admission, which improved with supportive care/abx. CXR suggested RLL consolidation (thought his could be known lung mass). He was started on vanc/zosyn in ED 5/20, followed by ceftriaxone and azithromycin for possible superimposed PNA in the setting of immunosuppression. Pt improved with all this and is now completing oral course of abx (last day tomorrow). Incidentally, pt was also noted to have ROSS (acute kidney injury) with admission SCr 1.88, baseline 1.2-1.3 thought to be related to sepsis/poor PO intake and dehydration. He was treated with IV hydration and home ARB was held (he continues to not take this at this time). \par He presents today for planned 3/3 cycle of chemoIO. He notes no fever, chills and cough has markedly improved. \par \par 6/16/22: Pt was diagnosed with corona virus infection, after ER visit for fever and cough. Cough has decreased and pt is fine now. \par \par 6/30/22: Cough improved. Otherwise feeling well\par \par 7/19/22: saw Dr. Moran. A bx of left sided lesion was recommended. pt wanted to discuss with me. He feels well. denies any dyspnea or cough or other symptoms. \par \par 9/16/22: Seen today with son, Rich. Patient has no complaints. Is doing well. Has not received any cancer tx since 6/23. \par \par 10/25/22: Pt seen vis televisit. He is feeling well. Has been active and notes no difference in status since last visit.\par \par 11/25/22: Per pt's son, everything is stable. Pt remains active. Since last visit, he has had Lt VATS, JUANPABLO and LLL wedge rxn on 10/28/22. Path revealed invasive mucinous AdenoCA. \par \par 12/6/22: Pt here for follow up. He has cough but otherwise no symptoms. \par \par 1/5/23: Cough is persistent. No other complaints.\par \par 2/15/23: Patient seen today for follow up in treatment room. Has been tolerating well with no AEs to report. Ongoing cough not adequately relieved by OTC cough syrup \par \par 3/8/23: Pt seen today for follow accompanied by his son. Ongoing cough, no other complaints. \par \par 3/29/23: patient seen today accompanied by his son. Reports that since he did his CT scan last week, his cough has been more frequent. He has also ran out of Benzonatate. He also notes that right after CT scan was done he had an episode of emesis, no nausea or emesis since then. \par \par 4/19/23: seen today accompanied by his son. Continues on tecentriq and is tolerating well thus far. He continues to have dry cough and does not feel that benzonatate is adequately helping him. His breathing is stable, no pain, appetite is good. He is also thinking about traveling to [facility] some time soon \par \par 5/10/23: Patient seen today for follow up accompanied by his grand-daughter. Patient reports that since last visit, he has been feeling more SOB with exertion. His cough is persistent, minimally relieved by benzonatate 200mg TID, and is now productive. \par \par 6/21/23: Patient seen today for follow up with his granddaughter. They report that his sough has improved since starting promethazine-codeine. He saw IR yesterday for evaluation for biopsy and thora however biopsy is not possible but will move forward with thoracentesis. He is otherwise doing well \par \par 7/12/23: Pt seen in office. He is accompanied by his son He reports that his cough is worse, he also notes some pleuritic CP on left side. cough is more productive than it has been in the past. pt denies any fever, but does have night sweats.  [de-identified] : adeno ca [FreeTextEntry1] : Tecentriq q 3 weeks started on 12/14

## 2023-08-11 NOTE — H&P ADULT - NSICDXPASTMEDICALHX_GEN_ALL_CORE_FT
PAST MEDICAL HISTORY:  History of BPH     HLD (hyperlipidemia)     HTN (hypertension)     Lung cancer right s/p 4 rounds of chemo, last chemo 6/23/22.    Pleural effusion

## 2023-08-11 NOTE — ED ADULT TRIAGE NOTE - CHIEF COMPLAINT QUOTE
Presents to ED c/o night sweats, chills, body aches, subjective fevers, and shortness of breath x 9 days after receiving immunotherapy for lung CA on 08/02/23. Had thoracentesis on July.  History of BPH, HTN.

## 2023-08-11 NOTE — H&P ADULT - NSHPLABSRESULTS_GEN_ALL_CORE
10.4   10.49 )-----------( 284      ( 11 Aug 2023 15:20 )             33.4       08-11    136  |  102  |  33<H>  ----------------------------<  102<H>  4.9   |  21<L>  |  1.56<H>    Ca    9.5      11 Aug 2023 15:20  Phos  3.7     08-11  Mg     2.30     08-11    TPro  8.5<H>  /  Alb  3.1<L>  /  TBili  0.5  /  DBili  x   /  AST  32  /  ALT  44<H>  /  AlkPhos  292<H>  08-11              Urinalysis Basic - ( 11 Aug 2023 15:20 )    Color: x / Appearance: x / SG: x / pH: x  Gluc: 102 mg/dL / Ketone: x  / Bili: x / Urobili: x   Blood: x / Protein: x / Nitrite: x   Leuk Esterase: x / RBC: x / WBC x   Sq Epi: x / Non Sq Epi: x / Bacteria: x            Lactate Trend            CAPILLARY BLOOD GLUCOSE

## 2023-08-11 NOTE — H&P ADULT - PROBLEM SELECTOR PLAN 1
- CXR showed R sided pleural effusion with features suggest loculation  - CT chest  - f/u Thoracic surgery recs  - Empiric coverage with zosyn  - f/u sputum culture  - f/u bcx  - f/u MRSA swab - CXR showed R sided pleural effusion with features suggest loculation  - CT chest  - f/u Thoracic surgery recs  - Empiric coverage with zosyn (8/12 - ), s/p ED Vanc + Cefepime (8/11)  - f/u sputum culture  - f/u bcx  - f/u MRSA swab

## 2023-08-11 NOTE — ED PROVIDER NOTE - PHYSICAL EXAMINATION
GENERAL: NAD  HEENT:  Atraumatic  CHEST/LUNG: Chest rise equal bilaterally. +Coarse lung sound, diminished left lung base  HEART: Regular rate and rhythm  ABDOMEN: Soft, Nontender, Nondistended  EXTREMITIES:  Extremities warm  PSYCH: A&Ox3  SKIN: No obvious rashes or lesions  NEUROLOGY: strength and sensation intact in all extremities

## 2023-08-11 NOTE — PATIENT PROFILE ADULT - DOES PATIENT HAVE ADVANCE DIRECTIVE
Patient ID: Gaby is a 85 year old female.    Chief Complaint   Patient presents with   • Hypertension     pt here for 6 months fup on meds     HPI    End stage knee Oa  Current Outpatient Medications   Medication Sig Dispense Refill   • amLODIPine (NORVASC) 5 MG tablet TAKE 1 TABLET BY MOUTH DAILY 90 tablet 1   • lisinopril (PRINIVIL,ZESTRIL) 40 MG tablet TAKE 1 TABLET BY MOUTH EVERY DAY 90 tablet 0   • SYNTHROID 50 MCG tablet TAKE 1 TABLET BY MOUTH EVERY DAY 90 tablet 1   • metoPROLOL succinate (TOPROL-XL) 100 MG 24 hr tablet TAKE 1 TABLET BY MOUTH DAILY 90 tablet 1   • Fexofenadine HCl (ALLEGRA PO) Take by mouth daily.     • acetaminophen (TYLENOL 8 HOUR ARTHRITIS PAIN) 650 MG CR tablet Take 1,300 mg by mouth 2 times daily as needed for Pain.     • calcium carbonate-vitamin D (CALTRATE+D) 600-400 MG-UNIT per tablet Take 1 tablet by mouth daily.     • polyethylene glycol (MIRALAX) powder Take 17 g by mouth daily.     • warfarin (COUMADIN) 5 MG tablet Take 1.5 tablets by mouth daily. 136 tablet 1     No current facility-administered medications for this visit.      ALLERGIES:   Allergen Reactions   • Clindamycin RASH   • Losartan SHORTNESS OF BREATH   • Penicillins Other (See Comments)   • Tetracycline Other (See Comments)     Past Medical History:   Diagnosis Date   • Arthritis    • Atrial fibrillation (CMS/HCC)    • Cardiac arrhythmia    • Hyperlipidemia    • Hypertension    • OA (osteoarthritis) of knee    • Obesity    • Osteoporosis      Past Surgical History:   Procedure Laterality Date   • Colonoscopy      Dr Thompson 2004   • Total abdominal hysterectomy          Review of Systems   Constitutional: Negative.  Negative for fever.   HENT: Negative.    Eyes: Negative.    Respiratory: Negative.  Negative for shortness of breath.    Cardiovascular: Negative.  Negative for chest pain.   Gastrointestinal: Negative for nausea.   Genitourinary: Negative.    Musculoskeletal: Negative.    Skin: Negative.  Negative  for rash.   Neurological: Negative.  Negative for dizziness.   All other systems reviewed and are negative.      Vitals: /80 (BP Location: LUE - Left upper extremity, Patient Position: Sitting, Cuff Size: Regular)   Ht 5' 2\" (1.575 m)   BMI 38.59 kg/m²   BSA 1.96 m²      Physical Exam   Constitutional: She is oriented to person, place, and time and well-developed, well-nourished, and in no distress.   Obese, ambulates with walker.  Alert and oriented x3   HENT:   Head: Normocephalic and atraumatic.   Eyes: Pupils are equal, round, and reactive to light. No scleral icterus.   Cardiovascular: Normal rate, regular rhythm and normal heart sounds.   No murmur heard.  Pulmonary/Chest: Breath sounds normal. She has no wheezes.   Abdominal: Soft. There is no tenderness. Musculoskeletal:         General: Tenderness and deformity (Valgus OA deformities of knees bilaterally significant with end-stage OA changes) present. No edema.     Neurological: She is alert and oriented to person, place, and time.   Skin: Skin is warm and dry.   Psychiatric: Affect normal.   Nursing note and vitals reviewed.         Assessment and Plan:  (I10) Benign hypertension  (primary encounter diagnosis)  Plan: COMPREHENSIVE METABOLIC PANEL    (I48.20) Chronic atrial fibrillation    (M17.0) Primary osteoarthritis of both knees    (E03.9) Acquired hypothyroidism  Plan: THYROID STIMULATING HORMONE    (D50.8) Other iron deficiency anemia  Plan: CBC & AUTO DIFFERENTIAL    (E78.2) Mixed hyperlipidemia  Plan: LIPID PANEL WITHOUT REFLEX    FU 1-2 weeks for fasting labs  Patient appears stable and well controlled at this time.  Cardiovascular risk factors including regular exercise need blood pressure cholesterol and sugar were all addressed.  Weight control continues to be an issue.  Efforts will be made before the next visit in 6 months to attempt to use some more weight.    Goal of 1/2-hour of walking every day was emphasized                     Yassine Miranda MD   No

## 2023-08-11 NOTE — H&P ADULT - ATTENDING COMMENTS
Patient seen and examined, care plan discussed with house staff as above.     83yoM prsenting w fevers, body aches. Hx of VATS, JUANPABLO/LLL wedge resection 10/2022 2/2 Stage IIIA adenocarcinoma of the lung diagnosed Feb 2022. Has been on Tecentric, last dosed 8/2.     Tmax of 100.3F, otherwise HDS.CXR concerning for loculated R pleural effusion.     Obtain CT chest wo [ ]. F/u w CT surgery regarding chest tube placement for loculated R effusion. Continue zosyn (8/11- ) to treat for PNA with parapneumonic loculated R pleural effusion, f/u blood/sputum cultures/pleural fluid cultures [ ].  EKG stable, monitor for myocarditis while on Tecentriq.

## 2023-08-11 NOTE — CONSULT NOTE ADULT - NS ATTEND OPT1 GEN_ALL_CORE
I attest my time as attending is greater than 50% of the total combined time spent on qualifying patient care activities by the PA/NP and attending. normal...

## 2023-08-11 NOTE — H&P ADULT - HISTORY OF PRESENT ILLNESS
83 year old man with PMHx of lung cancer on Tecentriq q3 weeks, HTN, Dyslipidemia, BPH presents with 9 days history of SOB associated with cough productive of white sputum, intermittent fever (Tmax 101 at home), chills, night sweats, several episodes of NBNB vomiting that usually follows coughing, and generalized malaise. Per patient, he received a dose of Tecentriq ~2 weeks ago and  developed symptoms shortly after. In the ED, CXR showed R sided pleural effusion with features suggest loculation, WBC of 10.49, RVP negative. Of note, patient recently presented for similar presentation 1 month ago, received thoracentesis drained 700cc of exudative fluid (pH 7,5, glucose 70s, protein 4.4/7.2, pleural , cytopath and flow cyte negative, culture negative). He denies recent travel or sick contact, headache, dizziness, diarrhea,  symptoms.

## 2023-08-11 NOTE — ED PROVIDER NOTE - ATTENDING CONTRIBUTION TO CARE
I have personally performed a face to face medical and diagnostic evaluation of the patient. I have discussed with and reviewed the Resident's note and agree with the History, ROS, Physical Exam and MDM unless otherwise indicated. A brief summary of my personal evaluation and impression can be found below.    Tee KOEHLER: 83-year-old male, history of lung CA recent thoracentesis currently on immunotherapy presents with shortness of breath associate with chills night sweats and objective fever to 101  at home associate with generalized body aches no chest pain no trouble breathing no nausea vomiting abdominal pain urinary or bowel complaints, has been persisting over the last 9 days.  No rashes.    All other ROS negative, except as above and as per HPI and ROS section.    VITALS: Initial triage and subsequent vitals have been reviewed by me.  GEN APPEARANCE: Alert, non-toxic, well-appearing, NAD.  HEAD: Atraumatic.  EYES: PERRLa, EOMI, vision grossly intact.   NECK: Supple  CV: RRR, S1S2, no c/r/m/g. Cap refill <2 seconds. No bruits.   LUNGS: crackles b/l   ABDOMEN: Soft, NTND. No guarding or rebound.   MSK/EXT: No spinal or extremity point tenderness. No CVA ttp. Pelvis stable. No peripheral edema.  NEURO: Alert, follows commands. Speech normal. Sensation and motor normal x4 extremities.   SKIN: Warm, dry and intact. No rash.  PSYCH: Appropriate    Plan/MDM: Exam vital signs stable nontoxic-appearing physical exam as above noted temperature in the ED concern for possible infectious process pneumonia versus urinary tract infection would also consider viral etiology, however given complication of lung cancer would also consider recurrence of pleural effusion possibly now with infection, less concern for PE at this time as patient is not tachycardic and not hypoxic, no new obvious lower extremity swelling or edema, will check sepsis labs, chest x-ray urine viral swab start antipyretics and antibiotics, will give additional meds as needed and reassess and anticipate admission.

## 2023-08-11 NOTE — ED ADULT NURSE NOTE - NSFALLUNIVINTERV_ED_ALL_ED
Bed/Stretcher in lowest position, wheels locked, appropriate side rails in place/Call bell, personal items and telephone in reach/Instruct patient to call for assistance before getting out of bed/chair/stretcher/Non-slip footwear applied when patient is off stretcher/[place] to call system/Physically safe environment - no spills, clutter or unnecessary equipment/Purposeful proactive rounding/Room/bathroom lighting operational, light cord in reach

## 2023-08-11 NOTE — CONSULT NOTE ADULT - NS ATTEND AMEND GEN_ALL_CORE FT
Patient seen and examined agree with above note as modified, where appropriate, by me. Pt with history of lung CA. Pt presents with SOB and night sweats. PT with Right effusion on CXR awaiting CT chest. May need pigtail placement pending CT.

## 2023-08-11 NOTE — CONSULT NOTE ADULT - SUBJECTIVE AND OBJECTIVE BOX
HPI: 84 y/o male w/ history of lung cancer s/p FB, L VATS, JUANPABLO & LLL Wedge resections on 10/28/22 and chemotherapy currently being treated with immunotherapy (last 8/2), recent right thoracentesis with removal of 780cc of fluid last month at [facility]. 9 day history of SOB a/w chills, night sweats, fevers (Tmax 101), generalized body aches. Denies nausea, vomiting, dizziness, chest pain, SOB, abdominal pain, dysuria, hematuria.    Thoracic surgery consulted for pleural effusion.     PAST MEDICAL & SURGICAL HISTORY:  HTN (hypertension)  HLD (hyperlipidemia)  History of BPH  Lung cancer: right s/p 4 rounds of chemo, last chemo 6/23/22.  History of ERCP  S/P bronchoscopy /2022, 3/2022  S/P inguinal hernia repair Rght approximately 1961    REVIEW OF SYSTEMS (negative except mentioned in HPI)     MEDICATIONS  (STANDING):  -cetaminophen 325 mg oral tablet: 2 tab(s) orally every 6 hours, As Needed - for mild pain  -Tlmisartan 20 mg oral tablet: 1 tab(s) orally once a day  -Rsuvastatin 10 mg oral tablet: 1 tab(s) orally once a day  MEDICATIONS  (PRN):      Allergies  No Known Allergies    SOCIAL HISTORY:  Occupation: Unknown   Smoking Hx: denies  Etoh Hx: denies  IVDA Hx: denies    FAMILY HISTORY:  FH: lung cancer (Sibling)    Vital Signs Last 24 Hrs  T(C): 37.9 (11 Aug 2023 15:23), Max: 37.9 (11 Aug 2023 15:23)  T(F): 100.3 (11 Aug 2023 15:23), Max: 100.3 (11 Aug 2023 15:23)  HR: 94 (11 Aug 2023 13:11) (94 - 94)  BP: 129/70 (11 Aug 2023 13:11) (129/70 - 129/70)  BP(mean): --  RR: 16 (11 Aug 2023 13:11) (16 - 16)  SpO2: 95% (11 Aug 2023 13:11) (95% - 95%)    General: WN/WD NAD  Neurology: Awake, nonfocal, JANE x 4  Eyes: Scleras clear, Gross vision intact  ENT: [place] hearing intact, grossly patent pharynx   Neck: Neck supple, trachea midline, No JVD  Respiratory: Respirations unlabeled. No wheezing, rales, rhonchi  CV: RRR  Abdominal: Soft, NT, ND  Extremities: No edema, + peripheral pulses  Skin: No Rashes, Hematoma, Ecchymosis  Lymphatic: No Neck, axilla, groin LAD  Psych: Oriented x 3, normal affect    LABS:                        10.4   10.49 )-----------( 284      ( 11 Aug 2023 15:20 )             33.4     08-11    136  |  102  |  33<H>  ----------------------------<  102<H>  4.9   |  21<L>  |  1.56<H>    Ca    9.5      11 Aug 2023 15:20  Phos  3.7     08-11  Mg     2.30     08-11    TPro  8.5<H>  /  Alb  3.1<L>  /  TBili  0.5  /  DBili  x   /  AST  32  /  ALT  44<H>  /  AlkPhos  292<H>  08-11      Urinalysis Basic - ( 11 Aug 2023 15:20 )    Color: x / Appearance: x / SG: x / pH: x  Gluc: 102 mg/dL / Ketone: x  / Bili: x / Urobili: x   Blood: x / Protein: x / Nitrite: x   Leuk Esterase: x / RBC: x / WBC x   Sq Epi: x / Non Sq Epi: x / Bacteria: x    RADIOLOGY & ADDITIONAL STUDIES:  cxr: Increasing right base effusion with loculated character. Irregular scattered nodular infiltrates in the lower lung fields are again seen.    ASSESSMENT: 84 y/o male w/ history of lung cancer currently being treated with immunotherapy, recent right thoracentesis last month at [facility]. 9 day history of SOB a/w chills, night sweats, fevers (Tmax 101), generalized body aches. Thoracic surgery consulted for right pleural effusion.     Plan:   -Care per primary team   -Will follow up CT chest   -Will discuss drainage of effusion    Plan to be discussed with attending Dr. Catherine, recommendations to follow

## 2023-08-11 NOTE — H&P ADULT - NSHPPHYSICALEXAM_GEN_ALL_CORE
GENERAL: NAD, lying in bed comfortably  HEAD:  Atraumatic, normocephalic  EYES: EOMI, PERRLA, conjunctiva clear, no conjunctival pallor, anicteric sclera  NECK: Supple, trachea midline, no JVD  HEART: Regular rate and rhythm, Normal S1 S2, no murmurs, rubs, or gallops  LUNGS: Unlabored respirations.  Diminished BS on R bases, no rhonchi, wheezes, crackles  ABDOMEN: Soft, nondistended, nontender, no rebound or guarding, bowel sounds presents  EXTREMITIES: Warm extremities, no clubbing, cyanosis, or edema, peripheral pulses 2+ bilaterally  MUSCULOSKELETAL: No joint swelling or tenderness to palpation  NEURO: CN 2-12 grossly intact, moves all limbs spontaneously  SKIN: No rashes or lesions

## 2023-08-11 NOTE — H&P ADULT - ASSESSMENT
83 year old man with PMHx of lung cancer, HTN, dyslipidemia, BPH p/w 9 days history of SOB a/w cough productive of white sputum, intermittent fever (Tmax 101 at home), chills, night sweats, several episodes of NBNB vomiting that usually follows coughing, and generalized malaise. Found to have R sided pleural effusion possibly loculated on CXR.

## 2023-08-11 NOTE — PATIENT PROFILE ADULT - FALL HARM RISK - HARM RISK INTERVENTIONS

## 2023-08-11 NOTE — ED PROVIDER NOTE - OBJECTIVE STATEMENT
84 y/o male w/ PMH lung cancer s/p immunotherapy, recent thoracentesis last month c/o 9 day history of SOB a/w chills, night sweats, fevers (Tmax 101), generalized bodyaches. Otherwise asymptomatic. Denies nausea, vomiting, dizziness, chest pain, SOB, abdominal pain, dysuria, hematuria.

## 2023-08-11 NOTE — ED ADULT NURSE NOTE - OBJECTIVE STATEMENT
Received patient in room 4 c/o Received patient in room 4 c/o SOB x 9 days associated w/fever, chills, night sweats, generalized body aches. PMHX Lung Cancer s/p immunotherapy. Patient is A&OX4, airway patent, breathing unlabored and even, radial pulses palpable. Pending lab results, medications given as ordered. Side rails up and safety maintained. Call bells within reach. Family at bedside.

## 2023-08-12 NOTE — PROGRESS NOTE ADULT - ASSESSMENT
83 year old man with PMHx of lung cancer, HTN, dyslipidemia, BPH p/w 9 days history of SOB a/w cough productive of white sputum, intermittent fever (Tmax 101 at home), chills, night sweats, several episodes of NBNB vomiting that usually follows coughing, and generalized malaise. Found to have R sided pleural effusion possibly loculated on CXR. 83 year old man with PMHx of stage IIIA adenocarcinoma of the lung dx 2/2022 s/p JUANPABLO/LLL wedge resection 10/22, known R sided pleural effusion s/p thoracentesis (7/2023) showed exudative without signs of infection, HTN, dyslipidemia, BPH p/w 9 days history of SOB a/w cough productive of white sputum, intermittent fever (Tmax 101 at home), chills, night sweats, several episodes of NBNB vomiting that usually follows coughing, and generalized malaise. Found to have R sided pleural effusion possibly loculated on CXR, sputum and blood culture pending, CT chest pending.

## 2023-08-12 NOTE — PROGRESS NOTE ADULT - PROBLEM SELECTOR PLAN 1
- CXR showed R sided pleural effusion with features suggest loculation  - CT chest  - f/u Thoracic surgery recs  - Empiric coverage with zosyn (8/12 - ), s/p ED Vanc + Cefepime (8/11)  - f/u sputum culture  - f/u bcx  - f/u MRSA swab - Hx of VATS, JUANPABLO/LLL wedge resection 10/2022 2/2 Stage IIIA adenocarcinoma of the lung diagnosed Feb 2022  - Pleural effusion etiology unknown, previous thoracentesis (7/2023) showed exudative fluid (pH 7,5, glucose 70s, protein 4.4/7.2, pleural , cytopath and flow cyte negative, culture negative)  - CXR showed R sided pleural effusion with features suggest loculation  - f/u CT chest non con  - f/u Thoracic surgery recs  - f/u sputum culture  - f/u bcx  - f/u MRSA swab  - Empiric coverage with zosyn (8/12 - ), s/p ED Vanc + Cefepime (8/11) - Malignancy vs. Trapped lung 2/2 obstruction? vs. parapneumonic  - Hx of VATS, JUANPABLO/LLL wedge resection 10/2022 2/2 Stage IIIA adenocarcinoma of the lung diagnosed Feb 2022  - Pleural effusion etiology unknown, previous thoracentesis (7/2023) showed exudative fluid (pH 7,5, glucose 70s, protein 4.4/7.2, pleural , cytopath and flow cyte negative, culture negative)  - CXR showed R sided pleural effusion with features suggest loculation  - f/u CT chest non con  - f/u Thoracic surgery recs  - f/u sputum culture  - f/u bcx  - f/u MRSA swab  - Empiric coverage with zosyn (8/12 - ), s/p ED Vanc + Cefepime (8/11)

## 2023-08-12 NOTE — PROVIDER CONTACT NOTE (CRITICAL VALUE NOTIFICATION) - ACTION/TREATMENT ORDERED:
will keep zosyn and wait for sensitivity, repeat blood culture 8/14AM. night team to adjust if need be

## 2023-08-12 NOTE — PROVIDER CONTACT NOTE (CRITICAL VALUE NOTIFICATION) - ASSESSMENT
Growth in aerobic bottle gram positive cocci in clusters from 8/11 culture. Vitally stable, afebrile on zosyn

## 2023-08-12 NOTE — PROGRESS NOTE ADULT - ATTENDING COMMENTS
83yoM prsenting w fevers, body aches. Hx of VATS, JUANPABLO/LLL wedge resection 10/2022 2/2 Stage IIIA adenocarcinoma of the lung diagnosed Feb 2022. Has been on Tecentric, last dosed 8/2. Presented with chills, night sweats, increased productive cough. Tmax of 100.3F, otherwise HDS. CXR concerning for loculated R pleural effusion. CT chest with right loculated effusion. F/u w CT surgery regarding chest tube placement for loculated R effusion. Continue zosyn (8/11- ) to treat for PNA with parapneumonic loculated R pleural effusion, f/u blood/sputum cultures/pleural fluid cultures if obtained.

## 2023-08-12 NOTE — PROGRESS NOTE ADULT - PROBLEM SELECTOR PLAN 5
Diet: Full diet  DVT ppx: Lovenox  Disposition: Home  Code: Full code Diet: Full diet  DVT ppx: Holding for possible thoracentesis  Disposition: Home  Code: Full code Diet: Full diet  DVT ppx: SCD, Holding AC for possible thoracentesis  Disposition: Home  Code: Full code

## 2023-08-12 NOTE — PROGRESS NOTE ADULT - SUBJECTIVE AND OBJECTIVE BOX
Patient is a 83y old  Male who presents with a chief complaint of Cough (11 Aug 2023 18:39)      ======Subjective/Overnight events======  no acute event overnight, patient seen and examined by bedside during AM rounds, denies dizziness, headache, fever, chest pain, SOB, nausea, vomiting, diarrhea, dysuria. Appetite good, tolerating diet, had normal BM yesterday.    ======Medications======  MEDICATIONS  (STANDING):  atorvastatin 40 milliGRAM(s) Oral at bedtime  finasteride 5 milliGRAM(s) Oral daily  losartan 25 milliGRAM(s) Oral daily  piperacillin/tazobactam IVPB.. 3.375 Gram(s) IV Intermittent every 8 hours    MEDICATIONS  (PRN):  acetaminophen     Tablet .. 650 milliGRAM(s) Oral every 6 hours PRN Temp greater or equal to 38C (100.4F), Mild Pain (1 - 3)  aluminum hydroxide/magnesium hydroxide/simethicone Suspension 30 milliLiter(s) Oral every 4 hours PRN Dyspepsia  melatonin 3 milliGRAM(s) Oral at bedtime PRN Insomnia  ondansetron Injectable 4 milliGRAM(s) IV Push every 8 hours PRN Nausea and/or Vomiting      ======Vital Signs======  T(C): 37.1 (23 @ 05:52), Max: 37.9 (23 @ 15:23)  T(F): 98.7 (23 @ 05:52), Max: 100.3 (23 @ 15:23)  HR: 92 (23 @ 05:52) (78 - 94)  BP: 134/71 (23 @ 05:52) (109/51 - 134/71)  BP(mean): --  RR: 16 (23 @ 05:52) (16 - 17)  SpO2: 95% (23 @ 05:52) (95% - 96%)    ======PHYSICAL EXAM======  GENERAL: NAD, lying in bed comfortably  HEAD:  Atraumatic, normocephalic  EYES: EOMI, PERRLA, conjunctiva clear, no conjunctival pallor, anicteric sclera  NECK: Supple, trachea midline, no JVD  HEART: Regular rate and rhythm, Normal S1 S2, no murmurs, rubs, or gallops  LUNGS: Unlabored respirations.  Clear to auscultation bilaterally, no crackles, wheezing, or rhonchi  ABDOMEN: Soft, nondistended, nontender, no rebound or guarding, bowel sounds presents  EXTREMITIES: Warm extremities, no clubbing, cyanosis, or edema, peripheral pulses 2+ bilaterally  MUSCULOSKELETAL: No joint swelling or tenderness to palpation  NEURO: CN 2-12 grossly intact, moves all limbs spontaneously  SKIN: No rashes or lesions    ======Labs======                9.1<L>  10.76<H> >----------< 285  (MCV: 80.7)                28.1<L>   136 | 102 | 33<H>  -----------------------< 102<H>  4.9 | 21<L> | 1.56<H>    TPro: 8.5<H> / Alb: 3.1<L> / TBili: 0.5 / DBili: -- / AlkPhos: 292<H> / ALT: 44<H> / AST: 32 (23 @ 15:20)  Ca: 9.5 / Phos: 3.7 / M.30 (23 @ 15:20)    Gas: 7.33 / 46 / 24<L> / 24 / 28.4<L>% / -1.8 (23 @ 15:30)  PT/INR - ( 12 Aug 2023 05:20 )   PT: 14.5 sec;   INR: 1.30 ratio         PTT - ( 12 Aug 2023 05:20 )  PTT:29.1 sec    ======Microbiology======  Urinalysis Basic - ( 11 Aug 2023 15:20 )    Color: x / Appearance: x / SG: x / pH: x  Gluc: 102 mg/dL / Ketone: x  / Bili: x / Urobili: x   Blood: x / Protein: x / Nitrite: x   Leuk Esterase: x / RBC: x / WBC x   Sq Epi: x / Non Sq Epi: x / Bacteria: x          ======Radiology & Additional tests======  Results Reviewed:   Imaging Personally Reviewed:  Electrocardiogram Personally Reviewed:    ======I&O's======  I&O's Summary       Patient is a 83y old  Male who presents with a chief complaint of Cough (11 Aug 2023 18:39)      ======Subjective/Overnight events======  Still some productive cough of white sputums, unchanged from yesterday. Otherwise no acute event overnight, patient seen and examined by bedside during AM rounds, denies dizziness, headache, fever, chest pain, SOB, nausea, vomiting, diarrhea, dysuria. Appetite good, tolerating diet, had normal BM yesterday.    ======Medications======  MEDICATIONS  (STANDING):  atorvastatin 40 milliGRAM(s) Oral at bedtime  finasteride 5 milliGRAM(s) Oral daily  losartan 25 milliGRAM(s) Oral daily  piperacillin/tazobactam IVPB.. 3.375 Gram(s) IV Intermittent every 8 hours    MEDICATIONS  (PRN):  acetaminophen     Tablet .. 650 milliGRAM(s) Oral every 6 hours PRN Temp greater or equal to 38C (100.4F), Mild Pain (1 - 3)  aluminum hydroxide/magnesium hydroxide/simethicone Suspension 30 milliLiter(s) Oral every 4 hours PRN Dyspepsia  melatonin 3 milliGRAM(s) Oral at bedtime PRN Insomnia  ondansetron Injectable 4 milliGRAM(s) IV Push every 8 hours PRN Nausea and/or Vomiting      ======Vital Signs======  T(C): 37.1 (23 @ 05:52), Max: 37.9 (23 @ 15:23)  T(F): 98.7 (23 @ 05:52), Max: 100.3 (23 @ 15:23)  HR: 92 (23 @ 05:52) (78 - 94)  BP: 134/71 (23 @ 05:52) (109/51 - 134/71)  BP(mean): --  RR: 16 (23 @ 05:52) (16 - 17)  SpO2: 95% (23 @ 05:52) (95% - 96%)    ======PHYSICAL EXAM======  GENERAL: NAD, lying in bed comfortably  HEAD:  Atraumatic, normocephalic  EYES: EOMI, PERRLA, conjunctiva clear, no conjunctival pallor, anicteric sclera  NECK: Supple, trachea midline, no JVD  HEART: Regular rate and rhythm, Normal S1 S2, no murmurs, rubs, or gallops  LUNGS: Unlabored respirations. Diminished BS on R, L sided lung clear to ascultation, no crackles, wheezing, or rhonchi  ABDOMEN: Soft, nondistended, nontender, no rebound or guarding, bowel sounds presents  EXTREMITIES: Warm extremities, no clubbing, cyanosis, or edema, peripheral pulses 2+ bilaterally  MUSCULOSKELETAL: No joint swelling or tenderness to palpation  NEURO: CN 2-12 grossly intact, moves all limbs spontaneously  SKIN: No rashes or lesions    ======Labs======                9.1<L>  10.76<H> >----------< 285  (MCV: 80.7)                28.1<L>   136 | 102 | 33<H>  -----------------------< 102<H>  4.9 | 21<L> | 1.56<H>    TPro: 8.5<H> / Alb: 3.1<L> / TBili: 0.5 / DBili: -- / AlkPhos: 292<H> / ALT: 44<H> / AST: 32 (23 @ 15:20)  Ca: 9.5 / Phos: 3.7 / M.30 (23 @ 15:20)    Gas: 7.33 / 46 / 24<L> / 24 / 28.4<L>% / -1.8 (23 @ 15:30)  PT/INR - ( 12 Aug 2023 05:20 )   PT: 14.5 sec;   INR: 1.30 ratio         PTT - ( 12 Aug 2023 05:20 )  PTT:29.1 sec    ======Microbiology======  Urinalysis Basic - ( 11 Aug 2023 15:20 )    Color: x / Appearance: x / SG: x / pH: x  Gluc: 102 mg/dL / Ketone: x  / Bili: x / Urobili: x   Blood: x / Protein: x / Nitrite: x   Leuk Esterase: x / RBC: x / WBC x   Sq Epi: x / Non Sq Epi: x / Bacteria: x          ======Radiology & Additional tests======  Results Reviewed:   Imaging Personally Reviewed:  Electrocardiogram Personally Reviewed:    ======I&O's======  I&O's Summary

## 2023-08-13 NOTE — PROGRESS NOTE ADULT - PROBLEM SELECTOR PLAN 1
- Malignancy vs. Trapped lung 2/2 obstruction? vs. parapneumonic  - Hx of VATS, JUANPABLO/LLL wedge resection 10/2022 2/2 Stage IIIA adenocarcinoma of the lung diagnosed Feb 2022  - Pleural effusion etiology unknown, previous thoracentesis (7/2023) showed exudative fluid (pH 7,5, glucose 70s, protein 4.4/7.2, pleural , cytopath and flow cyte negative, culture negative)  - CXR showed R sided pleural effusion with features suggest loculation  - f/u CT chest non con  - f/u Thoracic surgery recs  - f/u sputum culture  - f/u bcx  - f/u MRSA swab  - Empiric coverage with zosyn (8/12 - ), s/p ED Vanc + Cefepime (8/11) - Malignancy vs. Trapped lung 2/2 obstruction? vs. parapneumonic  - Hx of VATS, JUANPABLO/LLL wedge resection 10/2022 2/2 Stage IIIA adenocarcinoma of the lung diagnosed Feb 2022  - Pleural effusion etiology unknown, previous thoracentesis (7/2023) showed exudative fluid (pH 7,5, glucose 70s, protein 4.4/7.2, pleural , cytopath and flow cyte negative, culture negative)  - CXR showed R sided pleural effusion with features suggest loculation  - CT chest non con confirmed finding of chronic R loculated pleural effusion  - f/u Thoracic surgery recs -> no current indication for pigtail catheter placement given size of effusion and remains afebrile  - f/u sputum culture  - f/u bcx  - f/u MRSA swab  - Empiric coverage with zosyn (8/12 - ), s/p ED Vanc + Cefepime (8/11)

## 2023-08-13 NOTE — PROGRESS NOTE ADULT - ATTENDING COMMENTS
83yoM prsenting w fevers, body aches. Hx of VATS, JUANPABLO/LLL wedge resection 10/2022 2/2 Stage IIIA adenocarcinoma of the lung diagnosed Feb 2022. Has been on Tecentric, last dosed 8/2. Presented with chills, night sweats, increased productive cough. Tmax of 100.3F, otherwise HDS. CXR concerning for loculated R pleural effusion. CT chest with chronic right loculated effusion with interval decrease in size. CT surgery rec no further intervention due to chronicity. Sputum culture in process, continue zosyn (8/11- ) to treat for possible PNA with parapneumonic loculated R pleural effusion, f/u sputum cx. 1/4 Bcx with staph hominis, likely contaminant, repeat Bcx pending.

## 2023-08-13 NOTE — PROGRESS NOTE ADULT - PROBLEM SELECTOR PLAN 5
Diet: Full diet  DVT ppx: SCD, Holding AC for possible thoracentesis  Disposition: Home  Code: Full code

## 2023-08-13 NOTE — DIETITIAN INITIAL EVALUATION ADULT - OTHER INFO
Per chart, pt is 83 year old male PMH stage IIIA adenocarcinoma of the lung (dx 2/2022) s/p JUANPABLO/LLL wedge resection (10/22), known R sided pleural effusion s/p thoracentesis (7/2023), HTN, dyslipidemia, BPH presenting with  SOB, cough, fever x9 days found to have R sided pleural effusion.     Attempted to visit pt twice, sleeping on both occasions; comprehensive chart review completed. Pt follows at [facility], reviewed outpatient nutrition notes. NKFA. No chewing/swallowing difficulties. Pt lives at home with family. Noted with generally good appetite/PO intake, likely decreased PTA secondary to acute symptoms including vomiting. Pt continues on regular diet with Ensure supplementation, noted with good PO intake thus far in house per flowsheet documentation. No current GI distress, last BM 8/12 per flowsheets. No bowel regimen ordered at this time.

## 2023-08-13 NOTE — DIETITIAN INITIAL EVALUATION ADULT - OTHER CALCULATIONS
Ideal Body Weight: 148 lbs / 67.3 kg +/-10%   Dosing weight (8/11) 194.6 lbs / 88.3 kg.   Recent chart weights: (7/13) 200 lbs, (5/11) 209 lbs.  Apparent ~14 lb (7%) weight loss x3 months.

## 2023-08-13 NOTE — PROGRESS NOTE ADULT - SUBJECTIVE AND OBJECTIVE BOX
Romana Clalahan MD  PGY 2 Department of Internal Medicine        Patient is a 83y old  Male who presents with a chief complaint of Cough (12 Aug 2023 07:36)      SUBJECTIVE / OVERNIGHT EVENTS: Pt seen and examined. No acute overnight events. Denies fevers, chills, CP, SOB, Abdominal pain, N/V, Constipation, Diarrhea        MEDICATIONS  (STANDING):  atorvastatin 40 milliGRAM(s) Oral at bedtime  chlorhexidine 2% Cloths 1 Application(s) Topical daily  finasteride 5 milliGRAM(s) Oral daily  losartan 25 milliGRAM(s) Oral daily  piperacillin/tazobactam IVPB.. 3.375 Gram(s) IV Intermittent every 8 hours    MEDICATIONS  (PRN):  acetaminophen     Tablet .. 650 milliGRAM(s) Oral every 6 hours PRN Temp greater or equal to 38C (100.4F), Mild Pain (1 - 3)  aluminum hydroxide/magnesium hydroxide/simethicone Suspension 30 milliLiter(s) Oral every 4 hours PRN Dyspepsia  guaiFENesin Oral Liquid (Sugar-Free) 100 milliGRAM(s) Oral every 6 hours PRN Cough  melatonin 3 milliGRAM(s) Oral at bedtime PRN Insomnia  ondansetron Injectable 4 milliGRAM(s) IV Push every 8 hours PRN Nausea and/or Vomiting      I&O's Summary    12 Aug 2023 07:01  -  13 Aug 2023 07:00  --------------------------------------------------------  IN: 650 mL / OUT: 0 mL / NET: 650 mL        Vital Signs Last 24 Hrs  T(C): 37.1 (12 Aug 2023 22:35), Max: 37.1 (12 Aug 2023 22:35)  T(F): 98.7 (12 Aug 2023 22:35), Max: 98.7 (12 Aug 2023 22:35)  HR: 89 (12 Aug 2023 22:35) (80 - 89)  BP: 121/73 (12 Aug 2023 22:35) (107/58 - 121/73)  BP(mean): --  RR: 18 (12 Aug 2023 22:35) (18 - 18)  SpO2: 96% (12 Aug 2023 22:35) (96% - 96%)    Parameters below as of 12 Aug 2023 22:35  Patient On (Oxygen Delivery Method): room air        CAPILLARY BLOOD GLUCOSE          PHYSICAL EXAM:  GENERAL: NAD, lying in bed comfortably  HEAD:  Atraumatic, normocephalic  EYES: EOMI, PERRLA, conjunctiva clear, no conjunctival pallor, anicteric sclera  NECK: Supple, trachea midline, no JVD  HEART: Regular rate and rhythm, Normal S1 S2, no murmurs, rubs, or gallops  LUNGS: Unlabored respirations. (+) Decreased breath sounds at R lung base; no crackles, wheezing, or rhonchi  ABDOMEN: Soft, nondistended, nontender, no rebound or guarding, bowel sounds presents  EXTREMITIES: Warm extremities, no clubbing, cyanosis, or edema, peripheral pulses 2+ bilaterally  MUSCULOSKELETAL: No joint swelling or tenderness to palpation  NEURO: CN 2-12 grossly intact, moves all limbs spontaneously  SKIN: No rashes or lesions      LABS:                        9.1    10.76 )-----------( 285      ( 12 Aug 2023 05:20 )             28.1     Auto Eosinophil # 0.18  / Auto Eosinophil % 1.7   / Auto Neutrophil # 6.99  / Auto Neutrophil % 65.0  / BANDS % x                            10.4   10.49 )-----------( 284      ( 11 Aug 2023 15:20 )             33.4     Auto Eosinophil # 0.05  / Auto Eosinophil % 0.5   / Auto Neutrophil # 7.08  / Auto Neutrophil % 67.5  / BANDS % x        08-12    132<L>  |  102  |  41<H>  ----------------------------<  105<H>  4.6   |  21<L>  |  1.63<H>  08-11    136  |  102  |  33<H>  ----------------------------<  102<H>  4.9   |  21<L>  |  1.56<H>    Ca    8.8      12 Aug 2023 05:20  Mg     2.40     08-12  Phos  3.0     08-12  TPro  7.4  /  Alb  3.0<L>  /  TBili  0.3  /  DBili  x   /  AST  22  /  ALT  34  /  AlkPhos  244<H>  08-12  TPro  8.5<H>  /  Alb  3.1<L>  /  TBili  0.5  /  DBili  x   /  AST  32  /  ALT  44<H>  /  AlkPhos  292<H>  08-11    PT/INR - ( 12 Aug 2023 05:20 )   PT: 14.5 sec;   INR: 1.30 ratio         PTT - ( 12 Aug 2023 05:20 )  PTT:29.1 sec      Urinalysis Basic - ( 12 Aug 2023 05:20 )    Color: x / Appearance: x / SG: x / pH: x  Gluc: 105 mg/dL / Ketone: x  / Bili: x / Urobili: x   Blood: x / Protein: x / Nitrite: x   Leuk Esterase: x / RBC: x / WBC x   Sq Epi: x / Non Sq Epi: x / Bacteria: x            RADIOLOGY & ADDITIONAL TESTS:    CT Chest No Cont (08.12.23 @ 11:53)    IMPRESSION:.    Small chronic loculated right pleural effusion with interval decrease in   size compared to 5/24/2023.    Findings of invasive mucinous adenocarcinoma with some interval   progression compared to 5/24/2023 as described above.      Imaging Personally Reviewed: Yes    Consultant(s) Notes Reviewed:  Yes    Care Discussed with Consultants/Other Providers: Yes   Romana Callahan MD  PGY 2 Department of Internal Medicine        Patient is a 83y old  Male who presents with a chief complaint of Cough (12 Aug 2023 07:36)      SUBJECTIVE / OVERNIGHT EVENTS: Pt seen and examined. No acute overnight events. Still w/ mild productive cough. Denies fevers, chills, CP, SOB, Abdominal pain, N/V, Constipation, Diarrhea        MEDICATIONS  (STANDING):  atorvastatin 40 milliGRAM(s) Oral at bedtime  chlorhexidine 2% Cloths 1 Application(s) Topical daily  finasteride 5 milliGRAM(s) Oral daily  losartan 25 milliGRAM(s) Oral daily  piperacillin/tazobactam IVPB.. 3.375 Gram(s) IV Intermittent every 8 hours    MEDICATIONS  (PRN):  acetaminophen     Tablet .. 650 milliGRAM(s) Oral every 6 hours PRN Temp greater or equal to 38C (100.4F), Mild Pain (1 - 3)  aluminum hydroxide/magnesium hydroxide/simethicone Suspension 30 milliLiter(s) Oral every 4 hours PRN Dyspepsia  guaiFENesin Oral Liquid (Sugar-Free) 100 milliGRAM(s) Oral every 6 hours PRN Cough  melatonin 3 milliGRAM(s) Oral at bedtime PRN Insomnia  ondansetron Injectable 4 milliGRAM(s) IV Push every 8 hours PRN Nausea and/or Vomiting      I&O's Summary    12 Aug 2023 07:01  -  13 Aug 2023 07:00  --------------------------------------------------------  IN: 650 mL / OUT: 0 mL / NET: 650 mL        Vital Signs Last 24 Hrs  T(C): 37.1 (12 Aug 2023 22:35), Max: 37.1 (12 Aug 2023 22:35)  T(F): 98.7 (12 Aug 2023 22:35), Max: 98.7 (12 Aug 2023 22:35)  HR: 89 (12 Aug 2023 22:35) (80 - 89)  BP: 121/73 (12 Aug 2023 22:35) (107/58 - 121/73)  BP(mean): --  RR: 18 (12 Aug 2023 22:35) (18 - 18)  SpO2: 96% (12 Aug 2023 22:35) (96% - 96%)    Parameters below as of 12 Aug 2023 22:35  Patient On (Oxygen Delivery Method): room air        CAPILLARY BLOOD GLUCOSE          PHYSICAL EXAM:  GENERAL: NAD, lying in bed comfortably  HEAD:  Atraumatic, normocephalic  EYES: EOMI, PERRLA, conjunctiva clear, no conjunctival pallor, anicteric sclera  NECK: Supple, trachea midline, no JVD  HEART: Regular rate and rhythm, Normal S1 S2, no murmurs, rubs, or gallops  LUNGS: Unlabored respirations. (+) Decreased breath sounds at R lung base; no crackles, wheezing, or rhonchi  ABDOMEN: Soft, nondistended, nontender, no rebound or guarding, bowel sounds presents  EXTREMITIES: Warm extremities, no clubbing, cyanosis, or edema, peripheral pulses 2+ bilaterally  MUSCULOSKELETAL: No joint swelling or tenderness to palpation  NEURO: CN 2-12 grossly intact, moves all limbs spontaneously  SKIN: No rashes or lesions      LABS:                        9.1    10.76 )-----------( 285      ( 12 Aug 2023 05:20 )             28.1     Auto Eosinophil # 0.18  / Auto Eosinophil % 1.7   / Auto Neutrophil # 6.99  / Auto Neutrophil % 65.0  / BANDS % x                            10.4   10.49 )-----------( 284      ( 11 Aug 2023 15:20 )             33.4     Auto Eosinophil # 0.05  / Auto Eosinophil % 0.5   / Auto Neutrophil # 7.08  / Auto Neutrophil % 67.5  / BANDS % x        08-12    132<L>  |  102  |  41<H>  ----------------------------<  105<H>  4.6   |  21<L>  |  1.63<H>  08-11    136  |  102  |  33<H>  ----------------------------<  102<H>  4.9   |  21<L>  |  1.56<H>    Ca    8.8      12 Aug 2023 05:20  Mg     2.40     08-12  Phos  3.0     08-12  TPro  7.4  /  Alb  3.0<L>  /  TBili  0.3  /  DBili  x   /  AST  22  /  ALT  34  /  AlkPhos  244<H>  08-12  TPro  8.5<H>  /  Alb  3.1<L>  /  TBili  0.5  /  DBili  x   /  AST  32  /  ALT  44<H>  /  AlkPhos  292<H>  08-11    PT/INR - ( 12 Aug 2023 05:20 )   PT: 14.5 sec;   INR: 1.30 ratio         PTT - ( 12 Aug 2023 05:20 )  PTT:29.1 sec      Urinalysis Basic - ( 12 Aug 2023 05:20 )    Color: x / Appearance: x / SG: x / pH: x  Gluc: 105 mg/dL / Ketone: x  / Bili: x / Urobili: x   Blood: x / Protein: x / Nitrite: x   Leuk Esterase: x / RBC: x / WBC x   Sq Epi: x / Non Sq Epi: x / Bacteria: x            RADIOLOGY & ADDITIONAL TESTS:    CT Chest No Cont (08.12.23 @ 11:53)    IMPRESSION:.    Small chronic loculated right pleural effusion with interval decrease in   size compared to 5/24/2023.    Findings of invasive mucinous adenocarcinoma with some interval   progression compared to 5/24/2023 as described above.      Imaging Personally Reviewed: Yes    Consultant(s) Notes Reviewed:  Yes    Care Discussed with Consultants/Other Providers: Yes

## 2023-08-13 NOTE — DIETITIAN INITIAL EVALUATION ADULT - PERTINENT MEDS FT
atorvastatin   piperacillin/tazobactam IV  aluminum hydroxide/magnesium hydroxide/simethicone Suspension PRN  ondansetron IV PRN

## 2023-08-13 NOTE — PROGRESS NOTE ADULT - ASSESSMENT
83 year old man with PMHx of stage IIIA adenocarcinoma of the lung dx 2/2022 s/p JUANPABLO/LLL wedge resection 10/22, known R sided pleural effusion s/p thoracentesis (7/2023) showed exudative without signs of infection, HTN, dyslipidemia, BPH p/w 9 days history of SOB a/w cough productive of white sputum, intermittent fever (Tmax 101 at home), chills, night sweats, several episodes of NBNB vomiting that usually follows coughing, and generalized malaise. Found to have R sided pleural effusion possibly loculated on CXR, sputum and blood culture pending, CT chest pending. 83 year old man with PMHx of stage IIIA adenocarcinoma of the lung dx 2/2022 s/p JUANPABLO/LLL wedge resection 10/22, known R sided pleural effusion s/p thoracentesis (7/2023) showed exudative without signs of infection, HTN, dyslipidemia, BPH p/w 9 days history of SOB a/w cough productive of white sputum, intermittent fever (Tmax 101 at home), chills, night sweats, several episodes of NBNB vomiting that usually follows coughing, and generalized malaise. Found to have R sided pleural effusion possibly loculated on CXR, sputum and blood culture pending, CT chest completed w/ finding of chronic R loculated pleural effusion.

## 2023-08-13 NOTE — PROGRESS NOTE ADULT - SUBJECTIVE AND OBJECTIVE BOX
Patient seen at bedside.  Resting comfortably on RA. NAD.  CT chest done yesterday. Showing chronic R pleural effusion, appears slightly decreased from previous scan.  Patient on Zosyn.    Vital Signs Last 24 Hrs  T(C): 37.1 (13 Aug 2023 05:35), Max: 37.1 (12 Aug 2023 22:35)  T(F): 98.7 (13 Aug 2023 05:35), Max: 98.7 (12 Aug 2023 22:35)  HR: 84 (13 Aug 2023 05:35) (80 - 89)  BP: 119/71 (13 Aug 2023 05:35) (107/58 - 121/73)  BP(mean): --  RR: 16 (13 Aug 2023 05:35) (16 - 18)  SpO2: 96% (13 Aug 2023 05:35) (96% - 96%)    Parameters below as of 13 Aug 2023 05:35  Patient On (Oxygen Delivery Method): room air        General: A&Ox3, NAD  HEENT: Normocephalic. Vision and hearing grossly intact, EOMI. Airway grossly patent, no stridor.  CV: RRR, well perfused  Resp: Breathing comfortably on RA, no resp distress, no accessory muscle use  GI: Soft, NT, ND  MSK: JANE x4  Pysch: Appropriate affect                          9.1    10.76 )-----------( 285      ( 12 Aug 2023 05:20 )             28.1       08-12    132<L>  |  102  |  41<H>  ----------------------------<  105<H>  4.6   |  21<L>  |  1.63<H>    Ca    8.8      12 Aug 2023 05:20  Phos  3.0     08-12  Mg     2.40     08-12    TPro  7.4  /  Alb  3.0<L>  /  TBili  0.3  /  DBili  x   /  AST  22  /  ALT  34  /  AlkPhos  244<H>  08-12  < from: CT Chest No Cont (08.12.23 @ 11:53) >  ACC: [id number] EXAM:  CT CHEST   ORDERED BY: JOSE G GODOY     PROCEDURE DATE:  08/12/2023          INTERPRETATION:  HISTORY: Admitting Dxs: R52 R52. Loculated effusion on   chest x-ray. Pathology results from 10/28/2022 demonstrated invasive   mucinous adenocarcinoma from left lung wedge biopsies.    EXAMINATION: CT CHEST was performed without IV contrast.    COMPARISON: 5/24/2023 CT chest.    FINDINGS:    AIRWAYS, LUNGS, PLEURA: Central airways appear clear. Small chronic   appearing loculatedright pleural effusion, decreased in size compared to   5/24/2023.    Extensive nodular and consolidative opacities with some superior and   posterior areas of ground-glass opacification involving all lung lobes,   but most severe in the lingula, right middle lobe, and lower lobes are   again identified. With respect to 5/24/2023, findings appear slightly   progressed; for example, in the lingula there is mixed ground-glass and   consolidative opacity on image 84, series 2, which has progressed, a   posterior left upper lobe 1.1 cm nodule (image 53, series 2) was   previously 0.7 cm, and peripheral right lung 2 x 2 cm nodular opacity   (image 75, series 2) was previously 1.7 x 1.5 cm.    Left upper lobe and left lower lobe wedge resections.    MEDIASTINUM: Heart size normal. Coronary atherosclerosis. No pericardial   effusion. Thoracic aorta normal caliber.  No large mediastinal lymph   nodes.    IMAGED ABDOMEN: Unchanged hepatic hypodense lesion. Cholecystectomy.    SOFT TISSUES: Unremarkable.    BONES: Unremarkable.      IMPRESSION:.    Small chronic loculated right pleural effusion with interval decrease in   size compared to 5/24/2023.    Findings of invasive mucinous adenocarcinoma with some interval   progression compared to 5/24/2023 as described above.    --- End of Report ---            MARCIE SCHUMACHER MD; Attending Radiologist  This document has been electronically signed. Aug 12 2023 11:59AM    < end of copied text >      82 y/o male w/ history of lung cancer currently being treated with immunotherapy, recent right thoracentesis last month at [facility]. 9 day history of SOB a/w chills, night sweats, fevers (Tmax 101), generalized body aches. Thoracic surgery consulted for right pleural effusion.   8/13 - CT chest w/ chronic rt pleural effusion, mild improvement from previous CT. Questionable PNA.    Plan:   - Patient remains afebrile and asymptomatic on RA with chronic pleural effusion that appears to have improved  - Questionable PNA, agree with Zosyn  - Given size of effusion, chronicity and clinical presentation, would rec holding off on PTC for now.  - Remainder of care per primary team   - Contact with concerns  - Above d/w Attending on call.

## 2023-08-13 NOTE — DIETITIAN INITIAL EVALUATION ADULT - BODY MASS INDEX
[place]     Time In: 5478  Time Out: 7147  Minutes: 42  Timed Code Treatment Minutes: 42 Minutes                Date: 2019  Patient Name: Ivania Quach,  Gender:  male        CSN: [id number]   : 1957  (63 y.o.)  Referral Date : 19    Referring Practitioner: Alba Strong DO      Diagnosis: M54.16 (ICD-10-CM) - Radiculopathy, lumbar region  Treatment Diagnosis: Lumbago, spinal stiffness, B hip stiffness, muscular weakness   Additional Pertinent Hx: Lumbar fusion L4/L5 , and again 2019 due to loose hardware. General:  PT Visit Information  Onset Date: 19  PT Insurance Information: [employer] - APPROVED FOR A TOTAL OF 18 VISITS FROM 19 TO 19, 3 TIMES A WEEK FOR 6 WEEKS. Modalities covered, no aquatic, no ionto  Total # of Visits Approved: 18  Total # of Visits to Date: 6  Plan of Care/Certification Expiration Date: 19  Progress Note Counter: 6/10 for PN               Subjective:  Chart Reviewed: Yes  Patient assessed for rehabilitation services?: Yes  Response To Previous Treatment: Patient with no complaints from previous session. Family / Caregiver Present: No  Comments: Physician follow up:  Dr. Tobin Farris: Pt working 40 hours this week. I'm a little tired after work.  Doing HEP     Pain:  Patient Currently in Pain: No         Objective              Exercises  Exercise 1: NuStep warm up Seat 11/ Arms 11 x5 mins  L2(was 4)  Exercise 2: Stretches - Piriformis leg crossed and pulling knee towards him, and SKTC x3 B hold 15 sec,(HEP today >hamstring with strap)  Exercise 3: Bridge (with NO ball today ball squeeze), SLR, x15 each   Exercise 4: Standing gastroc and soleus stretch 3x 30 sec each   Exercise 5: Quadruped x10 UEs , LEs x10, combo UE/LE x10  abd bracing   Exercise 6: 3 way hip peach  band x 10 B    Exercise 7: x15 B heel/toe rasies, marching,and squats- hinge position  pain free   Exercise 8: lunge stretch x 3 hold 15 sec B st step , standing hamstring stretch x 3 hold 15 sec B   Exercise 9: Seated on silver disc x 15 B marching, LAQ, hip abd with green  t band, sh rows/ext ,  horiz abd and B Diag x15   Exercise 10: Hydrostick 4 directions x15 reps each  B step stance  Exercise 11: hydro chest L-4 x15   Exercise 12: hydro hip L-4 2x10          Activity Tolerance:  Activity Tolerance: Patient Tolerated treatment well  Activity Tolerance: \"Just stiff. \"    Assessment: Body structures, Functions, Activity limitations: Decreased ROM, Decreased strength, Decreased balance, Increased Pain, Decreased high-level IADLs  Assessment: Added hydro hip and chest. Pt asking when he can \"bend and twist?\"  Thorough discussio on the importance of proper body mechanics and not to bend/twist.  0 pain with progression of core stabs. Prognosis: Excellent  REQUIRES PT FOLLOW UP: Yes  Discharge Recommendations: Continue to assess pending progress    Patient Education:  Patient Education: Continue HEP, monitor response to today's session                      Plan:  Times per week: 3  Plan weeks: 8  Specific instructions for Next Treatment: No excessive double knee to chest, lumbar fusion. No lifting >10 lbs, no trunk rotation.  Review exercises from HEP packet, Hip flexibility (hip flexor supine off edge, piriformis, hamstring), Core abdominal bracing, leg raises x3, bridges, Nautilus equipment  Current Treatment Recommendations: Strengthening, ROM, Balance Training, Stair training, Gait Training, Manual Therapy - Joint Manipulation, Manual Therapy - Soft Tissue Mobilization, Modalities, Home Exercise Program    Goals:  Patient goals : Return to prior level working 12 hours     Short term goals  Time Frame for Short term goals: 4 weeks  Short term goal 1: Patient will report ability to sit one hour at work without increased stiffness in order to 30.9

## 2023-08-14 NOTE — PROGRESS NOTE ADULT - SUBJECTIVE AND OBJECTIVE BOX
PROGRESS NOTE:     Patient is a 83y old  Male who presents with a chief complaint of Cough (13 Aug 2023 12:07)      SUBJECTIVE / OVERNIGHT EVENTS:  No acute overnight events other than mild productive cough. Denies fevers, chills, CP, SOB, Abdominal pain, N/V, Constipation, Diarrhea      ADDITIONAL REVIEW OF SYSTEMS: 10 point ROS negative except per HPI    MEDICATIONS  (STANDING):  atorvastatin 40 milliGRAM(s) Oral at bedtime  chlorhexidine 2% Cloths 1 Application(s) Topical daily  finasteride 5 milliGRAM(s) Oral daily  losartan 25 milliGRAM(s) Oral daily  piperacillin/tazobactam IVPB.. 3.375 Gram(s) IV Intermittent every 8 hours    MEDICATIONS  (PRN):  acetaminophen     Tablet .. 650 milliGRAM(s) Oral every 6 hours PRN Temp greater or equal to 38C (100.4F), Mild Pain (1 - 3)  aluminum hydroxide/magnesium hydroxide/simethicone Suspension 30 milliLiter(s) Oral every 4 hours PRN Dyspepsia  guaiFENesin Oral Liquid (Sugar-Free) 100 milliGRAM(s) Oral every 6 hours PRN Cough  melatonin 3 milliGRAM(s) Oral at bedtime PRN Insomnia  ondansetron Injectable 4 milliGRAM(s) IV Push every 8 hours PRN Nausea and/or Vomiting      CAPILLARY BLOOD GLUCOSE        I&O's Summary      PHYSICAL EXAM:  Vital Signs Last 24 Hrs  T(C): 36.8 (14 Aug 2023 06:06), Max: 36.9 (13 Aug 2023 12:41)  T(F): 98.3 (14 Aug 2023 06:06), Max: 98.4 (13 Aug 2023 12:41)  HR: 81 (14 Aug 2023 06:06) (81 - 90)  BP: 121/57 (14 Aug 2023 06:06) (120/78 - 133/70)  RR: 17 (14 Aug 2023 06:06) (16 - 17)  SpO2: 96% (14 Aug 2023 06:06) (96% - 98%)    Parameters below as of 14 Aug 2023 06:06  Patient On (Oxygen Delivery Method): room air      GENERAL: NAD, lying in bed comfortably  HEAD:  Atraumatic, normocephalic  EYES: EOMI, PERRLA, conjunctiva clear, no conjunctival pallor, anicteric sclera  NECK: Supple, trachea midline, no JVD  HEART: Regular rate and rhythm, Normal S1 S2, no murmurs, rubs, or gallops  LUNGS: Unlabored respirations. Decreased breath sounds at R lung base; no crackles, wheezing, or rhonchi  ABDOMEN: Soft, nondistended, nontender, no rebound or guarding, bowel sounds presents  EXTREMITIES: Warm extremities, no clubbing, cyanosis, or edema, peripheral pulses 2+ bilaterally  MUSCULOSKELETAL: No joint swelling or tenderness to palpation  NEURO: CN 2-12 grossly intact, moves all limbs spontaneously  SKIN: No rashes or lesions        LABS:                  -----    MICRO      Culture - Sputum (collected 12 Aug 2023 11:05)  Source: .Sputum Sputum  Gram Stain (12 Aug 2023 18:06):    Few Squamous epithelial cells per low power field    Moderate polymorphonuclear leukocytes per low power field    Rare Gram positive cocci in pairs per oil power field    Rare Gram Positive Rods per oil power field  Preliminary Report (13 Aug 2023 07:45):    Normal Respiratory Breanna present    Culture - Blood (collected 12 Aug 2023 05:30)  Source: .Blood Blood-Venous  Preliminary Report (13 Aug 2023 15:01):    No growth at 24 hours    Culture - Blood (collected 12 Aug 2023 05:20)  Source: .Blood Blood-Peripheral  Preliminary Report (13 Aug 2023 15:01):    No growth at 24 hours    Culture - Blood (collected 11 Aug 2023 18:10)  Source: .Blood Blood-Peripheral  Preliminary Report (13 Aug 2023 22:02):    No growth at 48 Hours    Culture - Blood (collected 11 Aug 2023 18:10)  Source: .Blood Blood-Peripheral  Gram Stain (12 Aug 2023 17:26):    Growth in aerobic bottle: Gram Positive Cocci in Clusters  Final Report (13 Aug 2023 11:36):    Growth in aerobic bottle: Staphylococcus hominis    Coagulase Negative Staphylococci isolated from a single blood culture set    may represent contamination.    Contact the Microbiology Department at [phone] if susceptibility    testing is clinically indicated.    Direct identification is available within approximately 3-5    hours either by Blood Panel Multiplexed PCR or Direct    MALDI-TOF. Details: [email or web address]  Organism: Blood Culture PCR (13 Aug 2023 11:36)  Organism: Blood Culture PCR (13 Aug 2023 11:36)      -----    TRENDS  Hemoglobin: 9.1 g/dL (08-12 @ 05:20)  Hemoglobin: 10.4 g/dL (08-11 @ 15:20)    Creatinine Trend: 1.63<--, 1.56<--, 1.46<--  ----  RADIOLOGY & ADDITIONAL TESTS:  Results Reviewed:   Imaging Personally Reviewed:  Electrocardiogram Personally Reviewed:    COORDINATION OF CARE:  Care Discussed with Consultants/Other Providers [Y/N]:  Prior or Outpatient Records Reviewed [Y/N]:

## 2023-08-14 NOTE — DISCHARGE NOTE PROVIDER - PROVIDER TOKENS
PROVIDER:[TOKEN:[[id number]],FOLLOWUP:[1 week],ESTABLISHEDPATIENT:[T]] PROVIDER:[TOKEN:[[id number]],FOLLOWUP:[1 week],ESTABLISHEDPATIENT:[T]],PROVIDER:[TOKEN:[[id number]],FOLLOWUP:[2 weeks],ESTABLISHEDPATIENT:[T]]

## 2023-08-14 NOTE — PROGRESS NOTE ADULT - ASSESSMENT
83 year old man with PMHx of stage IIIA adenocarcinoma of the lung dx 2/2022 s/p JUANPABLO/LLL wedge resection 10/22, known R sided pleural effusion s/p thoracentesis (7/2023) showed exudative without signs of infection, HTN, dyslipidemia, BPH p/w 9 days history of SOB a/w cough productive of white sputum, intermittent fever (Tmax 101 at home), chills, night sweats, several episodes of NBNB vomiting that usually follows coughing, and generalized malaise. Found to have R sided pleural effusion possibly loculated on CXR, sputum and blood culture pending, CT chest completed w/ finding of chronic R loculated pleural effusion.

## 2023-08-14 NOTE — DISCHARGE NOTE PROVIDER - CARE PROVIDER_API CALL
Alejandro Ugalde  Internal Medicine  [address] [place] [address]  Phone: [phone]  Fax: [phone]  Established Patient  Follow Up Time: 1 week   Alejandro Ugalde  Internal Medicine  [address] [place] [address]  Phone: [phone]  Fax: [phone]  Established Patient  Follow Up Time: 1 week    Bahman Moran  Pulmonary Disease  [address] [place] [address]  Phone: [phone]  Fax: [phone]  Established Patient  Follow Up Time: 2 weeks

## 2023-08-14 NOTE — PROGRESS NOTE ADULT - ATTENDING COMMENTS
83yoM prsenting w fevers, body aches. Hx of VATS, JUANPABLO/LLL wedge resection 10/2022 2/2 Stage IIIA adenocarcinoma of the lung diagnosed Feb 2022. Has been on Tecentric, last dosed 8/2. Presented with chills, night sweats, increased productive cough. Tmax of 100.3F, otherwise HDS. CXR concerning for loculated R pleural effusion. CT chest with chronic right loculated effusion with interval decrease in size, extensive nodular and consolidative opacities. CT surgery rec no further intervention due to chronicity of effusion. Sputum culture with normal respiratory ivan, on zosyn (8/11- ) to treat for PNA. 1/4 Bcx 8/11 with staph hominis, which represent a contaminant, repeat Bcx NGTD. Patient symptoms has now resolved and remains afebrile. Discharge home today on oral Aumentin to complete total of 5 days of antibiotics. OP follow up with pulmonary. 83yoM prsenting w fevers, body aches. Hx of VATS, JUANPABLO/LLL wedge resection 10/2022 2/2 Stage IIIA adenocarcinoma of the lung diagnosed Feb 2022. Has been on Tecentric, last dosed 8/2. Presented with chills, night sweats, increased productive cough. Tmax of 100.3F, otherwise HDS. CXR concerning for loculated R pleural effusion. CT chest with chronic right loculated effusion with interval decrease in size, extensive nodular and consolidative opacities. CT surgery rec no further intervention due to chronicity of effusion. Sputum culture with normal respiratory ivan, on zosyn (8/11- ) to treat for PNA. 1/4 Bcx 8/11 with staph hominis, which represent a contaminant, repeat Bcx NGTD. Patient symptoms has now resolved and remains afebrile. Discharge home today on oral Amoxi-clav to complete total of 5 days of antibiotics. OP follow up with pulmonary.

## 2023-08-14 NOTE — DISCHARGE NOTE PROVIDER - NSDCCPTREATMENT_GEN_ALL_CORE_FT
PRINCIPAL PROCEDURE  Procedure: CT chest w contrast  Findings and Treatment: ACC: [id number] EXAM: CT CHEST ORDERED BY: JOSE G GODOY  PROCEDURE DATE: 08/12/2023  INTERPRETATION: HISTORY: Admitting Dxs: R52 R52. Loculated effusion on chest x-ray. 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  EXAMINATION: CT CHEST was performed without IV contrast.  COMPARISON: 5/24/2023 CT chest.  FINDINGS:  AIRWAYS, LUNGS, PLEURA: Central airways appear clear. Small chronic appearing loculated right pleural effusion, decreased in size compared to 5/24/2023.  Extensive nodular and consolidative opacities with some superior and posterior areas of ground-glass opacification involving all lung lobes, but most severe in the lingula, right middle lobe, and lower lobes are again identified. With respect to 5/24/2023, findings appear slightly progressed; for example, in the lingula there is mixed ground-glass and consolidative opacity on image 84, series 2, which has progressed, a posterior left upper lobe 1.1 cm nodule (image 53, series 2) was previously 0.7 cm, and peripheral right lung 2 x 2 cm nodular opacity (image 75, series 2) was previously 1.7 x 1.5 cm.  Left upper lobe and left lower lobe wedge resections.  MEDIASTINUM: Heart size normal. Coronary atherosclerosis. No pericardial effusion. Thoracic aorta normal caliber. No large mediastinal lymph nodes.  IMAGED ABDOMEN: Unchanged hepatic hypodense lesion. Cholecystectomy.  SOFT TISSUES: Unremarkable.  BONES: Unremarkable.  IMPRESSION:.  Small chronic loculated right pleural effusion with interval decrease in size compared to 5/24/2023.  Findings of invasive mucinous adenocarcinoma with some interval progression compared to 5/24/2023 as described above.

## 2023-08-14 NOTE — DISCHARGE NOTE PROVIDER - HOSPITAL COURSE
HPI:  83 year old man with PMHx of lung cancer on Tecentriq q3 weeks, HTN, Dyslipidemia, BPH presents with 9 days history of SOB associated with cough productive of white sputum, intermittent fever (Tmax 101 at home), chills, night sweats, several episodes of NBNB vomiting that usually follows coughing, and generalized malaise. Per patient, he received a dose of Tecentriq ~2 weeks ago and  developed symptoms shortly after. In the ED, CXR showed R sided pleural effusion with features suggest loculation, WBC of 10.49, RVP negative. Of note, patient recently presented for similar presentation 1 month ago, received thoracentesis drained 700cc of exudative fluid (pH 7,5, glucose 70s, protein 4.4/7.2, pleural , cytopath and flow cyte negative, culture negative). He denies recent travel or sick contact, headache, dizziness, diarrhea,  symptoms. (11 Aug 2023 18:39)   HPI:  83 year old man with PMHx of lung cancer on Tecentriq q3 weeks, HTN, Dyslipidemia, BPH presents with 9 days history of SOB associated with cough productive of white sputum, intermittent fever (Tmax 101 at home), chills, night sweats, several episodes of NBNB vomiting that usually follows coughing, and generalized malaise. Per patient, he received a dose of Tecentriq ~2 weeks ago and  developed symptoms shortly after. In the ED, CXR showed R sided pleural effusion with features suggest loculation, WBC of 10.49, RVP negative. Of note, patient recently presented for similar presentation 1 month ago, received thoracentesis drained 700cc of exudative fluid (pH 7,5, glucose 70s, protein 4.4/7.2, pleural , cytopath and flow cyte negative, culture negative). He denies recent travel or sick contact, headache, dizziness, diarrhea,  symptoms. (11 Aug 2023 18:39)    CT chest with chronic right loculated effusion with interval decrease in size, extensive nodular and consolidative opacities. CT surgery rec no further intervention due to chronicity of effusion. Sputum culture with normal respiratory ivan, on zosyn (8/11- ) to treat for PNA. 1/4 Bcx 8/11 with staph hominis, which represent a contaminant, repeat Bcx NGTD. Patient symptoms has now resolved and remains afebrile. Discharge home today on oral Amoxi-clav (2 more days) to complete total of 5 days of antibiotics. OP follow up with pulmonary.

## 2023-08-14 NOTE — DISCHARGE NOTE NURSING/CASE MANAGEMENT/SOCIAL WORK - NSDCPEFALRISK_GEN_ALL_CORE
For information on Fall & Injury Prevention, visit: [email or web address] OR  [email or web address] OR  https://www.cdc.gov/steadi/patient.html I have reviewed and confirmed nurses' notes for patient's medications, allergies, medical history, and surgical history.

## 2023-08-14 NOTE — DISCHARGE NOTE PROVIDER - NSDCMRMEDTOKEN_GEN_ALL_CORE_FT
finasteride 5 mg oral tablet: 1 orally once a day  rosuvastatin 10 mg oral tablet: 1 tab(s) orally once a day  telmisartan 20 mg oral tablet: 1 tab(s) orally once a day   amoxicillin-clavulanate 875 mg-125 mg oral tablet: 1 tab(s) orally 2 times a day Take on 08/14/2023 and 08/15/2023 to complete antibiotic course  benzonatate 100 mg oral capsule: 1 cap(s) orally 3 times a day as needed for  cough  finasteride 5 mg oral tablet: 1 orally once a day  guaiFENesin 100 mg/5 mL oral liquid: 5 milliliter(s) orally every 6 hours as needed for Cough  rosuvastatin 10 mg oral tablet: 1 tab(s) orally once a day  telmisartan 20 mg oral tablet: 1 tab(s) orally once a day

## 2023-08-14 NOTE — DISCHARGE NOTE PROVIDER - NSDCFUSCHEDAPPT_GEN_ALL_CORE_FT
[facility]  Scheduled Appointment: 08/23/2023    Eli Brooks  [facility]  Scheduled Appointment: 08/23/2023    [facility]  Scheduled Appointment: 09/13/2023    Eli Brooks  [facility]  Scheduled Appointment: 09/13/2023    [facility]  Scheduled Appointment: 10/04/2023    Eli Brooks  [facility]  Scheduled Appointment: 10/04/2023

## 2023-08-14 NOTE — DISCHARGE NOTE PROVIDER - NSDCCPCAREPLAN_GEN_ALL_CORE_FT
PRINCIPAL DISCHARGE DIAGNOSIS  Diagnosis: Community acquired pneumonia  Assessment and Plan of Treatment: You were diagnosed with pneumonia for which you were treated with antibiotics. We prescribed you an antibiotic named augmentin for 2 more days. Please take the medication for the next two days to complete the antibiotic course. We also found that you have small amount of fluid around the right lung but this was decreased in size compared to prior so you do not need further intervention at this time. Please continue to follow up with pulmonary medicine.

## 2023-08-14 NOTE — PROGRESS NOTE ADULT - PROBLEM SELECTOR PLAN 1
- Malignancy vs. Trapped lung 2/2 obstruction? vs. parapneumonic  - Hx of VATS, JUANPABLO/LLL wedge resection 10/2022 2/2 Stage IIIA adenocarcinoma of the lung diagnosed Feb 2022  - Pleural effusion etiology unknown, previous thoracentesis (7/2023) showed exudative fluid (pH 7,5, glucose 70s, protein 4.4/7.2, pleural , cytopath and flow cyte negative, culture negative)  - CXR showed R sided pleural effusion with features suggest loculation  - CT chest non con confirmed finding of chronic R loculated pleural effusion  - f/u Thoracic surgery recs -> no current indication for pigtail catheter placement given size of effusion and remains afebrile  - f/u sputum culture  - f/u bcx  - Empiric coverage with zosyn (8/12 - ), s/p ED Vanc + Cefepime (8/11)

## 2023-08-14 NOTE — DISCHARGE NOTE NURSING/CASE MANAGEMENT/SOCIAL WORK - NSDCVIVACCINE_GEN_ALL_CORE_FT
Tdap; 02-Oct-2017 21:58; Nica Joyce (RN); [employer]; [id number]; IntraMuscular; Deltoid Right.; 0.5 milliLiter(s); VIS (VIS Published: 09-May-2013, VIS Presented: 02-Oct-2017);

## 2023-08-14 NOTE — DISCHARGE NOTE NURSING/CASE MANAGEMENT/SOCIAL WORK - PATIENT PORTAL LINK FT
You can access the FollowMyHealth Patient Portal offered by [facility] by registering at the following website: [email or web address]. By joining [employer]’s FollowMyHealth portal, you will also be able to view your health information using other applications (apps) compatible with our system.

## 2023-08-16 PROBLEM — J90 PLEURAL EFFUSION, NOT ELSEWHERE CLASSIFIED: Chronic | Status: ACTIVE | Noted: 2023-01-01

## 2023-08-23 PROBLEM — J90 PLEURAL EFFUSION, RIGHT: Status: ACTIVE | Noted: 2023-01-01

## 2023-08-23 NOTE — HISTORY OF PRESENT ILLNESS
[Disease: _____________________] : Disease: [unfilled] [T: ___] : T[unfilled] [N: ___] : N[unfilled] [M: ___] : M[unfilled] [AJCC Stage: ____] : AJCC Stage: [unfilled] [Treatment Protocol] : Treatment Protocol [de-identified] : Mr. Del Rio is a pleasant 83 M, [place]an (very little English), speaking with PMH of HTN and BPH, never smoker but previously worked in building maintenance presented initially 1/21/22 with chronic dry cough x1 year and worsened dyspnea on exertion for 3 weeks, admitted to [facility] after found on CT to have to have a 5.3 x 2.8 cm masslike consolidation in the RLL. On 1/25, he underwent inpatient bronchoscopy and endobronchial ultrasound with RLL BAL, TBNA of station 7 lymph node, RLL Mass transbronchial biopsy which was suspicious for malignancy.  He saw Dr. Moran and was recommended for PET/CT 2/7/22:  large FDG-avid consolidations in right middle lobe and right lower lobe, unchanged as compared to CT dated 1/21/2022, compatible with known adenocarcinoma. Small FDG-avid nodule in right lung is suspicious for another site of disease. A mildly FDG-avid left lower lobe peripheral opacity, also unchanged on CT, is indeterminate. 2. Mildly FDG-avid subcarinal lymph node is nonspecific. Pt does not wish to use  phone. Grand-daughter provided the translation as per patient request. Other than cough, he has no other symptoms. He has tried OTC cough meds with no help.   2/18/22: Pt seen vis tel. No new complaints. He still has cough which is persistent and dry.   4/7/22: Pt seen via tele. 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   5/3/22: Pt seen in tx room. He received cycle 1 3 weeks ago. Tolerated well with absolutely no AEs. He has since seen Dr. Meidna who will see him again after scans post 3 cycles of tx. Pt here for cycle 2.   5/24/22: Since last visit, pt has had an eventful course. He was recently hospitalized with c/o fever, URI symptoms, myalgia and was found to have non-COVID coronavirus infection. Pt also febrile/tachycardic on admission, which improved with supportive care/abx. CXR suggested RLL consolidation (thought his could be known lung mass). He was started on vanc/zosyn in ED 5/20, followed by ceftriaxone and azithromycin for possible superimposed PNA in the setting of immunosuppression. Pt improved with all this and is now completing oral course of abx (last day tomorrow). Incidentally, pt was also noted to have ROSS (acute kidney injury) with admission SCr 1.88, baseline 1.2-1.3 thought to be related to sepsis/poor PO intake and dehydration. He was treated with IV hydration and home ARB was held (he continues to not take this at this time).  He presents today for planned 3/3 cycle of chemoIO. He notes no fever, chills and cough has markedly improved.   6/16/22: Pt was diagnosed with corona virus infection, after ER visit for fever and cough. Cough has decreased and pt is fine now.   6/30/22: Cough improved. Otherwise feeling well  7/19/22: saw Dr. Moran. A bx of left sided lesion was recommended. pt wanted to discuss with me. He feels well. denies any dyspnea or cough or other symptoms.   9/16/22: Seen today with son, Rich. Patient has no complaints. Is doing well. Has not received any cancer tx since 6/23.   10/25/22: Pt seen vis televisit. He is feeling well. Has been active and notes no difference in status since last visit.  11/25/22: Per pt's son, everything is stable. Pt remains active. Since last visit, he has had Lt VATS, JUANPABLO and LLL wedge rxn on 10/28/22. Path revealed invasive mucinous AdenoCA.   12/6/22: Pt here for follow up. He has cough but otherwise no symptoms.   1/5/23: Cough is persistent. No other complaints.  2/15/23: Patient seen today for follow up in treatment room. Has been tolerating well with no AEs to report. Ongoing cough not adequately relieved by OTC cough syrup   3/8/23: Pt seen today for follow accompanied by his son. Ongoing cough, no other complaints.   3/29/23: patient seen today accompanied by his son. Reports that since he did his CT scan last week, his cough has been more frequent. He has also ran out of Benzonatate. He also notes that right after CT scan was done he had an episode of emesis, no nausea or emesis since then.   4/19/23: seen today accompanied by his son. Continues on tecentriq and is tolerating well thus far. He continues to have dry cough and does not feel that benzonatate is adequately helping him. His breathing is stable, no pain, appetite is good. He is also thinking about traveling to [facility] some time soon   5/10/23: Patient seen today for follow up accompanied by his grand-daughter. Patient reports that since last visit, he has been feeling more SOB with exertion. His cough is persistent, minimally relieved by benzonatate 200mg TID, and is now productive.   6/21/23: Patient seen today for follow up with his granddaughter. They report that his sough has improved since starting promethazine-codeine. He saw IR yesterday for evaluation for biopsy and thora however biopsy is not possible but will move forward with thoracentesis. He is otherwise doing well   7/12/23: Pt seen in office. He is accompanied by his son He reports that his cough is worse, he also notes some pleuritic CP on left side. cough is more productive than it has been in the past. pt denies any fever, but does have night sweats.   8/23/23: Patient seen today for follow up accompanied by his son Sindi. They report that the patient was hospitalized at [facility] 2 weeks ago due to increased cough and was treated for PNA who improvement of his symptoms. Today he reports his breathing is better, no fever, however cough has only minimally improved. He continues to use benzonatate and promethazine/codeine. Pleuritic chest pain has resolved.  [de-identified] : adeno ca [FreeTextEntry1] : Tecentriq q 3 weeks started on 12/14

## 2023-08-23 NOTE — ASSESSMENT
[FreeTextEntry1] : 82 yo m with at least stage IIIA lung adeno ca, PDL1 0%, NGS showed KRAS G12V mut in addition to a few other un-actionable ones.  Baseline PET/CT personally- disease confined to rt RLL and RML- some activity in subcarinal region. Bx from RLL pos for adeno ca, lavage showed atypical cells, and level 7 LN- was benign. I presented the case at  and the group concurred that optimal staging would involve sampling of rt middle lobe. SUV on rt middle lobe mass is lower than that of RLL. Pt underwent RML through ventura bronch and was pos for adeno ca (80- [id number]). MRI brain on 2/17 showed no mets. Based on AJCC 8th staging, pt was staged as stage IIIA lung cancer. Based on PFTs he was deemed potentially resectable by Dr. Medina. Based on recent Checkmate 816 study, neoadjuvant chemo+nivo was thought likely to yield higher rates of CR and is an FDA-approved option. He started chemoimmunotherapy (Carbo/pem/nivo) for 4 cycles. PET/CT from May 2022 after 4 cycles was very confusing. There was mixed response on rt- right middle lobe mass is smaller and less FDG-avid but LL mass was larger and still quite avid. There is also some process on left side which was thought to be related to recent viral infection.  The PET/CT picture suggesting POD was not in agreement with clinical exam or tumor-informed ctDNA testing through [facility] in which level decreased from 0.91 MTM/ml to undetectable level in June (subsequently was detectable at 0.05 MTM/ml in July). Repeat CT without contrast a month later, on 6/28/22 showed persistent changes, and to my eye, these findings were more c/w organizing PNA likely from IO rather than true POD. Therefore, I recommended a bx. Pt was feeling well and hesitant to undergo any bx. Hence, we decided to watch off tx and repeat CT in 2-3 months. The scans done in September show stable 7 x 4.4 cm mass in the right lower lobe which has not significantly changed when compared to previous exam. Numerous solid nodules are noted within both lungs. Many of them have increased in size. ctDNA levels were also rising slowly. Pt finally underwent surgical bx on 10/28/22 confirming malignancy. Discussed the incurable nature of disease and palliative intent of tx. Since pt responded to chemoIO previously, I favored taxane/IO. However, pt was not interested in chemo given potential AEs but was amenable to IO alone. Hence, he started Tecentriq q 3 weeks on 12/14. Tolerating well with no AEs. CT scan from March 2023 show that a few nodules have increased in size however adequate comparison was limited due to differences in technique and inspiratory effort. ctDNA from March 2023 undetectable. CT scans done in May given worsening cough and SOB showed increased right pleural effusion with subtle nodularity along the posterior pleura as well as slight increase in the size of the right upper lobe nodule. Her underwent thoracentesis with IR and pleural fluid showed no malignant cells.  He was hospitalized in August 2023 for pneumonia and CT scan at that time showed mixed GGO in the lingula progressed, increased size of JUANPABLO nodule, increased right lung nodule.   Plan:  -Given concern for progression on CT scan from hospitalization along with rising ctDNA, will obtain PET-CT -Continue tracking ctDNA with [facility]; has been up and down, but still detectable at relatively high levels.  -Cough: Continue promethazine-codeine q6hrs PRN and continue benzonatate 200mg TID.  -OV with next treatment or sooner if needed

## 2023-08-23 NOTE — PHYSICAL EXAM
[Restricted in physically strenuous activity but ambulatory and able to carry out work of a light or sedentary nature] : Status 1- Restricted in physically strenuous activity but ambulatory and able to carry out work of a light or sedentary nature, e.g., light house work, office work [Normal] : affect appropriate [de-identified] : rhonchi noted over LLL and RLL

## 2023-09-05 NOTE — ASSESSMENT
[FreeTextEntry1] : 84 yo m never smoker with at least stage IIIA lung adeno ca who presents as hospital follow up after admission for fever, diaphoresis, worse cough/dyspnea, improved with abx but CT chest with c/f POD.  #Lung adenoCA CT chest with c/f POD as well as noted increase in ctDNA - f/u repeat PET - d/w onc if indication for repeat procedure - RTC PRN   Update:   D/w Oncology, plan is to repeat PET CT and then to assess if biopsy is warranted based on the findings. Differential includes progression of malignancy vs immunotherapy/immune mediated inflammation.

## 2023-09-05 NOTE — PROCEDURE
[Thoracic Ultrasound] : Thoracic Ultrasound [Lung sliding] : Lung sliding: Yes [A line] : A line: Yes [Pleural Effusion] : Pleural Effusion: No [de-identified] : h/o pleff with stage III lung CA [FreeTextEntry2] : small Rt sided pleural effusion

## 2023-09-05 NOTE — HISTORY OF PRESENT ILLNESS
[TextBox_4] : Interventional Pulmonology Consultation/Visit Note  82 yo m never smoker with at least stage IIIA lung adeno ca who presents as hospital follow up. Hospital follow up, admitted 3 wk ago, fevers, sweats, worse cough and dyspnea, tx'd w abx, notes improvement, still with chronic cough productive of clear sputum. Cough overall is slightly improved after having received cough suppressants. some mild dyspnea on exertion, about the same over the past 6 months. Since his hospital visit, he reports improvement in the symptoms that had initially brought him in after a course of abx (IV x2 days then finished PO after discharge). Otherwise no new concerns.

## 2023-09-05 NOTE — PHYSICAL EXAM
[No Acute Distress] : no acute distress [Normal Oropharynx] : normal oropharynx [Normal Appearance] : normal appearance [Supple] : supple [Normal Rate/Rhythm] : normal rate/rhythm [Normal S1, S2] : normal s1, s2 [No Resp Distress] : no resp distress [No Abnormalities] : no abnormalities [Benign] : benign [Normal Gait] : normal gait [No Clubbing] : no clubbing [No Cyanosis] : no cyanosis [No Edema] : no edema [FROM] : FROM [Normal Color/ Pigmentation] : normal color/ pigmentation [No Focal Deficits] : no focal deficits [Oriented x3] : oriented x3 [Normal Affect] : normal affect [TextBox_68] : decreased BS Rt lower lung field, wet rales on Lt

## 2023-09-05 NOTE — REVIEW OF SYSTEMS
[Cough] : cough [Sputum] : sputum [SOB on Exertion] : sob on exertion [Fever] : no fever [Chills] : no chills [Chest Discomfort] : no chest discomfort [Orthopnea] : no orthopnea [Abdominal Pain] : no abdominal pain [Rash] : no rash [Headache] : no headache [Dizziness] : no dizziness

## 2023-09-25 NOTE — PROGRESS NOTE ADULT - ASSESSMENT
82M Greenlandic-speaking (prefers family translate) pmhx lung adenocarcinoma (dx 2022) on chemotherapy last 5/3, BPH, HTN, HLD presenting cough/nausea/fatigue/aches found to have non-COVID coronavirus. Pt also febrile/tachycardic on admission with pro-charli 1, no WBC. Opioid Counseling: I discussed with the patient the potential side effects of opioids including but not limited to addiction, altered mental status, and depression. I stressed avoiding alcohol, benzodiazepines, muscle relaxants and sleep aids unless specifically okayed by a physician. The patient verbalized understanding of the proper use and possible adverse effects of opioids. All of the patient's questions and concerns were addressed. They were instructed to flush the remaining pills down the toilet if they did not need them for pain.

## 2023-11-24 NOTE — H&P ADULT - PROBLEM SELECTOR PLAN 1
New  RR 20 satting 95% on3L NC (baseline 2L NC PRN)  CTPA: Interval slight increase of nodules and patchy/consolidative opacities  within mid to lower lungs. Small loculated right pleural effusion and  underlying right pleural thickening increased from prior exam.  Rocephin/ azithromycin started   Wean O2 as tolerated  MRSA, legionella ordered  Monitor

## 2023-11-24 NOTE — ED ADULT NURSE NOTE - NSFALLHARMRISKINTERV_ED_ALL_ED
Assistance OOB with selected safe patient handling equipment if applicable/Assistance with ambulation/Communicate risk of Fall with Harm to all staff, patient, and family/Monitor gait and stability/Provide visual cue: red socks, yellow wristband, yellow gown, etc/Reinforce activity limits and safety measures with patient and family/Bed in lowest position, wheels locked, appropriate side rails in place/Call bell, personal items and telephone in reach/Instruct patient to call for assistance before getting out of bed/chair/stretcher/Non-slip footwear applied when patient is off stretcher/[place] to call system/Physically safe environment - no spills, clutter or unnecessary equipment/Purposeful Proactive Rounding/Room/bathroom lighting operational, light cord in reach

## 2023-11-24 NOTE — ED PROVIDER NOTE - NS ED ATTENDING STATEMENT MOD
Pt c/o 1-month hx of shortness of breath; denies fever but states she had a low-grade one upon her arrival in triage here. No cough. VSS as noted. Sinus rhythm noted on monitor. Labs and CT pending. Given PO water.      [address], RN  04/10/20 0693 Attending with

## 2023-11-24 NOTE — H&P ADULT - HISTORY OF PRESENT ILLNESS
83 yr old male with a pmg of HTN, HLD, BPH,  lung cancer on chemotherapy (last treatment 9 days ago) on PRN 2L NC, who presents with a 2 day history of fevers (100.4), generalized weakness, productive cough of white/grey sputum, and increased O2 requirement.  Denies  headache, dizziness, chest pain, palpitations, abdominal pain, joint pain, diarrhea/constipation, urinary symptoms.   Vitals: T 99.4, , /56, RR 20 satting 95% 3L NC

## 2023-11-24 NOTE — H&P ADULT - PROBLEM SELECTOR PLAN 5
Chronic stable  Last chemo ~9 days ago  follows with Dr. Domingo  Oncology emailed to inform of admission Active treatment   follows with Dr. Domingo  Oncology emailed to inform of admission

## 2023-11-24 NOTE — H&P ADULT - NSHPPHYSICALEXAM_GEN_ALL_CORE
PHYSICAL EXAM:  GENERAL: NAD, comfortable at bedside   HEAD:  Atraumatic, Normocephalic  EYES: EOMI, PERRL, conjunctiva and sclera clear  NECK: Supple, No JVD  CHEST/LUNG: Clear to auscultation bilaterally; No wheezes, rales or rhonchi speaking full sentences   HEART: Regular rate and rhythm; No murmurs, rubs, or gallops, (+)S1, S2  ABDOMEN: Soft, Nontender, Nondistended; Normal Bowel sounds   EXTREMITIES:  2+ Peripheral Pulses, No clubbing, cyanosis, or edema  PSYCH: normal mood and affect  NEUROLOGY: AAOx3, moving all extremities spontaneously   SKIN: No rashes or lesions

## 2023-11-24 NOTE — H&P ADULT - NSHPREVIEWOFSYSTEMS_GEN_ALL_CORE
REVIEW OF SYSTEMS:    CONSTITUTIONAL: No weakness, fevers or chills  EYES/ENT: No visual changes;  No dysphagia; No sore throat; No rhinorrhea; No sinus pain/pressure  NECK: No pain or stiffness  RESPIRATORY: + cough, no wheezing, hemoptysis; + shortness of breath  CARDIOVASCULAR: No chest pain or palpitations; No lower extremity edema  GASTROINTESTINAL: No abdominal or epigastric pain. No nausea, vomiting, or hematemesis; No diarrhea or constipation. No melena or hematochezia.  GENITOURINARY: No dysuria, frequency or hematuria  NEUROLOGICAL: No numbness or weakness  MSK: ambulates with assistance   SKIN: No itching, burning, rashes, or lesions   All other review of systems is negative unless indicated above.

## 2023-11-24 NOTE — ED PROVIDER NOTE - PHYSICAL EXAMINATION
Gen: Patient is well-appearing, NAD, AAOx3, able to follow commands   HEENT: NCAT, EOMI, PERRLA, normal conjunctiva, tongue midline, oral mucosa moist  Lung: mildly tachypneic, mild rales appreciated RUL, speaking in full sentences, on 2L NC   CV: RRR, no murmurs, rubs or gallops, distal pulses 2+ b/l  Abd: soft, NT, ND, no guarding, no rigidity, no rebound tenderness, no CVA tenderness   MSK: no visible deformities, ROM normal in UE/LE, no back TTP  Neuro: No focal sensory or motor deficits  Skin: Warm, well perfused, no rash, no leg swelling  Psych: normal affect, calm

## 2023-11-24 NOTE — ED PROVIDER NOTE - CLINICAL SUMMARY MEDICAL DECISION MAKING FREE TEXT BOX
83-year-old gentleman, medical history of lung cancer on chemotherapy (last treatment 9 days ago), hypertension, hyperlipidemia, BPH, presenting with days onset of fever, generalized weakness, productive cough.  Reports Tmax was 100.4 at home.  Patient uses 2 L nasal cannula at home as needed.  Reports he has been using oxygen all night the last night.  Denies chest pain, leg swelling. Afebrile on arrival, heart rate 106, /52, satting 94% on 2 L nasal cannula.  PE: Patient is not in acute distress, mildly tachypneic, mild Rales appreciated in right upper lobe, B-lines appreciated on POCUS on right upper lobe, abdomen soft, nontender, no leg swelling noted differentials include but not limited to pneumonia versus PE versus viral URI vs malignant effusion.  Will get labs, chest x-ray, CT angio chest, sepsis work-up, reassess.

## 2023-11-24 NOTE — PATIENT PROFILE ADULT - FALL HARM RISK - HARM RISK INTERVENTIONS

## 2023-11-24 NOTE — ED ADULT TRIAGE NOTE - CHIEF COMPLAINT QUOTE
had chemo last week Lung Ca   has been feeling hot and has had some night sweats denies any stiff neck or photophobia

## 2023-11-24 NOTE — H&P ADULT - NSHPLABSRESULTS_GEN_ALL_CORE
8.9    11.46 )-----------( 384      ( 2023 11:11 )             27.6     133<L>  |  100  |  36<H>  ----------------------------<  124<H>       4.5   |  23  |  1.51<H>    Ca    9.0      2023 11:11    TPro  7.2  /  Alb  3.0<L>  /  TBili  0.6  /  DBili  x   /  AST  19  /  ALT  24  /  AlkPhos  225<H>      PT/INR: 15.0/1.34 (23 @ 11:11)  PTT: 32.2 (23 @ 11:11)      11:11 - VBG - pH: 7.38  | pCO2: 39    | pO2: 64    | Lactate: 0.9      Urinalysis Basic - ( 2023 14:15 )  Color: Dark Yellow / Appearance: Cloudy / S.021 / pH: 5.5  Gluc: Negative mg/dL / Ketone: Trace mg/dL  / Bili: Small / Urobili: 1.0 mg/dL   Blood: Negative / Protein: 30 mg/dL / Nitrite: Negative   Leuk Esterase: Trace / RBC: 2 /HPF / WBC 0 /HPF   Sq Epi: x / Non Sq Epi: x / Bacteria: Negative /HPF    EKG interpreted by myself: nonischemic   CTPA interpreted by radiology: No main or lobar pulmonary embolus. The segmental and subsegmental   branches of the pulmonary arteries are poorly evaluated secondary to   motion and poor opacification.    Interval slight increase of nodules and patchy/consolidative opacities  within mid to lower lungs. Small loculated right pleural effusion and  underlying right pleural thickening increased from prior exam.

## 2023-11-24 NOTE — ED PROVIDER NOTE - ATTENDING CONTRIBUTION TO CARE
83-year-old gentleman, medical history of lung cancer on chemotherapy (last treatment 9 days ago), hypertension, hyperlipidemia, BPH, presenting with days onset of fever, generalized weakness, productive cough.  Reports Tmax was 100.4 at home.  Patient uses 2 L nasal cannula at home as needed.  Reports he has been using oxygen all night the last night.  Denies chest pain, leg swelling.

## 2023-11-24 NOTE — H&P ADULT - PROBLEM SELECTOR PLAN 2
New  CTPA: Interval slight increase of nodules and patchy/consolidative opacities  within mid to lower lungs. Small loculated right pleural effusion and  underlying right pleural thickening increased from prior exam.  Rocephin/ azithromycin started   Wean O2 as tolerated  MRSA, legionella ordered  Monitor

## 2023-11-24 NOTE — H&P ADULT - ASSESSMENT
83 yr old male presenting with acute hypoxic respiratory failure with increased opacities on imaging

## 2023-11-24 NOTE — ED ADULT NURSE NOTE - OBJECTIVE STATEMENT
pt A&ox4 , coming to ED from cancer center for SOB and fevers that started last night. pt being treated for Lung CA with chemo, last chemo treatment was last week. as per son at bedside pt uses home O2 as needed. past medical history: CHF. pt denies Chest pain and SOB. Sinus tachycardic on telemetry. breathing is spontaneous and unlabored. sating 88% on room air, placed on 3L nasal cannula and now sating 95%.. right wrist 20g IV placed Labs drawn and sent as per ordered. Bed in lowest position, call bell within reach, all other safety and comfort measures provided. awaiting labs results and further orders.

## 2023-11-25 NOTE — PROGRESS NOTE ADULT - PROBLEM SELECTOR PLAN 1
New  RR 20 satting 95% on3L NC (baseline 2L NC PRN)  CTPA: Interval slight increase of nodules and patchy/consolidative opacities  within mid to lower lungs. Small loculated right pleural effusion and  underlying right pleural thickening increased from prior exam.  Rocephin/ azithromycin started   Wean O2 as tolerated  MRSA, legionella ordered  Monitor on admission RR 20 satting 95% on3L NC (baseline 2L NC PRN)  CTPA: Interval slight increase of nodules and patchy/consolidative opacities  within mid to lower lungs. Small loculated right pleural effusion and  underlying right pleural thickening increased from prior exam.  treated for pneumonia - c/w Rocephin/ azithromycin  Wean O2 as tolerated  MRSA and legionella pending  hycodan PRN cough

## 2023-11-25 NOTE — CONSULT NOTE ADULT - ATTENDING COMMENTS
This is an 83 year old man with a history of metastatic lung cancer currently on treatment with taxol/atezolizumab.  Patient on home oxygen.  Admitted for pneumonia.  Patient started on ceftiraxone/azithromycin with some dyspnea. Will need to hold further chemotherapy for infectious complication.  Patient had just received the dose of taxol which may cause complications with neutropenia.  Recommend checking daily CBC.

## 2023-11-25 NOTE — CONSULT NOTE ADULT - ASSESSMENT
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     after found on CT to have to have a 5.3 x 2.8 cm masslike consolidation in the RLL. On 1/25, he underwent inpatient bronchoscopy and endobronchial ultrasound with RLL BAL, TBNA of station 7 lymph node, RLL Mass transbronchial biopsy which was suspicious for malignancy.  He saw Dr. Moran and was recommended for PET/CT  2/7/22:  large FDG-avid consolidations in right middle lobe and right lower lobe, unchanged as compared to CT dated 1/21/2022, compatible with known adenocarcinoma. Small FDG-avid nodule in right lung is suspicious for another site of disease. A mildly FDG-avid left lower lobe peripheral opacity, also unchanged on CT, is indeterminate.  2. Mildly FDG-avid subcarinal lymph node is nonspecific.  Pt does not wish to use  phone. Grand-daughter provided the translation as per patient request. Other than cough, he has no other symptoms. He has tried OTC cough meds with no help.  ?  2/18/22: Pt seen vis tel. No new complaints. He still has cough which is persistent and dry.  ?  4/7/22: Pt seen via tele. 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  ?  5/3/22: Pt seen in tx room. He received cycle 1 3 weeks ago. Tolerated well with absolutely no AEs. He has since seen Dr. Medina who will see him again after scans post 3 cycles of tx. Pt here for cycle 2.  ?  5/24/22: Since last visit, pt has had an eventful course. He was recently hospitalized with c/o fever, URI symptoms, myalgia and was found to have non-COVID coronavirus infection. Pt also febrile/tachycardic on admission, which improved with supportive care/abx. CXR suggested RLL consolidation (thought his could be known lung mass). He was started on vanc/zosyn in ED 5/20, followed by ceftriaxone and azithromycin for possible superimposed PNA in the setting of immunosuppression. Pt improved with all this and is now completing oral course of abx (last day tomorrow). Incidentally, pt was also noted to have ROSS (acute kidney injury) with admission SCr 1.88, baseline 1.2-1.3 thought to be related to sepsis/poor PO intake and dehydration. He was treated with IV hydration and home ARB was held (he continues to not take this at this time).  He presents today for planned 3/3 cycle of chemoIO. He notes no fever, chills and cough has markedly improved.  ?  6/16/22: Pt was diagnosed with corona virus infection, after ER visit for fever and cough. Cough has decreased and pt is fine now.  ?  6/30/22: Cough improved. Otherwise feeling well  ?  7/19/22: saw Dr. Moran. A bx of left sided lesion was recommended. pt wanted to discuss with me. He feels well. denies any dyspnea or cough or other symptoms.  ?  9/16/22: Seen today with son, Rich. Patient has no complaints. Is doing well. Has not received any cancer tx since 6/23.  ?  10/25/22: Pt seen vis televisit. He is feeling well. Has been active and notes no difference in status since last visit.  ?  11/25/22: Per pt's son, everything is stable. Pt remains active. Since last visit, he has had Lt VATS, JUANPABLO and LLL wedge rxn on 10/28/22. Path revealed invasive mucinous AdenoCA.  ?  12/6/22: Pt here for follow up. He has cough but otherwise no symptoms.  ?  1/5/23: Cough is persistent. No other complaints.  ?  2/15/23: Patient seen today for follow up in treatment room. Has been tolerating well with no AEs to report. Ongoing cough not adequately relieved by OTC cough syrup  ?  3/8/23: Pt seen today for follow accompanied by his son. Ongoing cough, no other complaints.  ?  3/29/23: patient seen today accompanied by his son. Reports that since he did his CT scan last week, his cough has been more frequent. He has also ran out of Benzonatate. He also notes that right after CT scan was done he had an episode of emesis, no nausea or emesis since then.  ?  4/19/23: seen today accompanied by his son. Continues on tecentriq and is tolerating well thus far. He continues to have dry cough and does not feel that benzonatate is adequately helping him. His breathing is stable, no pain, appetite is good. He is also thinking about traveling to [facility] some time soon  ?  5/10/23: Patient seen today for follow up accompanied by his grand-daughter. Patient reports that since last visit, he has been feeling more SOB with exertion. His cough is persistent, minimally relieved by benzonatate 200mg TID, and is now productive.  ?  6/21/23: Patient seen today for follow up with his granddaughter. They report that his sough has improved since starting promethazine-codeine. He saw IR yesterday for evaluation for biopsy and thora however biopsy is not possible but will move forward with thoracentesis. He is otherwise doing well  ?  7/12/23: Pt seen in office. He is accompanied by his son He reports that his cough is worse, he also notes some pleuritic CP on left side. cough is more productive than it has been in the past. pt denies any fever, but does have night sweats.  ?  8/23/23: Patient seen today for follow up accompanied by his son Sindi. They report that the patient was hospitalized at [facility] 2 weeks ago due to increased cough and was treated for PNA who improvement of his symptoms. Today he reports his breathing is better, no fever, however cough has only minimally improved. He continues to use benzonatate and promethazine/codeine. Pleuritic chest pain has resolved.  ?  9/13/23: Patient seen today for follow up accompanied by his son Lea. They report that the patients breathing is stable, cough is somewhat improved.  ?  9/20/23: Cough and dyspnea persist. He cannot sleep on his side.  ?  10/4/23: Patient seen today accompanied by his son and his other son Sindi attending the visit via telephone. He is here today for tecentriq with the addition of Taxol. He took dex 20mg last night. He reports persistent dyspnea as well as worsened cough which he attributes to running out of his cough medication. He otherwise feels well.  ?  10/23/23: After last tx, pt nausea, confusion, fatigue for several days. All these have improved, but not completely resolved. Pt has been coughing more.  ?  11/15/23: Pt seen in treatment room in the company of his son. Reports continued cough and SOB. Using supplemental O2 at home. O2 sat today 90% RA at rest. He has been using cough medicine but reports only minimal relief. Taxol was held last treatment 2/2 PS but will reintroduced today split dose D1D8. No hemoptysis. No fevers. CT chest was done which revealed consolidation and nodules in all lobes, representing known lung cancer, mildly decreased in the right upper lobe and elsewhere unchanged.       82 yo m with at least stage IIIA lung adeno ca, PDL1 0%, NGS showed KRAS G12V mut in addition to a few other un-actionable ones.  Baseline PET/CT personally- disease confined to rt RLL and RML- some activity in subcarinal region. Bx from RLL pos for adeno ca, lavage showed atypical cells, and level 7 LN- was benign. I presented the case at  and the group concurred that optimal staging would involve sampling of rt middle lobe. SUV on rt middle lobe mass is lower than that of RLL. Pt underwent RML through ventura bronch and was pos for adeno ca ([id number]). MRI brain on 2/17 showed no mets. Based on AJCC 8th staging, pt was staged as stage IIIA lung cancer. Based on PFTs he was deemed potentially resectable by Dr. Medina. Based on recent Checkmate 816 study, neoadjuvant chemo+nivo was thought likely to yield higher rates of CR and is an FDA-approved option. He started chemoimmunotherapy (Carbo/pem/nivo) for 4 cycles. PET/CT from May 2022 after 4 cycles was very confusing. There was mixed response on rt- right middle lobe mass is smaller and less FDG-avid but LL mass was larger and still quite avid. There is also some process on left side which was thought to be related to recent viral infection.  The PET/CT picture suggesting POD was not in agreement with clinical exam or tumor-informed ctDNA testing through [facility] in which level decreased from 0.91 MTM/ml to undetectable level in June (subsequently was detectable at 0.05 MTM/ml in July). Repeat CT without contrast a month later, on 6/28/22 showed persistent changes, and to my eye, these findings were more c/w organizing PNA likely from IO rather than true POD. Therefore, I recommended a bx. Pt was feeling well and hesitant to undergo any bx. Hence, we decided to watch off tx and repeat CT in 2-3 months. The scans done in September show stable 7 x 4.4 cm mass in the right lower lobe which has not significantly changed when compared to previous exam. Numerous solid nodules are noted within both lungs. Many of them have increased in size. ctDNA levels were also rising slowly. Pt finally underwent surgical bx on 10/28/22 confirming malignancy. Discussed the incurable nature of disease and palliative intent of tx. Since pt responded to chemoIO previously, I favored taxane/IO. However, pt was not interested in chemo given potential AEs but was amenable to IO alone. Hence, he started Tecentriq q 3 weeks on 12/14. Tolerating well with no AEs. CT scan from March 2023 show that a few nodules have increased in size however adequate comparison was limited due to differences in technique and inspiratory effort. ctDNA from March 2023 undetectable. CT scans done in May given worsening cough and SOB showed increased right pleural effusion with subtle nodularity along the posterior pleura as well as slight increase in the size of the right upper lobe nodule. Her underwent thoracentesis with IR and pleural fluid showed no malignant cells.  He was hospitalized in August 2023 for pneumonia and CT scan at that time showed mixed GGO in the lingula progressed, increased size of JUANPABLO nodule, increased right lung nodule.  PET-CT shows POD. Explored clinical trial options- there are no trials that pt will be eligible for at the moment.  Therefore, we discussed adding Taxol 175 mg/m2 Q 3 weeks to Tecentriq. Pt signed informed consent.  Pt started C1 taxol and continued Tecentriq. Taxol was held C2 2/2 decreased PS. WIll re-introduce taxol C3 but lower and split dose over D1D8. Pt understands change in regimen.  AEs reviewed and should be more manageable with split dose taxol. - severe fatigue, nausea, vomiting, constipation, and occasional confusion  He also has O2 sats down to 90% but admits to not using O2 regularly. Recommend consistent usage of O2.  Given consolidative nature of his disease recommend incentive spirometry and chest PT. Pt has incentive spirometer at home. WOuld consider Pulmonary rehab.  Continue tracking ctDNA with [facility]; has been up and down, but still detectable at relatively high levels.  Cough: Continue promethazine-codeine q6hrs PRN and continue benzonatate 200mg TID.  Mild anemia sec to chemo  Discussed at depth the need for timely communication of any AEs  OV in 3 weeks with next treatment. 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      Lung adenocarcinoma  PNA    Brief onc hx:  1/21/22: admitted initially  with chronic dry cough x1 year and worsened dyspnea on exertion. CT chest showed 5.3 x 2.8 cm masslike consolidation in the RLL.   1/25/22, s/p EBUS c/w mucinous adenocarcinoma  2/7/22: PET/CT-large FDG-avid consolidations in right middle lobe and right lower lobe. Small FDG-avid nodule in right lung is suspicious for another site of disease. A mildly FDG-avid left lower lobe peripheral opacity?  4/7/2022: RML bx c/w mucinous adenocarcinoma, stage IIIA  4/2022: started C1 Carbo/pem/nivo for 4 cycles.  5/2022: PET-CT after C4, unclear POD vs organizing PNA. So re bx was recomended but pt declined and hence was observed off tx for next 2-3 mo  9/2022: evidence of POD on CT and uptrending ctDNA  10/2022: rebx showed metastatic disease, hence tx goal changed to palliative intent  12/2022: started on q3w Tecentriq   5/2023: worsening cough/SOB, found to have worsening disease w/ pleural effusions, s/p thoracentesis but pleural fluid w/o malignant cells  8/2023: admitted for PNA and worsening disease. PET-CT also showed POD. So added Taxol 175 mg/m2 Q 3 weeks to Tecentriq.  11/15/23: Taxol 175 mg/m2 and Tecentriq.  ?  ?   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      Lung adenocarcinoma  PNA  Started on CXT/azithro w/ clinical improvement. Also fever resolved. Leukocytosis downtrended from 16.    Brief onc hx:  1/21/22: admitted initially  with chronic dry cough x1 year and worsened dyspnea on exertion. CT chest showed 5.3 x 2.8 cm masslike consolidation in the RLL.   1/25/22, s/p EBUS c/w mucinous adenocarcinoma  2/7/22: PET/CT-large FDG-avid consolidations in right middle lobe and right lower lobe. Small FDG-avid nodule in right lung is suspicious for another site of disease. A mildly FDG-avid left lower lobe peripheral opacity?  4/7/2022: RML bx c/w mucinous adenocarcinoma, stage IIIA  4/2022: started C1 Carbo/pem/nivo for 4 cycles.  5/2022: PET-CT after C4, unclear POD vs organizing PNA. So re bx was recomended but pt declined and hence was observed off tx for next 2-3 mo  9/2022: evidence of POD on CT and uptrending ctDNA  10/2022: rebx showed metastatic disease, hence tx goal changed to palliative intent  12/2022: started on q3w Tecentriq   5/2023: worsening cough/SOB, found to have worsening disease w/ pleural effusions, s/p thoracentesis but pleural fluid w/o malignant cells  8/2023: admitted for PNA and worsening disease. PET-CT also showed POD. So added Taxol 175 mg/m2 Q 3 weeks to Tecentriq.  11/15/23: Taxol 175 mg/m2 and Tecentriq.    Plan:  -c/w supplemental O2 as needed  -c/w incentive spirometry  -management of PNA per primary team  -will notify primary oncologist Dr. Domingo  -no plan for inpatient chemotherapy  -will set up [facility] appointment once ready for discharge    Discussed w/ Dr. Jose Raul Jacinto MD, PhD  Heme/Onc Fellow  PGY4    ?  ?   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      Lung adenocarcinoma  PNA  Started on CXT/azithro w/ clinical improvement.Improved cough since admission, but continues to have ROWLEY. Needing PRN 2L NC. Also fever resolved. Leukocytosis downtrended from 16.    Brief onc hx:  1/21/22: admitted initially  with chronic dry cough x1 year and worsened dyspnea on exertion. CT chest showed 5.3 x 2.8 cm masslike consolidation in the RLL.   1/25/22, s/p EBUS c/w mucinous adenocarcinoma  2/7/22: PET/CT-large FDG-avid consolidations in right middle lobe and right lower lobe. Small FDG-avid nodule in right lung is suspicious for another site of disease. A mildly FDG-avid left lower lobe peripheral opacity?  4/7/2022: RML bx c/w mucinous adenocarcinoma, stage IIIA  4/2022: started C1 Carbo/pem/nivo for 4 cycles.  5/2022: PET-CT after C4, unclear POD vs organizing PNA. So re bx was recomended but pt declined and hence was observed off tx for next 2-3 mo  9/2022: evidence of POD on CT and uptrending ctDNA  10/2022: rebx showed metastatic disease, hence tx goal changed to palliative intent  12/2022: started on q3w Tecentriq   5/2023: worsening cough/SOB, found to have worsening disease w/ pleural effusions, s/p thoracentesis but pleural fluid w/o malignant cells  8/2023: admitted for PNA and worsening disease. PET-CT also showed POD. So added Taxol 175 mg/m2 Q 3 weeks to Tecentriq.  11/15/23: Taxol 175 mg/m2 and Tecentriq.    Plan:  -c/w supplemental O2 as needed  -start incentive spirometry  -management of PNA per primary team  -will notify primary oncologist Dr. Domingo  -no plan for inpatient chemotherapy  -will set up [facility] appointment once ready for discharge    Discussed w/ Dr. Jose Raul Jacinto MD, PhD  Heme/Onc Fellow  PGY4    ?  ?   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      Lung adenocarcinoma  PNA  Started on CXT/azithro w/ clinical improvement.Improved cough since admission, but continues to have ROWLEY. Needing PRN 2L NC. Also fever resolved. Leukocytosis downtrended from 16.  CT PA: No PE. Interval slight increase of nodules and patchy/consolidative opacities within mid to lower lungs. Small loculated right pleural effusion and underlying right pleural thickening increased from prior exam.    Brief onc hx:  1/21/22: admitted initially  with chronic dry cough x1 year and worsened dyspnea on exertion. CT chest showed 5.3 x 2.8 cm masslike consolidation in the RLL.   1/25/22, s/p EBUS c/w mucinous adenocarcinoma  2/7/22: PET/CT-large FDG-avid consolidations in right middle lobe and right lower lobe. Small FDG-avid nodule in right lung is suspicious for another site of disease. A mildly FDG-avid left lower lobe peripheral opacity?  4/7/2022: RML bx c/w mucinous adenocarcinoma, stage IIIA  4/2022: started C1 Carbo/pem/nivo for 4 cycles.  5/2022: PET-CT after C4, unclear POD vs organizing PNA. So re bx was recomended but pt declined and hence was observed off tx for next 2-3 mo  9/2022: evidence of POD on CT and uptrending ctDNA  10/2022: rebx showed metastatic disease, hence tx goal changed to palliative intent  12/2022: started on q3w Tecentriq   5/2023: worsening cough/SOB, found to have worsening disease w/ pleural effusions, s/p thoracentesis but pleural fluid w/o malignant cells  8/2023: admitted for PNA and worsening disease. PET-CT also showed POD. So added Taxol 175 mg/m2 Q 3 weeks to Tecentriq.  11/15/23: Taxol 175 mg/m2 and Tecentriq.    Plan:  -c/w supplemental O2 as needed  -start incentive spirometry  -management of PNA per primary team  -will notify primary oncologist Dr. Seetharamu  -no plan for inpatient chemotherapy  -will set up [facility] appointment once ready for discharge    Discussed w/ Dr. Jose Raul Jacinto MD, PhD  Heme/Onc Fellow  PGY4    ?  ?   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     Lung adenocarcinoma  PNA  Started on CXT/azithro w/ clinical improvement. Improved cough since admission, but continues to have ROWLEY. Needing PRN 2L NC. Also fever resolved. Leukocytosis downtrended from 16.  CT PA: No PE. Interval slight increase of nodules and patchy/consolidative opacities within mid to lower lungs. Small loculated right pleural effusion and underlying right pleural thickening increased from prior exam.    Brief onc hx:  1/21/22: admitted initially  with chronic dry cough x1 year and worsened dyspnea on exertion. CT chest showed 5.3 x 2.8 cm masslike consolidation in the RLL.   1/25/22, s/p EBUS c/w mucinous adenocarcinoma  2/7/22: PET/CT-large FDG-avid consolidations in right middle lobe and right lower lobe. Small FDG-avid nodule in right lung is suspicious for another site of disease. A mildly FDG-avid left lower lobe peripheral opacity?  4/7/2022: RML bx c/w mucinous adenocarcinoma, stage IIIA  4/2022: started C1 Carbo/pem/nivo for 4 cycles.  5/2022: PET-CT after C4, unclear POD vs organizing PNA. So re bx was recomended but pt declined and hence was observed off tx for next 2-3 mo  9/2022: evidence of POD on CT and uptrending ctDNA  10/2022: rebx showed metastatic disease, hence tx goal changed to palliative intent  12/2022: started on q3w Tecentriq   5/2023: worsening cough/SOB, found to have worsening disease w/ pleural effusions, s/p thoracentesis but pleural fluid w/o malignant cells  8/2023: admitted for PNA and worsening disease. PET-CT also showed POD. So added Taxol 175 mg/m2 Q 3 weeks to Tecentriq.  11/15/23: Taxol 175 mg/m2 and Tecentriq.    Plan:  -c/w supplemental O2 as needed  -start incentive spirometry  -management of PNA per primary team  -no plan for inpatient chemotherapy  -will notify primary oncologist Dr. Domingo for [facility] appointment once ready for discharge    Discussed w/ Dr. Jose Raul Jacinto MD, PhD  Heme/Onc Fellow  PGY4    ?  ?

## 2023-11-25 NOTE — PROGRESS NOTE ADULT - TIME BILLING
I have spent a total of 96minutes to prepare to see the patient, obtaining and reviewing history, physical examination, explaining the diagnosis, prognosis and treatment plan with the patient/family/caregiver. I also have spent the time ordering studies and testing, interpreting results, medicine reconciliation, subspecialty consultation and documentation as above. Preparing to see the patient including review of tests and other providers' notes, confirming history with patient/family member, performing medical examination and evaluation, counseling and educating the patient/family/caregiver, ordering medications, tests and procedures, communicating with other health care professionals, documenting clinical information in the EMR, independently interpreting results and communicating results to the patient/family/caregiver, care coordination

## 2023-11-25 NOTE — PROGRESS NOTE ADULT - PROBLEM SELECTOR PLAN 5
Active treatment   follows with Dr. Domingo  Oncology emailed to inform of admission Active treatment   follows with Dr. Domingo  appreciate onc input - just received dose of taxol which may cause complications with neutropenia -->  will monitor daily CBC.

## 2023-11-25 NOTE — PROGRESS NOTE ADULT - PROBLEM SELECTOR PLAN 4
chronic stable   continue rosuvastatin formulary   lipid panel in AM chronic stable   continue rosuvastatin formulary   LDL 71, HDL 19

## 2023-11-25 NOTE — PROGRESS NOTE ADULT - ASSESSMENT
83 yr old male presenting with acute hypoxic respiratory failure with increased opacities on imaging  83M  never smoker, Mosotho speaking, with  metastatic Lung adeno carcinoma (1/2022 initially staged as IIIA, 10/2022: rebx showed metastatic disease, hence tx goal changed to palliative intent) currently on taxol/Tecentriq, last treatment 9 days ago, on home O2 PRN 2L NC, recurrent PNA, HTN and BPH send in directly from [facility] on 11/24/23 when patient arrived for scheduled chemo and was admitted for PNA, acute hypoxic respiratory failure with increased opacities on imaging

## 2023-11-25 NOTE — PROGRESS NOTE ADULT - SUBJECTIVE AND OBJECTIVE BOX
[facility] Division of Hospital Medicine  Zoya Juárez MD  Pager (M-F, 8A-5P):  In-house pager [phone]; Long-range pager [phone]  Other Times:  Please page Hospitalist in Charge -  In-house pager [phone]    Patient is a 83y old  Male who presents with a chief complaint of cough/pna (25 Nov 2023 09:00)      SUBJECTIVE / OVERNIGHT EVENTS:  ADDITIONAL REVIEW OF SYSTEMS:    MEDICATIONS  (STANDING):  atorvastatin 40 milliGRAM(s) Oral at bedtime  azithromycin  IVPB      azithromycin  IVPB 500 milliGRAM(s) IV Intermittent every 24 hours  cefTRIAXone   IVPB 1000 milliGRAM(s) IV Intermittent every 24 hours  chlorhexidine 2% Cloths 1 Application(s) Topical <User Schedule>  finasteride 5 milliGRAM(s) Oral daily  folic acid 1 milliGRAM(s) Oral daily  heparin   Injectable 5000 Unit(s) SubCutaneous every 8 hours  losartan 50 milliGRAM(s) Oral daily  sodium chloride 0.9%. 1000 milliLiter(s) (75 mL/Hr) IV Continuous <Continuous>    MEDICATIONS  (PRN):  acetaminophen     Tablet .. 650 milliGRAM(s) Oral every 6 hours PRN Temp greater or equal to 38C (100.4F), Mild Pain (1 - 3), Moderate Pain (4 - 6)  aluminum hydroxide/magnesium hydroxide/simethicone Suspension 30 milliLiter(s) Oral every 4 hours PRN Dyspepsia  benzonatate 100 milliGRAM(s) Oral three times a day PRN for cough  hydrocodone/homatropine Syrup 5 milliLiter(s) Oral every 6 hours PRN Cough  melatonin 3 milliGRAM(s) Oral at bedtime PRN Insomnia  ondansetron Injectable 4 milliGRAM(s) IV Push every 8 hours PRN Nausea and/or Vomiting      CAPILLARY BLOOD GLUCOSE        I&O's Summary      PHYSICAL EXAM:  Vital Signs Last 24 Hrs  T(C): 36.9 (25 Nov 2023 14:41), Max: 38.1 (24 Nov 2023 21:28)  T(F): 98.4 (25 Nov 2023 14:41), Max: 100.5 (24 Nov 2023 21:28)  HR: 103 (25 Nov 2023 14:41) (84 - 108)  BP: 150/61 (25 Nov 2023 14:41) (119/63 - 151/62)  BP(mean): --  RR: 17 (25 Nov 2023 14:41) (17 - 18)  SpO2: 97% (25 Nov 2023 14:41) (93% - 97%)    Parameters below as of 25 Nov 2023 14:41  Patient On (Oxygen Delivery Method): nasal cannula    CONSTITUTIONAL: NAD, well-developed, well-groomed  RESPIRATORY: Normal respiratory effort; lungs are clear to auscultation bilaterally  CARDIOVASCULAR: Regular rate and rhythm, normal S1 and S2, no murmur/rub/gallop; No lower extremity edema; Peripheral pulses are 2+ bilaterally  ABDOMEN: Nontender to palpation, normoactive bowel sounds, no rebound/guarding; No hepatosplenomegaly  MUSCLOSKELETAL: no clubbing or cyanosis of digits; no joint swelling or tenderness to palpation  PSYCH: A+O to person, place, and time; affect appropriate  NEUROLOGY: CN 2-12 are intact and symmetric; no gross sensory deficits;   SKIN: No rashes; no palpable lesions    LABS:                        8.8    7.52  )-----------( 349      ( 25 Nov 2023 06:00 )             27.6     11-25    138  |  106  |  28<H>  ----------------------------<  93  4.3   |  22  |  1.34<H>    Ca    8.9      25 Nov 2023 06:00    TPro  7.2  /  Alb  3.0<L>  /  TBili  0.6  /  DBili  x   /  AST  19  /  ALT  24  /  AlkPhos  225<H>  11-24    PT/INR - ( 24 Nov 2023 11:11 )   PT: 15.0 sec;   INR: 1.34 ratio    PTT - ( 24 Nov 2023 11:11 )  PTT:32.2 sec    Culture - Urine (collected 24 Nov 2023 14:15)  Source: Clean Catch Clean Catch (Midstream)  Final Report (25 Nov 2023 15:39):    <10,000 CFU/mL Normal Urogenital Breanna    Culture - Blood (collected 24 Nov 2023 11:00)  Source: .Blood Blood-Peripheral  Preliminary Report (25 Nov 2023 14:02):    No growth at 24 hours    Culture - Blood (collected 24 Nov 2023 10:45)  Source: .Blood Blood-Peripheral  Preliminary Report (25 Nov 2023 14:02):    No growth at 24 hours    RADIOLOGY & ADDITIONAL TESTS:  Results Reviewed:   Imaging Personally Reviewed:  Electrocardiogram Personally Reviewed:    COORDINATION OF CARE:  Care Discussed with Consultants/Other Providers [Y/N]: RN, SW, CM, ACP re overall care   Prior or Outpatient Records Reviewed [Y/N]:   [facility] Division of Hospital Medicine  Zoya Juárez MD  Pager (M-F, 8A-5P):  In-house pager [phone]; Long-range pager [phone]  Other Times:  Please page Hospitalist in Charge -  In-house pager [phone]    Patient is a 83y old  Male who presents with a chief complaint of cough/pna (25 Nov 2023 09:00)      SUBJECTIVE / OVERNIGHT EVENTS:  RN reports was coughing so much yesterday that he vomited a little.   Pt seen earlier, requested granddaughter Tricia (RN on 9N) translate, he reached her by phone. Pt reports feels better but still coughing a lot, hurts anterior chest wall when cough. Now no nausea, vomiting, diarrhea, constipation, dysuria. Belly seems bit bloated, he feels is baseline, ?last BM yesterday.   ADDITIONAL REVIEW OF SYSTEMS:    MEDICATIONS  (STANDING):  atorvastatin 40 milliGRAM(s) Oral at bedtime  azithromycin  IVPB      azithromycin  IVPB 500 milliGRAM(s) IV Intermittent every 24 hours  cefTRIAXone   IVPB 1000 milliGRAM(s) IV Intermittent every 24 hours  chlorhexidine 2% Cloths 1 Application(s) Topical <User Schedule>  finasteride 5 milliGRAM(s) Oral daily  folic acid 1 milliGRAM(s) Oral daily  heparin   Injectable 5000 Unit(s) SubCutaneous every 8 hours  losartan 50 milliGRAM(s) Oral daily  sodium chloride 0.9%. 1000 milliLiter(s) (75 mL/Hr) IV Continuous <Continuous>    MEDICATIONS  (PRN):  acetaminophen     Tablet .. 650 milliGRAM(s) Oral every 6 hours PRN Temp greater or equal to 38C (100.4F), Mild Pain (1 - 3), Moderate Pain (4 - 6)  aluminum hydroxide/magnesium hydroxide/simethicone Suspension 30 milliLiter(s) Oral every 4 hours PRN Dyspepsia  benzonatate 100 milliGRAM(s) Oral three times a day PRN for cough  hydrocodone/homatropine Syrup 5 milliLiter(s) Oral every 6 hours PRN Cough  melatonin 3 milliGRAM(s) Oral at bedtime PRN Insomnia  ondansetron Injectable 4 milliGRAM(s) IV Push every 8 hours PRN Nausea and/or Vomiting    PHYSICAL EXAM:  Vital Signs Last 24 Hrs  T(C): 36.9 (25 Nov 2023 14:41), Max: 38.1 (24 Nov 2023 21:28)  T(F): 98.4 (25 Nov 2023 14:41), Max: 100.5 (24 Nov 2023 21:28)  HR: 103 (25 Nov 2023 14:41) (84 - 108)  BP: 150/61 (25 Nov 2023 14:41) (119/63 - 151/62)  BP(mean): --  RR: 17 (25 Nov 2023 14:41) (17 - 18)  SpO2: 97% (25 Nov 2023 14:41) (93% - 97%)    Parameters below as of 25 Nov 2023 14:41  Patient On (Oxygen Delivery Method): nasal cannula    CONSTITUTIONAL: NAD, well-developed, well-groomed  RESPIRATORY: Normal respiratory effort; lungs are clear to auscultation bilaterally  CARDIOVASCULAR: Regular rate and rhythm, normal S1 and S2, no murmur/rub/gallop; No lower extremity edema; Peripheral pulses are 2+ bilaterally  ABDOMEN: Nontender to palpation, normoactive bowel sounds, no rebound/guarding; No hepatosplenomegaly  MUSCLOSKELETAL: no clubbing or cyanosis of digits; no joint swelling or tenderness to palpation  PSYCH: A+O to person, place, and time; affect appropriate  NEUROLOGY: CN 2-12 are intact and symmetric; no gross sensory deficits;   SKIN: No rashes; no palpable lesions    LABS:                        8.8    7.52  )-----------( 349      ( 25 Nov 2023 06:00 )             27.6     11-25    138  |  106  |  28<H>  ----------------------------<  93  4.3   |  22  |  1.34<H>    Ca    8.9      25 Nov 2023 06:00    TPro  7.2  /  Alb  3.0<L>  /  TBili  0.6  /  DBili  x   /  AST  19  /  ALT  24  /  AlkPhos  225<H>  11-24    PT/INR - ( 24 Nov 2023 11:11 )   PT: 15.0 sec;   INR: 1.34 ratio    PTT - ( 24 Nov 2023 11:11 )  PTT:32.2 sec    Culture - Urine (collected 24 Nov 2023 14:15)  Source: Clean Catch Clean Catch (Midstream)  Final Report (25 Nov 2023 15:39):    <10,000 CFU/mL Normal Urogenital Breanna    Culture - Blood (collected 24 Nov 2023 11:00)  Source: .Blood Blood-Peripheral  Preliminary Report (25 Nov 2023 14:02):    No growth at 24 hours    Culture - Blood (collected 24 Nov 2023 10:45)  Source: .Blood Blood-Peripheral  Preliminary Report (25 Nov 2023 14:02):    No growth at 24 hours    RADIOLOGY & ADDITIONAL TESTS:  < from: CT Angio Chest PE Protocol w/ IV Cont (11.24.23 @ 14:13) >  COMPARISON: 10/24/2023 chest CT.    FINDINGS:    PULMONARY VESSELS: No brian lobar pulmonary embolus. The segmental and   subsegmental branches of the pulmonary arteries are poorly evaluated   secondary to motion and poor opacification.    HEART AND VASCULATURE: Heart size is normal. No pericardial effusion. No   aortic aneurysm or dissection.    LUNGS, AIRWAYS, PLEURA: Patent central airways. Interval slight increase   of nodules and consolidative/patchy opacities within mid to lower lungs.   For reference: A 1.1 cm left upper lobe nodule (series2 image 46)   previously 0.8 cm. Small loculated right pleural effusion and underlying   right pleural thickening increased from prior exam.    MEDIASTINUM: Stable mildly enlarged subcarinal and hilar lymph nodes for   example 1.7 cm short axis right hilar lymph node.    UPPER ABDOMEN: Staples small cyst within right lobe of the liver.    BONES AND SOFT TISSUES: No aggressive osseous lesion.    LOWER NECK: Within normal limits.    IMPRESSION:  No main or lobar pulmonary embolus. The segmental and subsegmental   branches of the pulmonary arteries are poorly evaluated secondary to   motion and poor opacification.    Interval slight increase of nodules and patchy/consolidative opacities   within mid to lower lungs. Small loculated right pleural effusion and     Results Reviewed:   Imaging Personally Reviewed:  Electrocardiogram Personally Reviewed:    COORDINATION OF CARE:  Care Discussed with Consultants/Other Providers [Y/N]: RN, SW, CM, ACP re overall care   Prior or Outpatient Records Reviewed [Y/N]:

## 2023-11-25 NOTE — PHYSICAL THERAPY INITIAL EVALUATION ADULT - ADDITIONAL COMMENTS
Pt states he lives with his family in a house with no stairs. Prior to admission, pt was ambulating independently without any assistive devices. Pt has home O2, 2L prn.  Post PT evaluation, pt left semi-supine, alarm on, call bell and remote within reach, all precautions maintained, NAD. RN aware.

## 2023-11-25 NOTE — CONSULT NOTE ADULT - SUBJECTIVE AND OBJECTIVE BOX
HPI:  83 yr old male with a pmg of HTN, HLD, BPH,  lung cancer on chemotherapy (last treatment 9 days ago) on PRN 2L NC, who presents with a 2 day history of fevers (100.4), generalized weakness, productive cough of white/grey sputum, and increased O2 requirement.  Denies  headache, dizziness, chest pain, palpitations, abdominal pain, joint pain, diarrhea/constipation, urinary symptoms.   Vitals: T 99.4, , /56, RR 20 satting 95% 3L NC (24 Nov 2023 22:42)      Allergies    No Known Allergies    Intolerances        MEDICATIONS  (STANDING):  atorvastatin 40 milliGRAM(s) Oral at bedtime  azithromycin  IVPB      azithromycin  IVPB 500 milliGRAM(s) IV Intermittent every 24 hours  cefTRIAXone   IVPB 1000 milliGRAM(s) IV Intermittent every 24 hours  chlorhexidine 2% Cloths 1 Application(s) Topical <User Schedule>  finasteride 5 milliGRAM(s) Oral daily  folic acid 1 milliGRAM(s) Oral daily  heparin   Injectable 5000 Unit(s) SubCutaneous every 8 hours  losartan 50 milliGRAM(s) Oral daily    MEDICATIONS  (PRN):  acetaminophen     Tablet .. 650 milliGRAM(s) Oral every 6 hours PRN Temp greater or equal to 38C (100.4F), Mild Pain (1 - 3), Moderate Pain (4 - 6)  aluminum hydroxide/magnesium hydroxide/simethicone Suspension 30 milliLiter(s) Oral every 4 hours PRN Dyspepsia  benzonatate 100 milliGRAM(s) Oral three times a day PRN for cough  melatonin 3 milliGRAM(s) Oral at bedtime PRN Insomnia  ondansetron Injectable 4 milliGRAM(s) IV Push every 8 hours PRN Nausea and/or Vomiting      PAST MEDICAL & SURGICAL HISTORY:  HTN (hypertension)      HLD (hyperlipidemia)      History of BPH      Lung cancer  right s/p 4 rounds of chemo, last chemo 6/23/22.      Pleural effusion      History of ERCP      S/P bronchoscopy  1/2022, 3/2022      S/P inguinal hernia repair  Right approximately 1961          FAMILY HISTORY:  FH: lung cancer (Sibling)        SOCIAL HISTORY: No alcohol, tobacco, or drug use history    REVIEW OF SYSTEMS:  CONSTITUTIONAL: no fever  EYES/ENT: No visual changes; no throat pain  NECK: No pain or stiffness  RESPIRATORY: no SOB or cough  CARDIOVASCULAR: No chest pain or palpitations  GASTROINTESTINAL: No abdominal pain. No N/V/D/C  GENITOURINARY: No dysuria, change in frequency, or hematuria  NEUROLOGICAL: No numbness or focal weakness  SKIN: No itching, burning, rashes, or lesions  Psych: No depression  MSK: no joint pain  Allergy: no urticaria    Height (cm): 171 (11-24 @ 18:18)  Weight (kg): 89.4 (11-24 @ 18:18)  BMI (kg/m2): 30.6 (11-24 @ 18:18)  BSA (m2): 2.02 (11-24 @ 18:18)    T(F): 98.1 (11-25-23 @ 05:37), Max: 100.5 (11-24-23 @ 21:28)  HR: 88 (11-25-23 @ 05:37)  BP: 151/62 (11-25-23 @ 05:37)  RR: 18 (11-25-23 @ 05:37)  SpO2: 95% (11-25-23 @ 05:37)  Wt(kg): --    GENERAL: NAD  HEENT: EOMI, MMM, no oropharyngeal lesions or erythema appreciated  Pulm: no increased WOB, CTAB/L  CV: RRR, S1, S2, no m/g/r  ABDOMEN: soft, NT, ND, no masses felt, no HSM  MSK: nl ROM  EXTREMITIES: no appreciable edema in b/l LE  Neuro: A&Ox3, no focal deficits  SKIN: warm and dry, no visible rash                          8.8    7.52  )-----------( 349      ( 25 Nov 2023 06:00 )             27.6       11-25    138  |  106  |  28<H>  ----------------------------<  93  4.3   |  22  |  1.34<H>    Ca    8.9      25 Nov 2023 06:00    TPro  7.2  /  Alb  3.0<L>  /  TBili  0.6  /  DBili  x   /  AST  19  /  ALT  24  /  AlkPhos  225<H>  11-24             84 y/o  never smoker M (Micronesian speaking) with PMH of 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    83 yr old male with a pmg of HTN, HLD, BPH,  lung cancer on chemotherapy  , who presents with a 2 day history of fevers (100.4), generalized weakness, productive cough of white/grey sputum, and increased O2 requirement.  Denies  headache, dizziness, chest pain, palpitations, abdominal pain, joint pain, diarrhea/constipation, urinary symptoms.   Vitals: T 99.4, , /56, RR 20 satting 95% 3L NC (24 Nov 2023 22:42)      Allergies    No Known Allergies    Intolerances        MEDICATIONS  (STANDING):  atorvastatin 40 milliGRAM(s) Oral at bedtime  azithromycin  IVPB      azithromycin  IVPB 500 milliGRAM(s) IV Intermittent every 24 hours  cefTRIAXone   IVPB 1000 milliGRAM(s) IV Intermittent every 24 hours  chlorhexidine 2% Cloths 1 Application(s) Topical <User Schedule>  finasteride 5 milliGRAM(s) Oral daily  folic acid 1 milliGRAM(s) Oral daily  heparin   Injectable 5000 Unit(s) SubCutaneous every 8 hours  losartan 50 milliGRAM(s) Oral daily    MEDICATIONS  (PRN):  acetaminophen     Tablet .. 650 milliGRAM(s) Oral every 6 hours PRN Temp greater or equal to 38C (100.4F), Mild Pain (1 - 3), Moderate Pain (4 - 6)  aluminum hydroxide/magnesium hydroxide/simethicone Suspension 30 milliLiter(s) Oral every 4 hours PRN Dyspepsia  benzonatate 100 milliGRAM(s) Oral three times a day PRN for cough  melatonin 3 milliGRAM(s) Oral at bedtime PRN Insomnia  ondansetron Injectable 4 milliGRAM(s) IV Push every 8 hours PRN Nausea and/or Vomiting      PAST MEDICAL & SURGICAL HISTORY:  HTN (hypertension)      HLD (hyperlipidemia)      History of BPH      Lung cancer  right s/p 4 rounds of chemo, last chemo 6/23/22.      Pleural effusion      History of ERCP      S/P bronchoscopy  1/2022, 3/2022      S/P inguinal hernia repair  Right approximately 1961          FAMILY HISTORY:  FH: lung cancer (Sibling)        SOCIAL HISTORY: No alcohol, tobacco, or drug use history    REVIEW OF SYSTEMS:  CONSTITUTIONAL: no fever  EYES/ENT: No visual changes; no throat pain  NECK: No pain or stiffness  RESPIRATORY: no SOB or cough  CARDIOVASCULAR: No chest pain or palpitations  GASTROINTESTINAL: No abdominal pain. No N/V/D/C  GENITOURINARY: No dysuria, change in frequency, or hematuria  NEUROLOGICAL: No numbness or focal weakness  SKIN: No itching, burning, rashes, or lesions  Psych: No depression  MSK: no joint pain  Allergy: no urticaria    Height (cm): 171 (11-24 @ 18:18)  Weight (kg): 89.4 (11-24 @ 18:18)  BMI (kg/m2): 30.6 (11-24 @ 18:18)  BSA (m2): 2.02 (11-24 @ 18:18)    T(F): 98.1 (11-25-23 @ 05:37), Max: 100.5 (11-24-23 @ 21:28)  HR: 88 (11-25-23 @ 05:37)  BP: 151/62 (11-25-23 @ 05:37)  RR: 18 (11-25-23 @ 05:37)  SpO2: 95% (11-25-23 @ 05:37)  Wt(kg): --    GENERAL: NAD  HEENT: EOMI, MMM, no oropharyngeal lesions or erythema appreciated  Pulm: no increased WOB, CTAB/L  CV: RRR, S1, S2, no m/g/r  ABDOMEN: soft, NT, ND, no masses felt, no HSM  MSK: nl ROM  EXTREMITIES: no appreciable edema in b/l LE  Neuro: A&Ox3, no focal deficits  SKIN: warm and dry, no visible rash                          8.8    7.52  )-----------( 349      ( 25 Nov 2023 06:00 )             27.6       11-25    138  |  106  |  28<H>  ----------------------------<  93  4.3   |  22  |  1.34<H>    Ca    8.9      25 Nov 2023 06:00    TPro  7.2  /  Alb  3.0<L>  /  TBili  0.6  /  DBili  x   /  AST  19  /  ALT  24  /  AlkPhos  225<H>  11-24             82 y/o  never smoker M (Mauritanian speaking) with PMH of 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    Improved cough since admission, but continues to have ROWLEY. Needing PRN 2L NC.  Denies  fever, headache, dizziness, chest pain, palpitations, abdominal pain, joint pain, diarrhea/constipation, urinary symptoms.         Allergies    No Known Allergies    Intolerances        MEDICATIONS  (STANDING):  atorvastatin 40 milliGRAM(s) Oral at bedtime  azithromycin  IVPB      azithromycin  IVPB 500 milliGRAM(s) IV Intermittent every 24 hours  cefTRIAXone   IVPB 1000 milliGRAM(s) IV Intermittent every 24 hours  chlorhexidine 2% Cloths 1 Application(s) Topical <User Schedule>  finasteride 5 milliGRAM(s) Oral daily  folic acid 1 milliGRAM(s) Oral daily  heparin   Injectable 5000 Unit(s) SubCutaneous every 8 hours  losartan 50 milliGRAM(s) Oral daily    MEDICATIONS  (PRN):  acetaminophen     Tablet .. 650 milliGRAM(s) Oral every 6 hours PRN Temp greater or equal to 38C (100.4F), Mild Pain (1 - 3), Moderate Pain (4 - 6)  aluminum hydroxide/magnesium hydroxide/simethicone Suspension 30 milliLiter(s) Oral every 4 hours PRN Dyspepsia  benzonatate 100 milliGRAM(s) Oral three times a day PRN for cough  melatonin 3 milliGRAM(s) Oral at bedtime PRN Insomnia  ondansetron Injectable 4 milliGRAM(s) IV Push every 8 hours PRN Nausea and/or Vomiting      PAST MEDICAL & SURGICAL HISTORY:  HTN (hypertension)      HLD (hyperlipidemia)      History of BPH      Lung cancer  right s/p 4 rounds of chemo, last chemo 6/23/22.      Pleural effusion      History of ERCP      S/P bronchoscopy  1/2022, 3/2022      S/P inguinal hernia repair  Right approximately 1961          FAMILY HISTORY:  FH: lung cancer (Sibling)        SOCIAL HISTORY: No alcohol, tobacco, or drug use history    REVIEW OF SYSTEMS:  CONSTITUTIONAL: no fever  EYES/ENT: No visual changes; no throat pain  NECK: No pain or stiffness  RESPIRATORY: no SOB or cough  CARDIOVASCULAR: No chest pain or palpitations  GASTROINTESTINAL: No abdominal pain. No N/V/D/C  GENITOURINARY: No dysuria, change in frequency, or hematuria  NEUROLOGICAL: No numbness or focal weakness  SKIN: No itching, burning, rashes, or lesions  Psych: No depression  MSK: no joint pain  Allergy: no urticaria    Height (cm): 171 (11-24 @ 18:18)  Weight (kg): 89.4 (11-24 @ 18:18)  BMI (kg/m2): 30.6 (11-24 @ 18:18)  BSA (m2): 2.02 (11-24 @ 18:18)    T(F): 98.1 (11-25-23 @ 05:37), Max: 100.5 (11-24-23 @ 21:28)  HR: 88 (11-25-23 @ 05:37)  BP: 151/62 (11-25-23 @ 05:37)  RR: 18 (11-25-23 @ 05:37)  SpO2: 95% (11-25-23 @ 05:37)  Wt(kg): --    GENERAL: NAD  HEENT: EOMI, MMM, no oropharyngeal lesions or erythema appreciated  Pulm: no increased WOB, CTAB/L  CV: RRR, S1, S2, no m/g/r  ABDOMEN: soft, NT, ND, no masses felt, no HSM  MSK: nl ROM  EXTREMITIES: no appreciable edema in b/l LE  Neuro: A&Ox3, no focal deficits  SKIN: warm and dry, no visible rash                          8.8    7.52  )-----------( 349      ( 25 Nov 2023 06:00 )             27.6       11-25    138  |  106  |  28<H>  ----------------------------<  93  4.3   |  22  |  1.34<H>    Ca    8.9      25 Nov 2023 06:00    TPro  7.2  /  Alb  3.0<L>  /  TBili  0.6  /  DBili  x   /  AST  19  /  ALT  24  /  AlkPhos  225<H>  11-24             84 y/o  never smoker M (Barbadian speaking) with PMH of 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    Improved cough since admission, but continues to have ROWLEY. Needing PRN 2L NC.  Denies  fever, headache, dizziness, chest pain, palpitations, abdominal pain, joint pain, diarrhea/constipation, urinary symptoms.         Allergies    No Known Allergies    Intolerances        MEDICATIONS  (STANDING):  atorvastatin 40 milliGRAM(s) Oral at bedtime  azithromycin  IVPB      azithromycin  IVPB 500 milliGRAM(s) IV Intermittent every 24 hours  cefTRIAXone   IVPB 1000 milliGRAM(s) IV Intermittent every 24 hours  chlorhexidine 2% Cloths 1 Application(s) Topical <User Schedule>  finasteride 5 milliGRAM(s) Oral daily  folic acid 1 milliGRAM(s) Oral daily  heparin   Injectable 5000 Unit(s) SubCutaneous every 8 hours  losartan 50 milliGRAM(s) Oral daily    MEDICATIONS  (PRN):  acetaminophen     Tablet .. 650 milliGRAM(s) Oral every 6 hours PRN Temp greater or equal to 38C (100.4F), Mild Pain (1 - 3), Moderate Pain (4 - 6)  aluminum hydroxide/magnesium hydroxide/simethicone Suspension 30 milliLiter(s) Oral every 4 hours PRN Dyspepsia  benzonatate 100 milliGRAM(s) Oral three times a day PRN for cough  melatonin 3 milliGRAM(s) Oral at bedtime PRN Insomnia  ondansetron Injectable 4 milliGRAM(s) IV Push every 8 hours PRN Nausea and/or Vomiting      PAST MEDICAL & SURGICAL HISTORY:  HTN (hypertension)      HLD (hyperlipidemia)      History of BPH      Lung cancer  right s/p 4 rounds of chemo, last chemo 6/23/22.      Pleural effusion      History of ERCP      S/P bronchoscopy  1/2022, 3/2022      S/P inguinal hernia repair  Right approximately 1961          FAMILY HISTORY:  FH: lung cancer (Sibling)        SOCIAL HISTORY: No alcohol, tobacco, or drug use history    REVIEW OF SYSTEMS:  CONSTITUTIONAL: no fever  EYES/ENT: No visual changes; no throat pain  NECK: No pain or stiffness  RESPIRATORY: no SOB or cough  CARDIOVASCULAR: No chest pain or palpitations  GASTROINTESTINAL: No abdominal pain. No N/V/D/C  GENITOURINARY: No dysuria, change in frequency, or hematuria  NEUROLOGICAL: No numbness or focal weakness  SKIN: No itching, burning, rashes, or lesions  Psych: No depression  MSK: no joint pain  Allergy: no urticaria    Height (cm): 171 (11-24 @ 18:18)  Weight (kg): 89.4 (11-24 @ 18:18)  BMI (kg/m2): 30.6 (11-24 @ 18:18)  BSA (m2): 2.02 (11-24 @ 18:18)    T(F): 98.1 (11-25-23 @ 05:37), Max: 100.5 (11-24-23 @ 21:28)  HR: 88 (11-25-23 @ 05:37)  BP: 151/62 (11-25-23 @ 05:37)  RR: 18 (11-25-23 @ 05:37)  SpO2: 95% (11-25-23 @ 05:37)  Wt(kg): --    GENERAL: NAD  Pulm: no increased WOB, diffuse crackles especially on LLL, RLL w/ reduced breath sounds  CV: RRR, S1, S2, no m/g/r  ABDOMEN: soft, NT, ND, no masses felt, no HSM  EXTREMITIES: no appreciable edema in b/l LE  Neuro: A&Ox3, no focal deficits  SKIN: warm and dry, no visible rash                          8.8    7.52  )-----------( 349      ( 25 Nov 2023 06:00 )             27.6       11-25    138  |  106  |  28<H>  ----------------------------<  93  4.3   |  22  |  1.34<H>    Ca    8.9      25 Nov 2023 06:00    TPro  7.2  /  Alb  3.0<L>  /  TBili  0.6  /  DBili  x   /  AST  19  /  ALT  24  /  AlkPhos  225<H>  11-24             84 y/o  never smoker M (French speaking) with PMH of 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     Improved cough since admission, but continues to have ROWLEY. Needing PRN 2L NC.  Denies  fever, headache, dizziness, chest pain, palpitations, abdominal pain, joint pain, diarrhea/constipation, urinary symptoms.         Allergies    No Known Allergies    Intolerances        MEDICATIONS  (STANDING):  atorvastatin 40 milliGRAM(s) Oral at bedtime  azithromycin  IVPB      azithromycin  IVPB 500 milliGRAM(s) IV Intermittent every 24 hours  cefTRIAXone   IVPB 1000 milliGRAM(s) IV Intermittent every 24 hours  chlorhexidine 2% Cloths 1 Application(s) Topical <User Schedule>  finasteride 5 milliGRAM(s) Oral daily  folic acid 1 milliGRAM(s) Oral daily  heparin   Injectable 5000 Unit(s) SubCutaneous every 8 hours  losartan 50 milliGRAM(s) Oral daily    MEDICATIONS  (PRN):  acetaminophen     Tablet .. 650 milliGRAM(s) Oral every 6 hours PRN Temp greater or equal to 38C (100.4F), Mild Pain (1 - 3), Moderate Pain (4 - 6)  aluminum hydroxide/magnesium hydroxide/simethicone Suspension 30 milliLiter(s) Oral every 4 hours PRN Dyspepsia  benzonatate 100 milliGRAM(s) Oral three times a day PRN for cough  melatonin 3 milliGRAM(s) Oral at bedtime PRN Insomnia  ondansetron Injectable 4 milliGRAM(s) IV Push every 8 hours PRN Nausea and/or Vomiting      PAST MEDICAL & SURGICAL HISTORY:  HTN (hypertension)      HLD (hyperlipidemia)      History of BPH      Lung cancer  right s/p 4 rounds of chemo, last chemo 6/23/22.      Pleural effusion      History of ERCP      S/P bronchoscopy  1/2022, 3/2022      S/P inguinal hernia repair  Right approximately 1961          FAMILY HISTORY:  FH: lung cancer (Sibling)        SOCIAL HISTORY: No alcohol, tobacco, or drug use history    REVIEW OF SYSTEMS:  CONSTITUTIONAL: no fever  EYES/ENT: No visual changes; no throat pain  NECK: No pain or stiffness  RESPIRATORY: no SOB or cough  CARDIOVASCULAR: No chest pain or palpitations  GASTROINTESTINAL: No abdominal pain. No N/V/D/C  GENITOURINARY: No dysuria, change in frequency, or hematuria  NEUROLOGICAL: No numbness or focal weakness  SKIN: No itching, burning, rashes, or lesions  Psych: No depression  MSK: no joint pain  Allergy: no urticaria    Height (cm): 171 (11-24 @ 18:18)  Weight (kg): 89.4 (11-24 @ 18:18)  BMI (kg/m2): 30.6 (11-24 @ 18:18)  BSA (m2): 2.02 (11-24 @ 18:18)    T(F): 98.1 (11-25-23 @ 05:37), Max: 100.5 (11-24-23 @ 21:28)  HR: 88 (11-25-23 @ 05:37)  BP: 151/62 (11-25-23 @ 05:37)  RR: 18 (11-25-23 @ 05:37)  SpO2: 95% (11-25-23 @ 05:37)  Wt(kg): --    GENERAL: NAD  Pulm: no increased WOB, diffuse crackles especially on LLL, RLL w/ reduced breath sounds  CV: RRR, S1, S2, no m/g/r  ABDOMEN: soft, NT, ND, no masses felt, no HSM  EXTREMITIES: no appreciable edema in b/l LE  Neuro: A&Ox3, no focal deficits  SKIN: warm and dry, no visible rash                          8.8    7.52  )-----------( 349      ( 25 Nov 2023 06:00 )             27.6       11-25    138  |  106  |  28<H>  ----------------------------<  93  4.3   |  22  |  1.34<H>    Ca    8.9      25 Nov 2023 06:00    TPro  7.2  /  Alb  3.0<L>  /  TBili  0.6  /  DBili  x   /  AST  19  /  ALT  24  /  AlkPhos  225<H>  11-24

## 2023-11-26 NOTE — PROGRESS NOTE ADULT - NSPROGADDITIONALINFOA_GEN_ALL_CORE
d/w granddaughter Tricia re overall care d/w son Lazara re overall care  granddaughter Tricia is RN upstairs

## 2023-11-26 NOTE — PROGRESS NOTE ADULT - PROBLEM SELECTOR PLAN 5
Active treatment   follows with Dr. Domingo  appreciate onc input - just received dose of taxol which may cause complications with neutropenia -->  will monitor daily CBC.

## 2023-11-26 NOTE — DISCHARGE NOTE PROVIDER - NSDCFUSCHEDAPPT_GEN_ALL_CORE_FT
[facility]  Scheduled Appointment: 12/06/2023    [facility]  Scheduled Appointment: 12/06/2023    [facility]  Scheduled Appointment: 12/14/2023    Vj Domingo  [facility]  Scheduled Appointment: 12/14/2023    [facility]  Scheduled Appointment: 12/14/2023     [facility]  Scheduled Appointment: 12/06/2023    [facility]  Scheduled Appointment: 12/06/2023    [facility]  Scheduled Appointment: 12/14/2023    Vj Domingo  [facility]  Scheduled Appointment: 12/14/2023    [facility]  Scheduled Appointment: 12/14/2023     [facility]  Scheduled Appointment: 12/06/2023    [facility]  Scheduled Appointment: 12/06/2023    [facility]  Scheduled Appointment: 12/14/2023    Vj Domingo  [facility]  Scheduled Appointment: 12/14/2023    [facility]  Scheduled Appointment: 12/14/2023

## 2023-11-26 NOTE — PROGRESS NOTE ADULT - ASSESSMENT
83M  never smoker, Mexican speaking, with  metastatic Lung adeno carcinoma (1/2022 initially staged as IIIA, 10/2022: rebx showed metastatic disease, hence tx goal changed to palliative intent) currently on taxol/Tecentriq, last treatment 9 days ago, on home O2 PRN 2L NC, recurrent PNA, HTN and BPH send in directly from [facility] on 11/24/23 when patient arrived for scheduled chemo and was admitted for PNA, acute hypoxic respiratory failure with increased opacities on imaging

## 2023-11-26 NOTE — DIETITIAN INITIAL EVALUATION ADULT - PERTINENT LABORATORY DATA
11-26    134<L>  |  103  |  25<H>  ----------------------------<  98  4.5   |  20<L>  |  1.21    Ca    9.0      26 Nov 2023 06:39  Phos  3.4     11-26  Mg     1.90     11-26    TPro  6.5  /  Alb  2.4<L>  /  TBili  0.3  /  DBili  x   /  AST  22  /  ALT  24  /  AlkPhos  204<H>  11-26  A1C with Estimated Average Glucose Result: 5.9 % (11-25-23 @ 06:00)

## 2023-11-26 NOTE — DISCHARGE NOTE PROVIDER - NPI NUMBER (FOR [employer] USE ONLY) :
[id number]] [[id number]] [[id number]] [[id number]],[[id number]] [[id number]],[[id number]] [[id number]],[[id number]]

## 2023-11-26 NOTE — DISCHARGE NOTE PROVIDER - ATTENDING DISCHARGE PHYSICAL EXAMINATION:
General: elderly man laying in bed appears comfortable in NAD, awake and alert  Eyes: conjunctiva clear, nonicteric sclera  HENMT: NCAT, MMM  Respiratory: No respiratory distress, CTABL, No rales, rhonchi, wheezing.  Cardiovascular: S1,S2; Regular rate and rhythm; No m/g/r. 2+ peripheral pulses  Gastrointestinal: Soft, Nontender, Nondistended; +BS.   Extremities: No c/c/e; warm to touch  Neurological: Moving all 4 extremities; Sensation to LT grossly in tact.  Skin: No rashes, No erythema   Psych: appropriate mood and affect

## 2023-11-26 NOTE — DISCHARGE NOTE PROVIDER - NSDCMRMEDTOKEN_GEN_ALL_CORE_FT
benzonatate 100 mg oral capsule: 1 cap(s) orally 3 times a day as needed for  cough  finasteride 5 mg oral tablet: 1 orally once a day  folic acid 1 mg oral tablet: 1 tab(s) orally once a day  rosuvastatin 10 mg oral tablet: 1 tab(s) orally once a day  telmisartan 40 mg oral tablet: 1 tab(s) orally once a day   benzonatate 100 mg oral capsule: 1 cap(s) orally 3 times a day as needed for  cough  finasteride 5 mg oral tablet: 1 orally once a day  folic acid 1 mg oral tablet: 1 tab(s) orally once a day  melatonin 3 mg oral tablet: 1 tab(s) orally once a day (at bedtime) As needed Insomnia  rosuvastatin 10 mg oral tablet: 1 tab(s) orally once a day  telmisartan 40 mg oral tablet: 1 tab(s) orally once a day  Xarelto 10 mg oral tablet: 1 tab(s) orally once a day   benzonatate 100 mg oral capsule: 1 cap(s) orally 3 times a day as needed for  cough  finasteride 5 mg oral tablet: 1 orally once a day  folic acid 1 mg oral tablet: 1 tab(s) orally once a day  hydrocodone-homatropine 5 mg-1.5 mg/5 mL oral syrup: 5 milliliter(s) orally every 6 hours as needed for Cough iSTOP Reference #: [id number] MDD: 20mL  melatonin 3 mg oral tablet: 1 tab(s) orally once a day (at bedtime) As needed Insomnia  rosuvastatin 10 mg oral tablet: 1 tab(s) orally once a day  telmisartan 40 mg oral tablet: 1 tab(s) orally once a day  Xarelto 10 mg oral tablet: 1 tab(s) orally once a day   benzonatate 100 mg oral capsule: 1 cap(s) orally 3 times a day as needed for  cough  finasteride 5 mg oral tablet: 1 orally once a day  folic acid 1 mg oral tablet: 1 tab(s) orally once a day  hydrocodone-homatropine 5 mg-1.5 mg/5 mL oral syrup: 5 milliliter(s) orally every 6 hours as needed for Cough iSTOP Reference #: [id number] MDD: 20mL  melatonin 3 mg oral tablet: 1 tab(s) orally once a day (at bedtime) As needed Insomnia  rosuvastatin 10 mg oral tablet: 1 tab(s) orally once a day  telmisartan 40 mg oral tablet: 1 tab(s) orally once a day  Xarelto 10 mg oral tablet: 1 tab(s) orally once a day   benzonatate 100 mg oral capsule: 1 cap(s) orally 3 times a day as needed for  cough  finasteride 5 mg oral tablet: 1 orally once a day  folic acid 1 mg oral tablet: 1 tab(s) orally once a day  hydrocodone-homatropine 5 mg-1.5 mg/5 mL oral syrup: 5 milliliter(s) orally every 6 hours as needed for Cough iSTOP Reference #: [id number] MDD: 20mL  melatonin 3 mg oral tablet: 1 tab(s) orally once a day (at bedtime) As needed Insomnia  rosuvastatin 10 mg oral tablet: 1 tab(s) orally once a day  telmisartan 40 mg oral tablet: 1 tab(s) orally once a day  Xarelto 10 mg oral tablet: 1 tab(s) orally once a day   benzonatate 100 mg oral capsule: 1 cap(s) orally 3 times a day as needed for  cough  finasteride 5 mg oral tablet: 1 orally once a day  folic acid 1 mg oral tablet: 1 tab(s) orally once a day  hydrocodone-homatropine 5 mg-1.5 mg/5 mL oral syrup: 5 milliliter(s) orally every 6 hours as needed for Cough iSTOP Reference #: [id number] MDD: 20mL  Lovenox 40 mg/0.4 mL injectable solution: 40 milligram(s) subcutaneously once a day  melatonin 3 mg oral tablet: 1 tab(s) orally once a day (at bedtime) As needed Insomnia  rosuvastatin 10 mg oral tablet: 1 tab(s) orally once a day  telmisartan 40 mg oral tablet: 1 tab(s) orally once a day   benzonatate 100 mg oral capsule: 1 cap(s) orally 3 times a day as needed for  cough  finasteride 5 mg oral tablet: 1 orally once a day  folic acid 1 mg oral tablet: 1 tab(s) orally once a day  hydrocodone-homatropine 5 mg-1.5 mg/5 mL oral syrup: 5 milliliter(s) orally every 6 hours as needed for Cough iSTOP Reference #: [id number] MDD: 20mL  Lovenox 40 mg/0.4 mL injectable solution: 40 milligram(s) subcutaneously once a day  melatonin 3 mg oral tablet: 1 tab(s) orally once a day (at bedtime) As needed Insomnia  rosuvastatin 10 mg oral tablet: 1 tab(s) orally once a day  telmisartan 40 mg oral tablet: 1 tab(s) orally once a day   benzonatate 100 mg oral capsule: 1 cap(s) orally 3 times a day as needed for  cough  finasteride 5 mg oral tablet: 1 orally once a day  folic acid 1 mg oral tablet: 1 tab(s) orally once a day  hydrocodone-homatropine 5 mg-1.5 mg/5 mL oral syrup: 5 milliliter(s) orally every 6 hours as needed for Cough iSTOP Reference #: [id number] MDD: 20mL  Lovenox 40 mg/0.4 mL injectable solution: 40 milligram(s) subcutaneously once a day  melatonin 3 mg oral tablet: 1 tab(s) orally once a day (at bedtime) As needed Insomnia  rosuvastatin 10 mg oral tablet: 1 tab(s) orally once a day  telmisartan 40 mg oral tablet: 1 tab(s) orally once a day

## 2023-11-26 NOTE — DISCHARGE NOTE PROVIDER - NSDCFUADDAPPT_GEN_ALL_CORE_FT
Please follow up with your primary care provider Dr. Alejandro Ugalde for your already scheduled appointment on Monday 12/4. Please also follow up with your oncologist as scheduled.    Please follow up with your primary care provider, Dr. Alejandro Ugalde, for your already scheduled appointment on Monday 12/4. Please also follow up with your oncologist as scheduled.    Please follow up with your primary care provider, Dr. Alejandro Ugalde, for your already scheduled appointment on Monday 12/4. Please also follow up with your oncologist as scheduled.

## 2023-11-26 NOTE — DISCHARGE NOTE PROVIDER - HOSPITAL COURSE
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 on admission RR 20 satting 95% on3L NC (baseline 2L NC PRN)  CTPA: Interval slight increase of nodules and patchy/consolidative opacities  within mid to lower lungs. Small loculated right pleural effusion and  underlying right pleural thickening increased from prior exam.  treating for pneumonia - c/w Rocephin/ azithromycin  Wean O2 as tolerated  MRSA pending;  legionella negative  hycodan PRN cough.   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 on 11/24/23 when patient arrived for scheduled chemo and was admitted for PNA, acute hypoxic respiratory failure with increased opacities on imaging     Pneumonia. on admission RR 20 satting 95% on3L NC (baseline 2L NC PRN)  CTPA: Interval slight increase of nodules and patchy/consolidative opacities  within mid to lower lungs. Small loculated right pleural effusion and  underlying right pleural thickening increased from prior exam.  treating for pneumonia - c/w Rocephin/ azithromycin  Wean O2 as tolerated  MRSA pending;  legionella negative  hycodan PRN cough.   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 on admission RR 20 satting 95% on3L NC (baseline 2L NC PRN)  CTPA: Interval slight increase of nodules and patchy/consolidative opacities  within mid to lower lungs. Small loculated right pleural effusion and  underlying right pleural thickening increased from prior exam.  treating for pneumonia - c/w Rocephin/ azithromycin  Wean O2 as tolerated  MRSA pending;  legionella negative  hycodan PRN cough.   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 on admission RR 20 satting 95% on3L NC (baseline 2L NC PRN)  CTPA: Interval slight increase of nodules and patchy/consolidative opacities  within mid to lower lungs. Small loculated right pleural effusion and  underlying right pleural thickening increased from prior exam.  treating for pneumonia - c/w Rocephin/ azithromycin  Wean O2 as tolerated  MRSA pending;  legionella negative  hycodan PRN cough.    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 on admission RR 20 satting 95% on3L NC (baseline 2L NC PRN)  CTPA: Interval slight increase of nodules and patchy/consolidative opacities  within mid to lower lungs. Small loculated right pleural effusion and  underlying right pleural thickening increased from prior exam.  treating for pneumonia - c/w Rocephin/ azithromycin  Wean O2 as tolerated  MRSA pending;  legionella negative  hycodan PRN cough.    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 on admission RR 20 satting 95% on3L NC (baseline 2L NC PRN)  CTPA: Interval slight increase of nodules and patchy/consolidative opacities  within mid to lower lungs. Small loculated right pleural effusion and  underlying right pleural thickening increased from prior exam.  treating for pneumonia - c/w Rocephin/ azithromycin  Wean O2 as tolerated  MRSA pending;  legionella negative  hycodan PRN cough.    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 on admission RR 20 satting 95% on3L NC (baseline 2L NC PRN)  CTPA: Interval slight increase of nodules and patchy/consolidative opacities  within mid to lower lungs. Small loculated right pleural effusion and  underlying right pleural thickening increased from prior exam.  treating for pneumonia - c/w Rocephin/ azithromycin  Wean O2 as tolerated  MRSA pending;  legionella negative  hycodan PRN cough.    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 on admission RR 20 satting 95% on3L NC (baseline 2L NC PRN)  CTPA: Interval slight increase of nodules and patchy/consolidative opacities  within mid to lower lungs. Small loculated right pleural effusion and  underlying right pleural thickening increased from prior exam.  treating for pneumonia - c/w Rocephin/ azithromycin  Wean O2 as tolerated  MRSA pending;  legionella negative  hycodan PRN cough.    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 on admission RR 20 satting 95% on3L NC (baseline 2L NC PRN)  CTPA: Interval slight increase of nodules and patchy/consolidative opacities  within mid to lower lungs. Small loculated right pleural effusion and  underlying right pleural thickening increased from prior exam.  treating for pneumonia - c/w Rocephin/ azithromycin  Wean O2 as tolerated  MRSA pending;  legionella negative  hycodan PRN cough.    COVID-19  - viral sepsis 2/2 COVID-19  - febrile w/ tachycardia  - PCR positive 11/27  - s/p remdesivir  - discharge on lovenoxfor DVT PPx

## 2023-11-26 NOTE — DISCHARGE NOTE PROVIDER - CARE PROVIDER_API CALL
Chayo [facility]  [address] [place] [address]  Phone: [phone]  Fax: [phone]  Follow Up Time:    Chayo [facility]  [address] [place] [address]  Phone: [phone]  Fax: [phone]  Follow Up Time:    Chayo [facility] Oncology  [address] [place] [address]  Phone: [phone]  Fax: [phone]  Follow Up Time:    [facility]  [facility]  [address] [place] [address]  Phone: [phone]  Fax: [phone]  Follow Up Time:     Alejandro Ugalde  Internal Medicine  [address] [place] [address]  Phone: [phone]  Fax: [phone]  Follow Up Time: 1 week   [facility]  [facility]  [address] [place] [address]  Phone: [phone]  Fax: [phone]  Follow Up Time:     Alejandro Ugalde  Internal Medicine  [address] [place] [address]  Phone: [phone]  Fax: [phone]  Follow Up Time: 1 week   [facility]  [address] [place] [address]  Phone: [phone]  Fax: [phone]  Follow Up Time:     Alejandro Ugalde  Internal Medicine  [address] [place] [address]  Phone: [phone]  Fax: [phone]  Follow Up Time: 1 week

## 2023-11-26 NOTE — PROGRESS NOTE ADULT - PROBLEM SELECTOR PLAN 1
on admission RR 20 satting 95% on3L NC (baseline 2L NC PRN)  CTPA: Interval slight increase of nodules and patchy/consolidative opacities  within mid to lower lungs. Small loculated right pleural effusion and  underlying right pleural thickening increased from prior exam.  treated for pneumonia - c/w Rocephin/ azithromycin  Wean O2 as tolerated  MRSA and legionella pending  hycodan PRN cough on admission RR 20 satting 95% on3L NC (baseline 2L NC PRN)  CTPA: Interval slight increase of nodules and patchy/consolidative opacities  within mid to lower lungs. Small loculated right pleural effusion and  underlying right pleural thickening increased from prior exam.  treating for pneumonia - c/w Rocephin/ azithromycin  Wean O2 as tolerated  MRSA pending;  legionella negative  hycodan PRN cough

## 2023-11-26 NOTE — DISCHARGE NOTE PROVIDER - CARE PROVIDERS DIRECT ADDRESSES
,[email or web address] ,[email or web address] ,[email or web address],DirectAddress_Unknown ,[email or web address],[email or web address] ,[email or web address],DirectAddress_Unknown

## 2023-11-26 NOTE — DIETITIAN INITIAL EVALUATION ADULT - OTHER INFO
RD visited with patient for requested consult - MST.    Patient Sami speaking. Offered  services, but patient preferred for son to translate who was present at bedside.  Patient indicated reduced PO intake ~2 days PTA which he attributed to coughing, but reported improved appetite at present.  No chewing or swallowing difficulties reported. No GI distress reported i.e. nausea, vomiting, diarrhea. No food allergies noted or reported. Preferences taken - dislikes shellfish; no pork.  Reported usual body weight ~220 pounds, reported weight loss which he attributed to cancer dx (unspecified timeframe).  Weight on a previous admission during Aug 2023 - 88.3kg per Initial RD assessment.  Current weight 89.kg (197 pounds) 11/24/23.  No significant weight change x 3 months.  Amenable to accepting oral nutritional supplement to optimize nutritional status.

## 2023-11-26 NOTE — DIETITIAN INITIAL EVALUATION ADULT - ENTER FROM (CAL/KG)
Notified MANUELA Hernandez regarding infant's recheck one touch of 147. Ordered to do 2 more pre-feed one touches must be over 60, notify if below.   30

## 2023-11-26 NOTE — DIETITIAN INITIAL EVALUATION ADULT - PERTINENT MEDS FT
- counseled on cessation  - transdermal nicotine patch on board MEDICATIONS  (STANDING):  atorvastatin 40 milliGRAM(s) Oral at bedtime  azithromycin  IVPB 500 milliGRAM(s) IV Intermittent every 24 hours  cefTRIAXone   IVPB 1000 milliGRAM(s) IV Intermittent every 24 hours  chlorhexidine 2% Cloths 1 Application(s) Topical <User Schedule>  finasteride 5 milliGRAM(s) Oral daily  folic acid 1 milliGRAM(s) Oral daily  heparin   Injectable 5000 Unit(s) SubCutaneous every 8 hours  losartan 50 milliGRAM(s) Oral daily    MEDICATIONS  (PRN):  acetaminophen     Tablet .. 650 milliGRAM(s) Oral every 6 hours PRN Temp greater or equal to 38C (100.4F), Mild Pain (1 - 3), Moderate Pain (4 - 6)  aluminum hydroxide/magnesium hydroxide/simethicone Suspension 30 milliLiter(s) Oral every 4 hours PRN Dyspepsia  benzonatate 100 milliGRAM(s) Oral three times a day PRN for cough  hydrocodone/homatropine Syrup 5 milliLiter(s) Oral every 6 hours PRN Cough  melatonin 3 milliGRAM(s) Oral at bedtime PRN Insomnia  ondansetron Injectable 4 milliGRAM(s) IV Push every 8 hours PRN Nausea and/or Vomiting

## 2023-11-26 NOTE — DISCHARGE NOTE PROVIDER - PROVIDER TOKENS
PROVIDER:[TOKEN:[[id number]]] PROVIDER:[TOKEN:[[id number]]] PROVIDER:[TOKEN:[[id number]]] PROVIDER:[TOKEN:[[id number]]],PROVIDER:[TOKEN:[[id number]],FOLLOWUP:[1 week]] PROVIDER:[TOKEN:[[id number]]],PROVIDER:[TOKEN:[[id number]],FOLLOWUP:[1 week]] PROVIDER:[TOKEN:[[id number]]],PROVIDER:[TOKEN:[[id number]],FOLLOWUP:[1 week]]

## 2023-11-26 NOTE — DISCHARGE NOTE PROVIDER - NSDCCPCAREPLAN_GEN_ALL_CORE_FT
PRINCIPAL DISCHARGE DIAGNOSIS  Diagnosis: Pneumonia  Assessment and Plan of Treatment: You are being treated for pneumonia. Be sure to complete your antibiotics and follow up with your doctor.     PRINCIPAL DISCHARGE DIAGNOSIS  Diagnosis: Pneumonia  Assessment and Plan of Treatment: You are being treated for pneumonia. Be sure to complete your antibiotics and follow up with your doctor.      SECONDARY DISCHARGE DIAGNOSES  Diagnosis: Lung cancer  Assessment and Plan of Treatment: Follow up with Dr. Domingo.    Diagnosis: Benign essential HTN  Assessment and Plan of Treatment: Continue your home meds.     PRINCIPAL DISCHARGE DIAGNOSIS  Diagnosis: Pneumonia  Assessment and Plan of Treatment: You completed a course of antibiotics during your hospital stay and do not require any further antibiotic therapy.  Monitor for worsening of disease, such as increased cough/sputum, difficulty breathing, fever/chills, or changes in mental status. Follow-up with your primary care provider for further medical care/recommendations.      SECONDARY DISCHARGE DIAGNOSES  Diagnosis: Lung cancer  Assessment and Plan of Treatment: Follow up with your oncologist, Dr. Domingo, as scheduled on discharge.    Diagnosis: 2019 novel coronavirus disease (COVID-19)  Assessment and Plan of Treatment: You have been diagnosed with the COVID-19 virus during your hospital stay. You must self-quarantine to complete a 10 day time period. Monitor for fevers, shortness of breath, and cough. Monitor your temperature daily to note any changes and increases.   It has been determined that you no longer need hospitalization and can recover while remaining in self-quarantine at home. You should follow the prevention steps below until a healthcare provider or local or state health department says you can return to your normal activities.  1. You should restrict activities outside your home, except for getting medical care.  2. Do not go to work, school, or public areas.  3. Avoid using public transportation, ride-sharing, or taxis.  4. Separate yourself from other people in your home.  5. Call ahead before visiting your doctor.  6. Wear a facemask.  7. Cover your coughs and sneezes.  8. Clean your hands often.  9. Avoid sharing personal household items.  10. Clean all “high-touch” surfaces daily.  11. Monitor your symptoms.   If you have a medical emergency and need to call 911, notify the dispatch personnel that you have COVID-19. If possible, put on a facemask before emergency medical services arrive.  Stopping home isolation:   Patients with confirmed COVID-19 should remain under home isolation precautions for 10 days since the positive COVID-19 test and until the risk of secondary transmission to others is thought to be low. The decision to discontinue home isolation precautions should be made on a case-by-case basis, in consultation with healthcare providers and state and local health departments. The [place] [facility] can be reached at [phone] for further information about COVID-19.    Diagnosis: Benign essential HTN  Assessment and Plan of Treatment: Continue your home medications.     PRINCIPAL DISCHARGE DIAGNOSIS  Diagnosis: Pneumonia  Assessment and Plan of Treatment: You completed a course of antibiotics during your hospital stay and do not require any further antibiotic therapy.  Monitor for worsening of disease, such as increased cough/sputum, difficulty breathing, fever/chills, or changes in mental status. Follow-up with your primary care provider for further medical care/recommendations.      SECONDARY DISCHARGE DIAGNOSES  Diagnosis: Lung cancer  Assessment and Plan of Treatment: Follow up with your oncologist, Dr. Domingo, as scheduled on discharge.    Diagnosis: 2019 novel coronavirus disease (COVID-19)  Assessment and Plan of Treatment: You have been diagnosed with the COVID-19 virus during your hospital stay. You must self-quarantine to complete a 10 day time period. Monitor for fevers, shortness of breath, and cough. Monitor your temperature daily to note any changes and increases.   It has been determined that you no longer need hospitalization and can recover while remaining in self-quarantine at home. You should follow the prevention steps below until a healthcare provider or local or state health department says you can return to your normal activities.  1. You should restrict activities outside your home, except for getting medical care.  2. Do not go to work, school, or public areas.  3. Avoid using public transportation, ride-sharing, or taxis.  4. Separate yourself from other people in your home.  5. Call ahead before visiting your doctor.  6. Wear a facemask.  7. Cover your coughs and sneezes.  8. Clean your hands often.  9. Avoid sharing personal household items.  10. Clean all “high-touch” surfaces daily.  11. Monitor your symptoms.   If you have a medical emergency and need to call 911, notify the dispatch personnel that you have COVID-19. If possible, put on a facemask before emergency medical services arrive.  Stopping home isolation:   Patients with confirmed COVID-19 should remain under home isolation precautions for 10 days since the positive COVID-19 test and until the risk of secondary transmission to others is thought to be low. The decision to discontinue home isolation precautions should be made on a case-by-case basis, in consultation with healthcare providers and state and local health departments. The [place] [facility] can be reached at [phone] for further information about COVID-19.    Diagnosis: Benign essential HTN  Assessment and Plan of Treatment: Continue your home medications.     PRINCIPAL DISCHARGE DIAGNOSIS  Diagnosis: Pneumonia  Assessment and Plan of Treatment: You completed a course of antibiotics during your hospital stay and do not require any further antibiotic therapy.  Monitor for worsening of disease, such as increased cough/sputum, difficulty breathing, fever/chills, or changes in mental status. Follow-up with your primary care provider for further medical care/recommendations.      SECONDARY DISCHARGE DIAGNOSES  Diagnosis: Lung cancer  Assessment and Plan of Treatment: Follow up with your oncologist, Dr. Domingo, as scheduled on discharge.    Diagnosis: 2019 novel coronavirus disease (COVID-19)  Assessment and Plan of Treatment: You have been diagnosed with the COVID-19 virus during your hospital stay. You must self-quarantine to complete a 10 day time period. Monitor for fevers, shortness of breath, and cough. Monitor your temperature daily to note any changes and increases.   It has been determined that you no longer need hospitalization and can recover while remaining in self-quarantine at home. You should follow the prevention steps below until a healthcare provider or local or state health department says you can return to your normal activities.  1. You should restrict activities outside your home, except for getting medical care.  2. Do not go to work, school, or public areas.  3. Avoid using public transportation, ride-sharing, or taxis.  4. Separate yourself from other people in your home.  5. Call ahead before visiting your doctor.  6. Wear a facemask.  7. Cover your coughs and sneezes.  8. Clean your hands often.  9. Avoid sharing personal household items.  10. Clean all “high-touch” surfaces daily.  11. Monitor your symptoms.   If you have a medical emergency and need to call 911, notify the dispatch personnel that you have COVID-19. If possible, put on a facemask before emergency medical services arrive.  Stopping home isolation:   Patients with confirmed COVID-19 should remain under home isolation precautions for 10 days since the positive COVID-19 test and until the risk of secondary transmission to others is thought to be low. The decision to discontinue home isolation precautions should be made on a case-by-case basis, in consultation with healthcare providers and state and local health departments. The [place] [facility] can be reached at [phone] for further information about COVID-19.    Diagnosis: Benign essential HTN  Assessment and Plan of Treatment: Continue your home medications.     PRINCIPAL DISCHARGE DIAGNOSIS  Diagnosis: Pneumonia  Assessment and Plan of Treatment: You completed a course of antibiotics during your hospital stay and do not require any further antibiotic therapy.  Monitor for worsening of disease, such as increased cough/sputum, difficulty breathing, fever/chills, or changes in mental status. Follow-up with your primary care provider for further medical care/recommendations.      SECONDARY DISCHARGE DIAGNOSES  Diagnosis: Lung cancer  Assessment and Plan of Treatment: Follow up with your oncologist, Dr. Domingo, as scheduled on discharge.    Diagnosis: 2019 novel coronavirus disease (COVID-19)  Assessment and Plan of Treatment: You have been diagnosed with the COVID-19 virus during your hospital stay. You must self-quarantine to complete a 10 day time period. Monitor for fevers, shortness of breath, and cough. Monitor your temperature daily to note any changes and increases.   It has been determined that you no longer need hospitalization and can recover while remaining in self-quarantine at home. You should follow the prevention steps below until a healthcare provider or local or state health department says you can return to your normal activities.  *You are being discharged on Xarelto which is a blood thinner due to COVID-19 infection and increased risk of blood clots.*  1. You should restrict activities outside your home, except for getting medical care.  2. Do not go to work, school, or public areas.  3. Avoid using public transportation, ride-sharing, or taxis.  4. Separate yourself from other people in your home.  5. Call ahead before visiting your doctor.  6. Wear a facemask.  7. Cover your coughs and sneezes.  8. Clean your hands often.  9. Avoid sharing personal household items.  10. Clean all “high-touch” surfaces daily.  11. Monitor your symptoms.   If you have a medical emergency and need to call 911, notify the dispatch personnel that you have COVID-19. If possible, put on a facemask before emergency medical services arrive.  Stopping home isolation:   Patients with confirmed COVID-19 should remain under home isolation precautions for 10 days since the positive COVID-19 test and until the risk of secondary transmission to others is thought to be low. The decision to discontinue home isolation precautions should be made on a case-by-case basis, in consultation with healthcare providers and state and local health departments. The [place] [facility] can be reached at [phone] for further information about COVID-19.    Diagnosis: Benign essential HTN  Assessment and Plan of Treatment: Continue your home medications.     PRINCIPAL DISCHARGE DIAGNOSIS  Diagnosis: Pneumonia  Assessment and Plan of Treatment: You completed a course of antibiotics during your hospital stay and do not require any further antibiotic therapy.  Monitor for worsening of disease, such as increased cough/sputum, difficulty breathing, fever/chills, or changes in mental status. Follow-up with your primary care provider for further medical care/recommendations.      SECONDARY DISCHARGE DIAGNOSES  Diagnosis: Lung cancer  Assessment and Plan of Treatment: Follow up with your oncologist, Dr. Domingo, as scheduled on discharge.    Diagnosis: 2019 novel coronavirus disease (COVID-19)  Assessment and Plan of Treatment: You have been diagnosed with the COVID-19 virus during your hospital stay. You must self-quarantine to complete a 10 day time period. Monitor for fevers, shortness of breath, and cough. Monitor your temperature daily to note any changes and increases.   It has been determined that you no longer need hospitalization and can recover while remaining in self-quarantine at home. You should follow the prevention steps below until a healthcare provider or local or state health department says you can return to your normal activities.  *You are being discharged on Xarelto which is a blood thinner due to COVID-19 infection and increased risk of blood clots.*  1. You should restrict activities outside your home, except for getting medical care.  2. Do not go to work, school, or public areas.  3. Avoid using public transportation, ride-sharing, or taxis.  4. Separate yourself from other people in your home.  5. Call ahead before visiting your doctor.  6. Wear a facemask.  7. Cover your coughs and sneezes.  8. Clean your hands often.  9. Avoid sharing personal household items.  10. Clean all “high-touch” surfaces daily.  11. Monitor your symptoms.   If you have a medical emergency and need to call 911, notify the dispatch personnel that you have COVID-19. If possible, put on a facemask before emergency medical services arrive.  Stopping home isolation:   Patients with confirmed COVID-19 should remain under home isolation precautions for 10 days since the positive COVID-19 test and until the risk of secondary transmission to others is thought to be low. The decision to discontinue home isolation precautions should be made on a case-by-case basis, in consultation with healthcare providers and state and local health departments. The [place] [facility] can be reached at [phone] for further information about COVID-19.    Diagnosis: Benign essential HTN  Assessment and Plan of Treatment: Continue your home medications.     PRINCIPAL DISCHARGE DIAGNOSIS  Diagnosis: Pneumonia  Assessment and Plan of Treatment: You completed a course of antibiotics during your hospital stay and do not require any further antibiotic therapy.  Monitor for worsening of disease, such as increased cough/sputum, difficulty breathing, fever/chills, or changes in mental status. Follow-up with your primary care provider for further medical care/recommendations.      SECONDARY DISCHARGE DIAGNOSES  Diagnosis: Lung cancer  Assessment and Plan of Treatment: Follow up with your oncologist, Dr. Domingo, as scheduled on discharge.    Diagnosis: 2019 novel coronavirus disease (COVID-19)  Assessment and Plan of Treatment: You have been diagnosed with the COVID-19 virus during your hospital stay. You must self-quarantine to complete a 10 day time period. Monitor for fevers, shortness of breath, and cough. Monitor your temperature daily to note any changes and increases.   It has been determined that you no longer need hospitalization and can recover while remaining in self-quarantine at home. You should follow the prevention steps below until a healthcare provider or local or state health department says you can return to your normal activities.  *You are being discharged on Xarelto which is a blood thinner due to COVID-19 infection and increased risk of blood clots.*  1. You should restrict activities outside your home, except for getting medical care.  2. Do not go to work, school, or public areas.  3. Avoid using public transportation, ride-sharing, or taxis.  4. Separate yourself from other people in your home.  5. Call ahead before visiting your doctor.  6. Wear a facemask.  7. Cover your coughs and sneezes.  8. Clean your hands often.  9. Avoid sharing personal household items.  10. Clean all “high-touch” surfaces daily.  11. Monitor your symptoms.   If you have a medical emergency and need to call 911, notify the dispatch personnel that you have COVID-19. If possible, put on a facemask before emergency medical services arrive.  Stopping home isolation:   Patients with confirmed COVID-19 should remain under home isolation precautions for 10 days since the positive COVID-19 test and until the risk of secondary transmission to others is thought to be low. The decision to discontinue home isolation precautions should be made on a case-by-case basis, in consultation with healthcare providers and state and local health departments. The [place] [facility] can be reached at [phone] for further information about COVID-19.    Diagnosis: Benign essential HTN  Assessment and Plan of Treatment: Continue your home medications.     PRINCIPAL DISCHARGE DIAGNOSIS  Diagnosis: Pneumonia  Assessment and Plan of Treatment: You completed a course of antibiotics during your hospital stay and do not require any further antibiotic therapy.  Monitor for worsening of disease, such as increased cough/sputum, difficulty breathing, fever/chills, or changes in mental status. Follow-up with your primary care provider for further medical care/recommendations.      SECONDARY DISCHARGE DIAGNOSES  Diagnosis: Lung cancer  Assessment and Plan of Treatment: Follow up with your oncologist, Dr. Domingo, as scheduled on discharge.    Diagnosis: 2019 novel coronavirus disease (COVID-19)  Assessment and Plan of Treatment: You have been diagnosed with the COVID-19 virus during your hospital stay. You must self-quarantine to complete a 10 day time period. Monitor for fevers, shortness of breath, and cough. Monitor your temperature daily to note any changes and increases.   It has been determined that you no longer need hospitalization and can recover while remaining in self-quarantine at home. You should follow the prevention steps below until a healthcare provider or local or state health department says you can return to your normal activities.  *You are being discharged on Lovenox which is a blood thinner due to COVID-19 infection and increased risk of blood clots.*  1. You should restrict activities outside your home, except for getting medical care.  2. Do not go to work, school, or public areas.  3. Avoid using public transportation, ride-sharing, or taxis.  4. Separate yourself from other people in your home.  5. Call ahead before visiting your doctor.  6. Wear a facemask.  7. Cover your coughs and sneezes.  8. Clean your hands often.  9. Avoid sharing personal household items.  10. Clean all “high-touch” surfaces daily.  11. Monitor your symptoms.   If you have a medical emergency and need to call 911, notify the dispatch personnel that you have COVID-19. If possible, put on a facemask before emergency medical services arrive.  Stopping home isolation:   Patients with confirmed COVID-19 should remain under home isolation precautions for 10 days since the positive COVID-19 test and until the risk of secondary transmission to others is thought to be low. The decision to discontinue home isolation precautions should be made on a case-by-case basis, in consultation with healthcare providers and state and local health departments. The [place] [facility] can be reached at [phone] for further information about COVID-19.    Diagnosis: Benign essential HTN  Assessment and Plan of Treatment: Continue your home medications.     PRINCIPAL DISCHARGE DIAGNOSIS  Diagnosis: Pneumonia  Assessment and Plan of Treatment: You completed a course of antibiotics during your hospital stay and do not require any further antibiotic therapy.  Monitor for worsening of disease, such as increased cough/sputum, difficulty breathing, fever/chills, or changes in mental status. Follow-up with your primary care provider for further medical care/recommendations.      SECONDARY DISCHARGE DIAGNOSES  Diagnosis: Lung cancer  Assessment and Plan of Treatment: Follow up with your oncologist, Dr. Domingo, as scheduled on discharge.    Diagnosis: 2019 novel coronavirus disease (COVID-19)  Assessment and Plan of Treatment: You have been diagnosed with the COVID-19 virus during your hospital stay. You must self-quarantine to complete a 10 day time period. Monitor for fevers, shortness of breath, and cough. Monitor your temperature daily to note any changes and increases.   It has been determined that you no longer need hospitalization and can recover while remaining in self-quarantine at home. You should follow the prevention steps below until a healthcare provider or local or state health department says you can return to your normal activities.  *You are being discharged on Lovenox which is a blood thinner due to COVID-19 infection and increased risk of blood clots.*  1. You should restrict activities outside your home, except for getting medical care.  2. Do not go to work, school, or public areas.  3. Avoid using public transportation, ride-sharing, or taxis.  4. Separate yourself from other people in your home.  5. Call ahead before visiting your doctor.  6. Wear a facemask.  7. Cover your coughs and sneezes.  8. Clean your hands often.  9. Avoid sharing personal household items.  10. Clean all “high-touch” surfaces daily.  11. Monitor your symptoms.   If you have a medical emergency and need to call 911, notify the dispatch personnel that you have COVID-19. If possible, put on a facemask before emergency medical services arrive.  Stopping home isolation:   Patients with confirmed COVID-19 should remain under home isolation precautions for 10 days since the positive COVID-19 test and until the risk of secondary transmission to others is thought to be low. The decision to discontinue home isolation precautions should be made on a case-by-case basis, in consultation with healthcare providers and state and local health departments. The [place] [facility] can be reached at [phone] for further information about COVID-19.    Diagnosis: Benign essential HTN  Assessment and Plan of Treatment: Continue your home medications.     PRINCIPAL DISCHARGE DIAGNOSIS  Diagnosis: Pneumonia  Assessment and Plan of Treatment: You completed a course of antibiotics during your hospital stay and do not require any further antibiotic therapy.  Monitor for worsening of disease, such as increased cough/sputum, difficulty breathing, fever/chills, or changes in mental status. Follow-up with your primary care provider for further medical care/recommendations.      SECONDARY DISCHARGE DIAGNOSES  Diagnosis: Lung cancer  Assessment and Plan of Treatment: Follow up with your oncologist, Dr. Domingo, as scheduled on discharge.    Diagnosis: 2019 novel coronavirus disease (COVID-19)  Assessment and Plan of Treatment: You have been diagnosed with the COVID-19 virus during your hospital stay. You must self-quarantine to complete a 10 day time period. Monitor for fevers, shortness of breath, and cough. Monitor your temperature daily to note any changes and increases.   It has been determined that you no longer need hospitalization and can recover while remaining in self-quarantine at home. You should follow the prevention steps below until a healthcare provider or local or state health department says you can return to your normal activities.  *You are being discharged on Lovenox which is a blood thinner due to COVID-19 infection and increased risk of blood clots.*  1. You should restrict activities outside your home, except for getting medical care.  2. Do not go to work, school, or public areas.  3. Avoid using public transportation, ride-sharing, or taxis.  4. Separate yourself from other people in your home.  5. Call ahead before visiting your doctor.  6. Wear a facemask.  7. Cover your coughs and sneezes.  8. Clean your hands often.  9. Avoid sharing personal household items.  10. Clean all “high-touch” surfaces daily.  11. Monitor your symptoms.   If you have a medical emergency and need to call 911, notify the dispatch personnel that you have COVID-19. If possible, put on a facemask before emergency medical services arrive.  Stopping home isolation:   Patients with confirmed COVID-19 should remain under home isolation precautions for 10 days since the positive COVID-19 test and until the risk of secondary transmission to others is thought to be low. The decision to discontinue home isolation precautions should be made on a case-by-case basis, in consultation with healthcare providers and state and local health departments. The [place] [facility] can be reached at [phone] for further information about COVID-19.    Diagnosis: Benign essential HTN  Assessment and Plan of Treatment: Continue your home medications.

## 2023-11-26 NOTE — PROGRESS NOTE ADULT - SUBJECTIVE AND OBJECTIVE BOX
[facility] Division of Hospital Medicine  Zoya Juárez MD  Pager (M-F, 8A-5P):  In-house pager [phone]; Long-range pager [phone]  Other Times:  Please page Hospitalist in Charge -  In-house pager [phone]    Patient is a 83y old  Male who presents with a chief complaint of cough/pna (25 Nov 2023 15:53)    SUBJECTIVE / OVERNIGHT EVENTS:  No problems reported over night.    ADDITIONAL REVIEW OF SYSTEMS:    MEDICATIONS  (STANDING):  atorvastatin 40 milliGRAM(s) Oral at bedtime  azithromycin  IVPB 500 milliGRAM(s) IV Intermittent every 24 hours  cefTRIAXone   IVPB 1000 milliGRAM(s) IV Intermittent every 24 hours  chlorhexidine 2% Cloths 1 Application(s) Topical <User Schedule>  finasteride 5 milliGRAM(s) Oral daily  folic acid 1 milliGRAM(s) Oral daily  heparin   Injectable 5000 Unit(s) SubCutaneous every 8 hours  losartan 50 milliGRAM(s) Oral daily  sodium chloride 0.9%. 1000 milliLiter(s) (75 mL/Hr) IV Continuous <Continuous>    MEDICATIONS  (PRN):  acetaminophen     Tablet .. 650 milliGRAM(s) Oral every 6 hours PRN Temp greater or equal to 38C (100.4F), Mild Pain (1 - 3), Moderate Pain (4 - 6)  aluminum hydroxide/magnesium hydroxide/simethicone Suspension 30 milliLiter(s) Oral every 4 hours PRN Dyspepsia  benzonatate 100 milliGRAM(s) Oral three times a day PRN for cough  hydrocodone/homatropine Syrup 5 milliLiter(s) Oral every 6 hours PRN Cough  melatonin 3 milliGRAM(s) Oral at bedtime PRN Insomnia  ondansetron Injectable 4 milliGRAM(s) IV Push every 8 hours PRN Nausea and/or Vomiting    I&O's Summary    25 Nov 2023 07:01  -  26 Nov 2023 07:00  --------------------------------------------------------  IN: 1375 mL / OUT: 200 mL / NET: 1175 mL    PHYSICAL EXAM:  Vital Signs Last 24 Hrs  T(C): 36.7 (26 Nov 2023 05:10), Max: 37.2 (25 Nov 2023 21:28)  T(F): 98 (26 Nov 2023 05:10), Max: 98.9 (25 Nov 2023 21:28)  HR: 98 (26 Nov 2023 05:10) (98 - 103)  BP: 144/86 (26 Nov 2023 05:10) (144/86 - 150/61)  BP(mean): --  RR: 18 (26 Nov 2023 05:10) (17 - 20)  SpO2: 93% (26 Nov 2023 05:10) (93% - 97%)    Parameters below as of 26 Nov 2023 05:10  Patient On (Oxygen Delivery Method): nasal cannula  O2 Flow (L/min): 2    CONSTITUTIONAL: NAD, well-developed, well-groomed  RESPIRATORY: Normal respiratory effort; lungs are clear to auscultation bilaterally  CARDIOVASCULAR: Regular rate and rhythm, normal S1 and S2, no murmur/rub/gallop; No lower extremity edema; Peripheral pulses are 2+ bilaterally  ABDOMEN: Nontender to palpation, normoactive bowel sounds, no rebound/guarding; No hepatosplenomegaly  MUSCLOSKELETAL: no clubbing or cyanosis of digits; no joint swelling or tenderness to palpation  PSYCH: A+O to person, place, and time; affect appropriate  NEUROLOGY: CN 2-12 are intact and symmetric; no gross sensory deficits;   SKIN: No rashes; no palpable lesions    LABS:                        7.9    8.20  )-----------( 370      ( 26 Nov 2023 06:39 )             25.0     11-26    134<L>  |  103  |  25<H>  ----------------------------<  98  4.5   |  20<L>  |  1.21    Ca    9.0      26 Nov 2023 06:39  Phos  3.4     11-26  Mg     1.90     11-26    TPro  6.5  /  Alb  2.4<L>  /  TBili  0.3  /  DBili  x   /  AST  22  /  ALT  24  /  AlkPhos  204<H>  11-26    PT/INR - ( 24 Nov 2023 11:11 )   PT: 15.0 sec;   INR: 1.34 ratio    PTT - ( 24 Nov 2023 11:11 )  PTT:32.2 sec    Culture - Sputum (collected 25 Nov 2023 02:10)  Source: .Sputum Sputum  Gram Stain (25 Nov 2023 16:34):    Numerous polymorphonuclear leukocytes per low power field    Numerous Squamous epithelial cells per low power field    Moderate Gram positive cocci in pairs per oil power field    Moderate Gram Positive Rods per oil power field    Moderate Gram Negative Rods per oil power field    Few Gram Negative Coccobacilli per oil power field    Rare Yeast like cells per oil power field    Results consistent with oropharyngeal contamination  Preliminary Report (25 Nov 2023 23:12):    Normal Respiratory Breanna present    Culture - Urine (collected 24 Nov 2023 14:15)  Source: Clean Catch Clean Catch (Midstream)  Final Report (25 Nov 2023 15:39):    <10,000 CFU/mL Normal Urogenital Breanna    Culture - Blood (collected 24 Nov 2023 11:00)  Source: .Blood Blood-Peripheral  Preliminary Report (25 Nov 2023 14:02):    No growth at 24 hours    Culture - Blood (collected 24 Nov 2023 10:45)  Source: .Blood Blood-Peripheral  Preliminary Report (25 Nov 2023 14:02):    No growth at 24 hours    RADIOLOGY & ADDITIONAL TESTS:  Results Reviewed:   Imaging Personally Reviewed:  Electrocardiogram Personally Reviewed:    COORDINATION OF CARE:  Care Discussed with Consultants/Other Providers [Y/N]: RN, SW, CM, ACP re overall care  Prior or Outpatient Records Reviewed [Y/N]:   [facility] Division of Hospital Medicine  Zoya Juárez MD  Pager (M-F, 8A-5P):  In-house pager [phone]; Long-range pager [phone]  Other Times:  Please page Hospitalist in Charge -  In-house pager [phone]    Patient is a 83y old  Male who presents with a chief complaint of cough/pna (25 Nov 2023 15:53)    SUBJECTIVE / OVERNIGHT EVENTS:  No problems reported over night.  Seen earlier, requested family translate by phone. Called son Lazara. Pt feeling better. Cough bit better but still hurts when cough. Notes some mild RLE swelling. No calf tenderness.  ADDITIONAL REVIEW OF SYSTEMS:    MEDICATIONS  (STANDING):  atorvastatin 40 milliGRAM(s) Oral at bedtime  azithromycin  IVPB 500 milliGRAM(s) IV Intermittent every 24 hours  cefTRIAXone   IVPB 1000 milliGRAM(s) IV Intermittent every 24 hours  chlorhexidine 2% Cloths 1 Application(s) Topical <User Schedule>  finasteride 5 milliGRAM(s) Oral daily  folic acid 1 milliGRAM(s) Oral daily  heparin   Injectable 5000 Unit(s) SubCutaneous every 8 hours  losartan 50 milliGRAM(s) Oral daily  sodium chloride 0.9%. 1000 milliLiter(s) (75 mL/Hr) IV Continuous <Continuous>    MEDICATIONS  (PRN):  acetaminophen     Tablet .. 650 milliGRAM(s) Oral every 6 hours PRN Temp greater or equal to 38C (100.4F), Mild Pain (1 - 3), Moderate Pain (4 - 6)  aluminum hydroxide/magnesium hydroxide/simethicone Suspension 30 milliLiter(s) Oral every 4 hours PRN Dyspepsia  benzonatate 100 milliGRAM(s) Oral three times a day PRN for cough  hydrocodone/homatropine Syrup 5 milliLiter(s) Oral every 6 hours PRN Cough  melatonin 3 milliGRAM(s) Oral at bedtime PRN Insomnia  ondansetron Injectable 4 milliGRAM(s) IV Push every 8 hours PRN Nausea and/or Vomiting    I&O's Summary    25 Nov 2023 07:01  -  26 Nov 2023 07:00  --------------------------------------------------------  IN: 1375 mL / OUT: 200 mL / NET: 1175 mL    PHYSICAL EXAM:  Vital Signs Last 24 Hrs  T(C): 36.7 (26 Nov 2023 05:10), Max: 37.2 (25 Nov 2023 21:28)  T(F): 98 (26 Nov 2023 05:10), Max: 98.9 (25 Nov 2023 21:28)  HR: 98 (26 Nov 2023 05:10) (98 - 103)  BP: 144/86 (26 Nov 2023 05:10) (144/86 - 150/61)  BP(mean): --  RR: 18 (26 Nov 2023 05:10) (17 - 20)  SpO2: 93% (26 Nov 2023 05:10) (93% - 97%)    Parameters below as of 26 Nov 2023 05:10  Patient On (Oxygen Delivery Method): nasal cannula  O2 Flow (L/min): 2    CONSTITUTIONAL: NAD, well-developed, well-groomed  RESPIRATORY: dec BS R base  CARDIOVASCULAR: Regular rate and rhythm, normal S1 and S2, no murmur/rub/gallop; No lower extremity edema; Peripheral pulses are 2+ bilaterally  ABDOMEN: Nontender to palpation, normoactive bowel sounds, no rebound/guarding  MUSCLOSKELETAL: no clubbing or cyanosis of digits; no joint swelling or tenderness to palpation  PSYCH: A+O to person, place, and time; affect appropriate  NEUROLOGY: CN 2-12 are intact and symmetric; no gross sensory deficits;   SKIN: No rashes; no palpable lesions    LABS:                        7.9    8.20  )-----------( 370      ( 26 Nov 2023 06:39 )             25.0     11-26    134<L>  |  103  |  25<H>  ----------------------------<  98  4.5   |  20<L>  |  1.21    Ca    9.0      26 Nov 2023 06:39  Phos  3.4     11-26  Mg     1.90     11-26    TPro  6.5  /  Alb  2.4<L>  /  TBili  0.3  /  DBili  x   /  AST  22  /  ALT  24  /  AlkPhos  204<H>  11-26    PT/INR - ( 24 Nov 2023 11:11 )   PT: 15.0 sec;   INR: 1.34 ratio    PTT - ( 24 Nov 2023 11:11 )  PTT:32.2 sec    Culture - Sputum (collected 25 Nov 2023 02:10)  Source: .Sputum Sputum  Gram Stain (25 Nov 2023 16:34):    Numerous polymorphonuclear leukocytes per low power field    Numerous Squamous epithelial cells per low power field    Moderate Gram positive cocci in pairs per oil power field    Moderate Gram Positive Rods per oil power field    Moderate Gram Negative Rods per oil power field    Few Gram Negative Coccobacilli per oil power field    Rare Yeast like cells per oil power field    Results consistent with oropharyngeal contamination  Preliminary Report (25 Nov 2023 23:12):    Normal Respiratory Breanna present    Culture - Urine (collected 24 Nov 2023 14:15)  Source: Clean Catch Clean Catch (Midstream)  Final Report (25 Nov 2023 15:39):    <10,000 CFU/mL Normal Urogenital Breanna    Culture - Blood (collected 24 Nov 2023 11:00)  Source: .Blood Blood-Peripheral  Preliminary Report (25 Nov 2023 14:02):    No growth at 24 hours    Culture - Blood (collected 24 Nov 2023 10:45)  Source: .Blood Blood-Peripheral  Preliminary Report (25 Nov 2023 14:02):    No growth at 24 hours    RADIOLOGY & ADDITIONAL TESTS:  Results Reviewed:   Imaging Personally Reviewed:  Electrocardiogram Personally Reviewed:    COORDINATION OF CARE:  Care Discussed with Consultants/Other Providers [Y/N]: RN, SW, CM, ACP re overall care  Prior or Outpatient Records Reviewed [Y/N]:

## 2023-11-27 NOTE — PROVIDER CONTACT NOTE (OTHER) - RECOMMENDATIONS
Tylenol 975mg PO  Increase O2 to 4L
Get order for tylenol and reassess temperature in one hour.
ACP contacted

## 2023-11-27 NOTE — PROGRESS NOTE ADULT - ASSESSMENT
83M  never smoker, [place] speaking, with  metastatic Lung adeno carcinoma (1/2022 initially staged as IIIA, 10/2022: rebx showed metastatic disease, hence tx goal changed to palliative intent) currently on taxol/Tecentriq, last treatment 9 days ago, on home O2 PRN 2L NC, recurrent PNA, HTN and BPH send in directly from [facility] on 11/24/23 when patient arrived for scheduled chemo and was admitted for PNA, acute hypoxic respiratory failure with increased opacities on imaging

## 2023-11-27 NOTE — PROVIDER CONTACT NOTE (OTHER) - ACTION/TREATMENT ORDERED:
ACP ordered blood cultures, urine cultures, RVP swab, and stat tylenol. RN will send labs and administer tylenol per orders. RN will recheck temperature post-tylenol and monitor patient.
tylenol 975mg PO given  O2 increased to 4L via NC
Provider notified and tylenol ordered.

## 2023-11-27 NOTE — PROVIDER CONTACT NOTE (OTHER) - ASSESSMENT
Patient alert, oriented. C/o not "feeling good". Son and grand daughter at bedside to translate. Patient also c/o SOB.
Patient has oral temperature of 100.5
Upon assessment of patient they were feeling weak. /72, 103.1 rectal, , O2 saturation 95% on 2L NC. FS checked, resulted 133.

## 2023-11-27 NOTE — PROGRESS NOTE ADULT - SUBJECTIVE AND OBJECTIVE BOX
Patient is a 83y old  Male who presents with a chief complaint of cough/pna (26 Nov 2023 17:58)    Salty Ruiz MD   [facility] Division of Hospital Medicine   Pager [phone]  Reachable on [employer] English Teams     SUBJECTIVE / OVERNIGHT EVENTS:  Patient seen and examined this morning. Febrile overnight, RVP sent and now COVID positive.   No fevers, chills, nausea or vomiting.     MEDICATIONS  (STANDING):  atorvastatin 40 milliGRAM(s) Oral at bedtime  azithromycin  IVPB 500 milliGRAM(s) IV Intermittent every 24 hours  cefTRIAXone   IVPB 1000 milliGRAM(s) IV Intermittent every 24 hours  chlorhexidine 2% Cloths 1 Application(s) Topical <User Schedule>  finasteride 5 milliGRAM(s) Oral daily  folic acid 1 milliGRAM(s) Oral daily  heparin   Injectable 5000 Unit(s) SubCutaneous every 8 hours  losartan 50 milliGRAM(s) Oral daily  remdesivir  IVPB   IV Intermittent     MEDICATIONS  (PRN):  acetaminophen     Tablet .. 650 milliGRAM(s) Oral every 6 hours PRN Temp greater or equal to 38C (100.4F), Mild Pain (1 - 3), Moderate Pain (4 - 6)  aluminum hydroxide/magnesium hydroxide/simethicone Suspension 30 milliLiter(s) Oral every 4 hours PRN Dyspepsia  benzonatate 100 milliGRAM(s) Oral three times a day PRN for cough  hydrocodone/homatropine Syrup 5 milliLiter(s) Oral every 6 hours PRN Cough  melatonin 3 milliGRAM(s) Oral at bedtime PRN Insomnia  ondansetron Injectable 4 milliGRAM(s) IV Push every 8 hours PRN Nausea and/or Vomiting      Vital Signs Last 24 Hrs  T(C): 36.6 (27 Nov 2023 13:07), Max: 39.5 (27 Nov 2023 05:45)  T(F): 97.9 (27 Nov 2023 13:07), Max: 103.1 (27 Nov 2023 05:45)  HR: 92 (27 Nov 2023 13:07) (92 - 118)  BP: 116/51 (27 Nov 2023 13:07) (116/51 - 142/72)  BP(mean): --  RR: 18 (27 Nov 2023 13:07) (18 - 19)  SpO2: 95% (27 Nov 2023 13:07) (94% - 95%)  CAPILLARY BLOOD GLUCOSE      POCT Blood Glucose.: 131 mg/dL (27 Nov 2023 05:36)    I&O's Summary    26 Nov 2023 07:01  -  27 Nov 2023 07:00  --------------------------------------------------------  IN: 450 mL / OUT: 200 mL / NET: 250 mL        General: elderly man laying in bed appears comfortable in NAD, awake and alert  Eyes: conjunctiva clear, nonicteric sclera  HENMT: NCAT, MMM  Respiratory: No respiratory distress, CTABL, No rales, rhonchi, wheezing.  Cardiovascular: S1,S2; Regular rate and rhythm; No m/g/r. 2+ peripheral pulses  Gastrointestinal: Soft, Nontender, Nondistended; +BS.   Extremities: No c/c/e; warm to touch  Neurological: Moving all 4 extremities; Sensation to LT grossly in tact.  Skin: No rashes, No erythema   Psych: appropriate mood and affect    LABS:                        8.6    7.63  )-----------( 388      ( 27 Nov 2023 09:39 )             27.8     11-27    133<L>  |  100  |  24<H>  ----------------------------<  119<H>  4.8   |  22  |  1.49<H>    Ca    9.3      27 Nov 2023 09:39  Phos  4.2     11-27  Mg     1.90     11-27    TPro  7.3  /  Alb  2.8<L>  /  TBili  0.5  /  DBili  x   /  AST  40  /  ALT  40  /  AlkPhos  244<H>  11-27          Urinalysis Basic - ( 27 Nov 2023 09:39 )    Color: x / Appearance: x / SG: x / pH: x  Gluc: 119 mg/dL / Ketone: x  / Bili: x / Urobili: x   Blood: x / Protein: x / Nitrite: x   Leuk Esterase: x / RBC: x / WBC x   Sq Epi: x / Non Sq Epi: x / Bacteria: x        RADIOLOGY & ADDITIONAL TESTS:    Imaging Personally Reviewed:    Consultant(s) Notes Reviewed:      Care Discussed with Consultants/Other Providers:

## 2023-11-27 NOTE — PROVIDER CONTACT NOTE (OTHER) - BACKGROUND
Patient admitted with acute respiratory failure and has pmh of metastatic lung adenocarcinoma. Diagnosed with pneumonia, IV being treated with IV antibiotics.
Pt admitted with generalized weakness, productive cough, SOB. Blood cultures sent in ED today.
Patient sent in from [facility], admitted for PNA, acute resp. failure with increased opacities on Imaging.

## 2023-11-27 NOTE — PROVIDER CONTACT NOTE (OTHER) - SITUATION
Patient oral temperature 100.5F
Upon assessment of patient they were feeling weak. /72, 103.1 rectal, , O2 saturation 95% on 2L NC.
Patient's oral temp 102.

## 2023-11-28 NOTE — PROGRESS NOTE ADULT - PROBLEM SELECTOR PLAN 1
# Viral sepsis secondary to COVID-19, likely nosocomial   - febrile with tachycardia  - PCR positive 11/27  - c/w remdesivir

## 2023-11-28 NOTE — PROGRESS NOTE ADULT - SUBJECTIVE AND OBJECTIVE BOX
Patient is a 83y old  Male who presents with a chief complaint of cough/pna (27 Nov 2023 18:42)    Salty Ruiz MD   [facility] Division of Hospital Medicine   Pager [phone]  Reachable on [employer] Teams     SUBJECTIVE / OVERNIGHT EVENTS:  Patient seen and examined today, son at bedside performed translation per request of patient.   Febrile again overnight.   Moving around without any dyspnea.   No fevers, chills, nausea or vomiting    MEDICATIONS  (STANDING):  atorvastatin 40 milliGRAM(s) Oral at bedtime  azithromycin  IVPB 500 milliGRAM(s) IV Intermittent every 24 hours  cefTRIAXone   IVPB 1000 milliGRAM(s) IV Intermittent every 24 hours  chlorhexidine 2% Cloths 1 Application(s) Topical <User Schedule>  finasteride 5 milliGRAM(s) Oral daily  folic acid 1 milliGRAM(s) Oral daily  heparin   Injectable 5000 Unit(s) SubCutaneous every 8 hours  levalbuterol 45 MICROgram(s) HFA Inhaler 1 Puff(s) Inhalation every 6 hours  losartan 50 milliGRAM(s) Oral daily  remdesivir  IVPB   IV Intermittent   remdesivir  IVPB 100 milliGRAM(s) IV Intermittent every 24 hours    MEDICATIONS  (PRN):  acetaminophen     Tablet .. 650 milliGRAM(s) Oral every 6 hours PRN Temp greater or equal to 38C (100.4F), Mild Pain (1 - 3), Moderate Pain (4 - 6)  aluminum hydroxide/magnesium hydroxide/simethicone Suspension 30 milliLiter(s) Oral every 4 hours PRN Dyspepsia  benzonatate 100 milliGRAM(s) Oral three times a day PRN for cough  hydrocodone/homatropine Syrup 5 milliLiter(s) Oral every 6 hours PRN Cough  melatonin 3 milliGRAM(s) Oral at bedtime PRN Insomnia  ondansetron Injectable 4 milliGRAM(s) IV Push every 8 hours PRN Nausea and/or Vomiting      Vital Signs Last 24 Hrs  T(C): 36.7 (28 Nov 2023 12:38), Max: 38.9 (27 Nov 2023 20:58)  T(F): 98.1 (28 Nov 2023 12:38), Max: 102 (27 Nov 2023 20:58)  HR: 97 (28 Nov 2023 12:38) (90 - 105)  BP: 154/69 (28 Nov 2023 12:38) (138/56 - 157/73)  BP(mean): --  RR: 18 (28 Nov 2023 12:38) (18 - 22)  SpO2: 96% (28 Nov 2023 12:38) (92% - 96%)  CAPILLARY BLOOD GLUCOSE        I&O's Summary      General: NAD, awake and alert  Eyes: conjunctiva clear, nonicteric sclera  HENMT: NCAT, MMM  Neck: Supple, trachea midline   Respiratory: No respiratory distress, CTABL, No rales, rhonchi, wheezing.  Cardiovascular: S1,S2; Regular rate and rhythm; No m/g/r. 2+ peripheral pulses  Gastrointestinal: Soft, Nontender, Nondistended; +BS.   Extremities: No c/c/e; warm to touch  Neurological: Moving all 4 extremities; Sensation to LT grossly in tact.  Skin: No rashes, No erythema   Psych: AAOx3; appropriate mood and affect    LABS:                        7.9    5.63  )-----------( 361      ( 28 Nov 2023 07:04 )             25.2     11-28    137  |  102  |  23  ----------------------------<  104<H>  4.3   |  23  |  1.23    Ca    9.0      28 Nov 2023 07:04  Phos  3.3     11-28  Mg     1.90     11-28    TPro  6.7  /  Alb  2.5<L>  /  TBili  0.2  /  DBili  x   /  AST  40  /  ALT  42<H>  /  AlkPhos  226<H>  11-28          Urinalysis Basic - ( 28 Nov 2023 07:04 )    Color: x / Appearance: x / SG: x / pH: x  Gluc: 104 mg/dL / Ketone: x  / Bili: x / Urobili: x   Blood: x / Protein: x / Nitrite: x   Leuk Esterase: x / RBC: x / WBC x   Sq Epi: x / Non Sq Epi: x / Bacteria: x        RADIOLOGY & ADDITIONAL TESTS:    Imaging Personally Reviewed:    Consultant(s) Notes Reviewed:      Care Discussed with Consultants/Other Providers:

## 2023-11-28 NOTE — PROGRESS NOTE ADULT - ASSESSMENT
83M  never smoker, Citizen of [place] speaking, with  metastatic Lung adeno carcinoma (1/2022 initially staged as IIIA, 10/2022: rebx showed metastatic disease, hence tx goal changed to palliative intent) currently on taxol/Tecentriq, last treatment 9 days ago, on home O2 PRN 2L NC, recurrent PNA, HTN and BPH send in directly from [facility] on 11/24/23 when patient arrived for scheduled chemo and was admitted for PNA, acute hypoxic respiratory failure with increased opacities on imaging

## 2023-11-29 NOTE — PROGRESS NOTE ADULT - PROBLEM SELECTOR PLAN 3
Chronic stable  /56  Continue telmisartan 40mg daily formulary
Chronic stable  /56  Continue telmisartan 40mg daily formulary
mild RLE swelling   BLE dopplers negative for DVT

## 2023-11-29 NOTE — PROGRESS NOTE ADULT - PROBLEM SELECTOR PROBLEM 4
HLD (hyperlipidemia)
Benign essential HTN
Benign essential HTN
HLD (hyperlipidemia)
Benign essential HTN

## 2023-11-29 NOTE — PROGRESS NOTE ADULT - SUBJECTIVE AND OBJECTIVE BOX
Patient is a 83y old  Male who presents with a chief complaint of cough/pna (28 Nov 2023 17:09)    Salty Ruiz MD   [facility] Division of Hospital Medicine   Pager [phone]  Reachable on [employer] Teams     SUBJECTIVE / OVERNIGHT EVENTS:  Patient seen and examined this morning. Feeling tired..     MEDICATIONS  (STANDING):  atorvastatin 40 milliGRAM(s) Oral at bedtime  chlorhexidine 2% Cloths 1 Application(s) Topical <User Schedule>  finasteride 5 milliGRAM(s) Oral daily  folic acid 1 milliGRAM(s) Oral daily  heparin   Injectable 5000 Unit(s) SubCutaneous every 8 hours  levalbuterol 45 MICROgram(s) HFA Inhaler 1 Puff(s) Inhalation every 6 hours  losartan 50 milliGRAM(s) Oral daily  remdesivir  IVPB   IV Intermittent   remdesivir  IVPB 100 milliGRAM(s) IV Intermittent every 24 hours    MEDICATIONS  (PRN):  acetaminophen     Tablet .. 650 milliGRAM(s) Oral every 6 hours PRN Temp greater or equal to 38C (100.4F), Mild Pain (1 - 3), Moderate Pain (4 - 6)  aluminum hydroxide/magnesium hydroxide/simethicone Suspension 30 milliLiter(s) Oral every 4 hours PRN Dyspepsia  benzonatate 100 milliGRAM(s) Oral three times a day PRN for cough  hydrocodone/homatropine Syrup 5 milliLiter(s) Oral every 6 hours PRN Cough  melatonin 3 milliGRAM(s) Oral at bedtime PRN Insomnia  ondansetron Injectable 4 milliGRAM(s) IV Push every 8 hours PRN Nausea and/or Vomiting      Vital Signs Last 24 Hrs  T(C): 36.9 (29 Nov 2023 15:09), Max: 36.9 (29 Nov 2023 15:09)  T(F): 98.5 (29 Nov 2023 15:09), Max: 98.5 (29 Nov 2023 15:09)  HR: 81 (29 Nov 2023 15:09) (81 - 90)  BP: 143/67 (29 Nov 2023 15:09) (143/67 - 153/53)  BP(mean): --  RR: 16 (29 Nov 2023 15:09) (16 - 18)  SpO2: 99% (29 Nov 2023 15:09) (94% - 99%)  CAPILLARY BLOOD GLUCOSE        I&O's Summary      General: elderly man laying in bed appears comfortable in NAD, awake and alert  Eyes: conjunctiva clear, nonicteric sclera  HENMT: NCAT, MMM  Respiratory: No respiratory distress, CTABL, No rales, rhonchi, wheezing.  Cardiovascular: S1,S2; Regular rate and rhythm; No m/g/r. 2+ peripheral pulses  Gastrointestinal: Soft, Nontender, Nondistended; +BS.   Extremities: No c/c/e; warm to touch  Neurological: Moving all 4 extremities; Sensation to LT grossly in tact.  Skin: No rashes, No erythema   Psych: appropriate mood and affect      LABS:                        8.2    6.54  )-----------( 370      ( 29 Nov 2023 06:40 )             25.8     11-29    135  |  101  |  22  ----------------------------<  92  4.1   |  23  |  1.10    Ca    8.8      29 Nov 2023 06:40  Phos  3.5     11-29  Mg     1.80     11-29    TPro  6.6  /  Alb  2.4<L>  /  TBili  <0.2  /  DBili  x   /  AST  31  /  ALT  32  /  AlkPhos  190<H>  11-29          Urinalysis Basic - ( 29 Nov 2023 06:40 )    Color: x / Appearance: x / SG: x / pH: x  Gluc: 92 mg/dL / Ketone: x  / Bili: x / Urobili: x   Blood: x / Protein: x / Nitrite: x   Leuk Esterase: x / RBC: x / WBC x   Sq Epi: x / Non Sq Epi: x / Bacteria: x        RADIOLOGY & ADDITIONAL TESTS:    Imaging Personally Reviewed:    Consultant(s) Notes Reviewed:      Care Discussed with Consultants/Other Providers:

## 2023-11-29 NOTE — PROGRESS NOTE ADULT - PROBLEM SELECTOR PLAN 7
cr stable at baseline ~1.5  continue to monitor

## 2023-11-29 NOTE — PROGRESS NOTE ADULT - PROBLEM SELECTOR PLAN 2
on admission RR 20 satting 95% on3L NC (baseline 2L NC PRN)  CTPA: Interval slight increase of nodules and patchy/consolidative opacities  within mid to lower lungs. Small loculated right pleural effusion and  underlying right pleural thickening increased from prior exam.  treating for pneumonia - c/w Rocephin/ azithromycin  MRSA pending;  legionella negative
New  CTPA: Interval slight increase of nodules and patchy/consolidative opacities  within mid to lower lungs. Small loculated right pleural effusion and  underlying right pleural thickening increased from prior exam.  Rocephin/ azithromycin started   Wean O2 as tolerated  MRSA, legionella ordered  Monitor
# Acute on Chronic respiratory failure, POA  on admission RR 20 satting 95% on3L NC (baseline 2L NC PRN)  CTPA: Interval slight increase of nodules and patchy/consolidative opacities  within mid to lower lungs. Small loculated right pleural effusion and  underlying right pleural thickening increased from prior exam.  treating for pneumonia   - c/w Rocephin/ azithromycin to complete 5 day course
mild RLE swelling but will be cautious and check BLE US to r/o DVT
# Acute on Chronic respiratory failure, POA  on admission RR 20 satting 95% on3L NC (baseline 2L NC PRN)  CTPA: Interval slight increase of nodules and patchy/consolidative opacities  within mid to lower lungs. Small loculated right pleural effusion and  underlying right pleural thickening increased from prior exam.  treating for pneumonia   - c/w Rocephin/ azithromycin to complete 5 day course

## 2023-11-29 NOTE — PROGRESS NOTE ADULT - PROBLEM SELECTOR PLAN 1
# Viral sepsis secondary to COVID-19, likely nosocomial   - febrile with tachycardia  - PCR positive 11/27  - c/w remdesivir  - holding on dexamethasone esha patient on home level O2   - lovenox per COVID protocol

## 2023-11-29 NOTE — PROGRESS NOTE ADULT - ASSESSMENT
83M  never smoker, Burmese speaking, with  metastatic Lung adeno carcinoma (1/2022 initially staged as IIIA, 10/2022: rebx showed metastatic disease, hence tx goal changed to palliative intent) currently on taxol/Tecentriq, last treatment 9 days ago, on home O2 PRN 2L NC, recurrent PNA, HTN and BPH send in directly from [facility] on 11/24/23 when patient arrived for scheduled chemo and was admitted for PNA, acute hypoxic respiratory failure with increased opacities on imaging

## 2023-11-29 NOTE — PROGRESS NOTE ADULT - PROBLEM SELECTOR PLAN 6
on active treatement, follows with Dr. Domingo  appreciate onc input - received dose of taxol which may cause complications with neutropenia   monitor daily CBC.

## 2023-11-30 NOTE — CHART NOTE - NSCHARTNOTEFT_GEN_A_CORE
Patient COVID+ and continues to be treated with remdesivir; completed Abx for PNA. Not neutropenic.  Chemotherapy on hold during this inpatient acute issue while primary team treating for COVID/PNA.  Pt already has appointment scheduled with outpatient oncologist Dr. Domingo on 12/14/23 at 8:30 am, as well as taxol/tecentriq chemo appointment on 12/6/23 at 9am which may need to be moved. Please reach out to Dr. Domingo's office prior to discharge to notify when pt leaving hospital.   Oncology to sign off. Please reach out with any questions, concerns, or changes in clinical status. Patient COVID+ and continues to be treated with remdesivir; completed Abx for PNA. Not neutropenic.  Chemotherapy on hold during this inpatient acute issue while primary team treating for COVID/PNA.  Pt already has appointment scheduled with outpatient oncologist Dr. Domingo on 12/14/23 at 8:30 am, as well as taxol/tecentriq chemo appointment on 12/6/23 at 9am which may need to be moved. Please reach out to Dr. Domingo's office prior to discharge to notify when pt leaving hospital.   Oncology to sign off. Please reach back out if pt fails to improve with PNA/COVID treatment, or with any questions, concerns, or changes in clinical status.

## 2023-11-30 NOTE — DISCHARGE NOTE NURSING/CASE MANAGEMENT/SOCIAL WORK - NSDCPEFALRISK_GEN_ALL_CORE
For information on Fall & Injury Prevention, visit: [email or web address] OR  [email or web address] OR  https://www.cdc.gov/steadi/patient.html ICU

## 2023-11-30 NOTE — DISCHARGE NOTE NURSING/CASE MANAGEMENT/SOCIAL WORK - NSDCVIVACCINE_GEN_ALL_CORE_FT
Tdap; 02-Oct-2017 21:58; Nica Joyce (RN); [employer]; [id number]; IntraMuscular; Deltoid Right.; 0.5 milliLiter(s); VIS (VIS Published: 09-May-2013, VIS Presented: 02-Oct-2017);

## 2023-11-30 NOTE — CHART NOTE - NSCHARTNOTEFT_GEN_A_CORE
Xarelto sent to [facility], per pharmacy, cost is $528. Discussed with patient's son, Lazara, who is not amenable to this cost. Provider discussed with medicine attending, Lovenox sent to [facility] instead. Per [facility], cost is $80. Provider discussed with Lazara who is agreeable to paying this copay. Per discussion, patient's granddaughter is a nurse who lives with patient and will be able to assist with injections. Provider requested that RN provide subcutaneous Lovenox injections for patient and son at bedside.

## 2023-11-30 NOTE — DISCHARGE NOTE NURSING/CASE MANAGEMENT/SOCIAL WORK - NSDCPETBCESMAN_GEN_ALL_CORE
If you are a smoker, it is important for your health to stop smoking. Please be aware that second hand smoke is also harmful. The patient presents with dependent edema and pain.

## 2023-11-30 NOTE — DISCHARGE NOTE NURSING/CASE MANAGEMENT/SOCIAL WORK - PATIENT PORTAL LINK FT
You can access the FollowMyHealth Patient Portal offered by [facility] by registering at the following website: [email or web address]. By joining [employer]’s FollowMyHealth portal, you will also be able to view your health information using other applications (apps) compatible with our system.

## 2023-11-30 NOTE — CHART NOTE - NSCHARTNOTEFT_GEN_A_CORE
Provider emailed patient's outpatient oncologist per oncology fellow request to notify of plan for discharge home today.

## 2023-11-30 NOTE — DISCHARGE NOTE NURSING/CASE MANAGEMENT/SOCIAL WORK - NSDCFUADDAPPT_GEN_ALL_CORE_FT
Please follow up with your primary care provider, Dr. Alejandro Ugalde, for your already scheduled appointment on Monday 12/4. Please also follow up with your oncologist as scheduled.

## 2023-12-18 NOTE — PHYSICAL EXAM
[No Acute Distress] : no acute distress [Normal Oropharynx] : normal oropharynx [Normal Appearance] : normal appearance [No Neck Mass] : no neck mass [Normal Rate/Rhythm] : normal rate/rhythm [Normal S1, S2] : normal s1, s2 [No Murmurs] : no murmurs [No Resp Distress] : no resp distress [No Abnormalities] : no abnormalities [Benign] : benign [Normal Gait] : normal gait [No Clubbing] : no clubbing [No Cyanosis] : no cyanosis [No Edema] : no edema [FROM] : FROM [Normal Color/ Pigmentation] : normal color/ pigmentation [No Focal Deficits] : no focal deficits [Oriented x3] : oriented x3 [Normal Affect] : normal affect [TextBox_2] : Frequently coughing and coughing up phlegm.  But appears strong and in no distress [TextBox_68] : [place]

## 2023-12-18 NOTE — DISCUSSION/SUMMARY
[FreeTextEntry1] : Have somewhat of a difficult time with a pulmonary function test.  Though there is a significant response to bronchodilators a more importantly he felt better after the albuterol  I am ordering a nebulizer and will start him on albuterol, 3-4 times a day if needed.  Humidifying his oxygen as well   I refilled 1 month supply of his statin and his antihypertensive as they were unable to make it to their doctors today  He will follow-up with oncology, planned again for chemo. .

## 2023-12-18 NOTE — HISTORY OF PRESENT ILLNESS
[Never] : never [TextBox_4] : Patient here for initial consult, he is accompanied by his son and grandson.  83-year-old male, lifelong non-smoker.  Austrian speaking.   Advanced lung adenocarcinoma Also recent admission in November, COVID.  Treated with remdesivir.  Progression of tumor burden. Discharged from that admission with home oxygen, he has a stationary concentrator and a home fill system.  Generally uses 3 L. On immunotherapy.  Also reconsidering starting chemo again in January. Patient with complaints of general fatigue, dyspnea with exertion, and also just cough constant with phlegm production.  Though sometimes has difficulty coughing up the phlegm.  He is a lifelong non-smoker.  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

## 2023-12-24 NOTE — ASSESSMENT
[FreeTextEntry1] : 82 yo m with at least stage IIIA lung adeno ca, PDL1 0%, NGS showed KRAS G12V mut in addition to a few other un-actionable ones. Baseline PET/CT personally- disease confined to rt RLL and RML- some activity in subcarinal region. Bx from RLL pos for adeno ca, lavage showed atypical cells, and level 7 LN- was benign. I presented the case at  and the group concurred that optimal staging would involve sampling of rt middle lobe. SUV on rt middle lobe mass is lower than that of RLL. Pt underwent RML through [facility] and was pos for adeno ca (80- [id number]). MRI brain on 2/17 showed no mets. Based on AJCC 8th staging, pt was staged as stage IIIA lung cancer. Based on PFTs he was deemed potentially resectable by Dr. Medina. Based on recent Checkmate 816 study, neoadjuvant chemo+nivo was thought likely to yield higher rates of CR and is an FDA-approved option. He started chemoimmunotherapy (Carbo/pem/nivo) for 4 cycles. PET/CT from May 2022 after 4 cycles was very confusing. There was mixed response on rt- right middle lobe mass is smaller and less FDG-avid but LL mass was larger and still quite avid. There is also some process on left side which was thought to be related to recent viral infection. The PET/CT picture suggesting POD was not in agreement with clinical exam or tumor-informed ctDNA testing through [facility] in which level decreased from 0.91 MTM/ml to undetectable level in June (subsequently was detectable at 0.05 MTM/ml in July). Repeat CT without contrast a month later, on 6/28/22 showed persistent changes, and to my eye, these findings were more c/w organizing PNA likely from IO rather than true POD. Therefore, I recommended a bx. Pt was feeling well and hesitant to undergo any bx. Hence, we decided to watch off tx and repeat CT in 2-3 months. The scans done in September show stable 7 x 4.4 cm mass in the right lower lobe which has not significantly changed when compared to previous exam. Numerous solid nodules are noted within both lungs. Many of them have increased in size. ctDNA levels were also rising slowly. Pt finally underwent surgical bx on 10/28/22 confirming malignancy. Discussed the incurable nature of disease and palliative intent of tx. Since pt responded to chemoIO previously, I favored taxane/IO. However, pt was not interested in chemo given potential AEs but was amenable to IO alone. Hence, he started Tecentriq q 3 weeks on 12/14. He was tolerating well with no AEs. CT scan from March 2023 show that a few nodules have increased in size however adequate comparison was limited due to differences in technique and inspiratory effort. ctDNA from March 2023 undetectable. CT scans done in May given worsening cough and SOB showed increased right pleural effusion with subtle nodularity along the posterior pleura as well as slight increase in the size of the right upper lobe nodule. Her underwent thoracentesis with IR and pleural fluid showed no malignant cells. He was hospitalized in August 2023 for pneumonia and CT scan at that time showed mixed GGO in the lingula progressed, increased size of JUANPABLO nodule, increased right lung nodule. PET-CT shows POD. Explored clinical trial options- there are no trials that pt will be eligible for at the moment. Therefore, we added Taxol 175 mg/m2 Q 3 weeks to Tecentriq.  After C1, there was a decline in PS and hence, for cycle 2, Taxol was held 2/2 decreased PS. Taxol was reintroduced C3 but lower and split dose over D1D8.  Pt had been tolerating this better and overall stable but recent COVID infection resulted in decreased PS and now on home O2 Still fatigued and hypoxic, with increased cough ctDNA with signatera has been pos; up and down, but still detectable at relatively high levels. Cough: Continue promethazine-codeine q6hrs PRN and continue benzonatate 200mg TID. Today, pt is still quite debiltated from recent COVID Therefore, we will hold Taxol and proceed with  Tecentriq alone. Discussed in detail, the rationale for this recommendation and pt verbalized understanding Discussed at depth the need for timely communication of any AEs  Labs reviewed (CBC, CMP, TSH, Mg and CEA) OV in 3 weeks with next treatment

## 2023-12-24 NOTE — HISTORY OF PRESENT ILLNESS
[Disease: _____________________] : Disease: [unfilled] [T: ___] : T[unfilled] [N: ___] : N[unfilled] [M: ___] : M[unfilled] [AJCC Stage: ____] : AJCC Stage: [unfilled] [Treatment Protocol] : Treatment Protocol [de-identified] : Mr. Del Rio is a pleasant 83 M, Luxembourger (very little English), speaking with PMH of HTN and BPH, never smoker but previously worked in building maintenance presented initially 1/21/22 with chronic dry cough x1 year and worsened dyspnea on exertion for 3 weeks, admitted to [facility] after found on CT to have to have a 5.3 x 2.8 cm masslike consolidation in the RLL. On 1/25, he underwent inpatient bronchoscopy and endobronchial ultrasound with RLL BAL, TBNA of station 7 lymph node, RLL Mass transbronchial biopsy which was suspicious for malignancy.  He saw Dr. Moran and was recommended for PET/CT 2/7/22:  large FDG-avid consolidations in right middle lobe and right lower lobe, unchanged as compared to CT dated 1/21/2022, compatible with known adenocarcinoma. Small FDG-avid nodule in right lung is suspicious for another site of disease. A mildly FDG-avid left lower lobe peripheral opacity, also unchanged on CT, is indeterminate. 2. Mildly FDG-avid subcarinal lymph node is nonspecific. Pt does not wish to use  phone. Grand-daughter provided the translation as per patient request. Other than cough, he has no other symptoms. He has tried OTC cough meds with no help.   2/18/22: Pt seen vis tel. No new complaints. He still has cough which is persistent and dry.   4/7/22: Pt seen via tele. 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   5/3/22: Pt seen in tx room. He received cycle 1 3 weeks ago. Tolerated well with absolutely no AEs. He has since seen Dr. Medina who will see him again after scans post 3 cycles of tx. Pt here for cycle 2.   5/24/22: Since last visit, pt has had an eventful course. He was recently hospitalized with c/o fever, URI symptoms, myalgia and was found to have non-COVID coronavirus infection. Pt also febrile/tachycardic on admission, which improved with supportive care/abx. CXR suggested RLL consolidation (thought his could be known lung mass). He was started on vanc/zosyn in ED 5/20, followed by ceftriaxone and azithromycin for possible superimposed PNA in the setting of immunosuppression. Pt improved with all this and is now completing oral course of abx (last day tomorrow). Incidentally, pt was also noted to have ROSS (acute kidney injury) with admission SCr 1.88, baseline 1.2-1.3 thought to be related to sepsis/poor PO intake and dehydration. He was treated with IV hydration and home ARB was held (he continues to not take this at this time).  He presents today for planned 3/3 cycle of chemoIO. He notes no fever, chills and cough has markedly improved.   6/16/22: Pt was diagnosed with corona virus infection, after ER visit for fever and cough. Cough has decreased and pt is fine now.   6/30/22: Cough improved. Otherwise feeling well  7/19/22: saw Dr. Moran. A bx of left sided lesion was recommended. pt wanted to discuss with me. He feels well. denies any dyspnea or cough or other symptoms.   9/16/22: Seen today with son, Rich. Patient has no complaints. Is doing well. Has not received any cancer tx since 6/23.   10/25/22: Pt seen vis televisit. He is feeling well. Has been active and notes no difference in status since last visit.  11/25/22: Per pt's son, everything is stable. Pt remains active. Since last visit, he has had Lt VATS, JUANPABLO and LLL wedge rxn on 10/28/22. Path revealed invasive mucinous AdenoCA.   12/6/22: Pt here for follow up. He has cough but otherwise no symptoms.   1/5/23: Cough is persistent. No other complaints.  2/15/23: Patient seen today for follow up in treatment room. Has been tolerating well with no AEs to report. Ongoing cough not adequately relieved by OTC cough syrup   3/8/23: Pt seen today for follow accompanied by his son. Ongoing cough, no other complaints.   3/29/23: patient seen today accompanied by his son. Reports that since he did his CT scan last week, his cough has been more frequent. He has also ran out of Benzonatate. He also notes that right after CT scan was done he had an episode of emesis, no nausea or emesis since then.   4/19/23: seen today accompanied by his son. Continues on tecentriq and is tolerating well thus far. He continues to have dry cough and does not feel that benzonatate is adequately helping him. His breathing is stable, no pain, appetite is good. He is also thinking about traveling to [facility] some time soon   5/10/23: Patient seen today for follow up accompanied by his grand-daughter. Patient reports that since last visit, he has been feeling more SOB with exertion. His cough is persistent, minimally relieved by benzonatate 200mg TID, and is now productive.   6/21/23: Patient seen today for follow up with his granddaughter. They report that his sough has improved since starting promethazine-codeine. He saw IR yesterday for evaluation for biopsy and thora however biopsy is not possible but will move forward with thoracentesis. He is otherwise doing well   7/12/23: Pt seen in office. He is accompanied by his son He reports that his cough is worse, he also notes some pleuritic CP on left side. cough is more productive than it has been in the past. pt denies any fever, but does have night sweats.   8/23/23: Patient seen today for follow up accompanied by his son Sindi. They report that the patient was hospitalized at [facility] 2 weeks ago due to increased cough and was treated for PNA who improvement of his symptoms. Today he reports his breathing is better, no fever, however cough has only minimally improved. He continues to use benzonatate and promethazine/codeine. Pleuritic chest pain has resolved.   9/13/23: Patient seen today for follow up accompanied by his son Lea. They report that the patients breathing is stable, cough is somewhat improved.   9/20/23: Cough and dyspnea persist. He cannot sleep on his side.   10/4/23: Patient seen today accompanied by his son and his other son Sindi attending the visit via telephone. He is here today for tecentriq with the addition of Taxol. He took dex 20mg last night. He reports persistent dyspnea as well as worsened cough which he attributes to running out of his cough medication. He otherwise feels well.   10/23/23: After last tx, pt nausea, confusion, fatigue for several days. All these have improved, but not completely resolved. Pt has been coughing more.   11/15/23: Pt seen in treatment room in the company of his son. Reports continued cough and SOB. Using supplemental O2 at home. O2 sat today 90% RA at rest. He has been using cough medicine but reports only minimal relief. Taxol was held last treatment 2/2 PS but will reintroduced today split dose D1D8. No hemoptysis. No fevers. CT chest was done which revealed consolidation and nodules in all lobes, representing known lung cancer, mildly decreased in the right upper lobe and elsewhere unchanged.   12/14/23: Was hospitalized [facility] COVID end of November. He was treated with Remdesinvir and is now on home O2. He has been feeling extremely fatigued since then. He has increased cough with thin expectoration. Appetite is fair, but he has lost a lot of weight.  [de-identified] : adeno ca

## 2023-12-24 NOTE — REVIEW OF SYSTEMS
[Fatigue] : fatigue [Cough] : cough [SOB on Exertion] : shortness of breath during exertion [Abdominal Pain] : abdominal pain [Vomiting] : vomiting [Constipation] : constipation [Negative] : Allergic/Immunologic [Fever] : no fever [Dysphagia] : no dysphagia [Chest Pain] : no chest pain [Shortness Of Breath] : no shortness of breath [Joint Pain] : no joint pain [Skin Rash] : no skin rash [FreeTextEntry2] : as above

## 2023-12-24 NOTE — PHYSICAL EXAM
[Restricted in physically strenuous activity but ambulatory and able to carry out work of a light or sedentary nature] : Status 1- Restricted in physically strenuous activity but ambulatory and able to carry out work of a light or sedentary nature, e.g., light house work, office work [Normal] : affect appropriate [de-identified] : rhonchi noted over LLL and RLL

## 2024-01-01 ENCOUNTER — APPOINTMENT (OUTPATIENT)
Dept: HEMATOLOGY ONCOLOGY | Facility: CLINIC | Age: 84
End: 2024-01-01
Payer: MEDICARE

## 2024-01-01 ENCOUNTER — TRANSCRIPTION ENCOUNTER (OUTPATIENT)
Age: 84
End: 2024-01-01

## 2024-01-01 ENCOUNTER — APPOINTMENT (OUTPATIENT)
Dept: HEMATOLOGY ONCOLOGY | Facility: CLINIC | Age: 84
End: 2024-01-01

## 2024-01-01 ENCOUNTER — RESULT REVIEW (OUTPATIENT)
Age: 84
End: 2024-01-01

## 2024-01-01 ENCOUNTER — APPOINTMENT (OUTPATIENT)
Dept: INFUSION THERAPY | Facility: HOSPITAL | Age: 84
End: 2024-01-01

## 2024-01-01 ENCOUNTER — RX RENEWAL (OUTPATIENT)
Age: 84
End: 2024-01-01

## 2024-01-01 ENCOUNTER — OUTPATIENT (OUTPATIENT)
Dept: OUTPATIENT SERVICES | Facility: HOSPITAL | Age: 84
LOS: 1 days | End: 2024-01-01

## 2024-01-01 ENCOUNTER — INPATIENT (INPATIENT)
Facility: HOSPITAL | Age: 84
LOS: 7 days | Discharge: HOME | DRG: 871 | End: 2024-05-07
Attending: STUDENT IN AN ORGANIZED HEALTH CARE EDUCATION/TRAINING PROGRAM | Admitting: STUDENT IN AN ORGANIZED HEALTH CARE EDUCATION/TRAINING PROGRAM
Payer: MEDICARE

## 2024-01-01 ENCOUNTER — NON-APPOINTMENT (OUTPATIENT)
Age: 84
End: 2024-01-01

## 2024-01-01 ENCOUNTER — OUTPATIENT (OUTPATIENT)
Dept: OUTPATIENT SERVICES | Facility: HOSPITAL | Age: 84
LOS: 1 days | Discharge: HOME | End: 2024-01-01

## 2024-01-01 ENCOUNTER — APPOINTMENT (OUTPATIENT)
Dept: PULMONOLOGY | Facility: CLINIC | Age: 84
End: 2024-01-01
Payer: MEDICARE

## 2024-01-01 ENCOUNTER — APPOINTMENT (OUTPATIENT)
Dept: CT IMAGING | Facility: CLINIC | Age: 84
End: 2024-01-01
Payer: MEDICARE

## 2024-01-01 VITALS
WEIGHT: 171 LBS | OXYGEN SATURATION: 93 % | HEIGHT: 68 IN | RESPIRATION RATE: 17 BRPM | SYSTOLIC BLOOD PRESSURE: 133 MMHG | HEART RATE: 85 BPM | TEMPERATURE: 97.4 F | BODY MASS INDEX: 25.91 KG/M2 | DIASTOLIC BLOOD PRESSURE: 76 MMHG

## 2024-01-01 VITALS
OXYGEN SATURATION: 89 % | HEIGHT: 68 IN | HEART RATE: 129 BPM | WEIGHT: 169.98 LBS | RESPIRATION RATE: 24 BRPM | TEMPERATURE: 98 F | SYSTOLIC BLOOD PRESSURE: 192 MMHG | DIASTOLIC BLOOD PRESSURE: 87 MMHG

## 2024-01-01 VITALS
OXYGEN SATURATION: 96 % | RESPIRATION RATE: 17 BRPM | DIASTOLIC BLOOD PRESSURE: 71 MMHG | HEART RATE: 76 BPM | TEMPERATURE: 98 F | SYSTOLIC BLOOD PRESSURE: 131 MMHG

## 2024-01-01 DIAGNOSIS — R93.89 ABNORMAL FINDINGS ON DIAGNOSTIC IMAGING OF OTHER SPECIFIED BODY STRUCTURES: ICD-10-CM

## 2024-01-01 DIAGNOSIS — R79.89 OTHER SPECIFIED ABNORMAL FINDINGS OF BLOOD CHEMISTRY: ICD-10-CM

## 2024-01-01 DIAGNOSIS — Z51.11 ENCOUNTER FOR ANTINEOPLASTIC CHEMOTHERAPY: ICD-10-CM

## 2024-01-01 DIAGNOSIS — Z98.890 OTHER SPECIFIED POSTPROCEDURAL STATES: Chronic | ICD-10-CM

## 2024-01-01 DIAGNOSIS — D64.9 ANEMIA, UNSPECIFIED: ICD-10-CM

## 2024-01-01 DIAGNOSIS — R11.2 NAUSEA WITH VOMITING, UNSPECIFIED: ICD-10-CM

## 2024-01-01 DIAGNOSIS — C34.90 MALIGNANT NEOPLASM OF UNSPECIFIED PART OF UNSPECIFIED BRONCHUS OR LUNG: ICD-10-CM

## 2024-01-01 DIAGNOSIS — E78.5 HYPERLIPIDEMIA, UNSPECIFIED: ICD-10-CM

## 2024-01-01 DIAGNOSIS — C34.91 MALIGNANT NEOPLASM OF UNSPECIFIED PART OF RIGHT BRONCHUS OR LUNG: ICD-10-CM

## 2024-01-01 DIAGNOSIS — J96.01 ACUTE RESPIRATORY FAILURE WITH HYPOXIA: ICD-10-CM

## 2024-01-01 DIAGNOSIS — I10 ESSENTIAL (PRIMARY) HYPERTENSION: ICD-10-CM

## 2024-01-01 DIAGNOSIS — R53.81 OTHER MALAISE: ICD-10-CM

## 2024-01-01 DIAGNOSIS — Z29.9 ENCOUNTER FOR PROPHYLACTIC MEASURES, UNSPECIFIED: ICD-10-CM

## 2024-01-01 DIAGNOSIS — Z51.5 ENCOUNTER FOR PALLIATIVE CARE: ICD-10-CM

## 2024-01-01 DIAGNOSIS — Z87.438 PERSONAL HISTORY OF OTHER DISEASES OF MALE GENITAL ORGANS: ICD-10-CM

## 2024-01-01 DIAGNOSIS — C26.0 MALIGNANT NEOPLASM OF INTESTINAL TRACT, PART UNSPECIFIED: ICD-10-CM

## 2024-01-01 DIAGNOSIS — Z74.09 OTHER REDUCED MOBILITY: ICD-10-CM

## 2024-01-01 DIAGNOSIS — R29.898 OTHER SYMPTOMS AND SIGNS INVOLVING THE MUSCULOSKELETAL SYSTEM: ICD-10-CM

## 2024-01-01 DIAGNOSIS — R05.3 CHRONIC COUGH: ICD-10-CM

## 2024-01-01 DIAGNOSIS — J18.9 PNEUMONIA, UNSPECIFIED ORGANISM: ICD-10-CM

## 2024-01-01 DIAGNOSIS — E44.0 MODERATE PROTEIN-CALORIE MALNUTRITION: ICD-10-CM

## 2024-01-01 DIAGNOSIS — Z51.89 ENCOUNTER FOR OTHER SPECIFIED AFTERCARE: ICD-10-CM

## 2024-01-01 DIAGNOSIS — A41.9 SEPSIS, UNSPECIFIED ORGANISM: ICD-10-CM

## 2024-01-01 DIAGNOSIS — G70.9 MYONEURAL DISORDER, UNSPECIFIED: ICD-10-CM

## 2024-01-01 DIAGNOSIS — J45.901 UNSPECIFIED ASTHMA WITH (ACUTE) EXACERBATION: ICD-10-CM

## 2024-01-01 LAB
ALBUMIN SERPL ELPH-MCNC: 2.1 G/DL — LOW (ref 3.5–5)
ALBUMIN SERPL ELPH-MCNC: 2.1 G/DL — LOW (ref 3.5–5)
ALBUMIN SERPL ELPH-MCNC: 2.2 G/DL — LOW (ref 3.5–5)
ALBUMIN SERPL ELPH-MCNC: 2.3 G/DL — LOW (ref 3.5–5)
ALBUMIN SERPL ELPH-MCNC: 2.4 G/DL — LOW (ref 3.5–5)
ALBUMIN SERPL ELPH-MCNC: 2.4 G/DL — LOW (ref 3.5–5)
ALBUMIN SERPL ELPH-MCNC: 3 G/DL — LOW (ref 3.3–5)
ALBUMIN SERPL ELPH-MCNC: 3 G/DL — LOW (ref 3.3–5)
ALBUMIN SERPL ELPH-MCNC: 3.1 G/DL — LOW (ref 3.3–5)
ALBUMIN SERPL ELPH-MCNC: 3.2 G/DL — LOW (ref 3.3–5)
ALBUMIN SERPL ELPH-MCNC: 3.3 G/DL — SIGNIFICANT CHANGE UP (ref 3.3–5)
ALBUMIN SERPL ELPH-MCNC: 3.4 G/DL — SIGNIFICANT CHANGE UP (ref 3.3–5)
ALP SERPL-CCNC: 100 U/L — SIGNIFICANT CHANGE UP (ref 40–120)
ALP SERPL-CCNC: 110 U/L — SIGNIFICANT CHANGE UP (ref 40–120)
ALP SERPL-CCNC: 114 U/L — SIGNIFICANT CHANGE UP (ref 40–120)
ALP SERPL-CCNC: 124 U/L — HIGH (ref 40–120)
ALP SERPL-CCNC: 70 U/L — SIGNIFICANT CHANGE UP (ref 40–120)
ALP SERPL-CCNC: 70 U/L — SIGNIFICANT CHANGE UP (ref 40–120)
ALP SERPL-CCNC: 71 U/L — SIGNIFICANT CHANGE UP (ref 40–120)
ALP SERPL-CCNC: 77 U/L — SIGNIFICANT CHANGE UP (ref 40–120)
ALP SERPL-CCNC: 80 U/L — SIGNIFICANT CHANGE UP (ref 40–120)
ALP SERPL-CCNC: 82 U/L — SIGNIFICANT CHANGE UP (ref 40–120)
ALP SERPL-CCNC: 83 U/L — SIGNIFICANT CHANGE UP (ref 40–120)
ALP SERPL-CCNC: 84 U/L — SIGNIFICANT CHANGE UP (ref 40–120)
ALP SERPL-CCNC: 92 U/L — SIGNIFICANT CHANGE UP (ref 40–120)
ALP SERPL-CCNC: 95 U/L — SIGNIFICANT CHANGE UP (ref 40–120)
ALP SERPL-CCNC: 97 U/L — SIGNIFICANT CHANGE UP (ref 40–120)
ALP SERPL-CCNC: 98 U/L — SIGNIFICANT CHANGE UP (ref 40–120)
ALT FLD-CCNC: 10 U/L — SIGNIFICANT CHANGE UP (ref 10–45)
ALT FLD-CCNC: 10 U/L — SIGNIFICANT CHANGE UP (ref 10–45)
ALT FLD-CCNC: 15 U/L — SIGNIFICANT CHANGE UP (ref 10–45)
ALT FLD-CCNC: 18 U/L DA — SIGNIFICANT CHANGE UP (ref 10–60)
ALT FLD-CCNC: 18 U/L DA — SIGNIFICANT CHANGE UP (ref 10–60)
ALT FLD-CCNC: 19 U/L DA — SIGNIFICANT CHANGE UP (ref 10–60)
ALT FLD-CCNC: 19 U/L DA — SIGNIFICANT CHANGE UP (ref 10–60)
ALT FLD-CCNC: 19 U/L — SIGNIFICANT CHANGE UP (ref 10–45)
ALT FLD-CCNC: 20 U/L DA — SIGNIFICANT CHANGE UP (ref 10–60)
ALT FLD-CCNC: 20 U/L — SIGNIFICANT CHANGE UP (ref 10–45)
ALT FLD-CCNC: 20 U/L — SIGNIFICANT CHANGE UP (ref 10–45)
ALT FLD-CCNC: 21 U/L DA — SIGNIFICANT CHANGE UP (ref 10–60)
ALT FLD-CCNC: 24 U/L — SIGNIFICANT CHANGE UP (ref 10–45)
ALT FLD-CCNC: 25 U/L — SIGNIFICANT CHANGE UP (ref 10–45)
ALT FLD-CCNC: 27 U/L DA — SIGNIFICANT CHANGE UP (ref 10–60)
ALT FLD-CCNC: 29 U/L — SIGNIFICANT CHANGE UP (ref 10–45)
ALT FLD-CCNC: 31 U/L — SIGNIFICANT CHANGE UP (ref 10–45)
ALT FLD-CCNC: 31 U/L — SIGNIFICANT CHANGE UP (ref 10–45)
ALT FLD-CCNC: 32 U/L DA — SIGNIFICANT CHANGE UP (ref 10–60)
ANION GAP SERPL CALC-SCNC: 10 MMOL/L — SIGNIFICANT CHANGE UP (ref 5–17)
ANION GAP SERPL CALC-SCNC: 11 MMOL/L — SIGNIFICANT CHANGE UP (ref 5–17)
ANION GAP SERPL CALC-SCNC: 11 MMOL/L — SIGNIFICANT CHANGE UP (ref 5–17)
ANION GAP SERPL CALC-SCNC: 12 MMOL/L — SIGNIFICANT CHANGE UP (ref 5–17)
ANION GAP SERPL CALC-SCNC: 14 MMOL/L — SIGNIFICANT CHANGE UP (ref 5–17)
ANION GAP SERPL CALC-SCNC: 15 MMOL/L — SIGNIFICANT CHANGE UP (ref 5–17)
ANION GAP SERPL CALC-SCNC: 2 MMOL/L — LOW (ref 5–17)
ANION GAP SERPL CALC-SCNC: 3 MMOL/L — LOW (ref 5–17)
ANION GAP SERPL CALC-SCNC: 3 MMOL/L — LOW (ref 5–17)
ANION GAP SERPL CALC-SCNC: 4 MMOL/L — LOW (ref 5–17)
ANION GAP SERPL CALC-SCNC: 5 MMOL/L — SIGNIFICANT CHANGE UP (ref 5–17)
ANION GAP SERPL CALC-SCNC: 6 MMOL/L — SIGNIFICANT CHANGE UP (ref 5–17)
ANION GAP SERPL CALC-SCNC: 6 MMOL/L — SIGNIFICANT CHANGE UP (ref 5–17)
ANION GAP SERPL CALC-SCNC: 7 MMOL/L — SIGNIFICANT CHANGE UP (ref 5–17)
ANION GAP SERPL CALC-SCNC: 9 MMOL/L — SIGNIFICANT CHANGE UP (ref 5–17)
APPEARANCE UR: CLEAR — SIGNIFICANT CHANGE UP
AST SERPL-CCNC: 16 U/L — SIGNIFICANT CHANGE UP (ref 10–40)
AST SERPL-CCNC: 16 U/L — SIGNIFICANT CHANGE UP (ref 10–40)
AST SERPL-CCNC: 18 U/L — SIGNIFICANT CHANGE UP (ref 10–40)
AST SERPL-CCNC: 19 U/L — SIGNIFICANT CHANGE UP (ref 10–40)
AST SERPL-CCNC: 19 U/L — SIGNIFICANT CHANGE UP (ref 10–40)
AST SERPL-CCNC: 22 U/L — SIGNIFICANT CHANGE UP (ref 10–40)
AST SERPL-CCNC: 24 U/L — SIGNIFICANT CHANGE UP (ref 10–40)
AST SERPL-CCNC: 25 U/L — SIGNIFICANT CHANGE UP (ref 10–40)
AST SERPL-CCNC: 25 U/L — SIGNIFICANT CHANGE UP (ref 10–40)
AST SERPL-CCNC: 26 U/L — SIGNIFICANT CHANGE UP (ref 10–40)
AST SERPL-CCNC: 27 U/L — SIGNIFICANT CHANGE UP (ref 10–40)
AST SERPL-CCNC: 29 U/L — SIGNIFICANT CHANGE UP (ref 10–40)
AST SERPL-CCNC: 31 U/L — SIGNIFICANT CHANGE UP (ref 10–40)
AST SERPL-CCNC: 31 U/L — SIGNIFICANT CHANGE UP (ref 10–40)
AST SERPL-CCNC: 34 U/L — SIGNIFICANT CHANGE UP (ref 10–40)
BACTERIA # UR AUTO: ABNORMAL /HPF
BASOPHILS # BLD AUTO: 0.01 K/UL — SIGNIFICANT CHANGE UP (ref 0–0.2)
BASOPHILS # BLD AUTO: 0.02 K/UL — SIGNIFICANT CHANGE UP (ref 0–0.2)
BASOPHILS # BLD AUTO: 0.03 K/UL — SIGNIFICANT CHANGE UP (ref 0–0.2)
BASOPHILS # BLD AUTO: 0.03 K/UL — SIGNIFICANT CHANGE UP (ref 0–0.2)
BASOPHILS # BLD AUTO: 0.04 K/UL — SIGNIFICANT CHANGE UP (ref 0–0.2)
BASOPHILS # BLD AUTO: 0.05 K/UL — SIGNIFICANT CHANGE UP (ref 0–0.2)
BASOPHILS # BLD AUTO: 0.05 K/UL — SIGNIFICANT CHANGE UP (ref 0–0.2)
BASOPHILS # BLD AUTO: 0.06 K/UL — SIGNIFICANT CHANGE UP (ref 0–0.2)
BASOPHILS # BLD AUTO: 0.07 K/UL — SIGNIFICANT CHANGE UP (ref 0–0.2)
BASOPHILS NFR BLD AUTO: 0.1 % — SIGNIFICANT CHANGE UP (ref 0–2)
BASOPHILS NFR BLD AUTO: 0.2 % — SIGNIFICANT CHANGE UP (ref 0–2)
BASOPHILS NFR BLD AUTO: 0.3 % — SIGNIFICANT CHANGE UP (ref 0–2)
BASOPHILS NFR BLD AUTO: 0.4 % — SIGNIFICANT CHANGE UP (ref 0–2)
BASOPHILS NFR BLD AUTO: 0.5 % — SIGNIFICANT CHANGE UP (ref 0–2)
BASOPHILS NFR BLD AUTO: 0.6 % — SIGNIFICANT CHANGE UP (ref 0–2)
BASOPHILS NFR BLD AUTO: 0.6 % — SIGNIFICANT CHANGE UP (ref 0–2)
BILIRUB SERPL-MCNC: 0.2 MG/DL — SIGNIFICANT CHANGE UP (ref 0.2–1.2)
BILIRUB SERPL-MCNC: 0.3 MG/DL — SIGNIFICANT CHANGE UP (ref 0.2–1.2)
BILIRUB SERPL-MCNC: 0.4 MG/DL — SIGNIFICANT CHANGE UP (ref 0.2–1.2)
BILIRUB SERPL-MCNC: 0.5 MG/DL — SIGNIFICANT CHANGE UP (ref 0.2–1.2)
BILIRUB SERPL-MCNC: 0.6 MG/DL — SIGNIFICANT CHANGE UP (ref 0.2–1.2)
BILIRUB SERPL-MCNC: <0.2 MG/DL — SIGNIFICANT CHANGE UP (ref 0.2–1.2)
BILIRUB UR-MCNC: NEGATIVE — SIGNIFICANT CHANGE UP
BUN SERPL-MCNC: 12 MG/DL — SIGNIFICANT CHANGE UP (ref 7–18)
BUN SERPL-MCNC: 13 MG/DL — SIGNIFICANT CHANGE UP (ref 7–18)
BUN SERPL-MCNC: 14 MG/DL — SIGNIFICANT CHANGE UP (ref 7–18)
BUN SERPL-MCNC: 19 MG/DL — HIGH (ref 7–18)
BUN SERPL-MCNC: 21 MG/DL — HIGH (ref 7–18)
BUN SERPL-MCNC: 21 MG/DL — HIGH (ref 7–18)
BUN SERPL-MCNC: 22 MG/DL — SIGNIFICANT CHANGE UP (ref 7–23)
BUN SERPL-MCNC: 23 MG/DL — HIGH (ref 7–18)
BUN SERPL-MCNC: 23 MG/DL — SIGNIFICANT CHANGE UP (ref 7–23)
BUN SERPL-MCNC: 23 MG/DL — SIGNIFICANT CHANGE UP (ref 7–23)
BUN SERPL-MCNC: 24 MG/DL — HIGH (ref 7–18)
BUN SERPL-MCNC: 27 MG/DL — HIGH (ref 7–23)
BUN SERPL-MCNC: 29 MG/DL — HIGH (ref 7–23)
BUN SERPL-MCNC: 31 MG/DL — HIGH (ref 7–23)
BUN SERPL-MCNC: 34 MG/DL — HIGH (ref 7–23)
BUN SERPL-MCNC: 34 MG/DL — HIGH (ref 7–23)
BUN SERPL-MCNC: 39 MG/DL — HIGH (ref 7–23)
BUN SERPL-MCNC: 39 MG/DL — HIGH (ref 7–23)
BUN SERPL-MCNC: 43 MG/DL — HIGH (ref 7–23)
CALCIUM SERPL-MCNC: 8.3 MG/DL — LOW (ref 8.4–10.5)
CALCIUM SERPL-MCNC: 8.5 MG/DL — SIGNIFICANT CHANGE UP (ref 8.4–10.5)
CALCIUM SERPL-MCNC: 8.6 MG/DL — SIGNIFICANT CHANGE UP (ref 8.4–10.5)
CALCIUM SERPL-MCNC: 8.6 MG/DL — SIGNIFICANT CHANGE UP (ref 8.4–10.5)
CALCIUM SERPL-MCNC: 8.9 MG/DL — SIGNIFICANT CHANGE UP (ref 8.4–10.5)
CALCIUM SERPL-MCNC: 8.9 MG/DL — SIGNIFICANT CHANGE UP (ref 8.4–10.5)
CALCIUM SERPL-MCNC: 9.1 MG/DL — SIGNIFICANT CHANGE UP (ref 8.4–10.5)
CALCIUM SERPL-MCNC: 9.2 MG/DL — SIGNIFICANT CHANGE UP (ref 8.4–10.5)
CALCIUM SERPL-MCNC: 9.3 MG/DL — SIGNIFICANT CHANGE UP (ref 8.4–10.5)
CALCIUM SERPL-MCNC: 9.4 MG/DL — SIGNIFICANT CHANGE UP (ref 8.4–10.5)
CALCIUM SERPL-MCNC: 9.6 MG/DL — SIGNIFICANT CHANGE UP (ref 8.4–10.5)
CEA SERPL-MCNC: 12.5 NG/ML — HIGH (ref 0–3.8)
CEA SERPL-MCNC: 12.5 NG/ML — HIGH (ref 0–3.8)
CEA SERPL-MCNC: 15.1 NG/ML — HIGH (ref 0–3.8)
CEA SERPL-MCNC: 16 NG/ML — HIGH (ref 0–3.8)
CEA SERPL-MCNC: 16.3 NG/ML — HIGH (ref 0–3.8)
CEA SERPL-MCNC: 16.3 NG/ML — HIGH (ref 0–3.8)
CEA SERPL-MCNC: 20.8 NG/ML — HIGH (ref 0–3.8)
CEA SERPL-MCNC: 22.6 NG/ML — HIGH (ref 0–3.8)
CEA SERPL-MCNC: 22.6 NG/ML — HIGH (ref 0–3.8)
CEA SERPL-MCNC: 29.3 NG/ML — HIGH (ref 0–3.8)
CEA SERPL-MCNC: 29.8 NG/ML — HIGH (ref 0–3.8)
CHLORIDE SERPL-SCNC: 100 MMOL/L — SIGNIFICANT CHANGE UP (ref 96–108)
CHLORIDE SERPL-SCNC: 100 MMOL/L — SIGNIFICANT CHANGE UP (ref 96–108)
CHLORIDE SERPL-SCNC: 101 MMOL/L — SIGNIFICANT CHANGE UP (ref 96–108)
CHLORIDE SERPL-SCNC: 101 MMOL/L — SIGNIFICANT CHANGE UP (ref 96–108)
CHLORIDE SERPL-SCNC: 102 MMOL/L — SIGNIFICANT CHANGE UP (ref 96–108)
CHLORIDE SERPL-SCNC: 103 MMOL/L — SIGNIFICANT CHANGE UP (ref 96–108)
CHLORIDE SERPL-SCNC: 103 MMOL/L — SIGNIFICANT CHANGE UP (ref 96–108)
CHLORIDE SERPL-SCNC: 104 MMOL/L — SIGNIFICANT CHANGE UP (ref 96–108)
CHLORIDE SERPL-SCNC: 105 MMOL/L — SIGNIFICANT CHANGE UP (ref 96–108)
CHLORIDE SERPL-SCNC: 105 MMOL/L — SIGNIFICANT CHANGE UP (ref 96–108)
CHLORIDE SERPL-SCNC: 99 MMOL/L — SIGNIFICANT CHANGE UP (ref 96–108)
CO2 SERPL-SCNC: 20 MMOL/L — LOW (ref 22–31)
CO2 SERPL-SCNC: 22 MMOL/L — SIGNIFICANT CHANGE UP (ref 22–31)
CO2 SERPL-SCNC: 23 MMOL/L — SIGNIFICANT CHANGE UP (ref 22–31)
CO2 SERPL-SCNC: 25 MMOL/L — SIGNIFICANT CHANGE UP (ref 22–31)
CO2 SERPL-SCNC: 26 MMOL/L — SIGNIFICANT CHANGE UP (ref 22–31)
CO2 SERPL-SCNC: 26 MMOL/L — SIGNIFICANT CHANGE UP (ref 22–31)
CO2 SERPL-SCNC: 27 MMOL/L — SIGNIFICANT CHANGE UP (ref 22–31)
CO2 SERPL-SCNC: 28 MMOL/L — SIGNIFICANT CHANGE UP (ref 22–31)
CO2 SERPL-SCNC: 28 MMOL/L — SIGNIFICANT CHANGE UP (ref 22–31)
CO2 SERPL-SCNC: 29 MMOL/L — SIGNIFICANT CHANGE UP (ref 22–31)
CO2 SERPL-SCNC: 31 MMOL/L — SIGNIFICANT CHANGE UP (ref 22–31)
CO2 SERPL-SCNC: 32 MMOL/L — HIGH (ref 22–31)
COLOR SPEC: YELLOW — SIGNIFICANT CHANGE UP
CREAT SERPL-MCNC: 1 MG/DL — SIGNIFICANT CHANGE UP (ref 0.5–1.3)
CREAT SERPL-MCNC: 1.03 MG/DL — SIGNIFICANT CHANGE UP (ref 0.5–1.3)
CREAT SERPL-MCNC: 1.06 MG/DL — SIGNIFICANT CHANGE UP (ref 0.5–1.3)
CREAT SERPL-MCNC: 1.11 MG/DL — SIGNIFICANT CHANGE UP (ref 0.5–1.3)
CREAT SERPL-MCNC: 1.11 MG/DL — SIGNIFICANT CHANGE UP (ref 0.5–1.3)
CREAT SERPL-MCNC: 1.12 MG/DL — SIGNIFICANT CHANGE UP (ref 0.5–1.3)
CREAT SERPL-MCNC: 1.18 MG/DL — SIGNIFICANT CHANGE UP (ref 0.5–1.3)
CREAT SERPL-MCNC: 1.2 MG/DL — SIGNIFICANT CHANGE UP (ref 0.5–1.3)
CREAT SERPL-MCNC: 1.23 MG/DL — SIGNIFICANT CHANGE UP (ref 0.5–1.3)
CREAT SERPL-MCNC: 1.24 MG/DL — SIGNIFICANT CHANGE UP (ref 0.5–1.3)
CREAT SERPL-MCNC: 1.29 MG/DL — SIGNIFICANT CHANGE UP (ref 0.5–1.3)
CREAT SERPL-MCNC: 1.36 MG/DL — HIGH (ref 0.5–1.3)
CREAT SERPL-MCNC: 1.36 MG/DL — HIGH (ref 0.5–1.3)
CREAT SERPL-MCNC: 1.37 MG/DL — HIGH (ref 0.5–1.3)
CREAT SERPL-MCNC: 1.44 MG/DL — HIGH (ref 0.5–1.3)
CREAT SERPL-MCNC: 1.5 MG/DL — HIGH (ref 0.5–1.3)
CREAT SERPL-MCNC: 1.5 MG/DL — HIGH (ref 0.5–1.3)
CRP SERPL-MCNC: 151 MG/L — HIGH
CRP SERPL-MCNC: 61 MG/L — HIGH
CULTURE RESULTS: ABNORMAL
CULTURE RESULTS: SIGNIFICANT CHANGE UP
CULTURE RESULTS: SIGNIFICANT CHANGE UP
DIFF PNL FLD: ABNORMAL
EGFR: 46 ML/MIN/1.73M2 — LOW
EGFR: 46 ML/MIN/1.73M2 — LOW
EGFR: 49 ML/MIN/1.73M2 — LOW
EGFR: 52 ML/MIN/1.73M2 — LOW
EGFR: 55 ML/MIN/1.73M2 — LOW
EGFR: 58 ML/MIN/1.73M2 — LOW
EGFR: 59 ML/MIN/1.73M2 — LOW
EGFR: 60 ML/MIN/1.73M2 — SIGNIFICANT CHANGE UP
EGFR: 62 ML/MIN/1.73M2 — SIGNIFICANT CHANGE UP
EGFR: 66 ML/MIN/1.73M2 — SIGNIFICANT CHANGE UP
EGFR: 70 ML/MIN/1.73M2 — SIGNIFICANT CHANGE UP
EGFR: 73 ML/MIN/1.73M2 — SIGNIFICANT CHANGE UP
EGFR: 75 ML/MIN/1.73M2 — SIGNIFICANT CHANGE UP
EOSINOPHIL # BLD AUTO: 0.01 K/UL — SIGNIFICANT CHANGE UP (ref 0–0.5)
EOSINOPHIL # BLD AUTO: 0.02 K/UL — SIGNIFICANT CHANGE UP (ref 0–0.5)
EOSINOPHIL # BLD AUTO: 0.05 K/UL — SIGNIFICANT CHANGE UP (ref 0–0.5)
EOSINOPHIL # BLD AUTO: 0.09 K/UL — SIGNIFICANT CHANGE UP (ref 0–0.5)
EOSINOPHIL # BLD AUTO: 0.12 K/UL — SIGNIFICANT CHANGE UP (ref 0–0.5)
EOSINOPHIL # BLD AUTO: 0.14 K/UL — SIGNIFICANT CHANGE UP (ref 0–0.5)
EOSINOPHIL # BLD AUTO: 0.14 K/UL — SIGNIFICANT CHANGE UP (ref 0–0.5)
EOSINOPHIL # BLD AUTO: 0.15 K/UL — SIGNIFICANT CHANGE UP (ref 0–0.5)
EOSINOPHIL # BLD AUTO: 0.18 K/UL — SIGNIFICANT CHANGE UP (ref 0–0.5)
EOSINOPHIL # BLD AUTO: 0.18 K/UL — SIGNIFICANT CHANGE UP (ref 0–0.5)
EOSINOPHIL # BLD AUTO: 0.19 K/UL — SIGNIFICANT CHANGE UP (ref 0–0.5)
EOSINOPHIL # BLD AUTO: 0.2 K/UL — SIGNIFICANT CHANGE UP (ref 0–0.5)
EOSINOPHIL # BLD AUTO: 0.22 K/UL — SIGNIFICANT CHANGE UP (ref 0–0.5)
EOSINOPHIL # BLD AUTO: 0.26 K/UL — SIGNIFICANT CHANGE UP (ref 0–0.5)
EOSINOPHIL # BLD AUTO: 0.29 K/UL — SIGNIFICANT CHANGE UP (ref 0–0.5)
EOSINOPHIL # BLD AUTO: 0.33 K/UL — SIGNIFICANT CHANGE UP (ref 0–0.5)
EOSINOPHIL # BLD AUTO: 0.36 K/UL — SIGNIFICANT CHANGE UP (ref 0–0.5)
EOSINOPHIL NFR BLD AUTO: 0.1 % — SIGNIFICANT CHANGE UP (ref 0–6)
EOSINOPHIL NFR BLD AUTO: 0.2 % — SIGNIFICANT CHANGE UP (ref 0–6)
EOSINOPHIL NFR BLD AUTO: 0.5 % — SIGNIFICANT CHANGE UP (ref 0–6)
EOSINOPHIL NFR BLD AUTO: 0.8 % — SIGNIFICANT CHANGE UP (ref 0–6)
EOSINOPHIL NFR BLD AUTO: 0.8 % — SIGNIFICANT CHANGE UP (ref 0–6)
EOSINOPHIL NFR BLD AUTO: 1.1 % — SIGNIFICANT CHANGE UP (ref 0–6)
EOSINOPHIL NFR BLD AUTO: 1.3 % — SIGNIFICANT CHANGE UP (ref 0–6)
EOSINOPHIL NFR BLD AUTO: 1.4 % — SIGNIFICANT CHANGE UP (ref 0–6)
EOSINOPHIL NFR BLD AUTO: 2 % — SIGNIFICANT CHANGE UP (ref 0–6)
EOSINOPHIL NFR BLD AUTO: 2 % — SIGNIFICANT CHANGE UP (ref 0–6)
EOSINOPHIL NFR BLD AUTO: 2.3 % — SIGNIFICANT CHANGE UP (ref 0–6)
EOSINOPHIL NFR BLD AUTO: 2.4 % — SIGNIFICANT CHANGE UP (ref 0–6)
EOSINOPHIL NFR BLD AUTO: 2.8 % — SIGNIFICANT CHANGE UP (ref 0–6)
EOSINOPHIL NFR BLD AUTO: 3.6 % — SIGNIFICANT CHANGE UP (ref 0–6)
EOSINOPHIL NFR BLD AUTO: 3.8 % — SIGNIFICANT CHANGE UP (ref 0–6)
EOSINOPHIL NFR BLD AUTO: 3.9 % — SIGNIFICANT CHANGE UP (ref 0–6)
EOSINOPHIL NFR BLD AUTO: 5.1 % — SIGNIFICANT CHANGE UP (ref 0–6)
ERYTHROCYTE [SEDIMENTATION RATE] IN BLOOD: 98 MM/HR — HIGH (ref 0–20)
ERYTHROCYTE [SEDIMENTATION RATE] IN BLOOD: 98 MM/HR — HIGH (ref 0–20)
FLUAV AG NPH QL: SIGNIFICANT CHANGE UP
FLUBV AG NPH QL: SIGNIFICANT CHANGE UP
GAS PNL BLDA: SIGNIFICANT CHANGE UP
GAS PNL BLDA: SIGNIFICANT CHANGE UP
GLUCOSE SERPL-MCNC: 103 MG/DL — HIGH (ref 70–99)
GLUCOSE SERPL-MCNC: 117 MG/DL — HIGH (ref 70–99)
GLUCOSE SERPL-MCNC: 117 MG/DL — HIGH (ref 70–99)
GLUCOSE SERPL-MCNC: 126 MG/DL — HIGH (ref 70–99)
GLUCOSE SERPL-MCNC: 139 MG/DL — HIGH (ref 70–99)
GLUCOSE SERPL-MCNC: 139 MG/DL — HIGH (ref 70–99)
GLUCOSE SERPL-MCNC: 154 MG/DL — HIGH (ref 70–99)
GLUCOSE SERPL-MCNC: 154 MG/DL — HIGH (ref 70–99)
GLUCOSE SERPL-MCNC: 161 MG/DL — HIGH (ref 70–99)
GLUCOSE SERPL-MCNC: 163 MG/DL — HIGH (ref 70–99)
GLUCOSE SERPL-MCNC: 166 MG/DL — HIGH (ref 70–99)
GLUCOSE SERPL-MCNC: 188 MG/DL — HIGH (ref 70–99)
GLUCOSE SERPL-MCNC: 221 MG/DL — HIGH (ref 70–99)
GLUCOSE SERPL-MCNC: 80 MG/DL — SIGNIFICANT CHANGE UP (ref 70–99)
GLUCOSE SERPL-MCNC: 80 MG/DL — SIGNIFICANT CHANGE UP (ref 70–99)
GLUCOSE SERPL-MCNC: 86 MG/DL — SIGNIFICANT CHANGE UP (ref 70–99)
GLUCOSE SERPL-MCNC: 87 MG/DL — SIGNIFICANT CHANGE UP (ref 70–99)
GLUCOSE SERPL-MCNC: 93 MG/DL — SIGNIFICANT CHANGE UP (ref 70–99)
GLUCOSE SERPL-MCNC: 95 MG/DL — SIGNIFICANT CHANGE UP (ref 70–99)
GLUCOSE UR QL: NEGATIVE MG/DL — SIGNIFICANT CHANGE UP
GRAM STN FLD: ABNORMAL
HCT VFR BLD CALC: 26.2 % — LOW (ref 39–50)
HCT VFR BLD CALC: 26.2 % — LOW (ref 39–50)
HCT VFR BLD CALC: 28 % — LOW (ref 39–50)
HCT VFR BLD CALC: 28 % — LOW (ref 39–50)
HCT VFR BLD CALC: 29 % — LOW (ref 39–50)
HCT VFR BLD CALC: 29.9 % — LOW (ref 39–50)
HCT VFR BLD CALC: 30.4 % — LOW (ref 39–50)
HCT VFR BLD CALC: 30.6 % — LOW (ref 39–50)
HCT VFR BLD CALC: 30.8 % — LOW (ref 39–50)
HCT VFR BLD CALC: 30.8 % — LOW (ref 39–50)
HCT VFR BLD CALC: 30.9 % — LOW (ref 39–50)
HCT VFR BLD CALC: 31.1 % — LOW (ref 39–50)
HCT VFR BLD CALC: 31.2 % — LOW (ref 39–50)
HCT VFR BLD CALC: 31.2 % — LOW (ref 39–50)
HCT VFR BLD CALC: 32.4 % — LOW (ref 39–50)
HCT VFR BLD CALC: 32.6 % — LOW (ref 39–50)
HCT VFR BLD CALC: 32.8 % — LOW (ref 39–50)
HCT VFR BLD CALC: 34 % — LOW (ref 39–50)
HCT VFR BLD CALC: 35.5 % — LOW (ref 39–50)
HGB BLD-MCNC: 10 G/DL — LOW (ref 13–17)
HGB BLD-MCNC: 10.1 G/DL — LOW (ref 13–17)
HGB BLD-MCNC: 10.1 G/DL — LOW (ref 13–17)
HGB BLD-MCNC: 10.4 G/DL — LOW (ref 13–17)
HGB BLD-MCNC: 10.5 G/DL — LOW (ref 13–17)
HGB BLD-MCNC: 11.1 G/DL — LOW (ref 13–17)
HGB BLD-MCNC: 8.5 G/DL — LOW (ref 13–17)
HGB BLD-MCNC: 8.5 G/DL — LOW (ref 13–17)
HGB BLD-MCNC: 8.9 G/DL — LOW (ref 13–17)
HGB BLD-MCNC: 8.9 G/DL — LOW (ref 13–17)
HGB BLD-MCNC: 9.1 G/DL — LOW (ref 13–17)
HGB BLD-MCNC: 9.3 G/DL — LOW (ref 13–17)
HGB BLD-MCNC: 9.6 G/DL — LOW (ref 13–17)
HGB BLD-MCNC: 9.7 G/DL — LOW (ref 13–17)
HGB BLD-MCNC: 9.7 G/DL — LOW (ref 13–17)
HGB BLD-MCNC: 9.8 G/DL — LOW (ref 13–17)
HGB BLD-MCNC: 9.9 G/DL — LOW (ref 13–17)
IMM GRANULOCYTES NFR BLD AUTO: 0.3 % — SIGNIFICANT CHANGE UP (ref 0–0.9)
IMM GRANULOCYTES NFR BLD AUTO: 0.4 % — SIGNIFICANT CHANGE UP (ref 0–0.9)
IMM GRANULOCYTES NFR BLD AUTO: 0.5 % — SIGNIFICANT CHANGE UP (ref 0–0.9)
IMM GRANULOCYTES NFR BLD AUTO: 0.6 % — SIGNIFICANT CHANGE UP (ref 0–0.9)
IMM GRANULOCYTES NFR BLD AUTO: 0.7 % — SIGNIFICANT CHANGE UP (ref 0–0.9)
IMM GRANULOCYTES NFR BLD AUTO: 0.7 % — SIGNIFICANT CHANGE UP (ref 0–0.9)
IMM GRANULOCYTES NFR BLD AUTO: 0.8 % — SIGNIFICANT CHANGE UP (ref 0–0.9)
IMM GRANULOCYTES NFR BLD AUTO: 0.9 % — SIGNIFICANT CHANGE UP (ref 0–0.9)
IMM GRANULOCYTES NFR BLD AUTO: 1.1 % — HIGH (ref 0–0.9)
IMM GRANULOCYTES NFR BLD AUTO: 1.1 % — HIGH (ref 0–0.9)
IMM GRANULOCYTES NFR BLD AUTO: 1.5 % — HIGH (ref 0–0.9)
IMM GRANULOCYTES NFR BLD AUTO: 1.5 % — HIGH (ref 0–0.9)
IMM GRANULOCYTES NFR BLD AUTO: 1.6 % — HIGH (ref 0–0.9)
IMM GRANULOCYTES NFR BLD AUTO: 1.6 % — HIGH (ref 0–0.9)
IMM GRANULOCYTES NFR BLD AUTO: 1.8 % — HIGH (ref 0–0.9)
IMM GRANULOCYTES NFR BLD AUTO: 1.8 % — HIGH (ref 0–0.9)
IMM GRANULOCYTES NFR BLD AUTO: 2.2 % — HIGH (ref 0–0.9)
IMM GRANULOCYTES NFR BLD AUTO: 2.2 % — HIGH (ref 0–0.9)
IMM GRANULOCYTES NFR BLD AUTO: 4.2 % — HIGH (ref 0–0.9)
KETONES UR-MCNC: NEGATIVE MG/DL — SIGNIFICANT CHANGE UP
LACTATE SERPL-SCNC: 0.8 MMOL/L — SIGNIFICANT CHANGE UP (ref 0.7–2)
LACTATE SERPL-SCNC: 0.8 MMOL/L — SIGNIFICANT CHANGE UP (ref 0.7–2)
LEGIONELLA AG UR QL: NEGATIVE — SIGNIFICANT CHANGE UP
LEUKOCYTE ESTERASE UR-ACNC: NEGATIVE — SIGNIFICANT CHANGE UP
LYMPHOCYTES # BLD AUTO: 0.91 K/UL — LOW (ref 1–3.3)
LYMPHOCYTES # BLD AUTO: 1.49 K/UL — SIGNIFICANT CHANGE UP (ref 1–3.3)
LYMPHOCYTES # BLD AUTO: 1.62 K/UL — SIGNIFICANT CHANGE UP (ref 1–3.3)
LYMPHOCYTES # BLD AUTO: 1.91 K/UL — SIGNIFICANT CHANGE UP (ref 1–3.3)
LYMPHOCYTES # BLD AUTO: 1.97 K/UL — SIGNIFICANT CHANGE UP (ref 1–3.3)
LYMPHOCYTES # BLD AUTO: 15.3 % — SIGNIFICANT CHANGE UP (ref 13–44)
LYMPHOCYTES # BLD AUTO: 15.5 % — SIGNIFICANT CHANGE UP (ref 13–44)
LYMPHOCYTES # BLD AUTO: 2.4 K/UL — SIGNIFICANT CHANGE UP (ref 1–3.3)
LYMPHOCYTES # BLD AUTO: 2.56 K/UL — SIGNIFICANT CHANGE UP (ref 1–3.3)
LYMPHOCYTES # BLD AUTO: 2.56 K/UL — SIGNIFICANT CHANGE UP (ref 1–3.3)
LYMPHOCYTES # BLD AUTO: 2.71 K/UL — SIGNIFICANT CHANGE UP (ref 1–3.3)
LYMPHOCYTES # BLD AUTO: 22.3 % — SIGNIFICANT CHANGE UP (ref 13–44)
LYMPHOCYTES # BLD AUTO: 27.2 % — SIGNIFICANT CHANGE UP (ref 13–44)
LYMPHOCYTES # BLD AUTO: 27.8 % — SIGNIFICANT CHANGE UP (ref 13–44)
LYMPHOCYTES # BLD AUTO: 27.8 % — SIGNIFICANT CHANGE UP (ref 13–44)
LYMPHOCYTES # BLD AUTO: 27.9 % — SIGNIFICANT CHANGE UP (ref 13–44)
LYMPHOCYTES # BLD AUTO: 28.3 % — SIGNIFICANT CHANGE UP (ref 13–44)
LYMPHOCYTES # BLD AUTO: 29.1 % — SIGNIFICANT CHANGE UP (ref 13–44)
LYMPHOCYTES # BLD AUTO: 29.2 % — SIGNIFICANT CHANGE UP (ref 13–44)
LYMPHOCYTES # BLD AUTO: 29.7 % — SIGNIFICANT CHANGE UP (ref 13–44)
LYMPHOCYTES # BLD AUTO: 3 K/UL — SIGNIFICANT CHANGE UP (ref 1–3.3)
LYMPHOCYTES # BLD AUTO: 3.06 K/UL — SIGNIFICANT CHANGE UP (ref 1–3.3)
LYMPHOCYTES # BLD AUTO: 3.14 K/UL — SIGNIFICANT CHANGE UP (ref 1–3.3)
LYMPHOCYTES # BLD AUTO: 3.15 K/UL — SIGNIFICANT CHANGE UP (ref 1–3.3)
LYMPHOCYTES # BLD AUTO: 3.23 K/UL — SIGNIFICANT CHANGE UP (ref 1–3.3)
LYMPHOCYTES # BLD AUTO: 3.23 K/UL — SIGNIFICANT CHANGE UP (ref 1–3.3)
LYMPHOCYTES # BLD AUTO: 3.49 K/UL — HIGH (ref 1–3.3)
LYMPHOCYTES # BLD AUTO: 3.77 K/UL — HIGH (ref 1–3.3)
LYMPHOCYTES # BLD AUTO: 3.77 K/UL — HIGH (ref 1–3.3)
LYMPHOCYTES # BLD AUTO: 3.81 K/UL — HIGH (ref 1–3.3)
LYMPHOCYTES # BLD AUTO: 3.93 K/UL — HIGH (ref 1–3.3)
LYMPHOCYTES # BLD AUTO: 32.8 % — SIGNIFICANT CHANGE UP (ref 13–44)
LYMPHOCYTES # BLD AUTO: 32.9 % — SIGNIFICANT CHANGE UP (ref 13–44)
LYMPHOCYTES # BLD AUTO: 32.9 % — SIGNIFICANT CHANGE UP (ref 13–44)
LYMPHOCYTES # BLD AUTO: 33.4 % — SIGNIFICANT CHANGE UP (ref 13–44)
LYMPHOCYTES # BLD AUTO: 34.4 % — SIGNIFICANT CHANGE UP (ref 13–44)
LYMPHOCYTES # BLD AUTO: 35.4 % — SIGNIFICANT CHANGE UP (ref 13–44)
LYMPHOCYTES # BLD AUTO: 36.1 % — SIGNIFICANT CHANGE UP (ref 13–44)
LYMPHOCYTES # BLD AUTO: 36.3 % — SIGNIFICANT CHANGE UP (ref 13–44)
LYMPHOCYTES # BLD AUTO: 36.7 % — SIGNIFICANT CHANGE UP (ref 13–44)
LYMPHOCYTES # BLD AUTO: 4.39 K/UL — HIGH (ref 1–3.3)
LYMPHOCYTES # BLD AUTO: 7.5 % — LOW (ref 13–44)
MAGNESIUM SERPL-MCNC: 2.1 MG/DL — SIGNIFICANT CHANGE UP (ref 1.6–2.6)
MAGNESIUM SERPL-MCNC: 2.2 MG/DL — SIGNIFICANT CHANGE UP (ref 1.6–2.6)
MAGNESIUM SERPL-MCNC: 2.3 MG/DL — SIGNIFICANT CHANGE UP (ref 1.6–2.6)
MAGNESIUM SERPL-MCNC: 2.3 MG/DL — SIGNIFICANT CHANGE UP (ref 1.6–2.6)
MAGNESIUM SERPL-MCNC: 2.5 MG/DL — SIGNIFICANT CHANGE UP (ref 1.6–2.6)
MCHC RBC-ENTMCNC: 26.1 PG — LOW (ref 27–34)
MCHC RBC-ENTMCNC: 26.2 PG — LOW (ref 27–34)
MCHC RBC-ENTMCNC: 26.3 PG — LOW (ref 27–34)
MCHC RBC-ENTMCNC: 26.4 PG — LOW (ref 27–34)
MCHC RBC-ENTMCNC: 26.6 PG — LOW (ref 27–34)
MCHC RBC-ENTMCNC: 26.7 PG — LOW (ref 27–34)
MCHC RBC-ENTMCNC: 26.8 PG — LOW (ref 27–34)
MCHC RBC-ENTMCNC: 26.9 PG — LOW (ref 27–34)
MCHC RBC-ENTMCNC: 27 PG — SIGNIFICANT CHANGE UP (ref 27–34)
MCHC RBC-ENTMCNC: 30.8 GM/DL — LOW (ref 32–36)
MCHC RBC-ENTMCNC: 30.9 GM/DL — LOW (ref 32–36)
MCHC RBC-ENTMCNC: 31.1 GM/DL — LOW (ref 32–36)
MCHC RBC-ENTMCNC: 31.1 GM/DL — LOW (ref 32–36)
MCHC RBC-ENTMCNC: 31.2 G/DL — LOW (ref 32–36)
MCHC RBC-ENTMCNC: 31.2 GM/DL — LOW (ref 32–36)
MCHC RBC-ENTMCNC: 31.3 GM/DL — LOW (ref 32–36)
MCHC RBC-ENTMCNC: 31.4 G/DL — LOW (ref 32–36)
MCHC RBC-ENTMCNC: 31.7 G/DL — LOW (ref 32–36)
MCHC RBC-ENTMCNC: 31.8 G/DL — LOW (ref 32–36)
MCHC RBC-ENTMCNC: 31.8 G/DL — LOW (ref 32–36)
MCHC RBC-ENTMCNC: 31.9 G/DL — LOW (ref 32–36)
MCHC RBC-ENTMCNC: 31.9 G/DL — LOW (ref 32–36)
MCHC RBC-ENTMCNC: 32.2 G/DL — SIGNIFICANT CHANGE UP (ref 32–36)
MCHC RBC-ENTMCNC: 32.2 G/DL — SIGNIFICANT CHANGE UP (ref 32–36)
MCHC RBC-ENTMCNC: 32.4 G/DL — SIGNIFICANT CHANGE UP (ref 32–36)
MCV RBC AUTO: 81.9 FL — SIGNIFICANT CHANGE UP (ref 80–100)
MCV RBC AUTO: 81.9 FL — SIGNIFICANT CHANGE UP (ref 80–100)
MCV RBC AUTO: 82.4 FL — SIGNIFICANT CHANGE UP (ref 80–100)
MCV RBC AUTO: 82.4 FL — SIGNIFICANT CHANGE UP (ref 80–100)
MCV RBC AUTO: 82.7 FL — SIGNIFICANT CHANGE UP (ref 80–100)
MCV RBC AUTO: 82.8 FL — SIGNIFICANT CHANGE UP (ref 80–100)
MCV RBC AUTO: 83.1 FL — SIGNIFICANT CHANGE UP (ref 80–100)
MCV RBC AUTO: 83.7 FL — SIGNIFICANT CHANGE UP (ref 80–100)
MCV RBC AUTO: 84.2 FL — SIGNIFICANT CHANGE UP (ref 80–100)
MCV RBC AUTO: 84.7 FL — SIGNIFICANT CHANGE UP (ref 80–100)
MCV RBC AUTO: 84.8 FL — SIGNIFICANT CHANGE UP (ref 80–100)
MCV RBC AUTO: 85 FL — SIGNIFICANT CHANGE UP (ref 80–100)
MCV RBC AUTO: 85.3 FL — SIGNIFICANT CHANGE UP (ref 80–100)
MCV RBC AUTO: 85.3 FL — SIGNIFICANT CHANGE UP (ref 80–100)
MCV RBC AUTO: 85.4 FL — SIGNIFICANT CHANGE UP (ref 80–100)
MCV RBC AUTO: 85.7 FL — SIGNIFICANT CHANGE UP (ref 80–100)
MCV RBC AUTO: 86 FL — SIGNIFICANT CHANGE UP (ref 80–100)
MCV RBC AUTO: 86.1 FL — SIGNIFICANT CHANGE UP (ref 80–100)
MCV RBC AUTO: 86.8 FL — SIGNIFICANT CHANGE UP (ref 80–100)
MONOCYTES # BLD AUTO: 0.19 K/UL — SIGNIFICANT CHANGE UP (ref 0–0.9)
MONOCYTES # BLD AUTO: 0.39 K/UL — SIGNIFICANT CHANGE UP (ref 0–0.9)
MONOCYTES # BLD AUTO: 0.4 K/UL — SIGNIFICANT CHANGE UP (ref 0–0.9)
MONOCYTES # BLD AUTO: 0.45 K/UL — SIGNIFICANT CHANGE UP (ref 0–0.9)
MONOCYTES # BLD AUTO: 0.49 K/UL — SIGNIFICANT CHANGE UP (ref 0–0.9)
MONOCYTES # BLD AUTO: 0.5 K/UL — SIGNIFICANT CHANGE UP (ref 0–0.9)
MONOCYTES # BLD AUTO: 0.53 K/UL — SIGNIFICANT CHANGE UP (ref 0–0.9)
MONOCYTES # BLD AUTO: 0.53 K/UL — SIGNIFICANT CHANGE UP (ref 0–0.9)
MONOCYTES # BLD AUTO: 0.58 K/UL — SIGNIFICANT CHANGE UP (ref 0–0.9)
MONOCYTES # BLD AUTO: 0.59 K/UL — SIGNIFICANT CHANGE UP (ref 0–0.9)
MONOCYTES # BLD AUTO: 0.59 K/UL — SIGNIFICANT CHANGE UP (ref 0–0.9)
MONOCYTES # BLD AUTO: 0.6 K/UL — SIGNIFICANT CHANGE UP (ref 0–0.9)
MONOCYTES # BLD AUTO: 0.64 K/UL — SIGNIFICANT CHANGE UP (ref 0–0.9)
MONOCYTES # BLD AUTO: 0.66 K/UL — SIGNIFICANT CHANGE UP (ref 0–0.9)
MONOCYTES # BLD AUTO: 0.67 K/UL — SIGNIFICANT CHANGE UP (ref 0–0.9)
MONOCYTES # BLD AUTO: 0.7 K/UL — SIGNIFICANT CHANGE UP (ref 0–0.9)
MONOCYTES # BLD AUTO: 0.81 K/UL — SIGNIFICANT CHANGE UP (ref 0–0.9)
MONOCYTES # BLD AUTO: 0.91 K/UL — HIGH (ref 0–0.9)
MONOCYTES # BLD AUTO: 0.94 K/UL — HIGH (ref 0–0.9)
MONOCYTES NFR BLD AUTO: 2 % — SIGNIFICANT CHANGE UP (ref 2–14)
MONOCYTES NFR BLD AUTO: 4.2 % — SIGNIFICANT CHANGE UP (ref 2–14)
MONOCYTES NFR BLD AUTO: 4.7 % — SIGNIFICANT CHANGE UP (ref 2–14)
MONOCYTES NFR BLD AUTO: 5.3 % — SIGNIFICANT CHANGE UP (ref 2–14)
MONOCYTES NFR BLD AUTO: 5.3 % — SIGNIFICANT CHANGE UP (ref 2–14)
MONOCYTES NFR BLD AUTO: 5.4 % — SIGNIFICANT CHANGE UP (ref 2–14)
MONOCYTES NFR BLD AUTO: 5.4 % — SIGNIFICANT CHANGE UP (ref 2–14)
MONOCYTES NFR BLD AUTO: 5.8 % — SIGNIFICANT CHANGE UP (ref 2–14)
MONOCYTES NFR BLD AUTO: 6 % — SIGNIFICANT CHANGE UP (ref 2–14)
MONOCYTES NFR BLD AUTO: 6.1 % — SIGNIFICANT CHANGE UP (ref 2–14)
MONOCYTES NFR BLD AUTO: 6.2 % — SIGNIFICANT CHANGE UP (ref 2–14)
MONOCYTES NFR BLD AUTO: 6.4 % — SIGNIFICANT CHANGE UP (ref 2–14)
MONOCYTES NFR BLD AUTO: 6.4 % — SIGNIFICANT CHANGE UP (ref 2–14)
MONOCYTES NFR BLD AUTO: 6.6 % — SIGNIFICANT CHANGE UP (ref 2–14)
MONOCYTES NFR BLD AUTO: 6.7 % — SIGNIFICANT CHANGE UP (ref 2–14)
MONOCYTES NFR BLD AUTO: 6.8 % — SIGNIFICANT CHANGE UP (ref 2–14)
MONOCYTES NFR BLD AUTO: 7 % — SIGNIFICANT CHANGE UP (ref 2–14)
MONOCYTES NFR BLD AUTO: 7.3 % — SIGNIFICANT CHANGE UP (ref 2–14)
MONOCYTES NFR BLD AUTO: 7.8 % — SIGNIFICANT CHANGE UP (ref 2–14)
MRSA PCR RESULT.: SIGNIFICANT CHANGE UP
NEUTROPHILS # BLD AUTO: 10.52 K/UL — HIGH (ref 1.8–7.4)
NEUTROPHILS # BLD AUTO: 12.29 K/UL — HIGH (ref 1.8–7.4)
NEUTROPHILS # BLD AUTO: 3.74 K/UL — SIGNIFICANT CHANGE UP (ref 1.8–7.4)
NEUTROPHILS # BLD AUTO: 3.98 K/UL — SIGNIFICANT CHANGE UP (ref 1.8–7.4)
NEUTROPHILS # BLD AUTO: 4.03 K/UL — SIGNIFICANT CHANGE UP (ref 1.8–7.4)
NEUTROPHILS # BLD AUTO: 4.09 K/UL — SIGNIFICANT CHANGE UP (ref 1.8–7.4)
NEUTROPHILS # BLD AUTO: 4.61 K/UL — SIGNIFICANT CHANGE UP (ref 1.8–7.4)
NEUTROPHILS # BLD AUTO: 4.88 K/UL — SIGNIFICANT CHANGE UP (ref 1.8–7.4)
NEUTROPHILS # BLD AUTO: 5.23 K/UL — SIGNIFICANT CHANGE UP (ref 1.8–7.4)
NEUTROPHILS # BLD AUTO: 5.53 K/UL — SIGNIFICANT CHANGE UP (ref 1.8–7.4)
NEUTROPHILS # BLD AUTO: 5.74 K/UL — SIGNIFICANT CHANGE UP (ref 1.8–7.4)
NEUTROPHILS # BLD AUTO: 5.74 K/UL — SIGNIFICANT CHANGE UP (ref 1.8–7.4)
NEUTROPHILS # BLD AUTO: 6.31 K/UL — SIGNIFICANT CHANGE UP (ref 1.8–7.4)
NEUTROPHILS # BLD AUTO: 6.52 K/UL — SIGNIFICANT CHANGE UP (ref 1.8–7.4)
NEUTROPHILS # BLD AUTO: 6.62 K/UL — SIGNIFICANT CHANGE UP (ref 1.8–7.4)
NEUTROPHILS # BLD AUTO: 6.62 K/UL — SIGNIFICANT CHANGE UP (ref 1.8–7.4)
NEUTROPHILS # BLD AUTO: 7.21 K/UL — SIGNIFICANT CHANGE UP (ref 1.8–7.4)
NEUTROPHILS # BLD AUTO: 7.68 K/UL — HIGH (ref 1.8–7.4)
NEUTROPHILS # BLD AUTO: 7.95 K/UL — HIGH (ref 1.8–7.4)
NEUTROPHILS # BLD AUTO: 8.06 K/UL — HIGH (ref 1.8–7.4)
NEUTROPHILS # BLD AUTO: 8.1 K/UL — HIGH (ref 1.8–7.4)
NEUTROPHILS NFR BLD AUTO: 53 % — SIGNIFICANT CHANGE UP (ref 43–77)
NEUTROPHILS NFR BLD AUTO: 53.4 % — SIGNIFICANT CHANGE UP (ref 43–77)
NEUTROPHILS NFR BLD AUTO: 53.4 % — SIGNIFICANT CHANGE UP (ref 43–77)
NEUTROPHILS NFR BLD AUTO: 53.6 % — SIGNIFICANT CHANGE UP (ref 43–77)
NEUTROPHILS NFR BLD AUTO: 55.8 % — SIGNIFICANT CHANGE UP (ref 43–77)
NEUTROPHILS NFR BLD AUTO: 57 % — SIGNIFICANT CHANGE UP (ref 43–77)
NEUTROPHILS NFR BLD AUTO: 57.5 % — SIGNIFICANT CHANGE UP (ref 43–77)
NEUTROPHILS NFR BLD AUTO: 57.8 % — SIGNIFICANT CHANGE UP (ref 43–77)
NEUTROPHILS NFR BLD AUTO: 57.8 % — SIGNIFICANT CHANGE UP (ref 43–77)
NEUTROPHILS NFR BLD AUTO: 58.1 % — SIGNIFICANT CHANGE UP (ref 43–77)
NEUTROPHILS NFR BLD AUTO: 59.6 % — SIGNIFICANT CHANGE UP (ref 43–77)
NEUTROPHILS NFR BLD AUTO: 59.7 % — SIGNIFICANT CHANGE UP (ref 43–77)
NEUTROPHILS NFR BLD AUTO: 62.3 % — SIGNIFICANT CHANGE UP (ref 43–77)
NEUTROPHILS NFR BLD AUTO: 62.4 % — SIGNIFICANT CHANGE UP (ref 43–77)
NEUTROPHILS NFR BLD AUTO: 64.5 % — SIGNIFICANT CHANGE UP (ref 43–77)
NEUTROPHILS NFR BLD AUTO: 69.7 % — SIGNIFICANT CHANGE UP (ref 43–77)
NEUTROPHILS NFR BLD AUTO: 77.4 % — HIGH (ref 43–77)
NEUTROPHILS NFR BLD AUTO: 81.7 % — HIGH (ref 43–77)
NEUTROPHILS NFR BLD AUTO: 86.2 % — HIGH (ref 43–77)
NITRITE UR-MCNC: NEGATIVE — SIGNIFICANT CHANGE UP
NRBC # BLD: 0 /100 WBCS — SIGNIFICANT CHANGE UP (ref 0–0)
NT-PROBNP SERPL-SCNC: 2913 PG/ML — HIGH (ref 0–450)
PH UR: 5 — SIGNIFICANT CHANGE UP (ref 5–8)
PHOSPHATE SERPL-MCNC: 2.5 MG/DL — SIGNIFICANT CHANGE UP (ref 2.5–4.5)
PHOSPHATE SERPL-MCNC: 2.8 MG/DL — SIGNIFICANT CHANGE UP (ref 2.5–4.5)
PHOSPHATE SERPL-MCNC: 2.9 MG/DL — SIGNIFICANT CHANGE UP (ref 2.5–4.5)
PHOSPHATE SERPL-MCNC: 3.1 MG/DL — SIGNIFICANT CHANGE UP (ref 2.5–4.5)
PHOSPHATE SERPL-MCNC: 3.4 MG/DL — SIGNIFICANT CHANGE UP (ref 2.5–4.5)
PHOSPHATE SERPL-MCNC: 3.5 MG/DL — SIGNIFICANT CHANGE UP (ref 2.5–4.5)
PHOSPHATE SERPL-MCNC: 4.2 MG/DL — SIGNIFICANT CHANGE UP (ref 2.5–4.5)
PLATELET # BLD AUTO: 187 K/UL — SIGNIFICANT CHANGE UP (ref 150–400)
PLATELET # BLD AUTO: 199 K/UL — SIGNIFICANT CHANGE UP (ref 150–400)
PLATELET # BLD AUTO: 202 K/UL — SIGNIFICANT CHANGE UP (ref 150–400)
PLATELET # BLD AUTO: 203 K/UL — SIGNIFICANT CHANGE UP (ref 150–400)
PLATELET # BLD AUTO: 205 K/UL — SIGNIFICANT CHANGE UP (ref 150–400)
PLATELET # BLD AUTO: 216 K/UL — SIGNIFICANT CHANGE UP (ref 150–400)
PLATELET # BLD AUTO: 233 K/UL — SIGNIFICANT CHANGE UP (ref 150–400)
PLATELET # BLD AUTO: 256 K/UL — SIGNIFICANT CHANGE UP (ref 150–400)
PLATELET # BLD AUTO: 305 K/UL — SIGNIFICANT CHANGE UP (ref 150–400)
PLATELET # BLD AUTO: 305 K/UL — SIGNIFICANT CHANGE UP (ref 150–400)
PLATELET # BLD AUTO: 317 K/UL — SIGNIFICANT CHANGE UP (ref 150–400)
PLATELET # BLD AUTO: 342 K/UL — SIGNIFICANT CHANGE UP (ref 150–400)
PLATELET # BLD AUTO: 349 K/UL — SIGNIFICANT CHANGE UP (ref 150–400)
PLATELET # BLD AUTO: 355 K/UL — SIGNIFICANT CHANGE UP (ref 150–400)
PLATELET # BLD AUTO: 355 K/UL — SIGNIFICANT CHANGE UP (ref 150–400)
PLATELET # BLD AUTO: 359 K/UL — SIGNIFICANT CHANGE UP (ref 150–400)
PLATELET # BLD AUTO: 366 K/UL — SIGNIFICANT CHANGE UP (ref 150–400)
PLATELET # BLD AUTO: 366 K/UL — SIGNIFICANT CHANGE UP (ref 150–400)
PLATELET # BLD AUTO: 371 K/UL — SIGNIFICANT CHANGE UP (ref 150–400)
PLATELET # BLD AUTO: 384 K/UL — SIGNIFICANT CHANGE UP (ref 150–400)
PLATELET # BLD AUTO: 390 K/UL — SIGNIFICANT CHANGE UP (ref 150–400)
POTASSIUM SERPL-MCNC: 3.5 MMOL/L — SIGNIFICANT CHANGE UP (ref 3.5–5.3)
POTASSIUM SERPL-MCNC: 3.8 MMOL/L — SIGNIFICANT CHANGE UP (ref 3.5–5.3)
POTASSIUM SERPL-MCNC: 4 MMOL/L — SIGNIFICANT CHANGE UP (ref 3.5–5.3)
POTASSIUM SERPL-MCNC: 4.1 MMOL/L — SIGNIFICANT CHANGE UP (ref 3.5–5.3)
POTASSIUM SERPL-MCNC: 4.1 MMOL/L — SIGNIFICANT CHANGE UP (ref 3.5–5.3)
POTASSIUM SERPL-MCNC: 4.2 MMOL/L — SIGNIFICANT CHANGE UP (ref 3.5–5.3)
POTASSIUM SERPL-MCNC: 4.2 MMOL/L — SIGNIFICANT CHANGE UP (ref 3.5–5.3)
POTASSIUM SERPL-MCNC: 4.3 MMOL/L — SIGNIFICANT CHANGE UP (ref 3.5–5.3)
POTASSIUM SERPL-MCNC: 4.4 MMOL/L — SIGNIFICANT CHANGE UP (ref 3.5–5.3)
POTASSIUM SERPL-MCNC: 4.4 MMOL/L — SIGNIFICANT CHANGE UP (ref 3.5–5.3)
POTASSIUM SERPL-MCNC: 4.6 MMOL/L — SIGNIFICANT CHANGE UP (ref 3.5–5.3)
POTASSIUM SERPL-MCNC: 4.6 MMOL/L — SIGNIFICANT CHANGE UP (ref 3.5–5.3)
POTASSIUM SERPL-MCNC: 5.2 MMOL/L — SIGNIFICANT CHANGE UP (ref 3.5–5.3)
POTASSIUM SERPL-MCNC: 5.2 MMOL/L — SIGNIFICANT CHANGE UP (ref 3.5–5.3)
POTASSIUM SERPL-MCNC: 5.6 MMOL/L — HIGH (ref 3.5–5.3)
POTASSIUM SERPL-SCNC: 3.5 MMOL/L — SIGNIFICANT CHANGE UP (ref 3.5–5.3)
POTASSIUM SERPL-SCNC: 3.8 MMOL/L — SIGNIFICANT CHANGE UP (ref 3.5–5.3)
POTASSIUM SERPL-SCNC: 4 MMOL/L — SIGNIFICANT CHANGE UP (ref 3.5–5.3)
POTASSIUM SERPL-SCNC: 4.1 MMOL/L — SIGNIFICANT CHANGE UP (ref 3.5–5.3)
POTASSIUM SERPL-SCNC: 4.1 MMOL/L — SIGNIFICANT CHANGE UP (ref 3.5–5.3)
POTASSIUM SERPL-SCNC: 4.2 MMOL/L — SIGNIFICANT CHANGE UP (ref 3.5–5.3)
POTASSIUM SERPL-SCNC: 4.2 MMOL/L — SIGNIFICANT CHANGE UP (ref 3.5–5.3)
POTASSIUM SERPL-SCNC: 4.3 MMOL/L — SIGNIFICANT CHANGE UP (ref 3.5–5.3)
POTASSIUM SERPL-SCNC: 4.4 MMOL/L — SIGNIFICANT CHANGE UP (ref 3.5–5.3)
POTASSIUM SERPL-SCNC: 4.4 MMOL/L — SIGNIFICANT CHANGE UP (ref 3.5–5.3)
POTASSIUM SERPL-SCNC: 4.6 MMOL/L — SIGNIFICANT CHANGE UP (ref 3.5–5.3)
POTASSIUM SERPL-SCNC: 4.6 MMOL/L — SIGNIFICANT CHANGE UP (ref 3.5–5.3)
POTASSIUM SERPL-SCNC: 5.2 MMOL/L — SIGNIFICANT CHANGE UP (ref 3.5–5.3)
POTASSIUM SERPL-SCNC: 5.2 MMOL/L — SIGNIFICANT CHANGE UP (ref 3.5–5.3)
POTASSIUM SERPL-SCNC: 5.6 MMOL/L — HIGH (ref 3.5–5.3)
PROCALCITONIN SERPL-MCNC: 1.05 NG/ML — HIGH (ref 0.02–0.1)
PROT SERPL-MCNC: 6.6 G/DL — SIGNIFICANT CHANGE UP (ref 6–8.3)
PROT SERPL-MCNC: 6.7 G/DL — SIGNIFICANT CHANGE UP (ref 6–8.3)
PROT SERPL-MCNC: 6.8 G/DL — SIGNIFICANT CHANGE UP (ref 6–8.3)
PROT SERPL-MCNC: 6.8 G/DL — SIGNIFICANT CHANGE UP (ref 6–8.3)
PROT SERPL-MCNC: 7 G/DL — SIGNIFICANT CHANGE UP (ref 6–8.3)
PROT SERPL-MCNC: 7.1 G/DL — SIGNIFICANT CHANGE UP (ref 6–8.3)
PROT SERPL-MCNC: 7.2 G/DL — SIGNIFICANT CHANGE UP (ref 6–8.3)
PROT SERPL-MCNC: 7.3 G/DL — SIGNIFICANT CHANGE UP (ref 6–8.3)
PROT SERPL-MCNC: 7.4 G/DL — SIGNIFICANT CHANGE UP (ref 6–8.3)
PROT SERPL-MCNC: 7.4 G/DL — SIGNIFICANT CHANGE UP (ref 6–8.3)
PROT SERPL-MCNC: 7.6 G/DL — SIGNIFICANT CHANGE UP (ref 6–8.3)
PROT SERPL-MCNC: 7.6 G/DL — SIGNIFICANT CHANGE UP (ref 6–8.3)
PROT SERPL-MCNC: 7.9 G/DL — SIGNIFICANT CHANGE UP (ref 6–8.3)
PROT SERPL-MCNC: 7.9 G/DL — SIGNIFICANT CHANGE UP (ref 6–8.3)
PROT UR-MCNC: 30 MG/DL
RAPID RVP RESULT: SIGNIFICANT CHANGE UP
RBC # BLD: 3.2 M/UL — LOW (ref 4.2–5.8)
RBC # BLD: 3.2 M/UL — LOW (ref 4.2–5.8)
RBC # BLD: 3.4 M/UL — LOW (ref 4.2–5.8)
RBC # BLD: 3.4 M/UL — LOW (ref 4.2–5.8)
RBC # BLD: 3.41 M/UL — LOW (ref 4.2–5.8)
RBC # BLD: 3.49 M/UL — LOW (ref 4.2–5.8)
RBC # BLD: 3.55 M/UL — LOW (ref 4.2–5.8)
RBC # BLD: 3.61 M/UL — LOW (ref 4.2–5.8)
RBC # BLD: 3.63 M/UL — LOW (ref 4.2–5.8)
RBC # BLD: 3.63 M/UL — LOW (ref 4.2–5.8)
RBC # BLD: 3.64 M/UL — LOW (ref 4.2–5.8)
RBC # BLD: 3.66 M/UL — LOW (ref 4.2–5.8)
RBC # BLD: 3.66 M/UL — LOW (ref 4.2–5.8)
RBC # BLD: 3.67 M/UL — LOW (ref 4.2–5.8)
RBC # BLD: 3.7 M/UL — LOW (ref 4.2–5.8)
RBC # BLD: 3.77 M/UL — LOW (ref 4.2–5.8)
RBC # BLD: 3.8 M/UL — LOW (ref 4.2–5.8)
RBC # BLD: 3.85 M/UL — LOW (ref 4.2–5.8)
RBC # BLD: 3.87 M/UL — LOW (ref 4.2–5.8)
RBC # BLD: 3.95 M/UL — LOW (ref 4.2–5.8)
RBC # BLD: 4.16 M/UL — LOW (ref 4.2–5.8)
RBC # FLD: 15.9 % — HIGH (ref 10.3–14.5)
RBC # FLD: 15.9 % — HIGH (ref 10.3–14.5)
RBC # FLD: 16.1 % — HIGH (ref 10.3–14.5)
RBC # FLD: 16.1 % — HIGH (ref 10.3–14.5)
RBC # FLD: 16.2 % — HIGH (ref 10.3–14.5)
RBC # FLD: 16.3 % — HIGH (ref 10.3–14.5)
RBC # FLD: 16.3 % — HIGH (ref 10.3–14.5)
RBC # FLD: 16.4 % — HIGH (ref 10.3–14.5)
RBC # FLD: 16.4 % — HIGH (ref 10.3–14.5)
RBC # FLD: 16.5 % — HIGH (ref 10.3–14.5)
RBC # FLD: 16.5 % — HIGH (ref 10.3–14.5)
RBC # FLD: 16.6 % — HIGH (ref 10.3–14.5)
RBC # FLD: 16.8 % — HIGH (ref 10.3–14.5)
RBC # FLD: 17 % — HIGH (ref 10.3–14.5)
RBC # FLD: 17.2 % — HIGH (ref 10.3–14.5)
RBC # FLD: 17.7 % — HIGH (ref 10.3–14.5)
RBC # FLD: 17.8 % — HIGH (ref 10.3–14.5)
RBC # FLD: 17.8 % — HIGH (ref 10.3–14.5)
RBC # FLD: 18 % — HIGH (ref 10.3–14.5)
RBC # FLD: 18.1 % — HIGH (ref 10.3–14.5)
RBC # FLD: 18.1 % — HIGH (ref 10.3–14.5)
RBC CASTS # UR COMP ASSIST: 7 /HPF — HIGH (ref 0–4)
S AUREUS DNA NOSE QL NAA+PROBE: SIGNIFICANT CHANGE UP
S PNEUM AG UR QL: NEGATIVE — SIGNIFICANT CHANGE UP
SARS-COV-2 RNA SPEC QL NAA+PROBE: SIGNIFICANT CHANGE UP
SARS-COV-2 RNA SPEC QL NAA+PROBE: SIGNIFICANT CHANGE UP
SODIUM SERPL-SCNC: 134 MMOL/L — LOW (ref 135–145)
SODIUM SERPL-SCNC: 135 MMOL/L — SIGNIFICANT CHANGE UP (ref 135–145)
SODIUM SERPL-SCNC: 135 MMOL/L — SIGNIFICANT CHANGE UP (ref 135–145)
SODIUM SERPL-SCNC: 136 MMOL/L — SIGNIFICANT CHANGE UP (ref 135–145)
SODIUM SERPL-SCNC: 137 MMOL/L — SIGNIFICANT CHANGE UP (ref 135–145)
SODIUM SERPL-SCNC: 138 MMOL/L — SIGNIFICANT CHANGE UP (ref 135–145)
SODIUM SERPL-SCNC: 139 MMOL/L — SIGNIFICANT CHANGE UP (ref 135–145)
SP GR SPEC: 1.06 — HIGH (ref 1–1.03)
SPECIMEN SOURCE: SIGNIFICANT CHANGE UP
T4 FREE+ TSH PNL SERPL: 2.27 UIU/ML — SIGNIFICANT CHANGE UP (ref 0.27–4.2)
TROPONIN I, HIGH SENSITIVITY RESULT: 77.4 NG/L — SIGNIFICANT CHANGE UP
TROPONIN I, HIGH SENSITIVITY RESULT: 88.9 NG/L — HIGH
TSH SERPL-MCNC: 1.41 UIU/ML — SIGNIFICANT CHANGE UP (ref 0.27–4.2)
TSH SERPL-MCNC: 1.52 UIU/ML — SIGNIFICANT CHANGE UP (ref 0.27–4.2)
TSH SERPL-MCNC: 1.53 UIU/ML — SIGNIFICANT CHANGE UP (ref 0.27–4.2)
TSH SERPL-MCNC: 1.74 UIU/ML — SIGNIFICANT CHANGE UP (ref 0.27–4.2)
TSH SERPL-MCNC: 1.8 UIU/ML — SIGNIFICANT CHANGE UP (ref 0.27–4.2)
TSH SERPL-MCNC: 1.8 UIU/ML — SIGNIFICANT CHANGE UP (ref 0.27–4.2)
TSH SERPL-MCNC: 2.18 UIU/ML — SIGNIFICANT CHANGE UP (ref 0.27–4.2)
TSH SERPL-MCNC: 2.18 UIU/ML — SIGNIFICANT CHANGE UP (ref 0.27–4.2)
TSH SERPL-MCNC: 2.38 UIU/ML — SIGNIFICANT CHANGE UP (ref 0.27–4.2)
TSH SERPL-MCNC: 2.52 UIU/ML — SIGNIFICANT CHANGE UP (ref 0.27–4.2)
UROBILINOGEN FLD QL: 0.2 MG/DL — SIGNIFICANT CHANGE UP (ref 0.2–1)
VANCOMYCIN TROUGH SERPL-MCNC: 16.6 UG/ML — SIGNIFICANT CHANGE UP (ref 10–20)
VANCOMYCIN TROUGH SERPL-MCNC: 22.7 UG/ML — HIGH (ref 10–20)
WBC # BLD: 10.37 K/UL — SIGNIFICANT CHANGE UP (ref 3.8–10.5)
WBC # BLD: 10.46 K/UL — SIGNIFICANT CHANGE UP (ref 3.8–10.5)
WBC # BLD: 10.47 K/UL — SIGNIFICANT CHANGE UP (ref 3.8–10.5)
WBC # BLD: 10.59 K/UL — HIGH (ref 3.8–10.5)
WBC # BLD: 11.46 K/UL — HIGH (ref 3.8–10.5)
WBC # BLD: 11.46 K/UL — HIGH (ref 3.8–10.5)
WBC # BLD: 11.57 K/UL — HIGH (ref 3.8–10.5)
WBC # BLD: 12.2 K/UL — HIGH (ref 3.8–10.5)
WBC # BLD: 12.51 K/UL — HIGH (ref 3.8–10.5)
WBC # BLD: 13.37 K/UL — HIGH (ref 3.8–10.5)
WBC # BLD: 17.63 K/UL — HIGH (ref 3.8–10.5)
WBC # BLD: 6.44 K/UL — SIGNIFICANT CHANGE UP (ref 3.8–10.5)
WBC # BLD: 6.77 K/UL — SIGNIFICANT CHANGE UP (ref 3.8–10.5)
WBC # BLD: 7.18 K/UL — SIGNIFICANT CHANGE UP (ref 3.8–10.5)
WBC # BLD: 7.5 K/UL — SIGNIFICANT CHANGE UP (ref 3.8–10.5)
WBC # BLD: 8.64 K/UL — SIGNIFICANT CHANGE UP (ref 3.8–10.5)
WBC # BLD: 9.12 K/UL — SIGNIFICANT CHANGE UP (ref 3.8–10.5)
WBC # BLD: 9.21 K/UL — SIGNIFICANT CHANGE UP (ref 3.8–10.5)
WBC # BLD: 9.21 K/UL — SIGNIFICANT CHANGE UP (ref 3.8–10.5)
WBC # BLD: 9.39 K/UL — SIGNIFICANT CHANGE UP (ref 3.8–10.5)
WBC # BLD: 9.73 K/UL — SIGNIFICANT CHANGE UP (ref 3.8–10.5)
WBC # FLD AUTO: 10.37 K/UL — SIGNIFICANT CHANGE UP (ref 3.8–10.5)
WBC # FLD AUTO: 10.46 K/UL — SIGNIFICANT CHANGE UP (ref 3.8–10.5)
WBC # FLD AUTO: 10.47 K/UL — SIGNIFICANT CHANGE UP (ref 3.8–10.5)
WBC # FLD AUTO: 10.59 K/UL — HIGH (ref 3.8–10.5)
WBC # FLD AUTO: 11.46 K/UL — HIGH (ref 3.8–10.5)
WBC # FLD AUTO: 11.46 K/UL — HIGH (ref 3.8–10.5)
WBC # FLD AUTO: 11.57 K/UL — HIGH (ref 3.8–10.5)
WBC # FLD AUTO: 12.2 K/UL — HIGH (ref 3.8–10.5)
WBC # FLD AUTO: 12.51 K/UL — HIGH (ref 3.8–10.5)
WBC # FLD AUTO: 13.37 K/UL — HIGH (ref 3.8–10.5)
WBC # FLD AUTO: 17.63 K/UL — HIGH (ref 3.8–10.5)
WBC # FLD AUTO: 6.44 K/UL — SIGNIFICANT CHANGE UP (ref 3.8–10.5)
WBC # FLD AUTO: 6.77 K/UL — SIGNIFICANT CHANGE UP (ref 3.8–10.5)
WBC # FLD AUTO: 7.18 K/UL — SIGNIFICANT CHANGE UP (ref 3.8–10.5)
WBC # FLD AUTO: 7.5 K/UL — SIGNIFICANT CHANGE UP (ref 3.8–10.5)
WBC # FLD AUTO: 8.64 K/UL — SIGNIFICANT CHANGE UP (ref 3.8–10.5)
WBC # FLD AUTO: 9.12 K/UL — SIGNIFICANT CHANGE UP (ref 3.8–10.5)
WBC # FLD AUTO: 9.21 K/UL — SIGNIFICANT CHANGE UP (ref 3.8–10.5)
WBC # FLD AUTO: 9.21 K/UL — SIGNIFICANT CHANGE UP (ref 3.8–10.5)
WBC # FLD AUTO: 9.39 K/UL — SIGNIFICANT CHANGE UP (ref 3.8–10.5)
WBC # FLD AUTO: 9.73 K/UL — SIGNIFICANT CHANGE UP (ref 3.8–10.5)
WBC UR QL: 2 /HPF — SIGNIFICANT CHANGE UP (ref 0–5)

## 2024-01-01 PROCEDURE — 36415 COLL VENOUS BLD VENIPUNCTURE: CPT

## 2024-01-01 PROCEDURE — 99223 1ST HOSP IP/OBS HIGH 75: CPT | Mod: GC

## 2024-01-01 PROCEDURE — 99233 SBSQ HOSP IP/OBS HIGH 50: CPT

## 2024-01-01 PROCEDURE — 85652 RBC SED RATE AUTOMATED: CPT

## 2024-01-01 PROCEDURE — 86900 BLOOD TYPING SEROLOGIC ABO: CPT

## 2024-01-01 PROCEDURE — 99232 SBSQ HOSP IP/OBS MODERATE 35: CPT

## 2024-01-01 PROCEDURE — 71275 CT ANGIOGRAPHY CHEST: CPT | Mod: 26,MC

## 2024-01-01 PROCEDURE — 99222 1ST HOSP IP/OBS MODERATE 55: CPT

## 2024-01-01 PROCEDURE — 82803 BLOOD GASES ANY COMBINATION: CPT

## 2024-01-01 PROCEDURE — 99215 OFFICE O/P EST HI 40 MIN: CPT

## 2024-01-01 PROCEDURE — 87070 CULTURE OTHR SPECIMN AEROBIC: CPT

## 2024-01-01 PROCEDURE — 99214 OFFICE O/P EST MOD 30 MIN: CPT

## 2024-01-01 PROCEDURE — 82330 ASSAY OF CALCIUM: CPT

## 2024-01-01 PROCEDURE — 83690 ASSAY OF LIPASE: CPT

## 2024-01-01 PROCEDURE — 83735 ASSAY OF MAGNESIUM: CPT

## 2024-01-01 PROCEDURE — 92610 EVALUATE SWALLOWING FUNCTION: CPT

## 2024-01-01 PROCEDURE — 84484 ASSAY OF TROPONIN QUANT: CPT

## 2024-01-01 PROCEDURE — 71275 CT ANGIOGRAPHY CHEST: CPT | Mod: MC

## 2024-01-01 PROCEDURE — 83880 ASSAY OF NATRIURETIC PEPTIDE: CPT

## 2024-01-01 PROCEDURE — 86923 COMPATIBILITY TEST ELECTRIC: CPT

## 2024-01-01 PROCEDURE — 86140 C-REACTIVE PROTEIN: CPT

## 2024-01-01 PROCEDURE — 87899 AGENT NOS ASSAY W/OPTIC: CPT

## 2024-01-01 PROCEDURE — 71260 CT THORAX DX C+: CPT

## 2024-01-01 PROCEDURE — P9040: CPT

## 2024-01-01 PROCEDURE — 87640 STAPH A DNA AMP PROBE: CPT

## 2024-01-01 PROCEDURE — 84100 ASSAY OF PHOSPHORUS: CPT

## 2024-01-01 PROCEDURE — 99232 SBSQ HOSP IP/OBS MODERATE 35: CPT | Mod: GC

## 2024-01-01 PROCEDURE — 83605 ASSAY OF LACTIC ACID: CPT

## 2024-01-01 PROCEDURE — 74177 CT ABD & PELVIS W/CONTRAST: CPT | Mod: MC

## 2024-01-01 PROCEDURE — 84132 ASSAY OF SERUM POTASSIUM: CPT

## 2024-01-01 PROCEDURE — 87641 MR-STAPH DNA AMP PROBE: CPT

## 2024-01-01 PROCEDURE — 93005 ELECTROCARDIOGRAM TRACING: CPT

## 2024-01-01 PROCEDURE — 84295 ASSAY OF SERUM SODIUM: CPT

## 2024-01-01 PROCEDURE — 87637 SARSCOV2&INF A&B&RSV AMP PRB: CPT

## 2024-01-01 PROCEDURE — 74177 CT ABD & PELVIS W/CONTRAST: CPT | Mod: 26,MC

## 2024-01-01 PROCEDURE — 86901 BLOOD TYPING SEROLOGIC RH(D): CPT

## 2024-01-01 PROCEDURE — 99285 EMERGENCY DEPT VISIT HI MDM: CPT | Mod: 25

## 2024-01-01 PROCEDURE — 99497 ADVNCD CARE PLAN 30 MIN: CPT | Mod: 25

## 2024-01-01 PROCEDURE — 0225U NFCT DS DNA&RNA 21 SARSCOV2: CPT

## 2024-01-01 PROCEDURE — 96365 THER/PROPH/DIAG IV INF INIT: CPT

## 2024-01-01 PROCEDURE — 86850 RBC ANTIBODY SCREEN: CPT

## 2024-01-01 PROCEDURE — 80053 COMPREHEN METABOLIC PANEL: CPT

## 2024-01-01 PROCEDURE — 87040 BLOOD CULTURE FOR BACTERIA: CPT

## 2024-01-01 PROCEDURE — 94640 AIRWAY INHALATION TREATMENT: CPT

## 2024-01-01 PROCEDURE — 71250 CT THORAX DX C-: CPT

## 2024-01-01 PROCEDURE — 81001 URINALYSIS AUTO W/SCOPE: CPT

## 2024-01-01 PROCEDURE — 99239 HOSP IP/OBS DSCHRG MGMT >30: CPT

## 2024-01-01 PROCEDURE — 96366 THER/PROPH/DIAG IV INF ADDON: CPT

## 2024-01-01 PROCEDURE — 84145 PROCALCITONIN (PCT): CPT

## 2024-01-01 PROCEDURE — 80202 ASSAY OF VANCOMYCIN: CPT

## 2024-01-01 PROCEDURE — 96375 TX/PRO/DX INJ NEW DRUG ADDON: CPT

## 2024-01-01 PROCEDURE — 87449 NOS EACH ORGANISM AG IA: CPT

## 2024-01-01 PROCEDURE — 99233 SBSQ HOSP IP/OBS HIGH 50: CPT | Mod: GC

## 2024-01-01 PROCEDURE — 85025 COMPLETE CBC W/AUTO DIFF WBC: CPT

## 2024-01-01 PROCEDURE — 99285 EMERGENCY DEPT VISIT HI MDM: CPT

## 2024-01-01 RX ORDER — SENNA PLUS 8.6 MG/1
2 TABLET ORAL AT BEDTIME
Refills: 0 | Status: DISCONTINUED | OUTPATIENT
Start: 2024-01-01 | End: 2024-01-01

## 2024-01-01 RX ORDER — METOCLOPRAMIDE 10 MG/1
10 TABLET ORAL EVERY 6 HOURS
Qty: 120 | Refills: 1 | Status: ACTIVE | COMMUNITY
Start: 2023-05-10 | End: 1900-01-01

## 2024-01-01 RX ORDER — ONDANSETRON 8 MG/1
4 TABLET, FILM COATED ORAL EVERY 8 HOURS
Refills: 0 | Status: DISCONTINUED | OUTPATIENT
Start: 2024-01-01 | End: 2024-01-01

## 2024-01-01 RX ORDER — FAMOTIDINE 10 MG/ML
20 INJECTION INTRAVENOUS ONCE
Refills: 0 | Status: COMPLETED | OUTPATIENT
Start: 2024-01-01 | End: 2024-01-01

## 2024-01-01 RX ORDER — BUDESONIDE AND FORMOTEROL FUMARATE DIHYDRATE 160; 4.5 UG/1; UG/1
160-4.5 AEROSOL RESPIRATORY (INHALATION) TWICE DAILY
Qty: 1 | Refills: 3 | Status: ACTIVE | COMMUNITY
Start: 2024-01-01 | End: 1900-01-01

## 2024-01-01 RX ORDER — BENZONATATE 200 MG/1
200 CAPSULE ORAL 3 TIMES DAILY
Qty: 90 | Refills: 2 | Status: ACTIVE | COMMUNITY
Start: 2023-04-19 | End: 1900-01-01

## 2024-01-01 RX ORDER — LEVOFLOXACIN 500 MG/1
500 TABLET, FILM COATED ORAL DAILY
Qty: 7 | Refills: 0 | Status: ACTIVE | COMMUNITY
Start: 2024-01-01 | End: 1900-01-01

## 2024-01-01 RX ORDER — FOLIC ACID 0.8 MG
1 TABLET ORAL DAILY
Refills: 0 | Status: DISCONTINUED | OUTPATIENT
Start: 2024-01-01 | End: 2024-01-01

## 2024-01-01 RX ORDER — VANCOMYCIN HCL 1 G
1000 VIAL (EA) INTRAVENOUS EVERY 12 HOURS
Refills: 0 | Status: DISCONTINUED | OUTPATIENT
Start: 2024-01-01 | End: 2024-01-01

## 2024-01-01 RX ORDER — HYDROCODONE BITARTRATE AND HOMATROPINE METHYLBROMIDE 1.5; 5 MG/5ML; MG/5ML
5-1.5 SOLUTION ORAL EVERY 4 HOURS
Qty: 600 | Refills: 0 | Status: ACTIVE | COMMUNITY
Start: 2024-01-01 | End: 1900-01-01

## 2024-01-01 RX ORDER — FLUTICASONE FUROATE AND VILANTEROL TRIFENATATE 200; 25 UG/1; UG/1
200-25 POWDER RESPIRATORY (INHALATION)
Qty: 1 | Refills: 5 | Status: ACTIVE | COMMUNITY
Start: 2024-01-01 | End: 1900-01-01

## 2024-01-01 RX ORDER — LEVOFLOXACIN 500 MG/1
500 TABLET, FILM COATED ORAL DAILY
Qty: 10 | Refills: 0 | Status: ACTIVE | COMMUNITY
Start: 2024-01-01 | End: 1900-01-01

## 2024-01-01 RX ORDER — ONDANSETRON 8 MG/1
4 TABLET, FILM COATED ORAL ONCE
Refills: 0 | Status: COMPLETED | OUTPATIENT
Start: 2024-01-01 | End: 2024-01-01

## 2024-01-01 RX ORDER — HALOPERIDOL DECANOATE 100 MG/ML
1 INJECTION INTRAMUSCULAR
Qty: 12 | Refills: 0
Start: 2024-01-01 | End: 2024-01-01

## 2024-01-01 RX ORDER — PREDNISONE 10 MG/1
10 TABLET ORAL
Qty: 19 | Refills: 0 | Status: ACTIVE | COMMUNITY
Start: 2024-01-01 | End: 1900-01-01

## 2024-01-01 RX ORDER — ACETAMINOPHEN 500 MG
1 TABLET ORAL
Qty: 12 | Refills: 0
Start: 2024-01-01 | End: 2024-01-01

## 2024-01-01 RX ORDER — SODIUM CHLORIDE 9 MG/ML
1000 INJECTION INTRAMUSCULAR; INTRAVENOUS; SUBCUTANEOUS ONCE
Refills: 0 | Status: COMPLETED | OUTPATIENT
Start: 2024-01-01 | End: 2024-01-01

## 2024-01-01 RX ORDER — DEXAMETHASONE 0.5 MG/5ML
6 ELIXIR ORAL DAILY
Refills: 0 | Status: DISCONTINUED | OUTPATIENT
Start: 2024-01-01 | End: 2024-01-01

## 2024-01-01 RX ORDER — FINASTERIDE 5 MG/1
1 TABLET, FILM COATED ORAL
Refills: 0 | DISCHARGE

## 2024-01-01 RX ORDER — PANTOPRAZOLE SODIUM 20 MG/1
40 TABLET, DELAYED RELEASE ORAL
Refills: 0 | Status: DISCONTINUED | OUTPATIENT
Start: 2024-01-01 | End: 2024-01-01

## 2024-01-01 RX ORDER — IPRATROPIUM/ALBUTEROL SULFATE 18-103MCG
3 AEROSOL WITH ADAPTER (GRAM) INHALATION EVERY 8 HOURS
Refills: 0 | Status: DISCONTINUED | OUTPATIENT
Start: 2024-01-01 | End: 2024-01-01

## 2024-01-01 RX ORDER — SODIUM CHLORIDE 9 MG/ML
4 INJECTION INTRAMUSCULAR; INTRAVENOUS; SUBCUTANEOUS
Refills: 0 | Status: DISCONTINUED | OUTPATIENT
Start: 2024-01-01 | End: 2024-01-01

## 2024-01-01 RX ORDER — TELMISARTAN 20 MG/1
1 TABLET ORAL
Refills: 0 | DISCHARGE

## 2024-01-01 RX ORDER — ACETAMINOPHEN 500 MG
1000 TABLET ORAL ONCE
Refills: 0 | Status: COMPLETED | OUTPATIENT
Start: 2024-01-01 | End: 2024-01-01

## 2024-01-01 RX ORDER — HYOSCYAMINE SULFATE 0.13 MG
1 TABLET ORAL
Qty: 12 | Refills: 0
Start: 2024-01-01 | End: 2024-01-01

## 2024-01-01 RX ORDER — ROSUVASTATIN CALCIUM 10 MG/1
10 TABLET, FILM COATED ORAL
Qty: 30 | Refills: 6 | Status: ACTIVE | COMMUNITY
Start: 2023-01-01 | End: 1900-01-01

## 2024-01-01 RX ORDER — SODIUM CHLORIDE 9 MG/ML
4 INJECTION INTRAMUSCULAR; INTRAVENOUS; SUBCUTANEOUS EVERY 8 HOURS
Refills: 0 | Status: DISCONTINUED | OUTPATIENT
Start: 2024-01-01 | End: 2024-01-01

## 2024-01-01 RX ORDER — ALBUTEROL 90 UG/1
2 AEROSOL, METERED ORAL EVERY 6 HOURS
Refills: 0 | Status: DISCONTINUED | OUTPATIENT
Start: 2024-01-01 | End: 2024-01-01

## 2024-01-01 RX ORDER — ROSUVASTATIN CALCIUM 5 MG/1
1 TABLET ORAL
Qty: 0 | Refills: 0 | DISCHARGE

## 2024-01-01 RX ORDER — PANTOPRAZOLE SODIUM 20 MG/1
40 TABLET, DELAYED RELEASE ORAL ONCE
Refills: 0 | Status: COMPLETED | OUTPATIENT
Start: 2024-01-01 | End: 2024-01-01

## 2024-01-01 RX ORDER — FINASTERIDE 5 MG/1
5 TABLET, FILM COATED ORAL DAILY
Refills: 0 | Status: DISCONTINUED | OUTPATIENT
Start: 2024-01-01 | End: 2024-01-01

## 2024-01-01 RX ORDER — ALBUTEROL SULFATE 2.5 MG/3ML
(2.5 MG/3ML) SOLUTION RESPIRATORY (INHALATION)
Qty: 1 | Refills: 2 | Status: ACTIVE | COMMUNITY
Start: 2023-01-01 | End: 1900-01-01

## 2024-01-01 RX ORDER — IPRATROPIUM/ALBUTEROL SULFATE 18-103MCG
3 AEROSOL WITH ADAPTER (GRAM) INHALATION ONCE
Refills: 0 | Status: COMPLETED | OUTPATIENT
Start: 2024-01-01 | End: 2024-01-01

## 2024-01-01 RX ORDER — FUROSEMIDE 40 MG
20 TABLET ORAL ONCE
Refills: 0 | Status: COMPLETED | OUTPATIENT
Start: 2024-01-01 | End: 2024-01-01

## 2024-01-01 RX ORDER — AZITHROMYCIN 500 MG/1
500 TABLET, FILM COATED ORAL EVERY 24 HOURS
Refills: 0 | Status: DISCONTINUED | OUTPATIENT
Start: 2024-01-01 | End: 2024-01-01

## 2024-01-01 RX ORDER — SODIUM CHLORIDE 9 MG/ML
1000 INJECTION, SOLUTION INTRAVENOUS ONCE
Refills: 0 | Status: COMPLETED | OUTPATIENT
Start: 2024-01-01 | End: 2024-01-01

## 2024-01-01 RX ORDER — VANCOMYCIN HCL 1 G
750 VIAL (EA) INTRAVENOUS EVERY 12 HOURS
Refills: 0 | Status: DISCONTINUED | OUTPATIENT
Start: 2024-01-01 | End: 2024-01-01

## 2024-01-01 RX ORDER — CEFEPIME 1 G/1
2000 INJECTION, POWDER, FOR SOLUTION INTRAMUSCULAR; INTRAVENOUS ONCE
Refills: 0 | Status: COMPLETED | OUTPATIENT
Start: 2024-01-01 | End: 2024-01-01

## 2024-01-01 RX ORDER — CEFTRIAXONE 500 MG/1
1000 INJECTION, POWDER, FOR SOLUTION INTRAMUSCULAR; INTRAVENOUS EVERY 24 HOURS
Refills: 0 | Status: DISCONTINUED | OUTPATIENT
Start: 2024-01-01 | End: 2024-01-01

## 2024-01-01 RX ORDER — FOLIC ACID 1 MG/1
1 TABLET ORAL
Qty: 30 | Refills: 5 | Status: ACTIVE | COMMUNITY
Start: 2022-02-18 | End: 1900-01-01

## 2024-01-01 RX ORDER — CEFEPIME 1 G/1
1000 INJECTION, POWDER, FOR SOLUTION INTRAMUSCULAR; INTRAVENOUS EVERY 12 HOURS
Refills: 0 | Status: DISCONTINUED | OUTPATIENT
Start: 2024-01-01 | End: 2024-01-01

## 2024-01-01 RX ORDER — IPRATROPIUM/ALBUTEROL SULFATE 18-103MCG
3 AEROSOL WITH ADAPTER (GRAM) INHALATION EVERY 6 HOURS
Refills: 0 | Status: DISCONTINUED | OUTPATIENT
Start: 2024-01-01 | End: 2024-01-01

## 2024-01-01 RX ORDER — DEXAMETHASONE 0.5 MG/5ML
1 ELIXIR ORAL
Qty: 3 | Refills: 0
Start: 2024-01-01 | End: 2024-01-01

## 2024-01-01 RX ORDER — PROCHLORPERAZINE MALEATE 5 MG
1 TABLET ORAL
Qty: 6 | Refills: 0
Start: 2024-01-01 | End: 2024-01-01

## 2024-01-01 RX ORDER — MORPHINE SULFATE 50 MG/1
0.25 CAPSULE, EXTENDED RELEASE ORAL
Qty: 3 | Refills: 0
Start: 2024-01-01 | End: 2024-01-01

## 2024-01-01 RX ORDER — FOLIC ACID 0.8 MG
1 TABLET ORAL
Refills: 0 | DISCHARGE

## 2024-01-01 RX ORDER — ATORVASTATIN CALCIUM 80 MG/1
40 TABLET, FILM COATED ORAL AT BEDTIME
Refills: 0 | Status: DISCONTINUED | OUTPATIENT
Start: 2024-01-01 | End: 2024-01-01

## 2024-01-01 RX ORDER — ENOXAPARIN SODIUM 100 MG/ML
40 INJECTION SUBCUTANEOUS EVERY 24 HOURS
Refills: 0 | Status: DISCONTINUED | OUTPATIENT
Start: 2024-01-01 | End: 2024-01-01

## 2024-01-01 RX ORDER — TELMISARTAN 40 MG/1
40 TABLET ORAL DAILY
Qty: 30 | Refills: 0 | Status: ACTIVE | COMMUNITY
Start: 2022-10-03 | End: 1900-01-01

## 2024-01-01 RX ADMIN — SODIUM CHLORIDE 4 MILLILITER(S): 9 INJECTION INTRAMUSCULAR; INTRAVENOUS; SUBCUTANEOUS at 15:09

## 2024-01-01 RX ADMIN — Medication 600 MILLIGRAM(S): at 05:09

## 2024-01-01 RX ADMIN — Medication 250 MILLIGRAM(S): at 20:31

## 2024-01-01 RX ADMIN — ENOXAPARIN SODIUM 40 MILLIGRAM(S): 100 INJECTION SUBCUTANEOUS at 06:15

## 2024-01-01 RX ADMIN — SODIUM CHLORIDE 4 MILLILITER(S): 9 INJECTION INTRAMUSCULAR; INTRAVENOUS; SUBCUTANEOUS at 11:29

## 2024-01-01 RX ADMIN — Medication 1 MILLIGRAM(S): at 12:21

## 2024-01-01 RX ADMIN — ONDANSETRON 4 MILLIGRAM(S): 8 TABLET, FILM COATED ORAL at 07:06

## 2024-01-01 RX ADMIN — Medication 600 MILLIGRAM(S): at 17:25

## 2024-01-01 RX ADMIN — Medication 3 MILLILITER(S): at 20:12

## 2024-01-01 RX ADMIN — FINASTERIDE 5 MILLIGRAM(S): 5 TABLET, FILM COATED ORAL at 12:15

## 2024-01-01 RX ADMIN — FINASTERIDE 5 MILLIGRAM(S): 5 TABLET, FILM COATED ORAL at 11:53

## 2024-01-01 RX ADMIN — Medication 600 MILLIGRAM(S): at 18:32

## 2024-01-01 RX ADMIN — Medication 6 MILLIGRAM(S): at 07:06

## 2024-01-01 RX ADMIN — Medication 3 MILLILITER(S): at 15:22

## 2024-01-01 RX ADMIN — SODIUM CHLORIDE 4 MILLILITER(S): 9 INJECTION INTRAMUSCULAR; INTRAVENOUS; SUBCUTANEOUS at 21:15

## 2024-01-01 RX ADMIN — Medication 3 MILLILITER(S): at 15:17

## 2024-01-01 RX ADMIN — FINASTERIDE 5 MILLIGRAM(S): 5 TABLET, FILM COATED ORAL at 12:20

## 2024-01-01 RX ADMIN — ATORVASTATIN CALCIUM 40 MILLIGRAM(S): 80 TABLET, FILM COATED ORAL at 21:53

## 2024-01-01 RX ADMIN — FINASTERIDE 5 MILLIGRAM(S): 5 TABLET, FILM COATED ORAL at 12:11

## 2024-01-01 RX ADMIN — ONDANSETRON 4 MILLIGRAM(S): 8 TABLET, FILM COATED ORAL at 16:30

## 2024-01-01 RX ADMIN — Medication 600 MILLIGRAM(S): at 07:06

## 2024-01-01 RX ADMIN — CEFEPIME 100 MILLIGRAM(S): 1 INJECTION, POWDER, FOR SOLUTION INTRAMUSCULAR; INTRAVENOUS at 17:48

## 2024-01-01 RX ADMIN — Medication 600 MILLIGRAM(S): at 18:24

## 2024-01-01 RX ADMIN — PANTOPRAZOLE SODIUM 40 MILLIGRAM(S): 20 TABLET, DELAYED RELEASE ORAL at 08:32

## 2024-01-01 RX ADMIN — Medication 250 MILLIGRAM(S): at 22:32

## 2024-01-01 RX ADMIN — ENOXAPARIN SODIUM 40 MILLIGRAM(S): 100 INJECTION SUBCUTANEOUS at 07:06

## 2024-01-01 RX ADMIN — CEFEPIME 100 MILLIGRAM(S): 1 INJECTION, POWDER, FOR SOLUTION INTRAMUSCULAR; INTRAVENOUS at 06:44

## 2024-01-01 RX ADMIN — Medication 3 MILLILITER(S): at 02:34

## 2024-01-01 RX ADMIN — CEFEPIME 100 MILLIGRAM(S): 1 INJECTION, POWDER, FOR SOLUTION INTRAMUSCULAR; INTRAVENOUS at 05:58

## 2024-01-01 RX ADMIN — Medication 3 MILLILITER(S): at 09:44

## 2024-01-01 RX ADMIN — FAMOTIDINE 20 MILLIGRAM(S): 10 INJECTION INTRAVENOUS at 16:30

## 2024-01-01 RX ADMIN — SODIUM CHLORIDE 4 MILLILITER(S): 9 INJECTION INTRAMUSCULAR; INTRAVENOUS; SUBCUTANEOUS at 06:16

## 2024-01-01 RX ADMIN — Medication 250 MILLIGRAM(S): at 12:48

## 2024-01-01 RX ADMIN — Medication 3 MILLILITER(S): at 04:56

## 2024-01-01 RX ADMIN — Medication 3 MILLILITER(S): at 09:22

## 2024-01-01 RX ADMIN — Medication 3 MILLILITER(S): at 20:07

## 2024-01-01 RX ADMIN — SODIUM CHLORIDE 1000 MILLILITER(S): 9 INJECTION INTRAMUSCULAR; INTRAVENOUS; SUBCUTANEOUS at 21:36

## 2024-01-01 RX ADMIN — Medication 3 MILLILITER(S): at 15:28

## 2024-01-01 RX ADMIN — Medication 250 MILLIGRAM(S): at 06:26

## 2024-01-01 RX ADMIN — Medication 3 MILLILITER(S): at 09:18

## 2024-01-01 RX ADMIN — CEFEPIME 100 MILLIGRAM(S): 1 INJECTION, POWDER, FOR SOLUTION INTRAMUSCULAR; INTRAVENOUS at 18:23

## 2024-01-01 RX ADMIN — Medication 100 MILLIGRAM(S): at 22:09

## 2024-01-01 RX ADMIN — Medication 3 MILLILITER(S): at 09:04

## 2024-01-01 RX ADMIN — Medication 3 MILLILITER(S): at 21:14

## 2024-01-01 RX ADMIN — SODIUM CHLORIDE 4 MILLILITER(S): 9 INJECTION INTRAMUSCULAR; INTRAVENOUS; SUBCUTANEOUS at 20:12

## 2024-01-01 RX ADMIN — Medication 1 MILLIGRAM(S): at 12:55

## 2024-01-01 RX ADMIN — Medication 1 MILLIGRAM(S): at 12:11

## 2024-01-01 RX ADMIN — CEFEPIME 100 MILLIGRAM(S): 1 INJECTION, POWDER, FOR SOLUTION INTRAMUSCULAR; INTRAVENOUS at 06:12

## 2024-01-01 RX ADMIN — SODIUM CHLORIDE 4 MILLILITER(S): 9 INJECTION INTRAMUSCULAR; INTRAVENOUS; SUBCUTANEOUS at 08:20

## 2024-01-01 RX ADMIN — Medication 3 MILLILITER(S): at 15:09

## 2024-01-01 RX ADMIN — CEFEPIME 100 MILLIGRAM(S): 1 INJECTION, POWDER, FOR SOLUTION INTRAMUSCULAR; INTRAVENOUS at 05:09

## 2024-01-01 RX ADMIN — Medication 250 MILLIGRAM(S): at 19:38

## 2024-01-01 RX ADMIN — SODIUM CHLORIDE 4 MILLILITER(S): 9 INJECTION INTRAMUSCULAR; INTRAVENOUS; SUBCUTANEOUS at 08:14

## 2024-01-01 RX ADMIN — Medication 3 MILLILITER(S): at 09:31

## 2024-01-01 RX ADMIN — SODIUM CHLORIDE 4 MILLILITER(S): 9 INJECTION INTRAMUSCULAR; INTRAVENOUS; SUBCUTANEOUS at 20:17

## 2024-01-01 RX ADMIN — Medication 600 MILLIGRAM(S): at 17:07

## 2024-01-01 RX ADMIN — PANTOPRAZOLE SODIUM 40 MILLIGRAM(S): 20 TABLET, DELAYED RELEASE ORAL at 05:46

## 2024-01-01 RX ADMIN — Medication 3 MILLILITER(S): at 20:17

## 2024-01-01 RX ADMIN — Medication 3 MILLILITER(S): at 08:19

## 2024-01-01 RX ADMIN — CEFEPIME 100 MILLIGRAM(S): 1 INJECTION, POWDER, FOR SOLUTION INTRAMUSCULAR; INTRAVENOUS at 07:07

## 2024-01-01 RX ADMIN — ENOXAPARIN SODIUM 40 MILLIGRAM(S): 100 INJECTION SUBCUTANEOUS at 06:44

## 2024-01-01 RX ADMIN — ATORVASTATIN CALCIUM 40 MILLIGRAM(S): 80 TABLET, FILM COATED ORAL at 22:41

## 2024-01-01 RX ADMIN — Medication 30 MILLILITER(S): at 19:17

## 2024-01-01 RX ADMIN — SODIUM CHLORIDE 4 MILLILITER(S): 9 INJECTION INTRAMUSCULAR; INTRAVENOUS; SUBCUTANEOUS at 20:06

## 2024-01-01 RX ADMIN — SODIUM CHLORIDE 4 MILLILITER(S): 9 INJECTION INTRAMUSCULAR; INTRAVENOUS; SUBCUTANEOUS at 20:08

## 2024-01-01 RX ADMIN — Medication 3 MILLILITER(S): at 02:29

## 2024-01-01 RX ADMIN — Medication 400 MILLIGRAM(S): at 19:52

## 2024-01-01 RX ADMIN — CEFEPIME 100 MILLIGRAM(S): 1 INJECTION, POWDER, FOR SOLUTION INTRAMUSCULAR; INTRAVENOUS at 05:31

## 2024-01-01 RX ADMIN — Medication 3 MILLILITER(S): at 20:15

## 2024-01-01 RX ADMIN — FINASTERIDE 5 MILLIGRAM(S): 5 TABLET, FILM COATED ORAL at 17:56

## 2024-01-01 RX ADMIN — Medication 3 MILLILITER(S): at 03:28

## 2024-01-01 RX ADMIN — CEFEPIME 100 MILLIGRAM(S): 1 INJECTION, POWDER, FOR SOLUTION INTRAMUSCULAR; INTRAVENOUS at 17:23

## 2024-01-01 RX ADMIN — Medication 1000 MILLIGRAM(S): at 22:43

## 2024-01-01 RX ADMIN — Medication 250 MILLIGRAM(S): at 06:07

## 2024-01-01 RX ADMIN — CEFEPIME 100 MILLIGRAM(S): 1 INJECTION, POWDER, FOR SOLUTION INTRAMUSCULAR; INTRAVENOUS at 21:36

## 2024-01-01 RX ADMIN — CEFEPIME 100 MILLIGRAM(S): 1 INJECTION, POWDER, FOR SOLUTION INTRAMUSCULAR; INTRAVENOUS at 17:56

## 2024-01-01 RX ADMIN — SODIUM CHLORIDE 4 MILLILITER(S): 9 INJECTION INTRAMUSCULAR; INTRAVENOUS; SUBCUTANEOUS at 09:04

## 2024-01-01 RX ADMIN — Medication 250 MILLIGRAM(S): at 07:51

## 2024-01-01 RX ADMIN — ENOXAPARIN SODIUM 40 MILLIGRAM(S): 100 INJECTION SUBCUTANEOUS at 05:58

## 2024-01-01 RX ADMIN — SODIUM CHLORIDE 4 MILLILITER(S): 9 INJECTION INTRAMUSCULAR; INTRAVENOUS; SUBCUTANEOUS at 15:28

## 2024-01-01 RX ADMIN — ATORVASTATIN CALCIUM 40 MILLIGRAM(S): 80 TABLET, FILM COATED ORAL at 21:17

## 2024-01-01 RX ADMIN — Medication 1 MILLIGRAM(S): at 12:15

## 2024-01-01 RX ADMIN — ATORVASTATIN CALCIUM 40 MILLIGRAM(S): 80 TABLET, FILM COATED ORAL at 22:09

## 2024-01-01 RX ADMIN — CEFEPIME 100 MILLIGRAM(S): 1 INJECTION, POWDER, FOR SOLUTION INTRAMUSCULAR; INTRAVENOUS at 18:15

## 2024-01-01 RX ADMIN — ONDANSETRON 4 MILLIGRAM(S): 8 TABLET, FILM COATED ORAL at 07:52

## 2024-01-01 RX ADMIN — CEFEPIME 100 MILLIGRAM(S): 1 INJECTION, POWDER, FOR SOLUTION INTRAMUSCULAR; INTRAVENOUS at 18:32

## 2024-01-01 RX ADMIN — Medication 3 MILLILITER(S): at 20:49

## 2024-01-01 RX ADMIN — Medication 20 MILLIGRAM(S): at 04:56

## 2024-01-01 RX ADMIN — SODIUM CHLORIDE 4 MILLILITER(S): 9 INJECTION INTRAMUSCULAR; INTRAVENOUS; SUBCUTANEOUS at 00:49

## 2024-01-01 RX ADMIN — Medication 3 MILLILITER(S): at 08:14

## 2024-01-01 RX ADMIN — SODIUM CHLORIDE 4 MILLILITER(S): 9 INJECTION INTRAMUSCULAR; INTRAVENOUS; SUBCUTANEOUS at 15:33

## 2024-01-01 RX ADMIN — Medication 600 MILLIGRAM(S): at 18:15

## 2024-01-01 RX ADMIN — SODIUM CHLORIDE 4 MILLILITER(S): 9 INJECTION INTRAMUSCULAR; INTRAVENOUS; SUBCUTANEOUS at 14:18

## 2024-01-01 RX ADMIN — Medication 3 MILLILITER(S): at 09:16

## 2024-01-01 RX ADMIN — Medication 250 MILLIGRAM(S): at 00:48

## 2024-01-01 RX ADMIN — ENOXAPARIN SODIUM 40 MILLIGRAM(S): 100 INJECTION SUBCUTANEOUS at 06:27

## 2024-01-01 RX ADMIN — Medication 3 MILLILITER(S): at 14:17

## 2024-01-01 RX ADMIN — ATORVASTATIN CALCIUM 40 MILLIGRAM(S): 80 TABLET, FILM COATED ORAL at 21:38

## 2024-01-01 RX ADMIN — ENOXAPARIN SODIUM 40 MILLIGRAM(S): 100 INJECTION SUBCUTANEOUS at 05:09

## 2024-01-01 RX ADMIN — Medication 3 MILLILITER(S): at 15:38

## 2024-01-01 RX ADMIN — Medication 3 MILLILITER(S): at 02:55

## 2024-01-01 RX ADMIN — Medication 600 MILLIGRAM(S): at 05:58

## 2024-01-01 RX ADMIN — SODIUM CHLORIDE 4 MILLILITER(S): 9 INJECTION INTRAMUSCULAR; INTRAVENOUS; SUBCUTANEOUS at 15:22

## 2024-01-01 RX ADMIN — Medication 3 MILLILITER(S): at 20:05

## 2024-01-01 RX ADMIN — ATORVASTATIN CALCIUM 40 MILLIGRAM(S): 80 TABLET, FILM COATED ORAL at 21:15

## 2024-01-01 RX ADMIN — SODIUM CHLORIDE 1000 MILLILITER(S): 9 INJECTION, SOLUTION INTRAVENOUS at 16:30

## 2024-01-01 RX ADMIN — Medication 600 MILLIGRAM(S): at 05:46

## 2024-01-01 RX ADMIN — Medication 600 MILLIGRAM(S): at 05:30

## 2024-01-01 RX ADMIN — SODIUM CHLORIDE 4 MILLILITER(S): 9 INJECTION INTRAMUSCULAR; INTRAVENOUS; SUBCUTANEOUS at 09:48

## 2024-01-01 RX ADMIN — PANTOPRAZOLE SODIUM 40 MILLIGRAM(S): 20 TABLET, DELAYED RELEASE ORAL at 07:06

## 2024-01-01 RX ADMIN — SODIUM CHLORIDE 4 MILLILITER(S): 9 INJECTION INTRAMUSCULAR; INTRAVENOUS; SUBCUTANEOUS at 20:15

## 2024-01-01 RX ADMIN — FINASTERIDE 5 MILLIGRAM(S): 5 TABLET, FILM COATED ORAL at 12:55

## 2024-01-01 RX ADMIN — Medication 3 MILLILITER(S): at 00:48

## 2024-01-01 RX ADMIN — Medication 3 MILLILITER(S): at 15:44

## 2024-01-01 RX ADMIN — ENOXAPARIN SODIUM 40 MILLIGRAM(S): 100 INJECTION SUBCUTANEOUS at 06:59

## 2024-01-01 RX ADMIN — ONDANSETRON 4 MILLIGRAM(S): 8 TABLET, FILM COATED ORAL at 16:57

## 2024-01-01 RX ADMIN — Medication 6 MILLIGRAM(S): at 05:44

## 2024-01-01 RX ADMIN — SODIUM CHLORIDE 4 MILLILITER(S): 9 INJECTION INTRAMUSCULAR; INTRAVENOUS; SUBCUTANEOUS at 15:59

## 2024-01-01 RX ADMIN — Medication 1 MILLIGRAM(S): at 11:53

## 2024-01-01 RX ADMIN — SODIUM CHLORIDE 4 MILLILITER(S): 9 INJECTION INTRAMUSCULAR; INTRAVENOUS; SUBCUTANEOUS at 09:18

## 2024-01-01 RX ADMIN — CEFEPIME 100 MILLIGRAM(S): 1 INJECTION, POWDER, FOR SOLUTION INTRAMUSCULAR; INTRAVENOUS at 07:06

## 2024-01-01 RX ADMIN — SODIUM CHLORIDE 4 MILLILITER(S): 9 INJECTION INTRAMUSCULAR; INTRAVENOUS; SUBCUTANEOUS at 03:01

## 2024-01-11 NOTE — PHYSICAL EXAM
[Restricted in physically strenuous activity but ambulatory and able to carry out work of a light or sedentary nature] : Status 1- Restricted in physically strenuous activity but ambulatory and able to carry out work of a light or sedentary nature, e.g., light house work, office work [Normal] : affect appropriate [de-identified] : rhonchi noted over LLL and RLL . Decreased BS R lung fields

## 2024-01-11 NOTE — REVIEW OF SYSTEMS
[Fever] : no fever [Fatigue] : fatigue [Dysphagia] : no dysphagia [Chest Pain] : no chest pain [Shortness Of Breath] : shortness of breath [Cough] : cough [SOB on Exertion] : shortness of breath during exertion [Abdominal Pain] : abdominal pain [Vomiting] : vomiting [Constipation] : constipation [Joint Pain] : no joint pain [Skin Rash] : no skin rash [Negative] : Allergic/Immunologic [FreeTextEntry2] : as above

## 2024-01-11 NOTE — HISTORY OF PRESENT ILLNESS
[Disease: _____________________] : Disease: [unfilled] [T: ___] : T[unfilled] [N: ___] : N[unfilled] [M: ___] : M[unfilled] [AJCC Stage: ____] : AJCC Stage: [unfilled] [de-identified] : Mr. Del Rio is a pleasant 83 M, Estonian (very little English), speaking with PMH of HTN and BPH, never smoker but previously worked in building maintenance presented initially 1/21/22 with chronic dry cough x1 year and worsened dyspnea on exertion for 3 weeks, admitted to [facility] after found on CT to have to have a 5.3 x 2.8 cm masslike consolidation in the RLL. On 1/25, he underwent inpatient bronchoscopy and endobronchial ultrasound with RLL BAL, TBNA of station 7 lymph node, RLL Mass transbronchial biopsy which was suspicious for malignancy.  He saw Dr. Moran and was recommended for PET/CT 2/7/22:  large FDG-avid consolidations in right middle lobe and right lower lobe, unchanged as compared to CT dated 1/21/2022, compatible with known adenocarcinoma. Small FDG-avid nodule in right lung is suspicious for another site of disease. A mildly FDG-avid left lower lobe peripheral opacity, also unchanged on CT, is indeterminate. 2. Mildly FDG-avid subcarinal lymph node is nonspecific. Pt does not wish to use  phone. Grand-daughter provided the translation as per patient request. Other than cough, he has no other symptoms. He has tried OTC cough meds with no help.   2/18/22: Pt seen vis tel. No new complaints. He still has cough which is persistent and dry.   4/7/22: Pt seen via tele. 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   5/3/22: Pt seen in tx room. He received cycle 1 3 weeks ago. Tolerated well with absolutely no AEs. He has since seen Dr. Medina who will see him again after scans post 3 cycles of tx. Pt here for cycle 2.   5/24/22: Since last visit, pt has had an eventful course. He was recently hospitalized with c/o fever, URI symptoms, myalgia and was found to have non-COVID coronavirus infection. Pt also febrile/tachycardic on admission, which improved with supportive care/abx. CXR suggested RLL consolidation (thought his could be known lung mass). He was started on vanc/zosyn in ED 5/20, followed by ceftriaxone and azithromycin for possible superimposed PNA in the setting of immunosuppression. Pt improved with all this and is now completing oral course of abx (last day tomorrow). Incidentally, pt was also noted to have ROSS (acute kidney injury) with admission SCr 1.88, baseline 1.2-1.3 thought to be related to sepsis/poor PO intake and dehydration. He was treated with IV hydration and home ARB was held (he continues to not take this at this time).  He presents today for planned 3/3 cycle of chemoIO. He notes no fever, chills and cough has markedly improved.   6/16/22: Pt was diagnosed with corona virus infection, after ER visit for fever and cough. Cough has decreased and pt is fine now.   6/30/22: Cough improved. Otherwise feeling well  7/19/22: saw Dr. Moran. A bx of left sided lesion was recommended. pt wanted to discuss with me. He feels well. denies any dyspnea or cough or other symptoms.   9/16/22: Seen today with son, Rich. Patient has no complaints. Is doing well. Has not received any cancer tx since 6/23.   10/25/22: Pt seen vis televisit. He is feeling well. Has been active and notes no difference in status since last visit.  11/25/22: Per pt's son, everything is stable. Pt remains active. Since last visit, he has had Lt VATS, JUANPABLO and LLL wedge rxn on 10/28/22. Path revealed invasive mucinous AdenoCA.   12/6/22: Pt here for follow up. He has cough but otherwise no symptoms.   1/5/23: Cough is persistent. No other complaints.  2/15/23: Patient seen today for follow up in treatment room. Has been tolerating well with no AEs to report. Ongoing cough not adequately relieved by OTC cough syrup   3/8/23: Pt seen today for follow accompanied by his son. Ongoing cough, no other complaints.   3/29/23: patient seen today accompanied by his son. Reports that since he did his CT scan last week, his cough has been more frequent. He has also ran out of Benzonatate. He also notes that right after CT scan was done he had an episode of emesis, no nausea or emesis since then.   4/19/23: seen today accompanied by his son. Continues on tecentriq and is tolerating well thus far. He continues to have dry cough and does not feel that benzonatate is adequately helping him. His breathing is stable, no pain, appetite is good. He is also thinking about traveling to [facility] some time soon   5/10/23: Patient seen today for follow up accompanied by his grand-daughter. Patient reports that since last visit, he has been feeling more SOB with exertion. His cough is persistent, minimally relieved by benzonatate 200mg TID, and is now productive.   6/21/23: Patient seen today for follow up with his granddaughter. They report that his sough has improved since starting promethazine-codeine. He saw IR yesterday for evaluation for biopsy and thora however biopsy is not possible but will move forward with thoracentesis. He is otherwise doing well   7/12/23: Pt seen in office. He is accompanied by his son He reports that his cough is worse, he also notes some pleuritic CP on left side. cough is more productive than it has been in the past. pt denies any fever, but does have night sweats.   8/23/23: Patient seen today for follow up accompanied by his son Sindi. They report that the patient was hospitalized at [facility] 2 weeks ago due to increased cough and was treated for PNA who improvement of his symptoms. Today he reports his breathing is better, no fever, however cough has only minimally improved. He continues to use benzonatate and promethazine/codeine. Pleuritic chest pain has resolved.   9/13/23: Patient seen today for follow up accompanied by his son Lea. They report that the patients breathing is stable, cough is somewhat improved.   9/20/23: Cough and dyspnea persist. He cannot sleep on his side.   10/4/23: Patient seen today accompanied by his son and his other son Sindi attending the visit via telephone. He is here today for tecentriq with the addition of Taxol. He took dex 20mg last night. He reports persistent dyspnea as well as worsened cough which he attributes to running out of his cough medication. He otherwise feels well.   10/23/23: After last tx, pt nausea, confusion, fatigue for several days. All these have improved, but not completely resolved. Pt has been coughing more.   11/15/23: Pt seen in treatment room in the company of his son. Reports continued cough and SOB. Using supplemental O2 at home. O2 sat today 90% RA at rest. He has been using cough medicine but reports only minimal relief. Taxol was held last treatment 2/2 PS but will reintroduced today split dose D1D8. No hemoptysis. No fevers. CT chest was done which revealed consolidation and nodules in all lobes, representing known lung cancer, mildly decreased in the right upper lobe and elsewhere unchanged.   12/14/23: Was hospitalized [facility] COVID end of November. He was treated with Remdesinvir and is now on home O2. He has been feeling extremely fatigued since then. He has increased cough with thin expectoration. Appetite is fair, but he has lost a lot of weight.   1/11/24: Pt seen in treatment room prior to treatment. He is completely O2 dependent. Uses 3L. Cough continues to be problematic. Using Benzonatate with some relief. Pt complains of thick mucous.  Pt now being followed by Dr. Lisker, Pt reports more dyspnea on exertion.  [de-identified] : adeno ca [Treatment Protocol] : Treatment Protocol

## 2024-01-11 NOTE — ASSESSMENT
[FreeTextEntry1] : 84 yo m with at least stage IIIA lung adeno ca, PDL1 0%, NGS showed KRAS G12V mut in addition to a few other un-actionable ones. Baseline PET/CT personally- disease confined to rt RLL and RML- some activity in subcarinal region. Bx from RLL pos for adeno ca, lavage showed atypical cells, and level 7 LN- was benign. I presented the case at  and the group concurred that optimal staging would involve sampling of rt middle lobe. SUV on rt middle lobe mass is lower than that of RLL. Pt underwent RML through ventura bronch and was pos for adeno ca ([id number]- [id number]). MRI brain on 2/17 showed no mets. Based on AJCC 8th staging, pt was staged as stage IIIA lung cancer. Based on PFTs he was deemed potentially resectable by Dr. Medina. Based on recent Checkmate 816 study, neoadjuvant chemo+nivo was thought likely to yield higher rates of CR and is an FDA-approved option. He started chemoimmunotherapy (Carbo/pem/nivo) for 4 cycles. PET/CT from May 2022 after 4 cycles was very confusing. There was mixed response on rt- right middle lobe mass is smaller and less FDG-avid but LL mass was larger and still quite avid. There is also some process on left side which was thought to be related to recent viral infection. The PET/CT picture suggesting POD was not in agreement with clinical exam or tumor-informed ctDNA testing through [facility] in which level decreased from 0.91 MTM/ml to undetectable level in June (subsequently was detectable at 0.05 MTM/ml in July). Repeat CT without contrast a month later, on 6/28/22 showed persistent changes, and to my eye, these findings were more c/w organizing PNA likely from IO rather than true POD. Therefore, I recommended a bx. Pt was feeling well and hesitant to undergo any bx. Hence, we decided to watch off tx and repeat CT in 2-3 months. The scans done in September show stable 7 x 4.4 cm mass in the right lower lobe which has not significantly changed when compared to previous exam. Numerous solid nodules are noted within both lungs. Many of them have increased in size. ctDNA levels were also rising slowly. Pt finally underwent surgical bx on 10/28/22 confirming malignancy. Discussed the incurable nature of disease and palliative intent of tx. Since pt responded to chemoIO previously, I favored taxane/IO. However, pt was not interested in chemo given potential AEs but was amenable to IO alone. Hence, he started Tecentriq q 3 weeks on 12/14. He was tolerating well with no AEs. CT scan from March 2023 show that a few nodules have increased in size however adequate comparison was limited due to differences in technique and inspiratory effort. ctDNA from March 2023 undetectable. CT scans done in May given worsening cough and SOB showed increased right pleural effusion with subtle nodularity along the posterior pleura as well as slight increase in the size of the right upper lobe nodule. Her underwent thoracentesis with IR and pleural fluid showed no malignant cells. He was hospitalized in August 2023 for pneumonia and CT scan at that time showed mixed GGO in the lingula progressed, increased size of JUANPABLO nodule, increased right lung nodule. PET-CT shows POD. Explored clinical trial options- there are no trials that pt will be eligible for at the moment. Therefore, we added Taxol 175 mg/m2 Q 3 weeks to Tecentriq.  After C1, there was a decline in PS and hence, for cycle 2, Taxol was held 2/2 decreased PS. Taxol was reintroduced C3 but lower and split dose over D1D8.  Pt had been tolerating this better and overall stable but recent COVID infection resulted in decreased PS and now on home O2 Still fatigued and hypoxic, with increased cough ctDNA with signatera has been pos; up and down, but still detectable at relatively high levels. Cough: Continue promethazine-codeine q6hrs PRN and continue benzonatate 200mg TID. Appreciate Dr. Lisker note. Pt now using albuterol nebulizer TID  Today, pt is still quite debiltated from recent COVID CBC reviewed. HGB 8.5 which continues downward trend and may contribute to feeling of breathless with exertion. WIll arrange transfusion 2 unit PRBC's. COnsent obtained. WIll send type and crossx2 F/u CMP CEA  Benzonatate renewed per request  Discussed at depth the need for timely communication of any AEs  OV in 3 weeks with next treatment

## 2024-01-24 NOTE — ASSESSMENT
[FreeTextEntry1] : 84 yo m with at least stage IIIA lung adeno ca, PDL1 0%, NGS showed KRAS G12V mut in addition to a few other un-actionable ones. Baseline PET/CT personally- disease confined to rt RLL and RML- some activity in subcarinal region. Bx from RLL pos for adeno ca, lavage showed atypical cells, and level 7 LN- was benign. I presented the case at  and the group concurred that optimal staging would involve sampling of rt middle lobe. SUV on rt middle lobe mass is lower than that of RLL. Pt underwent RML through ventura bronch and was pos for adeno ca ([id number]- [id number]). MRI brain on 2/17 showed no mets. Based on AJCC 8th staging, pt was staged as stage IIIA lung cancer. Based on PFTs he was deemed potentially resectable by Dr. Medina. Based on recent Checkmate 816 study, neoadjuvant chemo+nivo was thought likely to yield higher rates of CR and is an FDA-approved option. He started chemoimmunotherapy (Carbo/pem/nivo) for 4 cycles. PET/CT from May 2022 after 4 cycles was very confusing. There was mixed response on rt- right middle lobe mass is smaller and less FDG-avid but LL mass was larger and still quite avid. There is also some process on left side which was thought to be related to recent viral infection. The PET/CT picture suggesting POD was not in agreement with clinical exam or tumor-informed ctDNA testing through [facility] in which level decreased from 0.91 MTM/ml to undetectable level in June (subsequently was detectable at 0.05 MTM/ml in July). Repeat CT without contrast a month later, on 6/28/22 showed persistent changes, and to my eye, these findings were more c/w organizing PNA likely from IO rather than true POD. Therefore, I recommended a bx. Pt was feeling well and hesitant to undergo any bx. Hence, we decided to watch off tx and repeat CT in 2-3 months. The scans done in September show stable 7 x 4.4 cm mass in the right lower lobe which has not significantly changed when compared to previous exam. Numerous solid nodules are noted within both lungs. Many of them have increased in size. ctDNA levels were also rising slowly. Pt finally underwent surgical bx on 10/28/22 confirming malignancy. Discussed the incurable nature of disease and palliative intent of tx. Since pt responded to chemoIO previously, I favored taxane/IO. However, pt was not interested in chemo given potential AEs but was amenable to IO alone. Hence, he started Tecentriq q 3 weeks on 12/14. He was tolerating well with no AEs. CT scan from March 2023 show that a few nodules have increased in size however adequate comparison was limited due to differences in technique and inspiratory effort. ctDNA from March 2023 undetectable. CT scans done in May given worsening cough and SOB showed increased right pleural effusion with subtle nodularity along the posterior pleura as well as slight increase in the size of the right upper lobe nodule. Her underwent thoracentesis with IR and pleural fluid showed no malignant cells. He was hospitalized in August 2023 for pneumonia and CT scan at that time showed mixed GGO in the lingula progressed, increased size of JUANPABLO nodule, increased right lung nodule.   PET-CT shows POD. Explored clinical trial options- there are no trials that pt will be eligible for at the moment. Therefore, we added Taxol 175 mg/m2 Q 3 weeks to Tecentriq.  After C1, there was a decline in PS and hence, for cycle 2, Taxol was held 2/2 decreased PS. Taxol was reintroduced C3 but lower and split dose over D1D8. Currently C(?)5 Pt had been tolerating this better and overall stable but recent COVID infection resulted in decreased PS and now on home O2 ctDNA with signatera has been pos; up and down, but still detectable at relatively high levels. Cough: Hycodan q6hrs PRN and continue benzonatate 200mg TID. Rx for hycodan sent to local pharmacy  Appreciate Dr. Lisker note. Pt now using albuterol nebulizer BID. Lung sounds are improved since using albuterol. Pt was encouraged to increase to TID Due for repeat imaging. Will enter orders for CT's F/U BW sent today.  OV in 3 weeks with next treatment

## 2024-01-24 NOTE — PHYSICAL EXAM
[Restricted in physically strenuous activity but ambulatory and able to carry out work of a light or sedentary nature] : Status 1- Restricted in physically strenuous activity but ambulatory and able to carry out work of a light or sedentary nature, e.g., light house work, office work [Normal] : affect appropriate [de-identified] : Much less rhonchi noted over LLL and RLL . Decreased BS R lung fields

## 2024-01-24 NOTE — REVIEW OF SYSTEMS
[Fever] : no fever [Fatigue] : fatigue [Dysphagia] : no dysphagia [Chest Pain] : no chest pain [Shortness Of Breath] : shortness of breath [SOB on Exertion] : shortness of breath during exertion [Cough] : cough [Abdominal Pain] : abdominal pain [Vomiting] : vomiting [Constipation] : constipation [Joint Pain] : no joint pain [Skin Rash] : no skin rash [Negative] : Allergic/Immunologic [FreeTextEntry2] : as above

## 2024-01-29 NOTE — PROGRESS NOTE ADULT - PROBLEM SELECTOR PROBLEM 8
Patient/EMS
Preventive measure

## 2024-02-25 NOTE — ASSESSMENT
[FreeTextEntry1] : 82 yo m with at least stage IIIA lung adeno ca, PDL1 0%, NGS showed KRAS G12V mut in addition to a few other un-actionable ones. Baseline PET/CT personally- disease confined to rt RLL and RML- some activity in subcarinal region. Bx from RLL pos for adeno ca, lavage showed atypical cells, and level 7 LN- was benign. I presented the case at  and the group concurred that optimal staging would involve sampling of rt middle lobe. SUV on rt middle lobe mass is lower than that of RLL. Pt underwent RML through ventura bronch and was pos for adeno ca (80- [id number]). MRI brain on 2/17 showed no mets. Based on AJCC 8th staging, pt was staged as stage IIIA lung cancer. Based on PFTs he was deemed potentially resectable by Dr. Medina. Based on recent Checkmate 816 study, neoadjuvant chemo+nivo was thought likely to yield higher rates of CR and is an FDA-approved option. He started chemoimmunotherapy (Carbo/pem/nivo) for 4 cycles. PET/CT from May 2022 after 4 cycles was very confusing. There was mixed response on rt- right middle lobe mass is smaller and less FDG-avid but LL mass was larger and still quite avid. There is also some process on left side which was thought to be related to recent viral infection. The PET/CT picture suggesting POD was not in agreement with clinical exam or tumor-informed ctDNA testing through [facility] in which level decreased from 0.91 MTM/ml to undetectable level in June (subsequently was detectable at 0.05 MTM/ml in July). Repeat CT without contrast a month later, on 6/28/22 showed persistent changes, and to my eye, these findings were more c/w organizing PNA likely from IO rather than true POD. Therefore, I recommended a bx. Pt was feeling well and hesitant to undergo any bx. Hence, we decided to watch off tx and repeat CT in 2-3 months. The scans done in September show stable 7 x 4.4 cm mass in the right lower lobe which has not significantly changed when compared to previous exam. Numerous solid nodules are noted within both lungs. Many of them have increased in size. ctDNA levels were also rising slowly. Pt finally underwent surgical bx on 10/28/22 confirming malignancy. Discussed the incurable nature of disease and palliative intent of tx. Since pt responded to chemoIO previously, I favored taxane/IO. However, pt was not interested in chemo given potential AEs but was amenable to IO alone. Hence, he started Tecentriq q 3 weeks on 12/14. He was tolerating well with no AEs. CT scan from March 2023 show that a few nodules have increased in size however adequate comparison was limited due to differences in technique and inspiratory effort. ctDNA from March 2023 undetectable. CT scans done in May given worsening cough and SOB showed increased right pleural effusion with subtle nodularity along the posterior pleura as well as slight increase in the size of the right upper lobe nodule. Her underwent thoracentesis with IR and pleural fluid showed no malignant cells. He was hospitalized in August 2023 for pneumonia and CT scan at that time showed mixed GGO in the lingula progressed, increased size of JUANPABLO nodule, increased right lung nodule.   PET-CT shows POD. Explored clinical trial options- there are no trials that pt will be eligible for at the moment. Therefore, we added Taxol 175 mg/m2 Q 3 weeks to Tecentriq.  After C1, there was a decline in PS and hence, for cycle 2, Taxol was held 2/2 decreased PS. Taxol was reintroduced C3 but lower and split dose over D1D8.  Pt had been tolerating this better and overall stable but recent COVID infection resulted in decreased PS and now on home O2 ctDNA with signatera has been pos; up and down, but still detectable at relatively high levels. Cough: Continu  Hycodan q6hrs PRN and continue benzonatate 200mg TID.  Appreciate Dr. Lisker note. Pt now using albuterol nebulizer BID. Lung sounds are improved since using albuterol. Pt was encouraged to increase to TID Worsening cough and increased sputum collection. Previously written for 10 day course of levaquin. Will extend course of Levaquin for 7 more days. RX sent to local pharmacy CT chest reviewed. Multilobar consolidations.  F/U BW sent today.  OV in 3 weeks with next treatment

## 2024-02-25 NOTE — HISTORY OF PRESENT ILLNESS
[Disease: _____________________] : Disease: [unfilled] [T: ___] : T[unfilled] [N: ___] : N[unfilled] [M: ___] : M[unfilled] [AJCC Stage: ____] : AJCC Stage: [unfilled] [de-identified] : Mr. Del Rio is a pleasant 83 M, [place] (very little English), speaking with PMH of HTN and BPH, never smoker but previously worked in building maintenance presented initially 1/21/22 with chronic dry cough x1 year and worsened dyspnea on exertion for 3 weeks, admitted to [facility] after found on CT to have to have a 5.3 x 2.8 cm masslike consolidation in the RLL. On 1/25, he underwent inpatient bronchoscopy and endobronchial ultrasound with RLL BAL, TBNA of station 7 lymph node, RLL Mass transbronchial biopsy which was suspicious for malignancy.  He saw Dr. Moran and was recommended for PET/CT 2/7/22:  large FDG-avid consolidations in right middle lobe and right lower lobe, unchanged as compared to CT dated 1/21/2022, compatible with known adenocarcinoma. Small FDG-avid nodule in right lung is suspicious for another site of disease. A mildly FDG-avid left lower lobe peripheral opacity, also unchanged on CT, is indeterminate. 2. Mildly FDG-avid subcarinal lymph node is nonspecific. Pt does not wish to use  phone. Grand-daughter provided the translation as per patient request. Other than cough, he has no other symptoms. He has tried OTC cough meds with no help.   2/18/22: Pt seen vis tel. No new complaints. He still has cough which is persistent and dry.   4/7/22: Pt seen via tele. 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   5/3/22: Pt seen in tx room. He received cycle 1 3 weeks ago. Tolerated well with absolutely no AEs. He has since seen Dr. Medina who will see him again after scans post 3 cycles of tx. Pt here for cycle 2.   5/24/22: Since last visit, pt has had an eventful course. He was recently hospitalized with c/o fever, URI symptoms, myalgia and was found to have non-COVID coronavirus infection. Pt also febrile/tachycardic on admission, which improved with supportive care/abx. CXR suggested RLL consolidation (thought his could be known lung mass). He was started on vanc/zosyn in ED 5/20, followed by ceftriaxone and azithromycin for possible superimposed PNA in the setting of immunosuppression. Pt improved with all this and is now completing oral course of abx (last day tomorrow). Incidentally, pt was also noted to have ROSS (acute kidney injury) with admission SCr 1.88, baseline 1.2-1.3 thought to be related to sepsis/poor PO intake and dehydration. He was treated with IV hydration and home ARB was held (he continues to not take this at this time).  He presents today for planned 3/3 cycle of chemoIO. He notes no fever, chills and cough has markedly improved.   6/16/22: Pt was diagnosed with corona virus infection, after ER visit for fever and cough. Cough has decreased and pt is fine now.   6/30/22: Cough improved. Otherwise feeling well  7/19/22: saw Dr. Moran. A bx of left sided lesion was recommended. pt wanted to discuss with me. He feels well. denies any dyspnea or cough or other symptoms.   9/16/22: Seen today with son, Rich. Patient has no complaints. Is doing well. Has not received any cancer tx since 6/23.   10/25/22: Pt seen vis televisit. He is feeling well. Has been active and notes no difference in status since last visit.  11/25/22: Per pt's son, everything is stable. Pt remains active. Since last visit, he has had Lt VATS, JUANPABLO and LLL wedge rxn on 10/28/22. Path revealed invasive mucinous AdenoCA.   12/6/22: Pt here for follow up. He has cough but otherwise no symptoms.   1/5/23: Cough is persistent. No other complaints.  2/15/23: Patient seen today for follow up in treatment room. Has been tolerating well with no AEs to report. Ongoing cough not adequately relieved by OTC cough syrup   3/8/23: Pt seen today for follow accompanied by his son. Ongoing cough, no other complaints.   3/29/23: patient seen today accompanied by his son. Reports that since he did his CT scan last week, his cough has been more frequent. He has also ran out of Benzonatate. He also notes that right after CT scan was done he had an episode of emesis, no nausea or emesis since then.   4/19/23: seen today accompanied by his son. Continues on tecentriq and is tolerating well thus far. He continues to have dry cough and does not feel that benzonatate is adequately helping him. His breathing is stable, no pain, appetite is good. He is also thinking about traveling to [facility] some time soon   5/10/23: Patient seen today for follow up accompanied by his grand-daughter. Patient reports that since last visit, he has been feeling more SOB with exertion. His cough is persistent, minimally relieved by benzonatate 200mg TID, and is now productive.   6/21/23: Patient seen today for follow up with his granddaughter. They report that his sough has improved since starting promethazine-codeine. He saw IR yesterday for evaluation for biopsy and thora however biopsy is not possible but will move forward with thoracentesis. He is otherwise doing well   7/12/23: Pt seen in office. He is accompanied by his son He reports that his cough is worse, he also notes some pleuritic CP on left side. cough is more productive than it has been in the past. pt denies any fever, but does have night sweats.   8/23/23: Patient seen today for follow up accompanied by his son Sindi. They report that the patient was hospitalized at [facility] 2 weeks ago due to increased cough and was treated for PNA who improvement of his symptoms. Today he reports his breathing is better, no fever, however cough has only minimally improved. He continues to use benzonatate and promethazine/codeine. Pleuritic chest pain has resolved.   9/13/23: Patient seen today for follow up accompanied by his son Lea. They report that the patients breathing is stable, cough is somewhat improved.   9/20/23: Cough and dyspnea persist. He cannot sleep on his side.   10/4/23: Patient seen today accompanied by his son and his other son Sindi attending the visit via telephone. He is here today for tecentriq with the addition of Taxol. He took dex 20mg last night. He reports persistent dyspnea as well as worsened cough which he attributes to running out of his cough medication. He otherwise feels well.   10/23/23: After last tx, pt nausea, confusion, fatigue for several days. All these have improved, but not completely resolved. Pt has been coughing more.   11/15/23: Pt seen in treatment room in the company of his son. Reports continued cough and SOB. Using supplemental O2 at home. O2 sat today 90% RA at rest. He has been using cough medicine but reports only minimal relief. Taxol was held last treatment 2/2 PS but will reintroduced today split dose D1D8. No hemoptysis. No fevers. CT chest was done which revealed consolidation and nodules in all lobes, representing known lung cancer, mildly decreased in the right upper lobe and elsewhere unchanged.   12/14/23: Was hospitalized [facility] COVID end of November. He was treated with Remdesinvir and is now on home O2. He has been feeling extremely fatigued since then. He has increased cough with thin expectoration. Appetite is fair, but he has lost a lot of weight.   1/11/24: Pt seen in treatment room prior to treatment. He is completely O2 dependent. Uses 3L. Cough continues to be problematic. Using Benzonatate with some relief. Pt complains of thick mucous.  Pt now being followed by Dr. Lisker, Pt reports more dyspnea on exertion.   1/24/24: Pt returns for follow up. Pt main complaint continues to be the cough. Slightly less productive nature of cough. Still experiences ROWLEY and O2 dependence.   2/21/24: Pt being seen prior to treatment today. Pt no show for treatment on 2/14. Pt with increased cough productive of yellow green sputum and SOB. CT chest ordered and Levaquin sent to pharmacy. Pt has had multiple admissions for pneumonia. CT confirmed continued consolidations. Pt reports feeling better since starting antibiotic and has noted a decrease in cough and productive sputum. No fevers.   [de-identified] : adeno ca [Treatment Protocol] : Treatment Protocol

## 2024-02-25 NOTE — PHYSICAL EXAM
[Ambulatory and capable of all self care but unable to carry out any work activities] : Status 2- Ambulatory and capable of all self care but unable to carry out any work activities. Up and about more than 50% of waking hours [de-identified] : Much less rhonchi noted over LLL and RLL . Decreased BS R lung fields [Normal] : affect appropriate

## 2024-02-25 NOTE — REVIEW OF SYSTEMS
[Fever] : no fever [Dysphagia] : no dysphagia [Fatigue] : fatigue [Shortness Of Breath] : shortness of breath [Chest Pain] : no chest pain [Cough] : cough [SOB on Exertion] : shortness of breath during exertion [Vomiting] : vomiting [Abdominal Pain] : abdominal pain [Joint Pain] : no joint pain [Constipation] : constipation [Skin Rash] : no skin rash [Negative] : Allergic/Immunologic [FreeTextEntry2] : as above

## 2024-03-13 NOTE — HISTORY OF PRESENT ILLNESS
[Disease: _____________________] : Disease: [unfilled] [T: ___] : T[unfilled] [N: ___] : N[unfilled] [M: ___] : M[unfilled] [AJCC Stage: ____] : AJCC Stage: [unfilled] [Treatment Protocol] : Treatment Protocol [de-identified] : Mr. Del Rio is a pleasant 83 M, Tajik (very little English), speaking with PMH of HTN and BPH, never smoker but previously worked in building maintenance presented initially 1/21/22 with chronic dry cough x1 year and worsened dyspnea on exertion for 3 weeks, admitted to [facility] after found on CT to have to have a 5.3 x 2.8 cm masslike consolidation in the RLL. On 1/25, he underwent inpatient bronchoscopy and endobronchial ultrasound with RLL BAL, TBNA of station 7 lymph node, RLL Mass transbronchial biopsy which was suspicious for malignancy.  He saw Dr. Moran and was recommended for PET/CT 2/7/22:  large FDG-avid consolidations in right middle lobe and right lower lobe, unchanged as compared to CT dated 1/21/2022, compatible with known adenocarcinoma. Small FDG-avid nodule in right lung is suspicious for another site of disease. A mildly FDG-avid left lower lobe peripheral opacity, also unchanged on CT, is indeterminate. 2. Mildly FDG-avid subcarinal lymph node is nonspecific. Pt does not wish to use  phone. Grand-daughter provided the translation as per patient request. Other than cough, he has no other symptoms. He has tried OTC cough meds with no help.   2/18/22: Pt seen vis tel. No new complaints. He still has cough which is persistent and dry.   4/7/22: Pt seen via tele. 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   5/3/22: Pt seen in tx room. He received cycle 1 3 weeks ago. Tolerated well with absolutely no AEs. He has since seen Dr. Medina who will see him again after scans post 3 cycles of tx. Pt here for cycle 2.   5/24/22: Since last visit, pt has had an eventful course. He was recently hospitalized with c/o fever, URI symptoms, myalgia and was found to have non-COVID coronavirus infection. Pt also febrile/tachycardic on admission, which improved with supportive care/abx. CXR suggested RLL consolidation (thought his could be known lung mass). He was started on vanc/zosyn in ED 5/20, followed by ceftriaxone and azithromycin for possible superimposed PNA in the setting of immunosuppression. Pt improved with all this and is now completing oral course of abx (last day tomorrow). Incidentally, pt was also noted to have ROSS (acute kidney injury) with admission SCr 1.88, baseline 1.2-1.3 thought to be related to sepsis/poor PO intake and dehydration. He was treated with IV hydration and home ARB was held (he continues to not take this at this time).  He presents today for planned 3/3 cycle of chemoIO. He notes no fever, chills and cough has markedly improved.   6/16/22: Pt was diagnosed with corona virus infection, after ER visit for fever and cough. Cough has decreased and pt is fine now.   6/30/22: Cough improved. Otherwise feeling well  7/19/22: saw Dr. Moran. A bx of left sided lesion was recommended. pt wanted to discuss with me. He feels well. denies any dyspnea or cough or other symptoms.   9/16/22: Seen today with son, Rich. Patient has no complaints. Is doing well. Has not received any cancer tx since 6/23.   10/25/22: Pt seen vis televisit. He is feeling well. Has been active and notes no difference in status since last visit.  11/25/22: Per pt's son, everything is stable. Pt remains active. Since last visit, he has had Lt VATS, JUANPABLO and LLL wedge rxn on 10/28/22. Path revealed invasive mucinous AdenoCA.   12/6/22: Pt here for follow up. He has cough but otherwise no symptoms.   1/5/23: Cough is persistent. No other complaints.  2/15/23: Patient seen today for follow up in treatment room. Has been tolerating well with no AEs to report. Ongoing cough not adequately relieved by OTC cough syrup   3/8/23: Pt seen today for follow accompanied by his son. Ongoing cough, no other complaints.   3/29/23: patient seen today accompanied by his son. Reports that since he did his CT scan last week, his cough has been more frequent. He has also ran out of Benzonatate. He also notes that right after CT scan was done he had an episode of emesis, no nausea or emesis since then.   4/19/23: seen today accompanied by his son. Continues on tecentriq and is tolerating well thus far. He continues to have dry cough and does not feel that benzonatate is adequately helping him. His breathing is stable, no pain, appetite is good. He is also thinking about traveling to [facility] some time soon   5/10/23: Patient seen today for follow up accompanied by his grand-daughter. Patient reports that since last visit, he has been feeling more SOB with exertion. His cough is persistent, minimally relieved by benzonatate 200mg TID, and is now productive.   6/21/23: Patient seen today for follow up with his granddaughter. They report that his sough has improved since starting promethazine-codeine. He saw IR yesterday for evaluation for biopsy and thora however biopsy is not possible but will move forward with thoracentesis. He is otherwise doing well   7/12/23: Pt seen in office. He is accompanied by his son He reports that his cough is worse, he also notes some pleuritic CP on left side. cough is more productive than it has been in the past. pt denies any fever, but does have night sweats.   8/23/23: Patient seen today for follow up accompanied by his son Sindi. They report that the patient was hospitalized at [facility] 2 weeks ago due to increased cough and was treated for PNA who improvement of his symptoms. Today he reports his breathing is better, no fever, however cough has only minimally improved. He continues to use benzonatate and promethazine/codeine. Pleuritic chest pain has resolved.   9/13/23: Patient seen today for follow up accompanied by his son Lea. They report that the patients breathing is stable, cough is somewhat improved.   9/20/23: Cough and dyspnea persist. He cannot sleep on his side.   10/4/23: Patient seen today accompanied by his son and his other son Sindi attending the visit via telephone. He is here today for tecentriq with the addition of Taxol. He took dex 20mg last night. He reports persistent dyspnea as well as worsened cough which he attributes to running out of his cough medication. He otherwise feels well.   10/23/23: After last tx, pt nausea, confusion, fatigue for several days. All these have improved, but not completely resolved. Pt has been coughing more.   11/15/23: Pt seen in treatment room in the company of his son. Reports continued cough and SOB. Using supplemental O2 at home. O2 sat today 90% RA at rest. He has been using cough medicine but reports only minimal relief. Taxol was held last treatment 2/2 PS but will reintroduced today split dose D1D8. No hemoptysis. No fevers. CT chest was done which revealed consolidation and nodules in all lobes, representing known lung cancer, mildly decreased in the right upper lobe and elsewhere unchanged.   12/14/23: Was hospitalized [facility] COVID end of November. He was treated with Remdesinvir and is now on home O2. He has been feeling extremely fatigued since then. He has increased cough with thin expectoration. Appetite is fair, but he has lost a lot of weight.   1/11/24: Pt seen in treatment room prior to treatment. He is completely O2 dependent. Uses 3L. Cough continues to be problematic. Using Benzonatate with some relief. Pt complains of thick mucous.  Pt now being followed by Dr. Lisker, Pt reports more dyspnea on exertion.   1/24/24: Pt returns for follow up. Pt main complaint continues to be the cough. Slightly less productive nature of cough. Still experiences ROWLEY and O2 dependence.   2/21/24: Pt being seen prior to treatment today. Pt no show for treatment on 2/14. Pt with increased cough productive of yellow green sputum and SOB. CT chest ordered and Levaquin sent to pharmacy. Pt has had multiple admissions for pneumonia. CT confirmed continued consolidations. Pt reports feeling better since starting antibiotic and has noted a decrease in cough and productive sputum. No fevers.    3/13/24: Pt being seen in follow up. He completed extended course of antibiotics. Pt reports worsening fatigue especially following treatment days that include chemotherapy. His cough is still bothering him but mucous is mostly white. No fevers. Asking for refill of albuterol nebulizer.  [de-identified] : adeno ca

## 2024-03-13 NOTE — ASSESSMENT
[FreeTextEntry1] : 84 yo m with at least stage IIIA lung adeno ca, PDL1 0%, NGS showed KRAS G12V mut in addition to a few other un-actionable ones. Baseline PET/CT personally- disease confined to rt RLL and RML- some activity in subcarinal region. Bx from RLL pos for adeno ca, lavage showed atypical cells, and level 7 LN- was benign. I presented the case at  and the group concurred that optimal staging would involve sampling of rt middle lobe. SUV on rt middle lobe mass is lower than that of RLL. Pt underwent RML through ventura bronch and was pos for adeno ca ([id number]). MRI brain on 2/17 showed no mets. Based on AJCC 8th staging, pt was staged as stage IIIA lung cancer. Based on PFTs he was deemed potentially resectable by Dr. Medina. Based on recent Checkmate 816 study, neoadjuvant chemo+nivo was thought likely to yield higher rates of CR and is an FDA-approved option. He started chemoimmunotherapy (Carbo/pem/nivo) for 4 cycles. PET/CT from May 2022 after 4 cycles was very confusing. There was mixed response on rt- right middle lobe mass is smaller and less FDG-avid but LL mass was larger and still quite avid. There is also some process on left side which was thought to be related to recent viral infection. The PET/CT picture suggesting POD was not in agreement with clinical exam or tumor-informed ctDNA testing through [facility] in which level decreased from 0.91 MTM/ml to undetectable level in June (subsequently was detectable at 0.05 MTM/ml in July). Repeat CT without contrast a month later, on 6/28/22 showed persistent changes, and to my eye, these findings were more c/w organizing PNA likely from IO rather than true POD. Therefore, I recommended a bx. Pt was feeling well and hesitant to undergo any bx. Hence, we decided to watch off tx and repeat CT in 2-3 months. The scans done in September show stable 7 x 4.4 cm mass in the right lower lobe which has not significantly changed when compared to previous exam. Numerous solid nodules are noted within both lungs. Many of them have increased in size. ctDNA levels were also rising slowly. Pt finally underwent surgical bx on 10/28/22 confirming malignancy. Discussed the incurable nature of disease and palliative intent of tx. Since pt responded to chemoIO previously, I favored taxane/IO. However, pt was not interested in chemo given potential AEs but was amenable to IO alone. Hence, he started Tecentriq q 3 weeks on 12/14. He was tolerating well with no AEs. CT scan from March 2023 show that a few nodules have increased in size however adequate comparison was limited due to differences in technique and inspiratory effort. ctDNA from March 2023 undetectable. CT scans done in May given worsening cough and SOB showed increased right pleural effusion with subtle nodularity along the posterior pleura as well as slight increase in the size of the right upper lobe nodule. Her underwent thoracentesis with IR and pleural fluid showed no malignant cells. He was hospitalized in August 2023 for pneumonia and CT scan at that time showed mixed GGO in the lingula progressed, increased size of JUANPABLO nodule, increased right lung nodule.   PET-CT shows POD. Explored clinical trial options- there are no trials that pt will be eligible for at the moment. Therefore, we added Taxol 175 mg/m2 Q 3 weeks to Tecentriq.  After C1, there was a decline in PS and hence, for cycle 2, Taxol was held 2/2 decreased PS. Taxol was reintroduced C3 but lower and split dose over D1D8.  Pt had been tolerating this better and overall stable but recent COVID infection resulted in decreased PS and now on home O2 ctDNA with sign[facility]a has been pos; up and down, but still detectable at relatively high levels. Cough: Continu  Hycodan q6hrs PRN and continue benzonatate 200mg TID.  Appreciate Dr. Lisker note. Pt now using albuterol nebulizer BID. Lung sounds are improved since using albuterol. Pt was encouraged to increase to TID. Refill sent to [facility] Pt was encouraged to follow up with Dr. Lisker.  Completed extended course of Levaquin.  Pt granddaughter getting  on April 28th. Pt inquiring whether chemotherapy can be held during month of APril to optimize PS for wedding. Will discuss with Dr. Domingo and plan during next visit.  F/U BW sent today.  OV in 3 weeks with next treatment

## 2024-03-13 NOTE — PHYSICAL EXAM
[Ambulatory and capable of all self care but unable to carry out any work activities] : Status 2- Ambulatory and capable of all self care but unable to carry out any work activities. Up and about more than 50% of waking hours [Normal] : grossly intact [de-identified] : (+) Pallor [de-identified] : Clear BS! . Decreased BS R lung fields

## 2024-03-13 NOTE — REVIEW OF SYSTEMS
[Fatigue] : fatigue [Shortness Of Breath] : shortness of breath [Cough] : cough [SOB on Exertion] : shortness of breath during exertion [Abdominal Pain] : abdominal pain [Vomiting] : vomiting [Constipation] : constipation [Negative] : Allergic/Immunologic [Fever] : no fever [Chest Pain] : no chest pain [Dysphagia] : no dysphagia [Joint Pain] : no joint pain [Skin Rash] : no skin rash [FreeTextEntry2] : as above

## 2024-03-20 NOTE — REVIEW OF SYSTEMS
[Fatigue] : fatigue [Shortness Of Breath] : shortness of breath [Cough] : cough [SOB on Exertion] : shortness of breath during exertion [Abdominal Pain] : abdominal pain [Constipation] : constipation [Vomiting] : vomiting [Negative] : Allergic/Immunologic [Fever] : no fever [Dysphagia] : no dysphagia [Chest Pain] : no chest pain [Joint Pain] : no joint pain [FreeTextEntry2] : as above [Skin Rash] : no skin rash

## 2024-03-20 NOTE — HISTORY OF PRESENT ILLNESS
[Disease: _____________________] : Disease: [unfilled] [T: ___] : T[unfilled] [N: ___] : N[unfilled] [AJCC Stage: ____] : AJCC Stage: [unfilled] [M: ___] : M[unfilled] [Treatment Protocol] : Treatment Protocol [de-identified] : Mr. Del Rio is a pleasant 83 M, Afghan (very little English), speaking with PMH of HTN and BPH, never smoker but previously worked in building maintenance presented initially 1/21/22 with chronic dry cough x1 year and worsened dyspnea on exertion for 3 weeks, admitted to [facility] after found on CT to have to have a 5.3 x 2.8 cm masslike consolidation in the RLL. On 1/25, he underwent inpatient bronchoscopy and endobronchial ultrasound with RLL BAL, TBNA of station 7 lymph node, RLL Mass transbronchial biopsy which was suspicious for malignancy.  He saw Dr. Moran and was recommended for PET/CT 2/7/22:  large FDG-avid consolidations in right middle lobe and right lower lobe, unchanged as compared to CT dated 1/21/2022, compatible with known adenocarcinoma. Small FDG-avid nodule in right lung is suspicious for another site of disease. A mildly FDG-avid left lower lobe peripheral opacity, also unchanged on CT, is indeterminate. 2. Mildly FDG-avid subcarinal lymph node is nonspecific. Pt does not wish to use  phone. Grand-daughter provided the translation as per patient request. Other than cough, he has no other symptoms. He has tried OTC cough meds with no help.   2/18/22: Pt seen vis tel. No new complaints. He still has cough which is persistent and dry.   4/7/22: Pt seen via tele. 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   5/3/22: Pt seen in tx room. He received cycle 1 3 weeks ago. Tolerated well with absolutely no AEs. He has since seen Dr. Medina who will see him again after scans post 3 cycles of tx. Pt here for cycle 2.   5/24/22: Since last visit, pt has had an eventful course. He was recently hospitalized with c/o fever, URI symptoms, myalgia and was found to have non-COVID coronavirus infection. Pt also febrile/tachycardic on admission, which improved with supportive care/abx. CXR suggested RLL consolidation (thought his could be known lung mass). He was started on vanc/zosyn in ED 5/20, followed by ceftriaxone and azithromycin for possible superimposed PNA in the setting of immunosuppression. Pt improved with all this and is now completing oral course of abx (last day tomorrow). Incidentally, pt was also noted to have ROSS (acute kidney injury) with admission SCr 1.88, baseline 1.2-1.3 thought to be related to sepsis/poor PO intake and dehydration. He was treated with IV hydration and home ARB was held (he continues to not take this at this time).  He presents today for planned 3/3 cycle of chemoIO. He notes no fever, chills and cough has markedly improved.   6/16/22: Pt was diagnosed with corona virus infection, after ER visit for fever and cough. Cough has decreased and pt is fine now.   6/30/22: Cough improved. Otherwise feeling well  7/19/22: saw Dr. Moran. A bx of left sided lesion was recommended. pt wanted to discuss with me. He feels well. denies any dyspnea or cough or other symptoms.   9/16/22: Seen today with son, Rich. Patient has no complaints. Is doing well. Has not received any cancer tx since 6/23.   10/25/22: Pt seen vis televisit. He is feeling well. Has been active and notes no difference in status since last visit.  11/25/22: Per pt's son, everything is stable. Pt remains active. Since last visit, he has had Lt VATS, JUANPABLO and LLL wedge rxn on 10/28/22. Path revealed invasive mucinous AdenoCA.   12/6/22: Pt here for follow up. He has cough but otherwise no symptoms.   1/5/23: Cough is persistent. No other complaints.  2/15/23: Patient seen today for follow up in treatment room. Has been tolerating well with no AEs to report. Ongoing cough not adequately relieved by OTC cough syrup   3/8/23: Pt seen today for follow accompanied by his son. Ongoing cough, no other complaints.   3/29/23: patient seen today accompanied by his son. Reports that since he did his CT scan last week, his cough has been more frequent. He has also ran out of Benzonatate. He also notes that right after CT scan was done he had an episode of emesis, no nausea or emesis since then.   4/19/23: seen today accompanied by his son. Continues on tecentriq and is tolerating well thus far. He continues to have dry cough and does not feel that benzonatate is adequately helping him. His breathing is stable, no pain, appetite is good. He is also thinking about traveling to [facility] some time soon   5/10/23: Patient seen today for follow up accompanied by his grand-daughter. Patient reports that since last visit, he has been feeling more SOB with exertion. His cough is persistent, minimally relieved by benzonatate 200mg TID, and is now productive.   6/21/23: Patient seen today for follow up with his granddaughter. They report that his sough has improved since starting promethazine-codeine. He saw IR yesterday for evaluation for biopsy and thora however biopsy is not possible but will move forward with thoracentesis. He is otherwise doing well   7/12/23: Pt seen in office. He is accompanied by his son He reports that his cough is worse, he also notes some pleuritic CP on left side. cough is more productive than it has been in the past. pt denies any fever, but does have night sweats.   8/23/23: Patient seen today for follow up accompanied by his son Sindi. They report that the patient was hospitalized at [facility] 2 weeks ago due to increased cough and was treated for PNA who improvement of his symptoms. Today he reports his breathing is better, no fever, however cough has only minimally improved. He continues to use benzonatate and promethazine/codeine. Pleuritic chest pain has resolved.   9/13/23: Patient seen today for follow up accompanied by his son Lea. They report that the patients breathing is stable, cough is somewhat improved.   9/20/23: Cough and dyspnea persist. He cannot sleep on his side.   10/4/23: Patient seen today accompanied by his son and his other son Sindi attending the visit via telephone. He is here today for tecentriq with the addition of Taxol. He took dex 20mg last night. He reports persistent dyspnea as well as worsened cough which he attributes to running out of his cough medication. He otherwise feels well.   10/23/23: After last tx, pt nausea, confusion, fatigue for several days. All these have improved, but not completely resolved. Pt has been coughing more.   11/15/23: Pt seen in treatment room in the company of his son. Reports continued cough and SOB. Using supplemental O2 at home. O2 sat today 90% RA at rest. He has been using cough medicine but reports only minimal relief. Taxol was held last treatment 2/2 PS but will reintroduced today split dose D1D8. No hemoptysis. No fevers. CT chest was done which revealed consolidation and nodules in all lobes, representing known lung cancer, mildly decreased in the right upper lobe and elsewhere unchanged.   12/14/23: Was hospitalized [facility] COVID end of November. He was treated with Remdesinvir and is now on home O2. He has been feeling extremely fatigued since then. He has increased cough with thin expectoration. Appetite is fair, but he has lost a lot of weight.   1/11/24: Pt seen in treatment room prior to treatment. He is completely O2 dependent. Uses 3L. Cough continues to be problematic. Using Benzonatate with some relief. Pt complains of thick mucous.  Pt now being followed by Dr. Lisker, Pt reports more dyspnea on exertion.   1/24/24: Pt returns for follow up. Pt main complaint continues to be the cough. Slightly less productive nature of cough. Still experiences ROWLEY and O2 dependence.   2/21/24: Pt being seen prior to treatment today. Pt no show for treatment on 2/14. Pt with increased cough productive of yellow green sputum and SOB. CT chest ordered and Levaquin sent to pharmacy. Pt has had multiple admissions for pneumonia. CT confirmed continued consolidations. Pt reports feeling better since starting antibiotic and has noted a decrease in cough and productive sputum. No fevers.    3/13/24: Pt being seen in follow up. He completed extended course of antibiotics. Pt reports worsening fatigue especially following treatment days that include chemotherapy. His cough is still bothering him but mucous is mostly white. No fevers. Asking for refill of albuterol nebulizer.   3/20/24: Patient seen today for follow up. He continues on Taxol/tecentriq. Reports after last treatment feeling weak but now improved. Breathing is at baseline as is cough. Reports ongoing use of nebulizer. Continues on 2L O2 at home [de-identified] : adeno ca

## 2024-03-20 NOTE — ASSESSMENT
[FreeTextEntry1] : 82 yo m with at least stage IIIA lung adeno ca, PDL1 0%, NGS showed KRAS G12V mut in addition to a few other un-actionable ones. Baseline PET/CT personally- disease confined to rt RLL and RML- some activity in subcarinal region. Bx from RLL pos for adeno ca, lavage showed atypical cells, and level 7 LN- was benign. I presented the case at  and the group concurred that optimal staging would involve sampling of rt middle lobe. SUV on rt middle lobe mass is lower than that of RLL. Pt underwent RML through ventura bronch and was pos for adeno ca ([id number]). MRI brain on 2/17 showed no mets. Based on AJCC 8th staging, pt was staged as stage IIIA lung cancer. Based on PFTs he was deemed potentially resectable by Dr. Medina. Based on recent Checkmate 816 study, neoadjuvant chemo+nivo was thought likely to yield higher rates of CR and is an FDA-approved option. He started chemoimmunotherapy (Carbo/pem/nivo) for 4 cycles. PET/CT from May 2022 after 4 cycles was very confusing. There was mixed response on rt- right middle lobe mass is smaller and less FDG-avid but LL mass was larger and still quite avid. There is also some process on left side which was thought to be related to recent viral infection. The PET/CT picture suggesting POD was not in agreement with clinical exam or tumor-informed ctDNA testing through [facility] in which level decreased from 0.91 MTM/ml to undetectable level in June (subsequently was detectable at 0.05 MTM/ml in July). Repeat CT without contrast a month later, on 6/28/22 showed persistent changes, and to my eye, these findings were more c/w organizing PNA likely from IO rather than true POD. Therefore, I recommended a bx. Pt was feeling well and hesitant to undergo any bx. Hence, we decided to watch off tx and repeat CT in 2-3 months. The scans done in September show stable 7 x 4.4 cm mass in the right lower lobe which has not significantly changed when compared to previous exam. Numerous solid nodules are noted within both lungs. Many of them have increased in size. ctDNA levels were also rising slowly. Pt finally underwent surgical bx on 10/28/22 confirming malignancy. Discussed the incurable nature of disease and palliative intent of tx. Since pt responded to chemoIO previously, I favored taxane/IO. However, pt was not interested in chemo given potential AEs but was amenable to IO alone. Hence, he started Tecentriq q 3 weeks on 12/14. He was tolerating well with no AEs. CT scan from March 2023 show that a few nodules have increased in size however adequate comparison was limited due to differences in technique and inspiratory effort. ctDNA from March 2023 undetectable. CT scans done in May given worsening cough and SOB showed increased right pleural effusion with subtle nodularity along the posterior pleura as well as slight increase in the size of the right upper lobe nodule. Her underwent thoracentesis with IR and pleural fluid showed no malignant cells. He was hospitalized in August 2023 for pneumonia and CT scan at that time showed mixed GGO in the lingula progressed, increased size of JUANPABLO nodule, increased right lung nodule.   PET-CT shows POD. Explored clinical trial options- there are no trials that pt will be eligible for at the moment. Therefore, we added Taxol 175 mg/m2 Q 3 weeks to Tecentriq.  After C1, there was a decline in PS and hence, for cycle 2, Taxol was held 2/2 decreased PS. Taxol was reintroduced C3 but lower and split dose over D1D8.  Pt had been tolerating this better and overall stable but recent COVID infection resulted in decreased PS and now on home O2 Pt granddaughter getting  on April 28th. Pt inquiring whether chemotherapy can be held during month of APril to optimize PS for wedding. Discussed with Dr. Domingo who is in agreement with holding taxol next cycle. Plan to re-evaluate at appointment in May.  ctDNA with [facility] has been pos; up and down, but still detectable at relatively high levels. Cough: Continue  Hycodan q6hrs PRN and continue benzonatate 200mg TID.  Continue follow up with Pulm. Has seen Dr. Lisker in the past. Has appointment with Dr. Moran scheduled for April 15th. Pt now using albuterol nebulizer TID at home. On exam today noted to have expiratory wheezes b/l thus given additional albuterol neb in the treatment room.  Completed extended course of Levaquin.  F/U BW sent today.  OV in 3 weeks w next treatment

## 2024-03-20 NOTE — PHYSICAL EXAM
[Ambulatory and capable of all self care but unable to carry out any work activities] : Status 2- Ambulatory and capable of all self care but unable to carry out any work activities. Up and about more than 50% of waking hours [Normal] : affect appropriate [de-identified] : (+) Pallor [de-identified] : Clear BS! . Decreased BS R lung fields

## 2024-03-24 NOTE — ASU PATIENT PROFILE, ADULT - LANGUAGE ASSISTANCE NEEDED
ICC VISIT NOTE   PCP:  Niharika Carbajal PA-C     Trudy is a 25 year old female and is being seen in [facility] today on 3/24/2024    Chief Complaint   Patient presents with    Other     Jaw pain & cold sore pain - Entered by patient    Jaw Pain     Pain, swelling to right side of jaw.  Also has cold sore on lower lip.  Denies painful chewing or mouth opening.  Denies fever.        PPE: Patient did not have mask on entire stay in the [facility].  Additionally, I did wear recommended personal protective equipment, including level 2/3 procedure mask when initially assessing patient and when re-examining.    Subjective   HPI  Trudy Alcantara is a 25 year old female presenting for evaluation of jaw pain and swelling.  She  woke up yesterday with pain and swelling in the right lower jaw area that has progressively worsened.  Denies any pain with chewing or dental issues.  Last dentist visit 1 month ago.  Has not had wisdom teeth taken out - was told she did not need them out.  No fevers.  No ear pain or sore throat.  Also with a sore on her lower lip that appeared yesterday.  Has history of cold sores - usually about once a year.  Pain is 5/10 in severity.  Patient has not had sick contacts.   Appetite has not significantly changed.  Has been taking Abreva and ibuprofen.      REVIEW OF SYSTEMS  Constitutional: Denies: fever/sweats, weight loss/gain, and fatigue.  Eyes: Denies:visual blurring, eye pain, and eye discharge.  ENT: Denies: hearing loss, congestion/stuffiness, sore throat, mouth sores, and ear pain.  Cardiovascular: Denies: lower extremity/ankle swelling, chest pain, and palpitations.  Respiratory: Denies: cough, SOB, and wheezing.  Gastrointestinal: Denies: abdominal pain/cramping, nausea/vomiting, constipation, and diarrhea.  Skin: Denies: rash and Reports: lip lesion.      PROBLEM LIST  Patient Active Problem List   Diagnosis    Encounter for surveillance of contraceptive pills       MEDICATIONS  Outpatient Medications  Marked as Taking for the 3/24/24 encounter (Walk In) with Jeanna Mcgee MD   Medication Sig Dispense Refill    drospirenone-ethinyl estradiol (PENNIE) 3-0.03 MG per tablet Take 1 tablet by mouth daily. 84 tablet 3        ALLERGIES  ALLERGIES:  No Known Allergies    PAST MEDICAL HISTORY  Past Medical History:   Diagnosis Date    Hyperlipidemia     No known problems        PAST SURGICAL HISTORY  History reviewed. No pertinent surgical history.    FAMILY HISTORY  Family History   Problem Relation Age of Onset    Hyperlipidemia Mother     Hyperlipidemia Father     Patient is unaware of any medical problems Maternal Grandmother     Patient is unaware of any medical problems Maternal Grandfather     Patient is unaware of any medical problems Paternal Grandmother     Patient is unaware of any medical problems Paternal Grandfather        SOCIAL HISTORY  Social History     Tobacco Use    Smoking status: Never    Smokeless tobacco: Never   Vaping Use    Vaping Use: never used   Substance Use Topics    Alcohol use: Yes     Comment: socially    Drug use: Never       Patient's medications, allergies, past medical, surgical, and social history were reviewed and updated as appropriate.         Objective   PHYSICAL EXAM  Vitals:    03/24/24 1027   BP: 124/78   Pulse: 72   Resp: 16   Temp: 98.6 °F (37 °C)   TempSrc: Temporal   SpO2: 99%   Weight: 47.6 kg (105 lb)   LMP: 01/24/2024      Physical Exam  Vitals and nursing note reviewed.   Constitutional:       General: She is not in acute distress.     Appearance: Normal appearance. She is not ill-appearing.   HENT:      Head: Normocephalic and atraumatic.      Jaw: Tenderness and swelling present. No trismus or pain on movement.      Salivary Glands: Right salivary gland is not diffusely enlarged or tender. Left salivary gland is not diffusely enlarged or tender.        Comments: Mild swelling of the right lower jaw with tenderness.  No overlying skin changes.  Mild  submandibular swelling as well.     Right Ear: Tympanic membrane, ear canal and external ear normal.      Left Ear: Tympanic membrane, ear canal and external ear normal.      Nose: No congestion or rhinorrhea.      Right Sinus: No maxillary sinus tenderness or frontal sinus tenderness.      Left Sinus: No maxillary sinus tenderness or frontal sinus tenderness.      Mouth/Throat:      Mouth: Mucous membranes are moist.      Pharynx: No oropharyngeal exudate or posterior oropharyngeal erythema.      Tonsils: No tonsillar exudate or tonsillar abscesses.        Neck: Normal range of motion and neck supple. No rigidity. No pain with movement.   Eyes:      General:         Right eye: No discharge.         Left eye: No discharge.      Extraocular Movements: Extraocular movements intact.      Conjunctiva/sclera: Conjunctivae normal.      Pupils: Pupils are equal, round, and reactive to light.   Cardiovascular:      Rate and Rhythm: Normal rate and regular rhythm.      Pulses: Normal pulses.      Heart sounds: No murmur heard.     No friction rub. No gallop.   Pulmonary:      Effort: Pulmonary effort is normal. No respiratory distress.      Breath sounds: Normal breath sounds. No wheezing, rhonchi or rales.   Musculoskeletal:      Right lower leg: No edema.      Left lower leg: No edema.   Lymphadenopathy:      Cervical: No cervical adenopathy.   Skin:     General: Skin is warm and dry.      Capillary Refill: Capillary refill takes less than 2 seconds.      Findings: No rash.   Neurological:      General: No focal deficit present.      Mental Status: She is alert.                DIAGNOSIS/PLAN  1. Jaw swelling  Patient with right lower jaw swelling.  May be due to dental infection versus reactive lymphadenitis.  Will treat with antibiotic for possible infection.  Encouraged follow up with dentist to rule out abscess.  Discussed potential adverse effects from medication(s).   - amoxicillin-clavulanate (AUGMENTIN) 875-125 MG  per tablet; Take 1 tablet by mouth in the morning and 1 tablet in the evening. Do all this for 7 days.  Dispense: 14 tablet; Refill: 0    2. Herpes simplex labialis  Patient with lesion on lip consistent with HSV.  Will treat with oral antiviral medication.  Discussed potential adverse effects from medication(s). Follow up as needed.  - Valacyclovir HCl 1000 MG Tab; Take 1 tablet by mouth in the morning and 1 tablet in the evening. Do all this for 7 days.  Dispense: 14 tablet; Refill: 0       FOLLOW UP INSTRUCTIONS  Instructions provided as documented in the AVS.     Patient Instructions   It was a pleasure caring for you today.    Please see the information included in this summary for more information on your diagnosis and treatment plan.    Take the antibiotic (Augmentin) - 1 tablet twice a day for 7 days.  This antibiotic can cause diarrhea or loose stools.  Taking a probiotic or eating yogurt can help with this.  Follow up with your PCP or return to urgent care if the diarrhea is severe or does not resolve after finishing the antibiotic.     Take antiviral medication (Valacyclovir)  - 1 tablet twice a day for 7 days for your cold sore.    Call your dentist in the morning for appointment to ensure no abscess or tooth infection.      Use tylenol/ibuprofen as needed for pain.    Please follow up with your primary care doctor:  Niharika Carbajal PA-C   [facility] / [facility] [address] [phone]     Follow up with your primary care provider in about 2-3 days if symptoms are not improving and dentist evaluation shows no dental issues.    Please go directly to the Emergency Department for immediate evaluation for severe symptoms.  Call 911 for any chest pain, shortness of breath, sudden weakness on one side of your body, or change in mental status/confusion.        The patient and mother indicated understanding of the diagnosis and agreed with the plan of care.     Andrew Mcgee MD    No-Patient/Caregiver offered and refused free interpretation services.

## 2024-04-21 PROBLEM — J96.01 ACUTE HYPOXEMIC RESPIRATORY FAILURE: Status: ACTIVE | Noted: 2024-01-01

## 2024-04-21 PROBLEM — R05.3 CHRONIC COUGH: Status: ACTIVE | Noted: 2022-01-31

## 2024-04-21 PROBLEM — R93.89 ABNORMAL CHEST CT: Status: ACTIVE | Noted: 2022-01-31

## 2024-04-23 PROBLEM — Z74.09 IMPAIRED MOBILITY AND ENDURANCE: Status: ACTIVE | Noted: 2024-01-01

## 2024-04-23 NOTE — HISTORY OF PRESENT ILLNESS
[TextBox_4] : Interventional Pulmonology Consultation/Visit Note  Mr. 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 Patient has had multiple lines of treatment, and recently had POD and has been having issues with progressive hypoxemic respiratory failure as well as inability estelle tolerate treatment.Presents today to IP clinic for management of his respiratory issues, accompanied by grand daughter.    Disease: lung cancer   Pathology: adeno ca   TNM stage: T4, Nx, M0 AJCC Stage: at least stage IIIA   Main symptom is hypoxemia as well as dyspnea on exertion persistent cough as well as wheezing.   Oxygen saturation room air at rest 87%. Oxygen saturation room air with ambulation 84%. Oxygen saturation on 3L 93%. Patient continues to demonstrate need for supplemental oxygen due to respiratory failure with hypoxemia, dyspnea, multifactorial.

## 2024-04-23 NOTE — DISCUSSION/SUMMARY
[FreeTextEntry1] : The patients most recent CT scan was reviewed by my independently and my interpretation is stated below:  CT from February demonstrates bilateral stable consolidations as well as metastatic nodules and a small loculated right effusion.

## 2024-04-23 NOTE — PHYSICAL EXAM
[No Acute Distress] : no acute distress [Normal Oropharynx] : normal oropharynx [Normal Appearance] : normal appearance [Normal Rate/Rhythm] : normal rate/rhythm [No Resp Distress] : no resp distress [No Abnormalities] : no abnormalities [Benign] : benign [Normal Gait] : normal gait [No Clubbing] : no clubbing [Normal Color/ Pigmentation] : normal color/ pigmentation [No Focal Deficits] : no focal deficits [Oriented x3] : oriented x3 [Normal Affect] : normal affect [TextBox_68] : Bilateral mild wheezing.

## 2024-04-23 NOTE — ASSESSMENT
[FreeTextEntry1] :   This is a 64-year-old male with an established history of lung cancer was had progressive hypoxemic respiratory failure.  The patient may require biopsy to confirm the diagnosis but at this time we will treat empirically in the light of the patient's granddaughters wedding that is coming up at the end of the month.  We will start the patient on a course of tapering steroids as well as extend the course of his current antibiotics.  Given his wheezing we will also start the patient on standing Symbicort at the higher dose.  Eventually he may definitively need a biopsy to actually ascertain if the etiology is related to pneumonitis, progression of malignancy, infection the etiology.  We will make that determination based upon patient's clinical status as well as ability to tolerate an invasive procedure with general anesthesia as well as goals of care discussion with the family and oncology team.   In addition to the management of his respiratory symptoms he also needs assistance and wheelchair. The patient is significantly impaired with impaired function status given his underlying lung cancer, acute hypoxemic respiratory failure as well as adverse events and pneumonitis from his drug regimen.   The patient has mobility limitation that significantly impairs his ability to do his daily activities.  His ability and limitation cannot be sufficiently resolved using a cane or a walker.  The patient's home has adequate space between rooms to provide maneuvering space and surfaces for the use of the wheelchair as per the granddaughter.  The use of manual wheelchair will significantly improve patient's ability to participate in his daily activities and the patient will routinely use it at home.  The patient has a caregiver was available and well and able to provide assistance with the wheelchair.  In addition, patient will need portable oxygen given progressive hypoxemia.  The patient has already been approved for oxygen in the past.   Oxygen saturation room air at rest 87%. Oxygen saturation room air with ambulation 84%. Oxygen saturation on 3L 93%. Patient continues to demonstrate need for supplemental oxygen due to respiratory failure with hypoxemia, dyspnea, multifactorial.     Overall prognosis guarded given progressive functional decline and ability to tolerate treatment.

## 2024-04-24 PROBLEM — C34.91 ADENOCARCINOMA OF LUNG, RIGHT: Status: ACTIVE | Noted: 2022-01-31

## 2024-04-24 PROBLEM — G70.9 NEUROMUSCULAR DISORDER: Status: ACTIVE | Noted: 2024-01-01

## 2024-04-24 PROBLEM — C34.90 LUNG CANCER: Status: ACTIVE | Noted: 2022-01-31

## 2024-04-24 PROBLEM — R29.898 SEVERE MUSCLE DECONDITIONING: Status: ACTIVE | Noted: 2024-01-01

## 2024-04-24 PROBLEM — J45.901 MODERATE ASTHMA WITH ACUTE EXACERBATION, UNSPECIFIED WHETHER PERSISTENT: Status: ACTIVE | Noted: 2023-01-01

## 2024-04-24 PROBLEM — R53.81 PHYSICAL DECONDITIONING: Status: ACTIVE | Noted: 2024-01-01

## 2024-04-29 NOTE — H&P ADULT - ASSESSMENT
84 yrs old M, from home, ambulating independently, pmhx of Adenocarcinoma of the lung dx on 2021, on Home oxygen 2L NC, on palliative chemoRx, last Rx on 4/3/2024, HLD, BPH, ?HTN, pshx hernia repair presented with dyspnea. Pt is admitted for AHRF and Sepsis 2/2 pneumonia.

## 2024-04-29 NOTE — ED ADULT NURSE NOTE - NSFALLRISKINTERV_ED_ALL_ED
Assistance OOB with selected safe patient handling equipment if applicable/Assistance with ambulation/Communicate fall risk and risk factors to all staff, patient, and family/Monitor gait and stability/Provide visual cue: yellow wristband, yellow gown, etc/Reinforce activity limits and safety measures with patient and family/Call bell, personal items and telephone in reach/Instruct patient to call for assistance before getting out of bed/chair/stretcher/Non-slip footwear applied when patient is off stretcher/[place] to call system/Physically safe environment - no spills, clutter or unnecessary equipment/Purposeful Proactive Rounding/Room/bathroom lighting operational, light cord in reach

## 2024-04-29 NOTE — ED ADULT TRIAGE NOTE - CHIEF COMPLAINT QUOTE
generalized weakness with nausea/vomiting, chills and abdominal pain started today, hx of lung CA, on 2L nasal cannula at home

## 2024-04-29 NOTE — H&P ADULT - PROBLEM SELECTOR PLAN 3
p/w trop 88.9  ECG sinus tachycardia no ischemic changes  likely demand ischemia in the setting of sepsis and acute infection  f/u repeat Trop

## 2024-04-29 NOTE — ED PROVIDER NOTE - PROGRESS NOTE DETAILS
rosales: Time improved.  Labs reviewed.  CT reviewed and noted worsening lung disease.  CT abdomen no acute findings however could still be an infectious versus viral gastro

## 2024-04-29 NOTE — H&P ADULT - HISTORY OF PRESENT ILLNESS
84 yrs old M, from home, ambulating independently, pmhx of Adenocarcinoma of the lung dx on 2021, on Home oxygen 2L NC, on palliative chemoRx, last Rx on 4/3/2024, HLD, BPH, ?HTN, pshx hernia repair presented with dyspnea. Pt AAOx2, baseline AAOx3, lethargic on exam, accompanied by son who opted for translation and collateral information. Stated that patient endorsed dyspnea, along with fever, chills, nausea and multiple episodes of vomiting since one day duration. As well reported lethargy and cough with sputum production, did not specify the color. Denied hematemesis, or hemoptysis. Son stated that patient was around large group of people yesterday however no sick contacts, denied recent travel.   Of note Pt following with heme/onc specialist Dr. Domingo at [facility], due for chemotherapy on Wednesday. Pt's son denied any metastatis, however chart review showed  84 yrs old M, from home, ambulating independently, pmhx of Adenocarcinoma of the lung dx on 2021, on Home oxygen 2L NC, on palliative chemoRx, last Rx on 4/3/2024, HLD, BPH, ?HTN, pshx hernia repair presented with dyspnea. Pt AAOx2, baseline AAOx3, lethargic on exam, accompanied by son who opted for translation and collateral information. Stated that patient endorsed dyspnea, along with fever, chills, nausea and multiple episodes of vomiting since one day duration. As well reported lethargy and cough with sputum production, did not specify the color. Denied hematemesis, or hemoptysis. Son stated that patient was around large group of people yesterday however no sick contacts, denied recent travel. Denied Smoking, alcohol consumptions, marijuana or illicit drugs.   Of note Pt following with heme/onc specialist Dr. Domingo at [facility], due for chemotherapy on Wednesday. Pt's son denied any metastasis.

## 2024-04-29 NOTE — ED ADULT TRIAGE NOTE - WEIGHT IN KG
Preceptor Attestation:   Patient seen, evaluated and discussed with the resident. I have verified the content of the note, which accurately reflects my assessment of the patient and the plan of care.   Supervising Physician:  Isaac Navas MD      77.1

## 2024-04-29 NOTE — H&P ADULT - PROBLEM SELECTOR PLAN 4
hx of Lung adenocarcinoma   following with heme/onc specialist Dr. Domingo at [facility], last chemo on 4/3/2024  due for chemotherapy on Wednesday

## 2024-04-29 NOTE — ED PROVIDER NOTE - OBJECTIVE STATEMENT
84 y old male with hx of metastatic Lung adeno carcinoma (1/2022 initially staged as IIIA, 10/2022: rebx showed metastatic disease, hence tx goal changed to palliative intent) currently on taxol/Tecentriq, last treatment 15 days ago, on home O2 PRN 2L NC, recurrent PNA, HTN and BPH presents to ed c/o abd pain on and off with nv nbnb and diarrhea. + chills, weakness. no sick contact, no alcohol use.

## 2024-04-29 NOTE — H&P ADULT - NSHPPHYSICALEXAM_GEN_ALL_CORE
VITALS:   Vital Signs Last 24 Hrs  T(C): 39.3 (29 Apr 2024 17:19), Max: 39.3 (29 Apr 2024 17:19)  T(F): 102.8 (29 Apr 2024 17:19), Max: 102.8 (29 Apr 2024 17:19)  HR: 129 (29 Apr 2024 16:47) (129 - 129)  BP: 192/87 (29 Apr 2024 16:47) (192/87 - 192/87)  BP(mean): --  RR: 24 (29 Apr 2024 16:47) (24 - 24)  SpO2: 89% (29 Apr 2024 16:47) (89% - 89%)    Parameters below as of 29 Apr 2024 16:47  Patient On (Oxygen Delivery Method): nasal cannula  O2 Flow (L/min): 4    GENERAL: NAD, lying in bed comfortably  HEAD:  Atraumatic, Normocephalic  EYES: EOMI, PERRLA, conjunctiva and sclera clear  ENT: Moist mucous membranes  NECK: Supple, No JVD  CHEST/LUNG: Clear to auscultation bilaterally; + diffuse rhonchi and crackles, No rales,  wheezing, or rubs. Unlabored respirations  HEART: Regular rate and rhythm; No murmurs, rubs, or gallops  ABDOMEN: Bowel sounds present; Soft, Nontender, Nondistended.   EXTREMITIES:  2+ Peripheral Pulses, brisk capillary refill. No clubbing, cyanosis, or edema  NERVOUS SYSTEM:  Alert & Oriented X2, speech clear. lethargic, opens eyes to verbal stimuli and follows commands, moves all extremities spontaneously   SKIN: No rashes or lesions

## 2024-04-29 NOTE — H&P ADULT - PROBLEM SELECTOR PLAN 5
hx of HTN based on prior charts on Telmisartan  Med recs per son does not include Telmisartan - however hypertensive 192/87 on admission   will repeat vitals and give IV meds for now

## 2024-04-29 NOTE — H&P ADULT - CONVERSATION DETAILS
GOC discussed with the patient's son at bedside GOC discussed with the patient's son at bedside. Stated that he would discuss it with his brother and mother for final decision, and would inform the team later. Explained that patient is currently relatively stable however as the increased requirement for oxygen noted in the GOC discussed with the patient's son at bedside. Stated that he would discuss it with his brother and mother for final decision, and would inform the team later. Explained that patient is currently relatively stable however as the increased requirement for oxygen noted in the setting of pneumonia and underlying cancer, possibility for requirement of intubation might happen.   Pt is FULL CODE for now until further decision by family.

## 2024-04-29 NOTE — H&P ADULT - NSHPREVIEWOFSYSTEMS_GEN_ALL_CORE
REVIEW OF SYSTEMS:    CONSTITUTIONAL: No weakness, fevers or chills  EYES/ENT: No visual changes;  No vertigo or throat pain   NECK: No pain or stiffness  RESPIRATORY: No cough, wheezing, hemoptysis; No shortness of breath  CARDIOVASCULAR: No chest pain or palpitations  GASTROINTESTINAL: No abdominal or epigastric pain. No nausea, vomiting, or hematemesis; No diarrhea or constipation. No melena or hematochezia.  GENITOURINARY: No dysuria, frequency or hematuria  NEUROLOGICAL: No numbness or weakness  SKIN: No itching, rashes REVIEW OF SYSTEMS:    CONSTITUTIONAL: + generalized weakness, + fevers or chills  EYES/ENT: No visual changes;  No vertigo or throat pain   NECK: No pain or stiffness  RESPIRATORY: + cough, No  wheezing, hemoptysis; +shortness of breath  CARDIOVASCULAR: No chest pain or palpitations  GASTROINTESTINAL: No abdominal or epigastric pain. + nausea, vomiting, No hematemesis; No diarrhea or constipation. No melena or hematochezia.  GENITOURINARY: No dysuria, frequency or hematuria  NEUROLOGICAL: No numbness or weakness  SKIN: No itching, rashes

## 2024-04-29 NOTE — ED PROVIDER NOTE - CLINICAL SUMMARY MEDICAL DECISION MAKING FREE TEXT BOX
84 y old male with hx of metastatic Lung adeno carcinoma (1/2022 initially staged as IIIA, 10/2022: rebx showed metastatic disease, hence tx goal changed to palliative intent) currently on taxol/Tecentriq, last treatment 15 days ago, on home O2 PRN 2L NC, recurrent PNA, HTN and BPH presents to ed c/o abd pain on and off with nv nbnb and diarrhea. + chills, weakness. no sick contact, no alcohol use.    gastroenteritis r/o pe vs colitis vs atypical acs. labs, ct, meds, monitor, re-assess

## 2024-04-29 NOTE — ED ADULT NURSE NOTE - OBJECTIVE STATEMENT
generalized weakness with nausea/vomiting, chills and abdominal pain started today, hx of lung CA, on 2L nasal cannula at home.

## 2024-04-29 NOTE — H&P ADULT - PROBLEM SELECTOR PLAN 1
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  meets sepsis criteria  s/p Cefepime and 2L bolus in ED   f/u ESR, CRP, procal, f/u blood cultures   f/u sputum culture, Legionella, Strep pneumo, MRSA   f/u urine with reflex culture to rule out other sources of sepsis   c/w Vanc and Cefepime   c/w Duoneb and chest PT q8, Albuterol PRN   c/w NPO diet for now and pt lethargic and concern for aspiration

## 2024-04-29 NOTE — H&P ADULT - ATTENDING COMMENTS
Pt 84 y.o M with a PMH of Metastatic Lung adenocarcinoma, HTN, HLD, BPH. Patient is on Taxol/Tecentriq last dose on April 3, was supposed to have a dose on 4/24. Patient Malagasy speaking, son translated. He presented to the ED with complaints of SOB, Nausea, vomiting, and generalized weakness. Son states symptoms started around 2 days ago.  He says the vomit is white/yellowish in color, denies any blood. He also denies any worsening cough, or blood in the sputum. At baseline patient is Axo X 2, and ambulates with assistance. In the Ed patient meeting sepsis criteria with WBC of 12, T of 102.8, and tachycardia to 129. CT Chest/abdomen pelvis showing Large and confluent consolidation in the RLL and RML, progressed from 2/16, and new patchy-ground glass opacity in the left apex. Small Loculated right pleural effusion w/o significant change. Admitted for Sepsis, Pna vs progresssion of Lung cancer.     Labs Reviewd as above, CBC, CMP, Troponin, Lipase  CT chest and abdomen reviewed: showing Large and confluent consolidation in the RLL and RML, progressed from 2/16, and new patchy-ground glass opacity in the left apex. Small Loculated right pleural effusion w/o significant change.   EKG:     Vital Signs Last 24 Hrs  T(C): 39.3 (29 Apr 2024 17:19), Max: 39.3 (29 Apr 2024 17:19)  T(F): 102.8 (29 Apr 2024 17:19), Max: 102.8 (29 Apr 2024 17:19)  HR: 129 (29 Apr 2024 16:47) (129 - 129)  BP: 192/87 (29 Apr 2024 16:47) (192/87 - 192/87)  BP(mean): --  RR: 24 (29 Apr 2024 16:47) (24 - 24)  SpO2: 89% (29 Apr 2024 16:47) (89% - 89%)    Parameters below as of 29 Apr 2024 16:47  Patient On (Oxygen Delivery Method): nasal cannula  O2 Flow (L/min): 4    GENERAL: Mild respiratory distress  HEAD:  Atraumatic, Normocephalic  EYES: EOMI, PERRLA, conjunctiva and sclera clear  ENMT: no lesions, white saliva/vomit secretions around mouth  NECK: Supple, normal appearance, No JVD noted  NERVOUS SYSTEM:  Alert & Oriented X2, moving extremities, sensation intact  CHEST/LUNG: BL crackles and ronchi  HEART: Regular rate and rhythm; No murmurs, rubs, or gallops appreciated  ABDOMEN: Soft, Nontender, Nondistended; Bowel sounds present  EXTREMITIES:  2+ Peripheral Pulses, No clubbing, cyanosis, or edema  SKIN: No rashes or lesions      Assessment/Plan:  84 y.o M with a PMH of Metastatic Lung adenocarcinoma, HTN, HLD, BPH. Patient is on Taxol/Tecentriq last dose on April 3, was supposed to have a dose on 4/24. Patient Malagasy speaking, son translated. He presented to the ED with complaints of SOB, Nausea, vomiting, and generalized weakness. Admitted for sepsis 2/2 to pneumonia vs worsening lung adenocarcinoma and nausea/vomiting.       #Sepsis  #R/o Pneumonia  #Metastatic Lung Adenocarcinoma  #elevated troponin  #nausea/vomiting  #HTN  #HLD  #BPH  #Anemia    - Work up pneumonia, send sputum cultures, legionela urine antigen, strep pneumo urine antigen, mycoplasma, Full RVP panel, f/u Pro-calcitonin  - Sepsis work up, F/u lactate, s/p 1L in ED, lungs sound congested, and patient increased WOB and O2 requirement, will hold off Full sepsis fluid administration.  - F/u Blood cultures, urine cultures  - S/p Cefepime in ED, add vanco since pt immunocompromised  - Troponin likely in setting of demand ischemia 2/2 to cancer and increased work of breathing, f/u repeat troponin  - Symbicort, albuterol prn  - Robitusin prn  - zofran prn for nausea  - Continue home medications for BP, Pt on Telmisartan 40mg at home  - Continue Rosuvastatin  - Continue Finasteride  - Pain medication as needed  - Pulmonology and hematology consult  - GOC: FULL CODE Pt 84 y.o M with a PMH of Metastatic Lung adenocarcinoma, HTN, HLD, BPH. Patient is on Taxol/Tecentriq last dose on April 3, was supposed to have a dose on 4/24. Patient Cambodian speaking, son translated. He presented to the ED with complaints of SOB, Nausea, vomiting, and generalized weakness. Son states symptoms started around 2 days ago.  He says the vomit is white/yellowish in color, denies any blood. He also denies any worsening cough, or blood in the sputum. At baseline patient is Axo X 2, and ambulates with assistance. In the Ed patient meeting sepsis criteria with WBC of 12, T of 102.8, and tachycardia to 129. CT Chest/abdomen pelvis showing Large and confluent consolidation in the RLL and RML, progressed from 2/16, and new patchy-ground glass opacity in the left apex. Small Loculated right pleural effusion w/o significant change. Admitted for Sepsis, Pna vs progresssion of Lung cancer.     Labs Reviewd as above, CBC, CMP, Troponin, Lipase  CT chest and abdomen reviewed: showing Large and confluent consolidation in the RLL and RML, progressed from 2/16, and new patchy-ground glass opacity in the left apex. Small Loculated right pleural effusion w/o significant change.   EKG:     Vital Signs Last 24 Hrs  T(C): 39.3 (29 Apr 2024 17:19), Max: 39.3 (29 Apr 2024 17:19)  T(F): 102.8 (29 Apr 2024 17:19), Max: 102.8 (29 Apr 2024 17:19)  HR: 129 (29 Apr 2024 16:47) (129 - 129)  BP: 192/87 (29 Apr 2024 16:47) (192/87 - 192/87)  BP(mean): --  RR: 24 (29 Apr 2024 16:47) (24 - 24)  SpO2: 89% (29 Apr 2024 16:47) (89% - 89%)    Parameters below as of 29 Apr 2024 16:47  Patient On (Oxygen Delivery Method): nasal cannula  O2 Flow (L/min): 4    GENERAL: Mild respiratory distress  HEAD:  Atraumatic, Normocephalic  EYES: EOMI, PERRLA, conjunctiva and sclera clear  ENMT: no lesions, white saliva/vomit secretions around mouth  NECK: Supple, normal appearance, No JVD noted  NERVOUS SYSTEM:  Alert & Oriented X2, moving extremities, sensation intact  CHEST/LUNG: BL crackles and ronchi  HEART: Regular rate and rhythm; No murmurs, rubs, or gallops appreciated  ABDOMEN: Soft, Nontender, Nondistended; Bowel sounds present  EXTREMITIES:  2+ Peripheral Pulses, No clubbing, cyanosis, or edema  SKIN: No rashes or lesions      Assessment/Plan:  84 y.o M with a PMH of Metastatic Lung adenocarcinoma, HTN, HLD, BPH. Patient is on Taxol/Tecentriq last dose on April 3, was supposed to have a dose on 4/24. Patient Cambodian speaking, son translated. He presented to the ED with complaints of SOB, Nausea, vomiting, and generalized weakness. Admitted for sepsis 2/2 to pneumonia vs worsening lung adenocarcinoma and nausea/vomiting.       #Sepsis  #R/o Pneumonia  #Metastatic Lung Adenocarcinoma  #elevated troponin  #nausea/vomiting  #HTN  #HLD  #BPH  #Anemia    - Work up pneumonia, send sputum cultures, legionela urine antigen, strep pneumo urine antigen, mycoplasma, Full RVP panel, f/u Pro-calcitonin  - Sepsis work up, F/u lactate, s/p 1L in ED, lungs sound congested, and patient increased WOB and O2 requirement, will hold off Full sepsis fluid administration.  - F/u Blood cultures, urine cultures  - S/p Cefepime in ED, add vanco since pt immunocompromised  - Troponin likely in setting of demand ischemia 2/2 to cancer and increased work of breathing, f/u repeat troponin  - Symbicort, albuterol prn  - Robitusin prn  - zofran prn for nausea  - Continue home medications for BP, Pt on Telmisartan 40mg at home  - Continue Rosuvastatin  - Continue Finasteride  - Pain medication as needed  - Pulmonology and hematology consult  - GOC: FULL CODE  -  prior records reviewed in [facility].

## 2024-04-30 NOTE — PROGRESS NOTE ADULT - PROBLEM SELECTOR PLAN 1
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  meets sepsis criteria  s/p Cefepime and 2L bolus in ED   f/u ESR, CRP, procal, f/u blood cultures   f/u sputum culture, Legionella, Strep pneumo, MRSA   f/u urine with reflex culture to rule out other sources of sepsis   c/w Vanc and Cefepime   c/w Duoneb and chest PT q8, Albuterol PRN   c/w NPO diet for now and pt lethargic and concern for aspiration p/w dyspnea, cough, nausea and vomiting  in ED: Pawel of Temp 102.8, , /87, Sat 89% on 4L, on exam: HR 89, Sat 92% on 4L however decreases to 88% and improves rapidly to >92% on 6L   ABG on 6L NC: 7.38/48/62/28  Leukocytosis 12,2, lactate negative  CTA chest: Neg PE , Mucinous adenocarcinoma of the lung with extensive consolidation bilaterally and mild progression compared to 2/16/2024.  meets sepsis criteria  s/p Cefepime and 2L bolus in ED   ESR 98,  , procal 1.05  f/u blood cultures   sputum culture-->gram stain: rare gram negative rods and rare gram positive cocci in pairs   -Legionella and Strep pneumo negative  -f/u MRSA   -urinalysis negative for WBCs   -c/w Vanc and Cefepime   -c/w Duoneb and chest PT q6, Albuterol PRN   -c/w NPO diet for now and pt lethargic and concern for aspiration.  -f/u speech and swallow evaluation  -Pulm consulted Dr. Rutherford

## 2024-04-30 NOTE — PATIENT PROFILE ADULT - FALL HARM RISK - HARM RISK INTERVENTIONS
Assistance with ambulation/Assistance OOB with selected safe patient handling equipment/Communicate Risk of Fall with Harm to all staff/Discuss with provider need for PT consult/Monitor for mental status changes/Monitor gait and stability/Move patient closer to nurses' station/Orthostatic vital signs/Provide patient with walking aids - walker, cane, crutches/Reinforce activity limits and safety measures with patient and family/Reorient to person, place and time as needed/Review medications for side effects contributing to fall risk/Sit up slowly, dangle for a short time, stand at bedside before walking/Tailored Fall Risk Interventions/Toileting schedule using arm’s reach rule for commode and bathroom/Use of alarms - bed, chair and/or voice tab/Visual Cue: Yellow wristband and red socks/Bed in lowest position, wheels locked, appropriate side rails in place/Call bell, personal items and telephone in reach/Instruct patient to call for assistance before getting out of bed or chair/Non-slip footwear when patient is out of bed/[place] to call system/Physically safe environment - no spills, clutter or unnecessary equipment/Purposeful Proactive Rounding/Room/bathroom lighting operational, light cord in reach

## 2024-04-30 NOTE — PROGRESS NOTE ADULT - PROBLEM SELECTOR PLAN 5
hx of HTN based on prior charts on Telmisartan  Med recs per son does not include Telmisartan - however hypertensive 192/87 on admission   will repeat vitals and give IV meds for now hx of HTN based on prior charts on Telmisartan  Med recs per son does not include Telmisartan - however hypertensive 192/87 on admission   s/p lasix 20mg IV push  BP well controlled  holding home PO meds while NPO

## 2024-04-30 NOTE — PROGRESS NOTE ADULT - PROBLEM SELECTOR PLAN 3
p/w trop 88.9  ECG sinus tachycardia no ischemic changes  likely demand ischemia in the setting of sepsis and acute infection  f/u repeat Trop p/w trop 88.9  ECG sinus tachycardia no ischemic changes  likely demand ischemia in the setting of sepsis and acute infection  repeat Trop 77

## 2024-04-30 NOTE — SWALLOW BEDSIDE ASSESSMENT ADULT - COMMENTS
HOB elevated to 90° Alert and responsive, verbalizes wants and needs in Sinhala. Son present, reports patient ate regular consistency and thin liquids at home without swallowing difficulties. Patient seen after consuming clear liquids dinner tray, no c/o difficulty swallowing. P/w swallowing WFL regular consistency and thin liquids - adequate labial seal, adequate mastication, adequate bolus transport, clear oral cavity post swallow, no overt s/s of aspiration.

## 2024-04-30 NOTE — PROGRESS NOTE ADULT - PROBLEM SELECTOR PLAN 4
hx of Lung adenocarcinoma   following with heme/onc specialist Dr. Domingo at [facility], last chemo on 4/3/2024  due for chemotherapy on Wednesday hx of Lung adenocarcinoma  -following with heme/onc specialist Dr. Domingo at [facility], last chemo on 4/3/2024  -heme/onc consulted Dr. Reese   -f/u palliative consult  -Pulm consulted Dr. Rutherford

## 2024-04-30 NOTE — CONSULT NOTE ADULT - ASSESSMENT
84M with advanced local lung cancer on immunotherapy, off chemo for a few months.  He is admitted with suspected PNA  I personally reviewed the imaging and compared to prior from 2/2024  There is probable progression of local disease and cannot exclude superimposed infection    Recommend:  Antibiotics, received Vanco and Cefepime, ok to cont Cefepime  Obtain sputum Cx if able  peripheral ID workup pending  Supplemental O2 to goal >92  Supportive care  address GOC as this pt has very advanced lung cancer on palliative treatment

## 2024-04-30 NOTE — PROGRESS NOTE ADULT - SUBJECTIVE AND OBJECTIVE BOX
PGY-1 Progress Note discussed with attending    PAGER #: [[phone]] TILL 5:00 PM  PLEASE CONTACT ON CALL TEAM:  - On Call Team (Please refer to Catrachita) FROM 5:00 PM - 8:30PM  - Nightfloat Team FROM 8:30 -7:30 AM    CC: Patient is a 84y old  Male who presents with a chief complaint of Sepsis 2/2 PNA (29 Apr 2024 21:38)      OVERNIGHT EVENTS:    SUBJECTIVE / INTERVAL HPI: Patient seen and examined at bedside. Lethargic. Sleeping on venti mask. Arousable with some effort. Son present assisting with translation. Patient complains of shortness of breath and persistent productive cough. Son states that patient is oriented x3. No further episodes of vomiting. Son states that patient has had minimal PO intake in the last 48-72hrs. Denies fever, chills, chest pain, diarrhea, constipation and dysuria.        ROS:  CONSTITUTIONAL: +weakness, no fevers or chills  EYES/ENT: No visual changes;  No vertigo or throat pain   NECK: No pain or stiffness  RESPIRATORY: +cough, no wheezing, no hemoptysis; ++shortness of breath  CARDIOVASCULAR: No chest pain or palpitations  GASTROINTESTINAL: No abdominal or epigastric pain. No nausea, vomiting, or hematemesis; No diarrhea or constipation. No melena or hematochezia.  GENITOURINARY: No dysuria, frequency or hematuria  NEUROLOGICAL: No numbness or weakness  SKIN: No itching, burning, rashes, or lesions     VITAL SIGNS:  Vital Signs Last 24 Hrs  T(C): 36.8 (30 Apr 2024 06:02), Max: 39.3 (29 Apr 2024 17:19)  T(F): 98.3 (30 Apr 2024 06:02), Max: 102.8 (29 Apr 2024 17:19)  HR: 100 (30 Apr 2024 06:02) (86 - 129)  BP: 117/71 (30 Apr 2024 06:02) (114/61 - 192/87)  BP(mean): --  RR: 23 (30 Apr 2024 06:02) (23 - 26)  SpO2: 91% (30 Apr 2024 04:16) (89% - 95%)    Parameters below as of 30 Apr 2024 06:02  Patient On (Oxygen Delivery Method): mask, Venturi  O2 Flow (L/min): 15      PHYSICAL EXAM:    General: WDWN, lethargic  HEENT: NC/AT; PERRL, anicteric sclera; dry mucous membranes  Neck: supple  Cardiovascular: Tachycardic, +S1/S2; RRR  Respiratory: mild tachypnea, diminished R sided breath sounds with grossly clear anterior L sided breath sounds; no wheezing  Gastrointestinal: soft, NT/ND; +BSx4  Extremities: WWP; no edema, clubbing or cyanosis  Vascular: 2+ radial, DP/PT pulses B/L  Skin: Warm, dry, good turgor, no rashes, or ecchymoses  Neurological: AAOx3; no focal deficits    MEDICATIONS:  MEDICATIONS  (STANDING):  albuterol/ipratropium for Nebulization 3 milliLiter(s) Nebulizer every 6 hours  cefepime   IVPB 1000 milliGRAM(s) IV Intermittent every 12 hours  enoxaparin Injectable 40 milliGRAM(s) SubCutaneous every 24 hours  sodium chloride 3%  Inhalation 4 milliLiter(s) Inhalation every 8 hours  vancomycin  IVPB 1000 milliGRAM(s) IV Intermittent every 12 hours    MEDICATIONS  (PRN):  albuterol    90 MICROgram(s) HFA Inhaler 2 Puff(s) Inhalation every 6 hours PRN Shortness of Breath and/or Wheezing  ondansetron Injectable 4 milliGRAM(s) IV Push every 8 hours PRN Nausea and/or Vomiting      ALLERGIES:  Allergies    No Known Allergies    Intolerances        LABS:                        10.0   10.46 )-----------( 203      ( 30 Apr 2024 05:05 )             32.4     04-30    139  |  105  |  23<H>  ----------------------------<  93  4.2   |  28  |  1.23    Ca    8.9      30 Apr 2024 05:05  Phos  4.2     04-30  Mg     2.2     04-30    TPro  7.0  /  Alb  2.3<L>  /  TBili  0.5  /  DBili  x   /  AST  16  /  ALT  18  /  AlkPhos  71  04-30      Urinalysis Basic - ( 30 Apr 2024 05:05 )    Color: x / Appearance: x / SG: x / pH: x  Gluc: 93 mg/dL / Ketone: x  / Bili: x / Urobili: x   Blood: x / Protein: x / Nitrite: x   Leuk Esterase: x / RBC: x / WBC x   Sq Epi: x / Non Sq Epi: x / Bacteria: x      CAPILLARY BLOOD GLUCOSE          RADIOLOGY & ADDITIONAL TESTS:   < from: CT Abdomen and Pelvis w/ IV Cont (04.29.24 @ 19:17) >  FINDINGS:    PULMONARY ARTERIES: No pulmonary embolism.    MEDIASTINUM: Normal heart size. No pericardial effusion. Thoracic aorta   normal caliber.  No large mediastinal lymphnodes.    AIRWAYS, LUNGS, PLEURA: Large and confluent consolidation involving the   entirety of the right lower lobe and majority of the right middle lobe,   left lower lobe, and portions of the posterior right upper lobe and left   upper lobe. With respect to 2/16/2024 findings have mildly progressed;   for example, there is now consolidation in the left lower lobe superior   segment (image 50, series 5), which was previously aerated and there is a   new patchy ground-glass opacity at the left apex measuring 1.4 cm (image   26, series 5).    There is now occlusion of the posterior right upper lobe airway.    Small loculated right pleural effusion is without significant change.    ABDOMEN and PELVIS: Hepatic cyst. Cholecystectomy. Bilateral renal   hypodense lesions, some are cysts and others are too small to   characterize. Unchanged calcification along the periphery of the spleen.   Unremarkable adrenal glands and pancreas.    Nondistended urinary bladder. Enlarged prostate measuring 5.4 x 5 cm.    No bowel obstruction.    Normal caliber abdominal aorta with extensive calcified and noncalcified   plaque. No large abdominal or pelvic nodes. No free fluid.    BONES: Small sclerotic lesion of C7 vertebral body (image 2, series 5)   waspresent on the prior exam.    SOFT TISSUES: Unremarkable.    IMPRESSION:    No pulmonary embolism.    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    No acute pathology in the abdomen and pelvis.    < end of copied text >

## 2024-04-30 NOTE — SWALLOW BEDSIDE ASSESSMENT ADULT - MODE OF PRESENTATION
3 trials/spoon/fed by clinician spoon 4 individual cup sips, 3 sequential cup sips/cup/self fed 4 trials/self fed

## 2024-04-30 NOTE — PATIENT PROFILE ADULT - TOBACCO USE
Never smoker Ht (cm):  170.2    Wt (kg):  54.1 (8/30)  BMI:     18.6   IBW: 67.3  %IBW:  80%  UBW: 67.1 kg  %UBW: 87%

## 2024-04-30 NOTE — CONSULT NOTE ADULT - SUBJECTIVE AND OBJECTIVE BOX
History of Present Illness:  Reason for Admission: Sepsis 2/2 PNA  History of Present Illness:   84 yrs old M, from home, ambulating independently, pmhx of Adenocarcinoma of the lung dx on 2021, on Home oxygen 2L NC, on palliative chemoRx, last Rx on 4/3/2024, HLD, BPH, ?HTN, pshx hernia repair presented with dyspnea. Pt AAOx2, baseline AAOx3, lethargic on exam, accompanied by son who opted for translation and collateral information. Stated that patient endorsed dyspnea, along with fever, chills, nausea and multiple episodes of vomiting since one day duration. As well reported lethargy and cough with sputum production, did not specify the color. Denied hematemesis, or hemoptysis. Son stated that patient was around large group of people yesterday however no sick contacts, denied recent travel. Denied Smoking, alcohol consumptions, marijuana or illicit drugs.   Of note Pt following with heme/onc specialist Dr. Domingo at [facility], due for chemotherapy on Wednesday. Pt's son denied any metastasis.          Review of Systems:  Review of Systems: REVIEW OF SYSTEMS:    CONSTITUTIONAL: + generalized weakness, + fevers or chills  EYES/ENT: No visual changes;  No vertigo or throat pain   NECK: No pain or stiffness  RESPIRATORY: + cough, No  wheezing, hemoptysis; +shortness of breath  CARDIOVASCULAR: No chest pain or palpitations  GASTROINTESTINAL: No abdominal or epigastric pain. + nausea, vomiting, No hematemesis; No diarrhea or constipation. No melena or hematochezia.  GENITOURINARY: No dysuria, frequency or hematuria  NEUROLOGICAL: No numbness or weakness  SKIN: No itching, rashes    Goals of Care:    Conversation Discussion:  · Conversation	Diagnosis; Prognosis; MOLST Discussed  · Conversation Details	GOC discussed with the patient's son at bedside. Stated that he would discuss it with his brother and mother for final decision, and would inform the team later. Explained that patient is currently relatively stable however as the increased requirement for oxygen noted in the setting of pneumonia and underlying cancer, possibility for requirement of intubation might happen.   Pt is FULL CODE for now until further decision by family.      Allergies and Intolerances:        Allergies:  	No Known Allergies:     Home Medications:   * Patient Currently Takes Medications as of 30-Nov-2023 14:43 documented in Structured Notes  · 	Lovenox 40 mg/0.4 mL injectable solution: 40 milligram(s) subcutaneously once a day  · 	benzonatate 100 mg oral capsule: 1 cap(s) orally 3 times a day as needed for  cough  · 	hydrocodone-homatropine 5 mg-1.5 mg/5 mL oral syrup: 5 milliliter(s) orally every 6 hours as needed for Cough iSTOP Reference #: [id number] MDD: 20mL  · 	melatonin 3 mg oral tablet: 1 tab(s) orally once a day (at bedtime) As needed Insomnia  · 	telmisartan 40 mg oral tablet: 1 tab(s) orally once a day  · 	folic acid 1 mg oral tablet: 1 tab(s) orally once a day  · 	finasteride 5 mg oral tablet: 1 orally once a day  · 	rosuvastatin 10 mg oral tablet: 1 tab(s) orally once a day    Patient History:    Past Medical, Past Surgical, and Family History:  PAST MEDICAL HISTORY:  History of BPH     HLD (hyperlipidemia)     HTN (hypertension)     Lung cancer right s/p 4 rounds of chemo, last chemo 6/23/22.    Pleural effusion.     PAST SURGICAL HISTORY:  History of ERCP     S/P bronchoscopy 1/2022, 3/2022    S/P inguinal hernia repair Right approximately 1961.     FAMILY HISTORY:  Sibling  Still living? No  FH: lung cancer, Age at diagnosis: Age Unknown.     Social History:  · Substance use	No    MEDICATIONS  (STANDING):  albuterol/ipratropium for Nebulization 3 milliLiter(s) Nebulizer every 6 hours  cefepime   IVPB 1000 milliGRAM(s) IV Intermittent every 12 hours  enoxaparin Injectable 40 milliGRAM(s) SubCutaneous every 24 hours  sodium chloride 3%  Inhalation 4 milliLiter(s) Inhalation every 8 hours  vancomycin  IVPB 1000 milliGRAM(s) IV Intermittent every 12 hours    MEDICATIONS  (PRN):  albuterol    90 MICROgram(s) HFA Inhaler 2 Puff(s) Inhalation every 6 hours PRN Shortness of Breath and/or Wheezing  ondansetron Injectable 4 milliGRAM(s) IV Push every 8 hours PRN Nausea and/or Vomiting      Vital Signs Last 24 Hrs  T(C): 36.8 (30 Apr 2024 06:02), Max: 39.3 (29 Apr 2024 17:19)  T(F): 98.3 (30 Apr 2024 06:02), Max: 102.8 (29 Apr 2024 17:19)  HR: 100 (30 Apr 2024 06:02) (86 - 129)  BP: 117/71 (30 Apr 2024 06:02) (114/61 - 192/87)  BP(mean): --  RR: 23 (30 Apr 2024 06:02) (23 - 26)  SpO2: 91% (30 Apr 2024 04:16) (89% - 95%)    Parameters below as of 30 Apr 2024 06:02  Patient On (Oxygen Delivery Method): mask, Venturi  O2 Flow (L/min): 15      GENERAL: NAD, lying in bed comfortably  HEAD:  Atraumatic, Normocephalic  EYES: EOMI, PERRLA, conjunctiva and sclera clear  ENT: Moist mucous membranes  NECK: Supple, No JVD  CHEST/LUNG: Clear to auscultation bilaterally; + diffuse rhonchi and crackles, No rales,  wheezing, or rubs. Unlabored respirations  HEART: Regular rate and rhythm; No murmurs, rubs, or gallops  ABDOMEN: Bowel sounds present; Soft, Nontender, Nondistended.   EXTREMITIES:  2+ Peripheral Pulses, brisk capillary refill. No clubbing, cyanosis, or edema  NERVOUS SYSTEM:  Alert & Oriented X2, speech clear. lethargic, opens eyes to verbal stimuli and follows commands, moves all extremities spontaneously   SKIN: No rashes or lesions    LABS:                          10.0   10.46 )-----------( 203      ( 30 Apr 2024 05:05 )             32.4     04-30    139  |  105  |  23<H>  ----------------------------<  93  4.2   |  28  |  1.23    Ca    8.9      30 Apr 2024 05:05  Phos  4.2     04-30  Mg     2.2     04-30    TPro  7.0  /  Alb  2.3<L>  /  TBili  0.5  /  DBili  x   /  AST  16  /  ALT  18  /  AlkPhos  71  04-30    Radiology:    < from: CT Angio Chest PE Protocol w/ IV Cont (04.29.24 @ 19:17) >  ACC: [id number] EXAM:  CT ABDOMEN AND PELVIS IC   ORDERED BY: KEVIN TORRES     ACC: [id number] EXAM:  CT ANGIO CHEST PULM ART WAWIC   ORDERED BY: KEVIN TORRES     PROCEDURE DATE:  04/29/2024          INTERPRETATION:  CTA CHEST AND CT ABDOMEN AND PELVIS    INDICATION: Rule out pulmonary embolism. Weakness. Abdominal pain.    TECHNIQUE: Enhanced helical images were obtained of the chest, abdomen   and pelvis. Coronal and sagittal images were reconstructed. Maximum   intensity projection images were generated.    CONTRAST/COMPLICATIONS:  IV Contrast: Omnipaque 350 (accession [id number]), IV contrast documented   in unlinked concurrent exam (accession [id number])  90 cc administered   10   cc discarded  Oral Contrast: NONE  Complications: None reported at time of study completion    COMPARISON: 2/16/2024 CT chest. 9/11/2023 PET/CT.    FINDINGS:    PULMONARY ARTERIES: No pulmonary embolism.    MEDIASTINUM: Normal heart size. No pericardial effusion. Thoracic aorta   normal caliber.  No large mediastinal lymphnodes.    AIRWAYS, LUNGS, PLEURA: Large and confluent consolidation involving the   entirety of the right lower lobe and majority of the right middle lobe,   left lower lobe, and portions of the posterior right upper lobe and left   upper lobe. With respect to 2/16/2024 findings have mildly progressed;   for example, there is now consolidation in the left lower lobe superior   segment (image 50, series 5), which was previously aerated and there is a   new patchy ground-glass opacity at the left apex measuring 1.4 cm (image   26, series 5).    There is now occlusion of the posterior right upper lobe airway.    Small loculated right pleural effusion is without significant change.    ABDOMEN and PELVIS: Hepatic cyst. Cholecystectomy. Bilateral renal   hypodense lesions, some are cysts and others are too small to   characterize. Unchanged calcification along the periphery of the spleen.   Unremarkable adrenal glands and pancreas.    Nondistended urinary bladder. Enlarged prostate measuring 5.4 x 5 cm.    No bowel obstruction.    Normal caliber abdominal aorta with extensive calcified and noncalcified   plaque. No large abdominal or pelvic nodes. No free fluid.    BONES: Small sclerotic lesion of C7 vertebral body (image 2, series 5)   waspresent on the prior exam.    SOFT TISSUES: Unremarkable.    IMPRESSION:    No pulmonary embolism.    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    No acute pathology in the abdomen and pelvis.    --- End of Report ---             MARCIE SCHUMACHER MD; Attending Radiologist  This document has been electronically signed. Apr 29 2024  7:38PM    < end of copied text >

## 2024-04-30 NOTE — PROGRESS NOTE ADULT - ATTENDING COMMENTS
Patient seen at bedside, states he feels overall unchanged since admission, endorses SOB, nausea.  SpO2 confirmed at goal on 5LNC, oxygen downgraded from venturi mask.  On exam, patient is AOx3, NAD, cardiopulmonary exams unremarkable, abdomen soft, NT/ND, extremities without edema.  Labs reviewed, CBC with hgb 10.0, BMP and LFT unremarkable.    Assessment and plan:   84 y.o M with a PMH of Metastatic Lung adenocarcinoma on chemotherapy, HTN, HLD, BPH, presented to the ED with complaints of SOB, Nausea, vomiting, and generalized weakness. Admitted for sepsis 2/2 to pneumonia vs worsening lung adenocarcinoma and nausea/vomiting.    # Sepsis 2/2 pneumonia vs worsening lung adenocarcinoma  # Patient seen at bedside, states he feels overall unchanged since admission, endorses SOB, nausea.  SpO2 confirmed at goal on 5LNC, oxygen downgraded from venturi mask.  On exam, patient is AOx3, NAD, cardiopulmonary exams unremarkable, abdomen soft, NT/ND, extremities without edema.  Labs reviewed, CBC with hgb 10.0, BMP and LFT unremarkable.    Assessment and plan:   84 y.o M with a PMH of Metastatic Lung adenocarcinoma on chemotherapy, HTN, HLD, BPH, presented to the ED with complaints of SOB, Nausea, vomiting, and generalized weakness. Admitted for sepsis 2/2 to pneumonia vs worsening lung adenocarcinoma and nausea/vomiting.    # Sepsis 2/2 pneumonia vs worsening metastatic lung adenocarcinoma, improving  # Elevated troponin, peaked, likely demand ischemia  # HTN  # HLD  # BPH  # Anemia  - oxygen downgraded from VM to NC, continue  - RR WNL, BP and HR normal  - F/u Blood/sputum cultures  - continue abx with vamc/cefepime  - f/u SLP evaluation   - albuterol prn  - Pulmonology Dr. luisana wade appreciated  - consulting oncology and pall care given the progression of lung cancer

## 2024-04-30 NOTE — SWALLOW BEDSIDE ASSESSMENT ADULT - ASR SWALLOW ASPIRATION MONITOR
change of breathing pattern/oral hygiene/position upright (90Y)/cough/fever/pneumonia/throat clearing

## 2024-05-01 NOTE — PROGRESS NOTE ADULT - SUBJECTIVE AND OBJECTIVE BOX
PGY-1 Progress Note discussed with attending    PAGER #: [[phone]] TILL 5:00 PM  PLEASE CONTACT ON CALL TEAM:  - On Call Team (Please refer to Catrachita) FROM 5:00 PM - 8:30PM  - Nightfloat Team FROM 8:30 -7:30 AM    CC: Patient is a 84y old  Male who presents with a chief complaint of Sepsis 2/2 PNA (01 May 2024 11:24)      OVERNIGHT EVENTS: no acute events    SUBJECTIVE / INTERVAL HPI: Patient seen and examined at bedside with son present providing Urdu translation. Patient states that he is feeling about the same as yesterday. Complaining of persistent productive cough with pleuritic pain and shortness of breath. Complains of dry mouth at night time. Diet advanced yesterday and reports tolerating small amount PO. Endorses subjective fevers, Denies nausea, vomiting, diarrhea, constipation, dysuria, and abdominal pain.      ROS:  CONSTITUTIONAL: +weakness, +fevers, no chills  EYES/ENT: No visual changes;  No vertigo or throat pain   NECK: No pain or stiffness  RESPIRATORY: +cough, no wheezing, no hemoptysis; +shortness of breath  CARDIOVASCULAR: No chest pain or palpitations  GASTROINTESTINAL: No abdominal or epigastric pain. No nausea, vomiting, or hematemesis; No diarrhea or constipation. No melena or hematochezia.  GENITOURINARY: No dysuria, frequency or hematuria  NEUROLOGICAL: No numbness or weakness  SKIN: No itching, burning, rashes, or lesions     VITAL SIGNS:  Vital Signs Last 24 Hrs  T(C): 36.7 (01 May 2024 05:17), Max: 36.9 (30 Apr 2024 14:14)  T(F): 98 (01 May 2024 05:17), Max: 98.4 (30 Apr 2024 14:14)  HR: 87 (01 May 2024 05:17) (86 - 94)  BP: 131/71 (01 May 2024 05:17) (115/65 - 131/71)  BP(mean): --  RR: 20 (01 May 2024 05:17) (18 - 20)  SpO2: 92% (01 May 2024 05:17) (92% - 93%)    Parameters below as of 01 May 2024 05:17  Patient On (Oxygen Delivery Method): nasal cannula  O2 Flow (L/min): 5      PHYSICAL EXAM:    General: WDWN, lethargic  HEENT: NC/AT; PERRL, anicteric sclera; dry mucous membranes  Neck: supple  Cardiovascular: irregularly irregular rhythm, +S1/S2; no murmurs  Respiratory: mild tachypnea, diminished R basilar breath sounds with coarse rhonchorous breath sounds b/l; no wheezing  Gastrointestinal: soft, NT/ND; +BSx4  Extremities: WWP; no edema, clubbing or cyanosis  Vascular: 2+ radial, DP/PT pulses B/L  Skin: Warm, dry, good turgor, no rashes, or ecchymoses  Neurological: AAOx3; no focal deficits    MEDICATIONS:  MEDICATIONS  (STANDING):  albuterol/ipratropium for Nebulization 3 milliLiter(s) Nebulizer every 6 hours  atorvastatin 40 milliGRAM(s) Oral at bedtime  cefepime   IVPB 1000 milliGRAM(s) IV Intermittent every 12 hours  enoxaparin Injectable 40 milliGRAM(s) SubCutaneous every 24 hours  finasteride 5 milliGRAM(s) Oral daily  folic acid 1 milliGRAM(s) Oral daily  sodium chloride 3%  Inhalation 4 milliLiter(s) Inhalation every 8 hours  vancomycin  IVPB 1000 milliGRAM(s) IV Intermittent every 12 hours    MEDICATIONS  (PRN):  albuterol    90 MICROgram(s) HFA Inhaler 2 Puff(s) Inhalation every 6 hours PRN Shortness of Breath and/or Wheezing  ondansetron Injectable 4 milliGRAM(s) IV Push every 8 hours PRN Nausea and/or Vomiting      ALLERGIES:  Allergies    No Known Allergies    Intolerances        LABS:                        9.7    6.77  )-----------( 199      ( 01 May 2024 06:45 )             31.2     05-01    139  |  102  |  21<H>  ----------------------------<  86  3.8   |  31  |  1.11    Ca    8.9      01 May 2024 06:45  Phos  3.5     05-01  Mg     2.3     05-01    TPro  6.7  /  Alb  2.1<L>  /  TBili  0.3  /  DBili  x   /  AST  22  /  ALT  18  /  AlkPhos  70  05-01      Urinalysis Basic - ( 01 May 2024 06:45 )    Color: x / Appearance: x / SG: x / pH: x  Gluc: 86 mg/dL / Ketone: x  / Bili: x / Urobili: x   Blood: x / Protein: x / Nitrite: x   Leuk Esterase: x / RBC: x / WBC x   Sq Epi: x / Non Sq Epi: x / Bacteria: x      CAPILLARY BLOOD GLUCOSE          RADIOLOGY & ADDITIONAL TESTS:   < from: CT Angio Chest PE Protocol w/ IV Cont (04.29.24 @ 19:17) >  FINDINGS:    PULMONARY ARTERIES: No pulmonary embolism.    MEDIASTINUM: Normal heart size. No pericardial effusion. Thoracic aorta   normal caliber.  No large mediastinal lymphnodes.    AIRWAYS, LUNGS, PLEURA: Large and confluent consolidation involving the   entirety of the right lower lobe and majority of the right middle lobe,   left lower lobe, and portions of the posterior right upper lobe and left   upper lobe. With respect to 2/16/2024 findings have mildly progressed;   for example, there is now consolidation in the left lower lobe superior   segment (image 50, series 5), which was previously aerated and there is a   new patchy ground-glass opacity at the left apex measuring 1.4 cm (image   26, series 5).    There is now occlusion of the posterior right upper lobe airway.    Small loculated right pleural effusion is without significant change.    ABDOMEN and PELVIS: Hepatic cyst. Cholecystectomy. Bilateral renal   hypodense lesions, some are cysts and others are too small to   characterize. Unchanged calcification along the periphery of the spleen.   Unremarkable adrenal glands and pancreas.    Nondistended urinary bladder. Enlarged prostate measuring 5.4 x 5 cm.    No bowel obstruction.    Normal caliber abdominal aorta with extensive calcified and noncalcified   plaque. No large abdominal or pelvic nodes. No free fluid.    BONES: Small sclerotic lesion of C7 vertebral body (image 2, series 5)   waspresent on the prior exam.    SOFT TISSUES: Unremarkable.    IMPRESSION:    No pulmonary embolism.    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    No acute pathology in the abdomen and pelvis.    --- End of Report ---    < end of copied text >

## 2024-05-01 NOTE — PROGRESS NOTE ADULT - PROBLEM SELECTOR PLAN 3
p/w trop 88.9  ECG sinus tachycardia no ischemic changes  likely demand ischemia in the setting of sepsis and acute infection  repeat Trop 77  f/u repeat EKG in s/o irregularly irregular rhythm on exam today 5/1

## 2024-05-01 NOTE — PROGRESS NOTE ADULT - PROBLEM SELECTOR PLAN 5
hx of HTN based on prior charts on Telmisartan  Med recs per son does not include Telmisartan - however hypertensive 192/87 on admission   s/p lasix 20mg IV push  BP well controlled  continue to hold losartan in s/o sepsis  restart home med as losartan as needed

## 2024-05-01 NOTE — CONSULT NOTE ADULT - NS ATTEND AMEND GEN_ALL_CORE FT
# Metastatic NSCLC:   Primary med Onc: Dr. Domingo at [facility], Dx in 2021 and currently on  Taxol + Tecentriq.    Concern for POD with worsening PS  now admitted with AHRF, sepsis d/t PNA   -Will connect with Dr. Domingo   -Palliative care consult   -Ongoing goals of care discussion   -Supportive care; on Abx    #dvt ppx   We will follow here

## 2024-05-01 NOTE — CONSULT NOTE ADULT - PROBLEM SELECTOR RECOMMENDATION 2
Due to sepsis with multifocal pneumonia vs underlying progression of disease from lung cancer  Continue oxygen supplementation/nebs/Mucinex  Continue broad spectrum antibiotics (Vanc/Cefepime)    Will restart Benzonatate 100 mg TID PRN for cough  Will restart Hycodan syrup 5 ml Q 6 PRN severe cough    From symptomatic point of view, patient may benefit from stronger opioids (i.e. oxycodone, morphine) for relief of cough and dyspnea, patient and son refusing at this time    Further management as per Pulmonary and primary medical team

## 2024-05-01 NOTE — PROGRESS NOTE ADULT - ATTENDING COMMENTS
Patient seen at bedside, states his SOB is grossly unchanged from prior, although he now requires less O2 to maintain SpO2 at goal.    On exam, patient is AOx3, NAD, cardiopulmonary exams unremarkable, abdomen soft, NT/ND, extremities without edema.  Labs reviewed, CBC with hgb 9.7, BMP and LFT unremarkable.    Assessment and plan:   84 y.o M with a PMH of Metastatic Lung adenocarcinoma on chemotherapy, HTN, HLD, BPH, presented to the ED with complaints of SOB, Nausea, vomiting, and generalized weakness. Admitted for sepsis 2/2 to pneumonia vs worsening lung adenocarcinoma and nausea/vomiting.    # Sepsis 2/2 pneumonia vs worsening metastatic lung adenocarcinoma, improving  # Elevated troponin, peaked, likely demand ischemia  # HTN  # HLD  # BPH  # Anemia  - attempt to wean O2 as tolerated  - F/u Blood cx, sputum cultures with normal ivan  - continue abx with vamc/cefepime  - albuterol prn  - Pulmonology Dr. luisana wade appreciated  - consulting oncology and pall care given the progression of lung cancer potentially contributing to his symptoms rather than acute PNA

## 2024-05-01 NOTE — CONSULT NOTE ADULT - SUBJECTIVE AND OBJECTIVE BOX
MEDICATIONS  (STANDING):  albuterol/ipratropium for Nebulization 3 milliLiter(s) Nebulizer every 6 hours  cefepime   IVPB 1000 milliGRAM(s) IV Intermittent every 12 hours  enoxaparin Injectable 40 milliGRAM(s) SubCutaneous every 24 hours  sodium chloride 3%  Inhalation 4 milliLiter(s) Inhalation every 8 hours  vancomycin  IVPB 1000 milliGRAM(s) IV Intermittent every 12 hours    MEDICATIONS  (PRN):  albuterol    90 MICROgram(s) HFA Inhaler 2 Puff(s) Inhalation every 6 hours PRN Shortness of Breath and/or Wheezing  ondansetron Injectable 4 milliGRAM(s) IV Push every 8 hours PRN Nausea and/or Vomiting    CAPILLARY BLOOD GLUCOSE        I&O's Summary      PHYSICAL EXAM:  Vital Signs Last 24 Hrs  T(C): 36.7 (01 May 2024 05:17), Max: 36.9 (30 Apr 2024 14:14)  T(F): 98 (01 May 2024 05:17), Max: 98.4 (30 Apr 2024 14:14)  HR: 87 (01 May 2024 05:17) (86 - 94)  BP: 131/71 (01 May 2024 05:17) (115/65 - 131/71)  BP(mean): --  RR: 20 (01 May 2024 05:17) (18 - 20)  SpO2: 92% (01 May 2024 05:17) (92% - 93%)    Parameters below as of 01 May 2024 05:17  Patient On (Oxygen Delivery Method): nasal cannula  O2 Flow (L/min): 5        LABS:                        9.7    6.77  )-----------( 199      ( 01 May 2024 06:45 )             31.2     05-01    139  |  102  |  21<H>  ----------------------------<  86  3.8   |  31  |  1.11    Ca    8.9      01 May 2024 06:45  Phos  3.5     05-01  Mg     2.3     05-01    TPro  6.7  /  Alb  2.1<L>  /  TBili  0.3  /  DBili  x   /  AST  22  /  ALT  18  /  AlkPhos  70  05-01          Urinalysis Basic - ( 01 May 2024 06:45 )    Color: x / Appearance: x / SG: x / pH: x  Gluc: 86 mg/dL / Ketone: x  / Bili: x / Urobili: x   Blood: x / Protein: x / Nitrite: x   Leuk Esterase: x / RBC: x / WBC x   Sq Epi: x / Non Sq Epi: x / Bacteria: x        Urinalysis with Rflx Culture (collected 30 Apr 2024 02:20)    Culture - Sputum (collected 30 Apr 2024 02:20)  Source: .Sputum Sputum  Gram Stain (30 Apr 2024 07:35):    Few-moderate polymorphonuclear leukocytes per low power field    Rare Squamous epithelial cells per low power field    Rare Gram Negative Rods seen per oil power field    Rare Gram positive cocci in pairs seen per oil power field  Preliminary Report (30 Apr 2024 20:33):    Normal Respiratory Breanna present    Culture - Blood (collected 29 Apr 2024 21:40)  Source: .Blood Blood-Peripheral  Preliminary Report (01 May 2024 02:03):    No growth at 24 hours    Culture - Blood (collected 29 Apr 2024 21:20)  Source: .Blood Blood-Peripheral  Preliminary Report (01 May 2024 02:03):    No growth at 24 hours      SARS-CoV-2: NotDetec (29 Apr 2024 17:45)  SARS-CoV-2: Detected (27 Nov 2023 06:15)  SARS-CoV-2: NotDetec (24 Nov 2023 22:34)      RADIOLOGY & ADDITIONAL TESTS:  < from: CT Abdomen and Pelvis w/ IV Cont (04.29.24 @ 19:17) >    ACC: [id number] EXAM:  CT ABDOMEN AND PELVIS IC   ORDERED BY: KEVIN TORRES     ACC: [id number] EXAM:  CT ANGIO CHEST PULM ART WAWIC   ORDERED BY: KEVIN TORRES     PROCEDURE DATE:  04/29/2024          INTERPRETATION:  CTA CHEST AND CT ABDOMEN AND PELVIS    INDICATION: Rule out pulmonary embolism. Weakness. Abdominal pain.    TECHNIQUE: Enhanced helical images were obtained of the chest, abdomen   and pelvis. Coronal and sagittal images were reconstructed. Maximum   intensity projection images were generated.    CONTRAST/COMPLICATIONS:  IV Contrast: Omnipaque 350 (accession [id number]), IV contrast documented   in unlinked concurrent exam (accession [id number])  90 cc administered   10   cc discarded  Oral Contrast: NONE  Complications: None reported at time of study completion    COMPARISON: 2/16/2024 CT chest. 9/11/2023 PET/CT.    FINDINGS:    PULMONARY ARTERIES: No pulmonary embolism.    MEDIASTINUM: Normal heart size. No pericardial effusion. Thoracic aorta   normal caliber.  No large mediastinal lymphnodes.    AIRWAYS, LUNGS, PLEURA: Large and confluent consolidation involving the   entirety of the right lower lobe and majority of the right middle lobe,   left lower lobe, and portions of the posterior right upper lobe and left   upper lobe. With respect to 2/16/2024 findings have mildly progressed;   for example, there is now consolidation in the left lower lobe superior   segment (image 50, series 5), which was previously aerated and there is a   new patchy ground-glass opacity at the left apex measuring 1.4 cm (image   26, series 5).    There is now occlusion of the posterior right upper lobe airway.    Small loculated right pleural effusion is without significant change.    ABDOMEN and PELVIS: Hepatic cyst. Cholecystectomy. Bilateral renal   hypodense lesions, some are cysts and others are too small to   characterize. Unchanged calcification along the periphery of the spleen.   Unremarkable adrenal glands and pancreas.    Nondistended urinary bladder. Enlarged prostate measuring 5.4 x 5 cm.    No bowel obstruction.    Normal caliber abdominal aorta with extensive calcified and noncalcified   plaque. No large abdominal or pelvic nodes. No free fluid.    BONES: Small sclerotic lesion of C7 vertebral body (image 2, series 5)   waspresent on the prior exam.    SOFT TISSUES: Unremarkable.    IMPRESSION:    No pulmonary embolism.    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    No acute pathology in the abdomen and pelvis.    < end of copied text >   Reason for Admission: Sepsis 2/2 PNA  History of Present Illness:   84 yrs old M, from home, ambulating independently, pmhx of Adenocarcinoma of the lung dx on 2021, on Home oxygen 2L [place], on palliative chemoRx, last Rx on 4/3/2024, HLD, BPH, ?HTN, pshx hernia repair presented with dyspnea. Pt AAOx2, baseline AAOx3, lethargic on exam, accompanied by son who opted for translation and collateral information. Stated that patient endorsed dyspnea, along with fever, chills, nausea and multiple episodes of vomiting since one day duration. As well reported lethargy and cough with sputum production, did not specify the color. Denied hematemesis, or hemoptysis. Son stated that patient was around large group of people yesterday however no sick contacts, denied recent travel. Denied Smoking, alcohol consumptions, marijuana or illicit drugs.   Of note Pt following with heme/onc specialist Dr. Domingo at [facility], due for chemotherapy on Wednesday. Pt's son denied any metastasis.     #[id number]    REVIEW OF SYSTEMS:    CONSTITUTIONAL: No fever, no loss of appetite. no chills, no weight loss,   EYES: no acute visual disturbances  NECK: No pain or stiffness  RESPIRATORY: + cough; + shortness of breath  CARDIOVASCULAR: No chest pain, no palpitations  GASTROINTESTINAL: No pain. No nausea or vomiting; No diarrhea   NEUROLOGICAL: No headache or numbness, no tremors  MUSCULOSKELETAL: No joint pain, no muscle pain  GENITOURINARY: no dysuria, no frequency, no hesitancy  PSYCHIATRY: no depression, no anxiety  ALL OTHER  ROS negative      Allergies and Intolerances:        Allergies:  	No Known Allergies:     Home Medications:   * Patient Currently Takes Medications as of 30-Nov-2023 14:43 documented in Structured Notes  · 	Lovenox 40 mg/0.4 mL injectable solution: 40 milligram(s) subcutaneously once a day  · 	benzonatate 100 mg oral capsule: 1 cap(s) orally 3 times a day as needed for  cough  · 	hydrocodone-homatropine 5 mg-1.5 mg/5 mL oral syrup: 5 milliliter(s) orally every 6 hours as needed for Cough iSTOP Reference #: [id number] MDD: 20mL  · 	melatonin 3 mg oral tablet: 1 tab(s) orally once a day (at bedtime) As needed Insomnia  · 	telmisartan 40 mg oral tablet: 1 tab(s) orally once a day  · 	folic acid 1 mg oral tablet: 1 tab(s) orally once a day  · 	finasteride 5 mg oral tablet: 1 orally once a day  · 	rosuvastatin 10 mg oral tablet: 1 tab(s) orally once a day    Patient History:    Past Medical, Past Surgical, and Family History:  PAST MEDICAL HISTORY:  History of BPH     HLD (hyperlipidemia)     HTN (hypertension)     Lung cancer right s/p 4 rounds of chemo, last chemo 6/23/22.    Pleural effusion.     PAST SURGICAL HISTORY:  History of ERCP     S/P bronchoscopy 1/2022, 3/2022    S/P inguinal hernia repair Right approximately 1961.     FAMILY HISTORY:  Sibling  Still living? No  FH: lung cancer, Age at diagnosis: Age Unknown.     Social History:  · Substance use	No     Tobacco Screening:  · Core Measure Site	Yes  · Has the patient used tobacco in the past 30 days?	No      MEDICATIONS  (STANDING):  albuterol/ipratropium for Nebulization 3 milliLiter(s) Nebulizer every 6 hours  cefepime   IVPB 1000 milliGRAM(s) IV Intermittent every 12 hours  enoxaparin Injectable 40 milliGRAM(s) SubCutaneous every 24 hours  sodium chloride 3%  Inhalation 4 milliLiter(s) Inhalation every 8 hours  vancomycin  IVPB 1000 milliGRAM(s) IV Intermittent every 12 hours    MEDICATIONS  (PRN):  albuterol    90 MICROgram(s) HFA Inhaler 2 Puff(s) Inhalation every 6 hours PRN Shortness of Breath and/or Wheezing  ondansetron Injectable 4 milliGRAM(s) IV Push every 8 hours PRN Nausea and/or Vomiting    CAPILLARY BLOOD GLUCOSE        I&O's Summary      PHYSICAL EXAM:  Vital Signs Last 24 Hrs  T(C): 36.7 (01 May 2024 05:17), Max: 36.9 (30 Apr 2024 14:14)  T(F): 98 (01 May 2024 05:17), Max: 98.4 (30 Apr 2024 14:14)  HR: 87 (01 May 2024 05:17) (86 - 94)  BP: 131/71 (01 May 2024 05:17) (115/65 - 131/71)  BP(mean): --  RR: 20 (01 May 2024 05:17) (18 - 20)  SpO2: 92% (01 May 2024 05:17) (92% - 93%)    Parameters below as of 01 May 2024 05:17  Patient On (Oxygen Delivery Method): nasal cannula  O2 Flow (L/min): 5    GEN: NAD; A and O x 3  LUNGS: B/L rhonchi/rales  HEART: S1 S2  ABDOMEN: soft, non-tender, non-distended, + BS  EXTREMITIES: no edema        LABS:                        9.7    6.77  )-----------( 199      ( 01 May 2024 06:45 )             31.2     05-01    139  |  102  |  21<H>  ----------------------------<  86  3.8   |  31  |  1.11    Ca    8.9      01 May 2024 06:45  Phos  3.5     05-01  Mg     2.3     05-01    TPro  6.7  /  Alb  2.1<L>  /  TBili  0.3  /  DBili  x   /  AST  22  /  ALT  18  /  AlkPhos  70  05-01          Urinalysis Basic - ( 01 May 2024 06:45 )    Color: x / Appearance: x / SG: x / pH: x  Gluc: 86 mg/dL / Ketone: x  / Bili: x / Urobili: x   Blood: x / Protein: x / Nitrite: x   Leuk Esterase: x / RBC: x / WBC x   Sq Epi: x / Non Sq Epi: x / Bacteria: x        Urinalysis with Rflx Culture (collected 30 Apr 2024 02:20)    Culture - Sputum (collected 30 Apr 2024 02:20)  Source: .Sputum Sputum  Gram Stain (30 Apr 2024 07:35):    Few-moderate polymorphonuclear leukocytes per low power field    Rare Squamous epithelial cells per low power field    Rare Gram Negative Rods seen per oil power field    Rare Gram positive cocci in pairs seen per oil power field  Preliminary Report (30 Apr 2024 20:33):    Normal Respiratory Breanna present    Culture - Blood (collected 29 Apr 2024 21:40)  Source: .Blood Blood-Peripheral  Preliminary Report (01 May 2024 02:03):    No growth at 24 hours    Culture - Blood (collected 29 Apr 2024 21:20)  Source: .Blood Blood-Peripheral  Preliminary Report (01 May 2024 02:03):    No growth at 24 hours      SARS-CoV-2: NotDetec (29 Apr 2024 17:45)  SARS-CoV-2: Detected (27 Nov 2023 06:15)  SARS-CoV-2: NotDetec (24 Nov 2023 22:34)      RADIOLOGY & ADDITIONAL TESTS:  < from: CT Abdomen and Pelvis w/ IV Cont (04.29.24 @ 19:17) >    ACC: [id number] EXAM:  CT ABDOMEN AND PELVIS IC   ORDERED BY: KEVIN TORRES     ACC: [id number] EXAM:  CT ANGIO CHEST PULM ART WAWIC   ORDERED BY: KEVIN TORRES     PROCEDURE DATE:  04/29/2024          INTERPRETATION:  CTA CHEST AND CT ABDOMEN AND PELVIS    INDICATION: Rule out pulmonary embolism. Weakness. Abdominal pain.    TECHNIQUE: Enhanced helical images were obtained of the chest, abdomen   and pelvis. Coronal and sagittal images were reconstructed. Maximum   intensity projection images were generated.    CONTRAST/COMPLICATIONS:  IV Contrast: Omnipaque 350 (accession [id number]), IV contrast documented   in unlinked concurrent exam (accession [id number])  90 cc administered   10   cc discarded  Oral Contrast: NONE  Complications: None reported at time of study completion    COMPARISON: 2/16/2024 CT chest. 9/11/2023 PET/CT.    FINDINGS:    PULMONARY ARTERIES: No pulmonary embolism.    MEDIASTINUM: Normal heart size. No pericardial effusion. Thoracic aorta   normal caliber.  No large mediastinal lymphnodes.    AIRWAYS, LUNGS, PLEURA: Large and confluent consolidation involving the   entirety of the right lower lobe and majority of the right middle lobe,   left lower lobe, and portions of the posterior right upper lobe and left   upper lobe. With respect to 2/16/2024 findings have mildly progressed;   for example, there is now consolidation in the left lower lobe superior   segment (image 50, series 5), which was previously aerated and there is a   new patchy ground-glass opacity at the left apex measuring 1.4 cm (image   26, series 5).    There is now occlusion of the posterior right upper lobe airway.    Small loculated right pleural effusion is without significant change.    ABDOMEN and PELVIS: Hepatic cyst. Cholecystectomy. Bilateral renal   hypodense lesions, some are cysts and others are too small to   characterize. Unchanged calcification along the periphery of the spleen.   Unremarkable adrenal glands and pancreas.    Nondistended urinary bladder. Enlarged prostate measuring 5.4 x 5 cm.    No bowel obstruction.    Normal caliber abdominal aorta with extensive calcified and noncalcified   plaque. No large abdominal or pelvic nodes. No free fluid.    BONES: Small sclerotic lesion of C7 vertebral body (image 2, series 5)   waspresent on the prior exam.    SOFT TISSUES: Unremarkable.    IMPRESSION:    No pulmonary embolism.    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    No acute pathology in the abdomen and pelvis.    < end of copied text >   Reason for Admission: Sepsis 2/2 PNA  History of Present Illness:   84 yrs old M, from home, ambulating independently, pmhx of Adenocarcinoma of the lung dx on 2021, on Home oxygen 2L [place], on palliative chemoRx, last Rx on 4/3/2024, HLD, BPH, ?HTN, pshx hernia repair presented with dyspnea. Pt AAOx2, baseline AAOx3, lethargic on exam, accompanied by son who opted for translation and collateral information. Stated that patient endorsed dyspnea, along with fever, chills, nausea and multiple episodes of vomiting since one day duration. As well reported lethargy and cough with sputum production, did not specify the color. Denied hematemesis, or hemoptysis. Son stated that patient was around large group of people yesterday however no sick contacts, denied recent travel. Denied Smoking, alcohol consumptions, marijuana or illicit drugs.   Of note Pt following with heme/onc specialist Dr. Domingo at [facility], due for chemotherapy on Wednesday. Pt's son denied any metastasis.     #[id number]    REVIEW OF SYSTEMS:    CONSTITUTIONAL: No fever, no loss of appetite. no chills, no weight loss,   EYES: no acute visual disturbances  NECK: No pain or stiffness  RESPIRATORY: + cough; + shortness of breath  CARDIOVASCULAR: No chest pain, no palpitations  GASTROINTESTINAL: No pain. No nausea or vomiting; No diarrhea   NEUROLOGICAL: No headache or numbness, no tremors  MUSCULOSKELETAL: No joint pain, no muscle pain  GENITOURINARY: no dysuria, no frequency, no hesitancy  PSYCHIATRY: no depression, no anxiety  ALL OTHER  ROS negative      Allergies and Intolerances:        Allergies:  	No Known Allergies:     Home Medications:   * Patient Currently Takes Medications as of 30-Nov-2023 14:43 documented in Structured Notes  · 	Lovenox 40 mg/0.4 mL injectable solution: 40 milligram(s) subcutaneously once a day  · 	benzonatate 100 mg oral capsule: 1 cap(s) orally 3 times a day as needed for  cough  · 	hydrocodone-homatropine 5 mg-1.5 mg/5 mL oral syrup: 5 milliliter(s) orally every 6 hours as needed for Cough iSTOP Reference #: [id number] MDD: 20mL  · 	melatonin 3 mg oral tablet: 1 tab(s) orally once a day (at bedtime) As needed Insomnia  · 	telmisartan 40 mg oral tablet: 1 tab(s) orally once a day  · 	folic acid 1 mg oral tablet: 1 tab(s) orally once a day  · 	finasteride 5 mg oral tablet: 1 orally once a day  · 	rosuvastatin 10 mg oral tablet: 1 tab(s) orally once a day    Patient History:    Past Medical, Past Surgical, and Family History:  PAST MEDICAL HISTORY:  History of BPH     HLD (hyperlipidemia)     HTN (hypertension)     Lung cancer right s/p 4 rounds of chemo, last chemo 6/23/22.    Pleural effusion.     PAST SURGICAL HISTORY:  History of ERCP     S/P bronchoscopy 1/2022, 3/2022    S/P inguinal hernia repair Right approximately 1961.     FAMILY HISTORY:  Sibling  Still living? No  FH: lung cancer, Age at diagnosis: Age Unknown.     Social History:  · Substance use	No     Tobacco Screening:  · Core Measure Site	Yes  · Has the patient used tobacco in the past 30 days?	No      MEDICATIONS  (STANDING):  albuterol/ipratropium for Nebulization 3 milliLiter(s) Nebulizer every 6 hours  cefepime   IVPB 1000 milliGRAM(s) IV Intermittent every 12 hours  enoxaparin Injectable 40 milliGRAM(s) SubCutaneous every 24 hours  sodium chloride 3%  Inhalation 4 milliLiter(s) Inhalation every 8 hours  vancomycin  IVPB 1000 milliGRAM(s) IV Intermittent every 12 hours    MEDICATIONS  (PRN):  albuterol    90 MICROgram(s) HFA Inhaler 2 Puff(s) Inhalation every 6 hours PRN Shortness of Breath and/or Wheezing  ondansetron Injectable 4 milliGRAM(s) IV Push every 8 hours PRN Nausea and/or Vomiting    CAPILLARY BLOOD GLUCOSE        I&O's Summary      PHYSICAL EXAM:  Vital Signs Last 24 Hrs  T(C): 36.7 (01 May 2024 05:17), Max: 36.9 (30 Apr 2024 14:14)  T(F): 98 (01 May 2024 05:17), Max: 98.4 (30 Apr 2024 14:14)  HR: 87 (01 May 2024 05:17) (86 - 94)  BP: 131/71 (01 May 2024 05:17) (115/65 - 131/71)  BP(mean): --  RR: 20 (01 May 2024 05:17) (18 - 20)  SpO2: 92% (01 May 2024 05:17) (92% - 93%)    Parameters below as of 01 May 2024 05:17  Patient On (Oxygen Delivery Method): nasal cannula  O2 Flow (L/min): 5    GEN: NAD; A and O x 3  LUNGS: B/L rhonchi/rales, NC in place on 5L  HEART: S1 S2  ABDOMEN: soft, non-tender, non-distended, + BS  EXTREMITIES: no edema        LABS:                        9.7    6.77  )-----------( 199      ( 01 May 2024 06:45 )             31.2     05-01    139  |  102  |  21<H>  ----------------------------<  86  3.8   |  31  |  1.11    Ca    8.9      01 May 2024 06:45  Phos  3.5     05-01  Mg     2.3     05-01    TPro  6.7  /  Alb  2.1<L>  /  TBili  0.3  /  DBili  x   /  AST  22  /  ALT  18  /  AlkPhos  70  05-01          Urinalysis Basic - ( 01 May 2024 06:45 )    Color: x / Appearance: x / SG: x / pH: x  Gluc: 86 mg/dL / Ketone: x  / Bili: x / Urobili: x   Blood: x / Protein: x / Nitrite: x   Leuk Esterase: x / RBC: x / WBC x   Sq Epi: x / Non Sq Epi: x / Bacteria: x        Urinalysis with Rflx Culture (collected 30 Apr 2024 02:20)    Culture - Sputum (collected 30 Apr 2024 02:20)  Source: .Sputum Sputum  Gram Stain (30 Apr 2024 07:35):    Few-moderate polymorphonuclear leukocytes per low power field    Rare Squamous epithelial cells per low power field    Rare Gram Negative Rods seen per oil power field    Rare Gram positive cocci in pairs seen per oil power field  Preliminary Report (30 Apr 2024 20:33):    Normal Respiratory Breanna present    Culture - Blood (collected 29 Apr 2024 21:40)  Source: .Blood Blood-Peripheral  Preliminary Report (01 May 2024 02:03):    No growth at 24 hours    Culture - Blood (collected 29 Apr 2024 21:20)  Source: .Blood Blood-Peripheral  Preliminary Report (01 May 2024 02:03):    No growth at 24 hours      SARS-CoV-2: NotDetec (29 Apr 2024 17:45)  SARS-CoV-2: Detected (27 Nov 2023 06:15)  SARS-CoV-2: NotDetec (24 Nov 2023 22:34)      RADIOLOGY & ADDITIONAL TESTS:  < from: CT Abdomen and Pelvis w/ IV Cont (04.29.24 @ 19:17) >    ACC: [id number] EXAM:  CT ABDOMEN AND PELVIS IC   ORDERED BY: KEVIN TORRES     ACC: [id number] EXAM:  CT ANGIO CHEST PULM ART WAWIC   ORDERED BY: KEVIN TORRES     PROCEDURE DATE:  04/29/2024          INTERPRETATION:  CTA CHEST AND CT ABDOMEN AND PELVIS    INDICATION: Rule out pulmonary embolism. Weakness. Abdominal pain.    TECHNIQUE: Enhanced helical images were obtained of the chest, abdomen   and pelvis. Coronal and sagittal images were reconstructed. Maximum   intensity projection images were generated.    CONTRAST/COMPLICATIONS:  IV Contrast: Omnipaque 350 (accession [id number]), IV contrast documented   in unlinked concurrent exam (accession [id number])  90 cc administered   10   cc discarded  Oral Contrast: NONE  Complications: None reported at time of study completion    COMPARISON: 2/16/2024 CT chest. 9/11/2023 PET/CT.    FINDINGS:    PULMONARY ARTERIES: No pulmonary embolism.    MEDIASTINUM: Normal heart size. No pericardial effusion. Thoracic aorta   normal caliber.  No large mediastinal lymphnodes.    AIRWAYS, LUNGS, PLEURA: Large and confluent consolidation involving the   entirety of the right lower lobe and majority of the right middle lobe,   left lower lobe, and portions of the posterior right upper lobe and left   upper lobe. With respect to 2/16/2024 findings have mildly progressed;   for example, there is now consolidation in the left lower lobe superior   segment (image 50, series 5), which was previously aerated and there is a   new patchy ground-glass opacity at the left apex measuring 1.4 cm (image   26, series 5).    There is now occlusion of the posterior right upper lobe airway.    Small loculated right pleural effusion is without significant change.    ABDOMEN and PELVIS: Hepatic cyst. Cholecystectomy. Bilateral renal   hypodense lesions, some are cysts and others are too small to   characterize. Unchanged calcification along the periphery of the spleen.   Unremarkable adrenal glands and pancreas.    Nondistended urinary bladder. Enlarged prostate measuring 5.4 x 5 cm.    No bowel obstruction.    Normal caliber abdominal aorta with extensive calcified and noncalcified   plaque. No large abdominal or pelvic nodes. No free fluid.    BONES: Small sclerotic lesion of C7 vertebral body (image 2, series 5)   waspresent on the prior exam.    SOFT TISSUES: Unremarkable.    IMPRESSION:    No pulmonary embolism.    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    No acute pathology in the abdomen and pelvis.    < end of copied text >

## 2024-05-01 NOTE — PROGRESS NOTE ADULT - PROBLEM SELECTOR PLAN 4
hx of Lung adenocarcinoma  -following with heme/onc specialist Dr. Domingo at [facility], last chemo on 4/3/2024  -heme/onc consulted Dr. Reese   -f/u palliative consult  -Pulm consulted Dr. Rutherford

## 2024-05-01 NOTE — CONSULT NOTE ADULT - SUBJECTIVE AND OBJECTIVE BOX
Consult to: Discuss complex medical decision making related to goals of care    [facility] Geriatric and Palliative Consult Service:  Lisacris Vásquez DO: cell ([phone])  Naresh Earl MD: cell ([phone])  Eyad Carrillo NP: cell ([phone])   Kwabena Valdes SW: cell ([phone])   Svitlana Gamez NP: via [employer] Teams    Can contact any Palliative Team member via [employer] Teams for consults and questions      HPI:  84 yrs old M, from home, ambulating independently, pmhx of Adenocarcinoma of the lung dx on 2021, on Home oxygen 2L NC, on palliative chemoRx, last Rx on 4/3/2024, HLD, BPH, ?HTN, pshx hernia repair presented with dyspnea. Pt AAOx2, baseline AAOx3, lethargic on exam, accompanied by son who opted for translation and collateral information. Stated that patient endorsed dyspnea, along with fever, chills, nausea and multiple episodes of vomiting since one day duration. As well reported lethargy and cough with sputum production, did not specify the color. Denied hematemesis, or hemoptysis. Son stated that patient was around large group of people yesterday however no sick contacts, denied recent travel. Denied Smoking, alcohol consumptions, marijuana or illicit drugs.   Of note Pt following with heme/onc specialist Dr. Domingo at [facility], due for chemotherapy on Wednesday. Pt's son denied any metastasis.    (29 Apr 2024 21:38)      PAST MEDICAL & SURGICAL HISTORY:  HTN (hypertension)      HLD (hyperlipidemia)      History of BPH      Lung cancer  right s/p 4 rounds of chemo, last chemo 6/23/22.      Pleural effusion      History of ERCP      S/P bronchoscopy  1/2022, 3/2022      S/P inguinal hernia repair  Right approximately 1961          SOCIAL HISTORY:    Admitted from:  home  (with HHA)           assisted living          JUAN M       LTC   [ none ] Substance abuse, [ none ] Tobacco hx, [ none ] Alcohol hx, [ none ] Home Opioid Hx    FAMILY HISTORY:  FH: lung cancer (Sibling)     unable to obtain from patient due to poor mentation, family unable to give information, see H&P for history  Baseline ADLs (prior to admission):    Alevism:    Allergies    No Known Allergies    Intolerances      Present Symptoms: Mild, Moderate, Severe  Pain:             Location -                               Aggravating factors -             Quality -             Radiation -             Timing-             Severity (0-10 scale):             Minimal acceptable level (0-10 scale):  Fatigue:  Nausea:  Lack of Appetite:   SOB:  Depression:  Anxiety:  Constipation:  Review of Systems: [All others negative or Unable to obtain due to poor mentation]    CPOT:    [email or web address]/resources/Documents/Sedation%20Analgesia%20Delirium%20in%20CC/CCS%20STH%20Guideline%20template%20CPOT.pdf  PAIN AD Score:   [email or web address]/painad.pdf (press ctrl +  left click to view)      MEDICATIONS  (STANDING):  albuterol/ipratropium for Nebulization 3 milliLiter(s) Nebulizer every 6 hours  atorvastatin 40 milliGRAM(s) Oral at bedtime  cefepime   IVPB 1000 milliGRAM(s) IV Intermittent every 12 hours  enoxaparin Injectable 40 milliGRAM(s) SubCutaneous every 24 hours  finasteride 5 milliGRAM(s) Oral daily  folic acid 1 milliGRAM(s) Oral daily  sodium chloride 3%  Inhalation 4 milliLiter(s) Inhalation every 8 hours  vancomycin  IVPB 1000 milliGRAM(s) IV Intermittent every 12 hours    MEDICATIONS  (PRN):  albuterol    90 MICROgram(s) HFA Inhaler 2 Puff(s) Inhalation every 6 hours PRN Shortness of Breath and/or Wheezing  benzonatate 100 milliGRAM(s) Oral three times a day PRN Cough  hydrocodone/homatropine Syrup 5 milliLiter(s) Oral every 6 hours PRN Cough  ondansetron Injectable 4 milliGRAM(s) IV Push every 8 hours PRN Nausea and/or Vomiting  senna 2 Tablet(s) Oral at bedtime PRN Constipation      PHYSICAL EXAM:  Vital Signs Last 24 Hrs  T(C): 37.2 (01 May 2024 14:21), Max: 37.2 (01 May 2024 14:21)  T(F): 98.9 (01 May 2024 14:21), Max: 98.9 (01 May 2024 14:21)  HR: 81 (01 May 2024 14:21) (81 - 87)  BP: 147/68 (01 May 2024 14:21) (115/65 - 147/68)  BP(mean): --  RR: 18 (01 May 2024 14:21) (18 - 20)  SpO2: 95% (01 May 2024 14:21) (92% - 95%)    Parameters below as of 01 May 2024 14:21  Patient On (Oxygen Delivery Method): nasal cannula  O2 Flow (L/min): 5      General: alert  oriented x ____    lethargic distressed cachexia  nonverbal  unarousable verbal    Palliative Performance Scale/Karnofsky Score:  http://npcrc.org/files/news/palliative_performance_scale_ppsv2.pdf    HEENT: no abnormal lesion, dry mouth  ET tube/trach oral lesions:  Lungs: tachypnea/labored breathing, audible excessive secretions  CV: RRR, S1S2, tachycardia  GI: soft non distended non tender  incontinent               PEG/NG/OG tube  constipation  last BM:   : incontinent  oliguria/anuria  tillman  Musculoskeletal: weakness x4 edema x4    ambulatory with assistance   mostly/fully bedbound/wheelchair bound  Skin: no abnormal skin lesions, poor skin turgor, pressure ulcer stage:   Neuro: no deficits, mild cognitive impairment dsyphagia/dysarthria paresis  Oral intake ability: unable/only mouth care, minimal moderate full capability    LABS:                        9.7    6.77  )-----------( 199      ( 01 May 2024 06:45 )             31.2     05-01    139  |  102  |  21<H>  ----------------------------<  86  3.8   |  31  |  1.11    Ca    8.9      01 May 2024 06:45  Phos  3.5     05-01  Mg     2.3     05-01    TPro  6.7  /  Alb  2.1<L>  /  TBili  0.3  /  DBili  x   /  AST  22  /  ALT  18  /  AlkPhos  70  05-01    Urinalysis Basic - ( 01 May 2024 06:45 )    Color: x / Appearance: x / SG: x / pH: x  Gluc: 86 mg/dL / Ketone: x  / Bili: x / Urobili: x   Blood: x / Protein: x / Nitrite: x   Leuk Esterase: x / RBC: x / WBC x   Sq Epi: x / Non Sq Epi: x / Bacteria: x        RADIOLOGY & ADDITIONAL STUDIES:     Consult to: Discuss complex medical decision making related to goals of care    [facility] Geriatric and Palliative Consult Service:  Lisa Vásquez DO: cell ([phone])  Naresh Earl MD: cell ([phone])  Eyad Carrillo NP: cell ([phone])   Kwabena Valdes LMSW: cell ([phone])   Svitlana Gamez NP: via [employer] Teams    Can contact any Palliative Team member via [employer] Teams for consults and questions      HPI:  84 yrs old M, from home, ambulating independently, pmhx of Adenocarcinoma of the lung dx on 2021, on Home oxygen 2L NC, on palliative chemoRx, last Rx on 4/3/2024, HLD, BPH, ?HTN, pshx hernia repair presented with dyspnea. Pt AAOx2, baseline AAOx3, lethargic on exam, accompanied by son who opted for translation and collateral information. Stated that patient endorsed dyspnea, along with fever, chills, nausea and multiple episodes of vomiting since one day duration. As well reported lethargy and cough with sputum production, did not specify the color. Denied hematemesis, or hemoptysis. Son stated that patient was around large group of people yesterday however no sick contacts, denied recent travel. Denied Smoking, alcohol consumptions, marijuana or illicit drugs.   Of note Pt following with heme/onc specialist Dr. Domingo at [facility], due for chemotherapy on Wednesday. Pt's son denied any metastasis.    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  Patient was admitted for AHRF 2/2 sepsis with pneumonia vs progression of underlying malignancy.  Started on IV cefepime and vancomycin.    Patient examined earlier this afternoon with mann Fermin at bedside.  Patient declined [facility] , nephew and son Hasan (by phone) translated for patient instead.  Alert and oriented x 3  Continues to report moderate SOB (including ROWLEY with any attempts to transfer/get OOB) and continued weakness/fatigue.  + cough with white phlegm.  Denies pain, no other complaints.    Pulmonary and Heme/Onc consults read and appreciated.        PAST MEDICAL & SURGICAL HISTORY:  HTN (hypertension)      HLD (hyperlipidemia)      History of BPH      Lung cancer  right s/p 4 rounds of chemo, last chemo 6/23/22.      Pleural effusion      History of ERCP      S/P bronchoscopy  1/2022, 3/2022      S/P inguinal hernia repair  Right approximately 1961          SOCIAL HISTORY:    Admitted from:  home  lives with wife and son, family provides caregiving, no A services  [ none ] Substance abuse, [ none ] Tobacco hx, [ none ] Alcohol hx, [ none ] Home Opioid Hx    FAMILY HISTORY:  FH: lung cancer (Sibling)    Unable to obtain from patient due to poor mentation, family unable to give information, see H&P for history  Baseline ADLs (prior to admission):  Ambulatory inside home, requires supervision with bathing/showering, otherwise independent in ADLs.  Uses wheelchair outside for transport.  Per son, patient has recently progressed to being in bed > 50% of the day, with increasing fatigue (ECOG 3)    Per review of HIE notes, patient currently on Taxol and Tecentriq, Taxol held this month due to adverse effects on his performance status    Baptism:  Yarsanism      Allergies    No Known Allergies    Intolerances      Present Symptoms: Mild, Moderate, Severe  Pain:  Denies, 0/10  Fatigue:  moderate  Nausea:  denies  Lack of Appetite:   mild  SOB:  mild to moderate (currently rates as 3/10)  Depression:  Denies  Constipation:  Denies    Review of Systems:   All other systems reviewed and are negative      MEDICATIONS  (STANDING):  albuterol/ipratropium for Nebulization 3 milliLiter(s) Nebulizer every 6 hours  atorvastatin 40 milliGRAM(s) Oral at bedtime  cefepime   IVPB 1000 milliGRAM(s) IV Intermittent every 12 hours  enoxaparin Injectable 40 milliGRAM(s) SubCutaneous every 24 hours  finasteride 5 milliGRAM(s) Oral daily  folic acid 1 milliGRAM(s) Oral daily  sodium chloride 3%  Inhalation 4 milliLiter(s) Inhalation every 8 hours  vancomycin  IVPB 1000 milliGRAM(s) IV Intermittent every 12 hours    MEDICATIONS  (PRN):  albuterol    90 MICROgram(s) HFA Inhaler 2 Puff(s) Inhalation every 6 hours PRN Shortness of Breath and/or Wheezing  benzonatate 100 milliGRAM(s) Oral three times a day PRN Cough  hydrocodone/homatropine Syrup 5 milliLiter(s) Oral every 6 hours PRN Cough  ondansetron Injectable 4 milliGRAM(s) IV Push every 8 hours PRN Nausea and/or Vomiting  senna 2 Tablet(s) Oral at bedtime PRN Constipation      PHYSICAL EXAM:  Vital Signs Last 24 Hrs  T(C): 37.2 (01 May 2024 14:21), Max: 37.2 (01 May 2024 14:21)  T(F): 98.9 (01 May 2024 14:21), Max: 98.9 (01 May 2024 14:21)  HR: 81 (01 May 2024 14:21) (81 - 87)  BP: 147/68 (01 May 2024 14:21) (115/65 - 147/68)  BP(mean): --  RR: 18 (01 May 2024 14:21) (18 - 20)  SpO2: 95% (01 May 2024 14:21) (92% - 95%)    Parameters below as of 01 May 2024 14:21  Patient On (Oxygen Delivery Method): nasal cannula  O2 Flow (L/min): 5      General: alert  oriented x __3__     appears fatigued with mild temporal wasting, NAD    Palliative Performance Scale/Karnofsky Score:  50%  http://npcrc.org/files/news/palliative_performance_scale_ppsv2.pdf    HEENT:  EOMI anicteric, pharynx clear, MM moist:  Lungs: decreased breath sounds with rhonchi over posterior R lung fields, otherwise clear, respirations unlabored  CV: RRR,  normal S1 and S2, no murmur  GI: soft non distended non tender  + bowel sounds  Musculoskeletal:  ambulatory, no edema, no focal abnormal joint findings identified  Skin: no abnormal skin lesions or DU noted  Neuro: no focal deficits  Oral intake ability:  full capability      LABS:                        9.7    6.77  )-----------( 199      ( 01 May 2024 06:45 )             31.2     05-01    139  |  102  |  21<H>  ----------------------------<  86  3.8   |  31  |  1.11    Ca    8.9      01 May 2024 06:45  Phos  3.5     05-01  Mg     2.3     05-01    TPro  6.7  /  Alb  2.1<L>  /  TBili  0.3  /  DBili  x   /  AST  22  /  ALT  18  /  AlkPhos  70  05-01    Urinalysis Basic - ( 01 May 2024 06:45 )    Color: x / Appearance: x / SG: x / pH: x  Gluc: 86 mg/dL / Ketone: x  / Bili: x / Urobili: x   Blood: x / Protein: x / Nitrite: x   Leuk Esterase: x / RBC: x / WBC x   Sq Epi: x / Non Sq Epi: x / Bacteria: x        RADIOLOGY & ADDITIONAL STUDIES:    ACC: [id number] EXAM:  CT ABDOMEN AND PELVIS IC   ORDERED BY: KEVIN TORRES     ACC: [id number] EXAM:  CT ANGIO CHEST PULM ART WAWIC   ORDERED BY: KEVIN TORRES     PROCEDURE DATE:  04/29/2024          INTERPRETATION:  CTA CHEST AND CT ABDOMEN AND PELVIS    INDICATION: Rule out pulmonary embolism. Weakness. Abdominal pain.    TECHNIQUE: Enhanced helical images were obtained of the chest, abdomen   and pelvis. Coronal and sagittal images were reconstructed. Maximum   intensity projection images were generated.    CONTRAST/COMPLICATIONS:  IV Contrast: Omnipaque 350 (accession [id number]), IV contrast documented   in unlinked concurrent exam (accession [id number])  90 cc administered   10   cc discarded  Oral Contrast: NONE  Complications: None reported at time of study completion    COMPARISON: 2/16/2024 CT chest. 9/11/2023 PET/CT.    FINDINGS:    PULMONARY ARTERIES: No pulmonary embolism.    MEDIASTINUM: Normal heart size. No pericardial effusion. Thoracic aorta   normal caliber.  No large mediastinal lymphnodes.    AIRWAYS, LUNGS, PLEURA: Large and confluent consolidation involving the   entirety of the right lower lobe and majority of the right middle lobe,   left lower lobe, and portions of the posterior right upper lobe and left   upper lobe. With respect to 2/16/2024 findings have mildly progressed;   for example, there is now consolidation in the left lower lobe superior   segment (image 50, series 5), which was previously aerated and there is a   new patchy ground-glass opacity at the left apex measuring 1.4 cm (image   26, series 5).    There is now occlusion of the posterior right upper lobe airway.    Small loculated right pleural effusion is without significant change.    ABDOMEN and PELVIS: Hepatic cyst. Cholecystectomy. Bilateral renal   hypodense lesions, some are cysts and others are too small to   characterize. Unchanged calcification along the periphery of the spleen.   Unremarkable adrenal glands and pancreas.    Nondistended urinary bladder. Enlarged prostate measuring 5.4 x 5 cm.    No bowel obstruction.    Normal caliber abdominal aorta with extensive calcified and noncalcified   plaque. No large abdominal or pelvic nodes. No free fluid.    BONES: Small sclerotic lesion of C7 vertebral body (image 2, series 5)   waspresent on the prior exam.    SOFT TISSUES: Unremarkable.    IMPRESSION:    No pulmonary embolism.    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    No acute pathology in the abdomen and pelvis.    --- End of Report ---

## 2024-05-01 NOTE — CONSULT NOTE ADULT - CONVERSATION DETAILS
Face to face family meeting held with patient and nephew at bedside x 25 minutes (as separately identifiable service) to discuss prognosis, ACP, goals of care, and treatment preferences.  Son Hasan joined by phone.  Patient declined use of [place] speaking , family translated for family.  Supportive role of palliative care team explained.  Current hospital course and events briefly reviewed with patient and family.  Patient and family are aware of his advanced cancer, and also aware of concern that cancer may be progressing despite current treatment.  Son is aware that patient is on palliative chemo and that current regimen of Taxol and Tecentriq may represent the last line of therapy.  They still have plans to follow-up with Dr. Ayala to discuss continuation of treatment (which also previously included plans for additional imaging later this month).    Attempted to discuss advance directives and code status with patient and nephew.  I asked patient about his preference for intubation in the presence of worsening respiratory failure; patient responded by stating that if he gets that sick "he's finished" and "everyone has to die."  Nephew at that point asked me to stop, as he was not comfortable with continuing the conversation without the son being physically present.  Spoke to son privately outside the room; he acknowledged that patient is getting sicker and weaker, is aware that patient is at high risk of requiring intubation in the near future.  If patient is getting sicker and cancer is progression, son's wish is for patient "to go as quickly as possible."  Son states he and his family are increasingly aware that code status/DNR needs to be clarified.  Son agreed to in-person family meeting tomorrow morning 5/2 for additional GOC/ACP discussion.  For the moment, patient will remain Full Code

## 2024-05-01 NOTE — CONSULT NOTE ADULT - ASSESSMENT
complete note to follow    #VTE Prophylaxis   84 yrs old M, from home, ambulating independently, pmhx of Adenocarcinoma of the lung dx on 2021, on Home oxygen 2L NC, on palliative chemoRx, last Rx on 4/3/2024, HLD, BPH, ?HTN, pshx hernia repair presented with dyspnea. Pt AAOx2, baseline AAOx3, lethargic on exam, accompanied by son who opted for translation and collateral information. Stated that patient endorsed dyspnea, along with fever, chills, nausea and multiple episodes of vomiting since one day duration. As well reported lethargy and cough with sputum production, did not specify the color. Denied hematemesis, or hemoptysis. Son stated that patient was around large group of people yesterday however no sick contacts, denied recent travel. Denied Smoking, alcohol consumptions, marijuana or illicit drugs.   Of note Pt following with heme/onc specialist Dr. Domingo at [facility], due for chemotherapy on Wednesday. Pt's son denied any metastasis.     #Lung CA    #VTE Prophylaxis   84 yrs old M, from home, ambulating independently, pmhx of Adenocarcinoma of the lung dx on 2021, on Home oxygen 2L NC, on palliative chemoRx, last Rx on 4/3/2024, HLD, BPH, ?HTN, pshx hernia repair presented with dyspnea. Pt AAOx2, baseline AAOx3, lethargic on exam, accompanied by son who opted for translation and collateral information. Stated that patient endorsed dyspnea, along with fever, chills, nausea and multiple episodes of vomiting since one day duration. As well reported lethargy and cough with sputum production, did not specify the color. Denied hematemesis, or hemoptysis. Son stated that patient was around large group of people yesterday however no sick contacts, denied recent travel. Denied Smoking, alcohol consumptions, marijuana or illicit drugs.   Of note Pt following with heme/onc specialist Dr. Domingo at [facility], due for chemotherapy on Wednesday. Pt's son denied any metastasis.     #Met Lung CA  pt follows with Dr. 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  Rec's:  multifactorial, PNA and POD  -Hold chemo during admission  -plan for cont tx or change in tx or hospice to be d/w primary Oncologist  -LM for Dr. Domingo, await response  -continue abx  -Pulm following  upon d/c pt to f/u with Dr. Domingo    #VTE Prophylaxis    Thank you for the referral. Will continue to monitor the patient.  Please call with any questions [phone]  Above reviewed with Attending Dr. Wu  [facility] [place]  [address], [address], 5th Floor  [place], [place] [id number] [phone]  *Note not finalized until signed by Attending Physician

## 2024-05-01 NOTE — CONSULT NOTE ADULT - PROBLEM SELECTOR RECOMMENDATION 5
As above.  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  Concern for continued POD despite ongoing chemo, regimen recently modified 2/2 concerns over performance status. ECOG 3 with PPS of 50%.  Patient at significant risk of decline towards death over next 6-12 months, regardless of future cancer directed treatment.  Hospice appropriate    See [facility] discussion above--pt/family wish to continue with active treatment for now, awaiting further input from outside oncologist, not ready for hospice discussion at this time.    Patient/family unwilling to have detailed discussion about code status at this time, son agreed for in-person meetin tomorrow 5/2 for further discussion.  Patient to remain Full Code for now.    Discussed with medical team and Dr. Hurley in detail  Palliative care team will continue to follow

## 2024-05-01 NOTE — CONSULT NOTE ADULT - PROBLEM SELECTOR RECOMMENDATION 9
Heme/Onc consult from Dr. Wu/HUYEN greatly appreciated.    Patient with adenocarcinoma of lung, first diagnosed in 2021, on palliative chemo with Dr. Domingo at [facility].   Current regimen is Taxol and Tecentriq, last treatment on 4/3, was supposed to get next treatment this week.  Per review of HIE notes, Taxol previously reduced/held due to effects on performance status    In my discussions with Heme/Onc and Pulmonary, CT findings are concerning for underlying progression of disease (with or without superimposed infection).  By history, PS has worsened from ECOG 2 to ECOG 3.  Patient likely to progress towards death over next 6-12 months, regardless of future cancer directed treatment.  He would be medically appropriate for hospice/comfort oriented care if in line with patient/family goals of care.    Awaiting additional input from Dr. Domingo regarding treatment direction.  Per [facility] discussion above, patient and family still wishing to pursue disease directed treatment and are not ready for a hospice discussion at this time.    Will continue supportive care

## 2024-05-01 NOTE — PROGRESS NOTE ADULT - PROBLEM SELECTOR PLAN 1
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  meets sepsis criteria  s/p Cefepime and 2L IVF bolus in ED   ESR 98,  , procal 1.05  blood cultures: NG at 24hrs   sputum culture-->normal respiratory ivan   -MRSA, Legionella and Strep pneumo all negative  -urinalysis negative for WBCs   -c/w Vanc and Cefepime   -c/w Duoneb and chest PT q6, Albuterol PRN   -passed speech and swallow evaluation-->diet advanced to regular w/ thin liquids   -Pulm consulted Dr. Rutherford

## 2024-05-01 NOTE — CONSULT NOTE ADULT - PROBLEM SELECTOR RECOMMENDATION 4
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

## 2024-05-02 NOTE — DIETITIAN INITIAL EVALUATION ADULT - PERTINENT LABORATORY DATA
05-02    138  |  104  |  19<H>  ----------------------------<  117<H>  3.5   |  31  |  1.06    Ca    8.3<L>      02 May 2024 08:10  Phos  2.8     05-02  Mg     2.2     05-02    TPro  6.6  /  Alb  2.1<L>  /  TBili  0.4  /  DBili  x   /  AST  18  /  ALT  19  /  AlkPhos  70  05-02  A1C with Estimated Average Glucose Result: 5.9 % (11-25-23 @ 06:00)

## 2024-05-02 NOTE — PROGRESS NOTE ADULT - PROBLEM SELECTOR PLAN 4
hx of Lung adenocarcinoma  -following with heme/onc specialist Dr. Domingo at [facility], last chemo on 4/3/2024  -heme/onc consulted Dr. Reese   -f/u palliative consult  -Pulm consulted Dr. Rutherford

## 2024-05-02 NOTE — PROGRESS NOTE ADULT - PROBLEM SELECTOR PLAN 4
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

## 2024-05-02 NOTE — DIETITIAN INITIAL EVALUATION ADULT - OTHER INFO
Pt visited. Pt is Yakut speaking. Pt dialed his  son cell and information Obtained. Per Son Pt with Fair appetite. + Wt loss of ~ 47 lb x ~  6 months.  Lb. Current bed  scale 153 lb. HT 67 inches. NFPe done. Per son Pt does Not eat Pork, cheese. Pt  has ensure at  Home but does not drink. F / N Dept Notified. . However will encourage . No chewing or swallowing Difficulty Reported.  Pt on Palliative  chemo 4/3/24. Palliative On case.

## 2024-05-02 NOTE — DIETITIAN INITIAL EVALUATION ADULT - PROBLEM SELECTOR PLAN 4
hx of Lung adenocarcinoma   following with heme/onc specialist Dr. Domingo at [facility], last chemo on 4/3/2024  due for chemotherapy on Wednesday

## 2024-05-02 NOTE — PROGRESS NOTE ADULT - PROBLEM SELECTOR PLAN 5
As above.  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  Concern for continued POD despite ongoing chemo, regimen recently modified 2/2 concerns over performance status. ECOG 3 with PPS of 50%.  Patient at significant risk of decline towards death regardless of future cancer directed treatment.  Hospice appropriate with prognosis of 6 months or less    See GOC discussion from 5/1 and above--pt/family wish to continue with active treatment for now, awaiting further input from outside oncologist, not ready for hospice discussion at this time.    Patient/family still reluctant/refusing to have detailed discussion about code status at this time.  Patient to remain Full Code for now.    Discussed with medical team and Dr. Hurley in detail  Palliative care team will continue to follow.

## 2024-05-02 NOTE — PROGRESS NOTE ADULT - PROBLEM SELECTOR PLAN 3
p/w trop 88.9  ECG sinus tachycardia no ischemic changes  likely demand ischemia in the setting of sepsis and acute infection  repeat Trop 77  f/u repeat EKG in s/o irregularly irregular rhythm on exam today 5/1 p/w trop 88.9  ECG sinus tachycardia no ischemic changes  likely demand ischemia in the setting of sepsis and acute infection  repeat Trop 77  f/u repeat EKG sinus rhythm with PACs

## 2024-05-02 NOTE — PROGRESS NOTE ADULT - PROBLEM SELECTOR PLAN 1
Heme/Onc consult from Dr. Wu/CHUCHO greatly appreciated.    Patient with adenocarcinoma of lung, first diagnosed in 2021, on palliative chemo with Dr. Domingo at [facility].   Current regimen is Taxol and Tecentriq, last treatment on 4/3, was supposed to get next treatment this week.  Per review of HIE notes, Taxol previously reduced/held due to effects on performance status    In my discussions with Heme/Onc and Pulmonary, CT findings are concerning for underlying progression of disease (with or without superimposed infection).  By history, PS has worsened from ECOG 2 to ECOG 3, but he is also oxygen dependent.  In my medical opinion, he is a poor candidate for additional cancer directed treatment and likely has a prognosis of 6 months or less, regardless of further therapy.  He is hospice appropriate.     Per previous GOC discussion, patient and family still wishing to pursue disease directed treatment and are not quite ready for a hospice discussion at this time.  Also not ready for code status discussion--see GOC discussions from 5/1 and today.      Will continue supportive care.  Awaiting additional input from Dr. Domingo/CHUCHO regarding treatment direction.

## 2024-05-02 NOTE — PROGRESS NOTE ADULT - SUBJECTIVE AND OBJECTIVE BOX
PGY-1 Progress Note discussed with attending    PAGER #: [[phone]] TILL 5:00 PM  PLEASE CONTACT ON CALL TEAM:  - On Call Team (Please refer to Catrachita) FROM 5:00 PM - 8:30PM  - Nightfloat Team FROM 8:30 -7:30 AM    CC: Patient is a 84y old  Male who presents with a chief complaint of Sepsis 2/2 PNA (02 May 2024 10:59)      OVERNIGHT EVENTS:    SUBJECTIVE / INTERVAL HPI: Patient seen and examined at bedside.     ROS:  CONSTITUTIONAL: No weakness, fevers or chills  EYES/ENT: No visual changes;  No vertigo or throat pain   NECK: No pain or stiffness  RESPIRATORY: No cough, wheezing, hemoptysis; No shortness of breath  CARDIOVASCULAR: No chest pain or palpitations  GASTROINTESTINAL: No abdominal or epigastric pain. No nausea, vomiting, or hematemesis; No diarrhea or constipation. No melena or hematochezia.  GENITOURINARY: No dysuria, frequency or hematuria  NEUROLOGICAL: No numbness or weakness  SKIN: No itching, burning, rashes, or lesions     VITAL SIGNS:  Vital Signs Last 24 Hrs  T(C): 36.3 (02 May 2024 05:27), Max: 37.2 (01 May 2024 14:21)  T(F): 97.4 (02 May 2024 05:27), Max: 98.9 (01 May 2024 14:21)  HR: 84 (02 May 2024 05:27) (81 - 84)  BP: 126/62 (02 May 2024 05:27) (126/62 - 147/68)  BP(mean): --  RR: 19 (02 May 2024 05:27) (18 - 19)  SpO2: 93% (02 May 2024 05:27) (93% - 95%)    Parameters below as of 02 May 2024 05:27  Patient On (Oxygen Delivery Method): nasal cannula  O2 Flow (L/min): 5      PHYSICAL EXAM:    General: WDWN  HEENT: NC/AT; PERRL, anicteric sclera; MMM  Neck: supple  Cardiovascular: +S1/S2; RRR  Respiratory: CTA B/L; no W/R/R  Gastrointestinal: soft, NT/ND; +BSx4  Extremities: WWP; no edema, clubbing or cyanosis  Vascular: 2+ radial, DP/PT pulses B/L  Skin: Warm, dry, good turgor, no rashes, or ecchymoses  Neurological: AAOx3; no focal deficits    MEDICATIONS:  MEDICATIONS  (STANDING):  albuterol/ipratropium for Nebulization 3 milliLiter(s) Nebulizer every 6 hours  atorvastatin 40 milliGRAM(s) Oral at bedtime  cefepime   IVPB 1000 milliGRAM(s) IV Intermittent every 12 hours  enoxaparin Injectable 40 milliGRAM(s) SubCutaneous every 24 hours  finasteride 5 milliGRAM(s) Oral daily  folic acid 1 milliGRAM(s) Oral daily  guaiFENesin  milliGRAM(s) Oral every 12 hours  sodium chloride 3%  Inhalation 4 milliLiter(s) Inhalation every 8 hours  vancomycin  IVPB 1000 milliGRAM(s) IV Intermittent every 12 hours    MEDICATIONS  (PRN):  albuterol    90 MICROgram(s) HFA Inhaler 2 Puff(s) Inhalation every 6 hours PRN Shortness of Breath and/or Wheezing  benzonatate 100 milliGRAM(s) Oral three times a day PRN Cough  hydrocodone/homatropine Syrup 5 milliLiter(s) Oral every 6 hours PRN Cough  ondansetron Injectable 4 milliGRAM(s) IV Push every 8 hours PRN Nausea and/or Vomiting  senna 2 Tablet(s) Oral at bedtime PRN Constipation      ALLERGIES:  Allergies    No Known Allergies    Intolerances        LABS:                        9.3    7.18  )-----------( 205      ( 02 May 2024 08:10 )             29.9     05-02    138  |  104  |  19<H>  ----------------------------<  117<H>  3.5   |  31  |  1.06    Ca    8.3<L>      02 May 2024 08:10  Phos  2.8     05-02  Mg     2.2     05-02    TPro  6.6  /  Alb  2.1<L>  /  TBili  0.4  /  DBili  x   /  AST  18  /  ALT  19  /  AlkPhos  70  05-02      Urinalysis Basic - ( 02 May 2024 08:10 )    Color: x / Appearance: x / SG: x / pH: x  Gluc: 117 mg/dL / Ketone: x  / Bili: x / Urobili: x   Blood: x / Protein: x / Nitrite: x   Leuk Esterase: x / RBC: x / WBC x   Sq Epi: x / Non Sq Epi: x / Bacteria: x      CAPILLARY BLOOD GLUCOSE          RADIOLOGY & ADDITIONAL TESTS: Reviewed. PGY-1 Progress Note discussed with attending    PAGER #: [[phone]] TILL 5:00 PM  PLEASE CONTACT ON CALL TEAM:  - On Call Team (Please refer to Catrachita) FROM 5:00 PM - 8:30PM  - Nightfloat Team FROM 8:30 -7:30 AM    CC: Patient is a 84y old  Male who presents with a chief complaint of Sepsis 2/2 PNA (02 May 2024 10:59)      OVERNIGHT EVENTS: no acute events    SUBJECTIVE / INTERVAL HPI: Patient seen and examined at bedside. Mild improvement in shortness of breath. Complaining of more sputum with cough.     ROS:  CONSTITUTIONAL: +weakness, +fevers, no chills  EYES/ENT: No visual changes;  No vertigo or throat pain   NECK: No pain or stiffness  RESPIRATORY: +cough, no wheezing, no hemoptysis; +shortness of breath  CARDIOVASCULAR: No chest pain or palpitations  GASTROINTESTINAL: No abdominal or epigastric pain. No nausea, vomiting, or hematemesis; No diarrhea or constipation. No melena or hematochezia.  GENITOURINARY: No dysuria, frequency or hematuria  NEUROLOGICAL: No numbness or weakness  SKIN: No itching, burning, rashes, or lesions     VITAL SIGNS:  Vital Signs Last 24 Hrs  T(C): 36.3 (02 May 2024 05:27), Max: 37.2 (01 May 2024 14:21)  T(F): 97.4 (02 May 2024 05:27), Max: 98.9 (01 May 2024 14:21)  HR: 84 (02 May 2024 05:27) (81 - 84)  BP: 126/62 (02 May 2024 05:27) (126/62 - 147/68)  BP(mean): --  RR: 19 (02 May 2024 05:27) (18 - 19)  SpO2: 93% (02 May 2024 05:27) (93% - 95%)    Parameters below as of 02 May 2024 05:27  Patient On (Oxygen Delivery Method): nasal cannula  O2 Flow (L/min): 5      PHYSICAL EXAM:    General: WDWN, lethargic  HEENT: NC/AT; PERRL, anicteric sclera; dry mucous membranes  Neck: supple  Cardiovascular: irregularly irregular rhythm, +S1/S2; no murmurs  Respiratory: R basilar rub? coarse rhonchorous breath sounds b/l; no wheezing  Gastrointestinal: soft, NT/ND; +BSx4  Extremities: WWP; no edema, clubbing or cyanosis  Vascular: 2+ radial, DP/PT pulses B/L  Skin: Warm, dry, good turgor, no rashes, or ecchymoses  Neurological: AAOx3; no focal deficits    MEDICATIONS:  MEDICATIONS  (STANDING):  albuterol/ipratropium for Nebulization 3 milliLiter(s) Nebulizer every 6 hours  atorvastatin 40 milliGRAM(s) Oral at bedtime  cefepime   IVPB 1000 milliGRAM(s) IV Intermittent every 12 hours  enoxaparin Injectable 40 milliGRAM(s) SubCutaneous every 24 hours  finasteride 5 milliGRAM(s) Oral daily  folic acid 1 milliGRAM(s) Oral daily  guaiFENesin  milliGRAM(s) Oral every 12 hours  sodium chloride 3%  Inhalation 4 milliLiter(s) Inhalation every 8 hours  vancomycin  IVPB 1000 milliGRAM(s) IV Intermittent every 12 hours    MEDICATIONS  (PRN):  albuterol    90 MICROgram(s) HFA Inhaler 2 Puff(s) Inhalation every 6 hours PRN Shortness of Breath and/or Wheezing  benzonatate 100 milliGRAM(s) Oral three times a day PRN Cough  hydrocodone/homatropine Syrup 5 milliLiter(s) Oral every 6 hours PRN Cough  ondansetron Injectable 4 milliGRAM(s) IV Push every 8 hours PRN Nausea and/or Vomiting  senna 2 Tablet(s) Oral at bedtime PRN Constipation      ALLERGIES:  Allergies    No Known Allergies    Intolerances        LABS:                        9.3    7.18  )-----------( 205      ( 02 May 2024 08:10 )             29.9     05-02    138  |  104  |  19<H>  ----------------------------<  117<H>  3.5   |  31  |  1.06    Ca    8.3<L>      02 May 2024 08:10  Phos  2.8     05-02  Mg     2.2     05-02    TPro  6.6  /  Alb  2.1<L>  /  TBili  0.4  /  DBili  x   /  AST  18  /  ALT  19  /  AlkPhos  70  05-02      Urinalysis Basic - ( 02 May 2024 08:10 )    Color: x / Appearance: x / SG: x / pH: x  Gluc: 117 mg/dL / Ketone: x  / Bili: x / Urobili: x   Blood: x / Protein: x / Nitrite: x   Leuk Esterase: x / RBC: x / WBC x   Sq Epi: x / Non Sq Epi: x / Bacteria: x      CAPILLARY BLOOD GLUCOSE          RADIOLOGY & ADDITIONAL TESTS: Reviewed.

## 2024-05-02 NOTE — PROGRESS NOTE ADULT - SUBJECTIVE AND OBJECTIVE BOX
Patient is a 84y old  Male who presents with a chief complaint of Sepsis 2/2 PNA       SUBJECTIVE / OVERNIGHT EVENTS:      MEDICATIONS  (STANDING):  albuterol/ipratropium for Nebulization 3 milliLiter(s) Nebulizer every 6 hours  atorvastatin 40 milliGRAM(s) Oral at bedtime  cefepime   IVPB 1000 milliGRAM(s) IV Intermittent every 12 hours  enoxaparin Injectable 40 milliGRAM(s) SubCutaneous every 24 hours  finasteride 5 milliGRAM(s) Oral daily  folic acid 1 milliGRAM(s) Oral daily  guaiFENesin  milliGRAM(s) Oral every 12 hours  sodium chloride 3%  Inhalation 4 milliLiter(s) Inhalation every 8 hours  vancomycin  IVPB 1000 milliGRAM(s) IV Intermittent every 12 hours    MEDICATIONS  (PRN):  albuterol    90 MICROgram(s) HFA Inhaler 2 Puff(s) Inhalation every 6 hours PRN Shortness of Breath and/or Wheezing  benzonatate 100 milliGRAM(s) Oral three times a day PRN Cough  hydrocodone/homatropine Syrup 5 milliLiter(s) Oral every 6 hours PRN Cough  ondansetron Injectable 4 milliGRAM(s) IV Push every 8 hours PRN Nausea and/or Vomiting  senna 2 Tablet(s) Oral at bedtime PRN Constipation    CAPILLARY BLOOD GLUCOSE        I&O's Summary      PHYSICAL EXAM:  Vital Signs Last 24 Hrs  T(C): 36.3 (02 May 2024 05:27), Max: 37.2 (01 May 2024 14:21)  T(F): 97.4 (02 May 2024 05:27), Max: 98.9 (01 May 2024 14:21)  HR: 84 (02 May 2024 05:27) (81 - 84)  BP: 126/62 (02 May 2024 05:27) (126/62 - 147/68)  BP(mean): --  RR: 19 (02 May 2024 05:27) (18 - 19)  SpO2: 93% (02 May 2024 05:27) (93% - 95%)    Parameters below as of 02 May 2024 05:27  Patient On (Oxygen Delivery Method): nasal cannula  O2 Flow (L/min): 5        LABS:                        9.3    7.18  )-----------( 205      ( 02 May 2024 08:10 )             29.9     05-02    138  |  104  |  19<H>  ----------------------------<  117<H>  3.5   |  31  |  1.06    Ca    8.3<L>      02 May 2024 08:10  Phos  2.8     05-02  Mg     2.2     05-02    TPro  6.6  /  Alb  2.1<L>  /  TBili  0.4  /  DBili  x   /  AST  18  /  ALT  19  /  AlkPhos  70  05-02          Urinalysis Basic - ( 02 May 2024 08:10 )    Color: x / Appearance: x / SG: x / pH: x  Gluc: 117 mg/dL / Ketone: x  / Bili: x / Urobili: x   Blood: x / Protein: x / Nitrite: x   Leuk Esterase: x / RBC: x / WBC x   Sq Epi: x / Non Sq Epi: x / Bacteria: x        Urinalysis with Rflx Culture (collected 30 Apr 2024 02:20)    Culture - Sputum (collected 30 Apr 2024 02:20)  Source: .Sputum Sputum  Gram Stain (30 Apr 2024 07:35):    Few-moderate polymorphonuclear leukocytes per low power field    Rare Squamous epithelial cells per low power field    Rare Gram Negative Rods seen per oil power field    Rare Gram positive cocci in pairs seen per oil power field  Final Report (01 May 2024 14:52):    Normal Respiratory Breanna present    Culture - Blood (collected 29 Apr 2024 21:40)  Source: .Blood Blood-Peripheral  Preliminary Report (02 May 2024 02:02):    No growth at 48 Hours    Culture - Blood (collected 29 Apr 2024 21:20)  Source: .Blood Blood-Peripheral  Preliminary Report (02 May 2024 02:02):    No growth at 48 Hours      SARS-CoV-2: NotDetec (29 Apr 2024 17:45)  SARS-CoV-2: Detected (27 Nov 2023 06:15)  SARS-CoV-2: NotDetec (24 Nov 2023 22:34)      RADIOLOGY & ADDITIONAL TESTS:       Patient is a 84y old  Male who presents with a chief complaint of Sepsis 2/2 PNA       SUBJECTIVE / OVERNIGHT EVENTS: events noted. No new complaints. Advised pt and his son David that I spoke with Dr. Domingo's PA Johana and there are no other tx options available. Pt would be a candidate for hospice      MEDICATIONS  (STANDING):  albuterol/ipratropium for Nebulization 3 milliLiter(s) Nebulizer every 6 hours  atorvastatin 40 milliGRAM(s) Oral at bedtime  cefepime   IVPB 1000 milliGRAM(s) IV Intermittent every 12 hours  enoxaparin Injectable 40 milliGRAM(s) SubCutaneous every 24 hours  finasteride 5 milliGRAM(s) Oral daily  folic acid 1 milliGRAM(s) Oral daily  guaiFENesin  milliGRAM(s) Oral every 12 hours  sodium chloride 3%  Inhalation 4 milliLiter(s) Inhalation every 8 hours  vancomycin  IVPB 1000 milliGRAM(s) IV Intermittent every 12 hours    MEDICATIONS  (PRN):  albuterol    90 MICROgram(s) HFA Inhaler 2 Puff(s) Inhalation every 6 hours PRN Shortness of Breath and/or Wheezing  benzonatate 100 milliGRAM(s) Oral three times a day PRN Cough  hydrocodone/homatropine Syrup 5 milliLiter(s) Oral every 6 hours PRN Cough  ondansetron Injectable 4 milliGRAM(s) IV Push every 8 hours PRN Nausea and/or Vomiting  senna 2 Tablet(s) Oral at bedtime PRN Constipation    CAPILLARY BLOOD GLUCOSE        I&O's Summary      PHYSICAL EXAM:  Vital Signs Last 24 Hrs  T(C): 36.3 (02 May 2024 05:27), Max: 37.2 (01 May 2024 14:21)  T(F): 97.4 (02 May 2024 05:27), Max: 98.9 (01 May 2024 14:21)  HR: 84 (02 May 2024 05:27) (81 - 84)  BP: 126/62 (02 May 2024 05:27) (126/62 - 147/68)  BP(mean): --  RR: 19 (02 May 2024 05:27) (18 - 19)  SpO2: 93% (02 May 2024 05:27) (93% - 95%)    Parameters below as of 02 May 2024 05:27  Patient On (Oxygen Delivery Method): nasal cannula  O2 Flow (L/min): 5    GEN: NAD; A and O x 3  LUNGS: B/L rhonchi/rales, NC in place on 5L  HEART: S1 S2  ABDOMEN: soft, non-tender, non-distended, + BS  EXTREMITIES: no edema    LABS:                        9.3    7.18  )-----------( 205      ( 02 May 2024 08:10 )             29.9     05-02    138  |  104  |  19<H>  ----------------------------<  117<H>  3.5   |  31  |  1.06    Ca    8.3<L>      02 May 2024 08:10  Phos  2.8     05-02  Mg     2.2     05-02    TPro  6.6  /  Alb  2.1<L>  /  TBili  0.4  /  DBili  x   /  AST  18  /  ALT  19  /  AlkPhos  70  05-02          Urinalysis Basic - ( 02 May 2024 08:10 )    Color: x / Appearance: x / SG: x / pH: x  Gluc: 117 mg/dL / Ketone: x  / Bili: x / Urobili: x   Blood: x / Protein: x / Nitrite: x   Leuk Esterase: x / RBC: x / WBC x   Sq Epi: x / Non Sq Epi: x / Bacteria: x        Urinalysis with Rflx Culture (collected 30 Apr 2024 02:20)    Culture - Sputum (collected 30 Apr 2024 02:20)  Source: .Sputum Sputum  Gram Stain (30 Apr 2024 07:35):    Few-moderate polymorphonuclear leukocytes per low power field    Rare Squamous epithelial cells per low power field    Rare Gram Negative Rods seen per oil power field    Rare Gram positive cocci in pairs seen per oil power field  Final Report (01 May 2024 14:52):    Normal Respiratory Breanna present    Culture - Blood (collected 29 Apr 2024 21:40)  Source: .Blood Blood-Peripheral  Preliminary Report (02 May 2024 02:02):    No growth at 48 Hours    Culture - Blood (collected 29 Apr 2024 21:20)  Source: .Blood Blood-Peripheral  Preliminary Report (02 May 2024 02:02):    No growth at 48 Hours      SARS-CoV-2: NotDetec (29 Apr 2024 17:45)  SARS-CoV-2: Detected (27 Nov 2023 06:15)  SARS-CoV-2: NotDetec (24 Nov 2023 22:34)      RADIOLOGY & ADDITIONAL TESTS:

## 2024-05-02 NOTE — PROGRESS NOTE ADULT - ATTENDING COMMENTS
Patient seen at bedside, states his breathing is a little better. on 2LNC on my interview.    On exam, patient is AOx3, NAD, cardiopulmonary exams unremarkable, abdomen soft, NT/ND, extremities without edema.  Labs reviewed, CBC with hgb 9.3, BMP and LFT unremarkable. ESR 98.    Assessment and plan:   84 y.o M with a PMH of Metastatic Lung adenocarcinoma on chemotherapy, HTN, HLD, BPH, presented to the ED with complaints of SOB, Nausea, vomiting, and generalized weakness. Admitted for sepsis 2/2 to pneumonia vs worsening lung adenocarcinoma and nausea/vomiting.    # Sepsis 2/2 pneumonia vs worsening metastatic lung adenocarcinoma, improving  # Elevated troponin, peaked, likely demand ischemia  # HTN  # HLD  # BPH  # Anemia  - weaning off O2, now on 2L  - Blood Cx negative, sputum cultures with normal ivan  - continue abx with [facility]/cefepime  - albuterol prn  - Pulmonology Dr. lieberman recs appreciated  - Oncology recs appreciated on the cancer therapy inpatient and after discharge  - pall care consulted, to have further discussion on GOC

## 2024-05-02 NOTE — PROGRESS NOTE ADULT - ASSESSMENT
complete note to follow    #VTE Prophylaxis   Assessment and Recommendation:   · Assessment	  84 yrs old M, from home, ambulating independently, pmhx of Adenocarcinoma of the lung dx on 2021, on Home oxygen 2L NC, on palliative chemoRx, last Rx on 4/3/2024, HLD, BPH, ?HTN, pshx hernia repair presented with dyspnea. Pt AAOx2, baseline AAOx3, lethargic on exam, accompanied by son who opted for translation and collateral information. Stated that patient endorsed dyspnea, along with fever, chills, nausea and multiple episodes of vomiting since one day duration. As well reported lethargy and cough with sputum production, did not specify the color. Denied hematemesis, or hemoptysis. Son stated that patient was around large group of people yesterday however no sick contacts, denied recent travel. Denied Smoking, alcohol consumptions, marijuana or illicit drugs.   Of note Pt following with heme/onc specialist Dr. Domingo at [facility], due for chemotherapy on Wednesday. Pt's son denied any metastasis.     #Met Lung CA  pt follows with Dr. Domingo at [facility], Dx in 2021 and currently on  Taxol + tecentriq, but hel recently d/t worsening PS  now admitted with AHRF, sepsis d/t PNA  Hgb=9.7, MCV  nl  CT C/A/P does show Mucinous adenocarcinoma of the lung with extensive consolidation   bilaterally and mild progression compared to 2/16/2024.  Rec's:  multifactorial, PNA and POD  -Hold chemo during admission  -plan for cont tx or change in tx or hospice to be d/w primary Oncologist  -spoke with Dr. Domingo's PA and pt is on last line tx, POD on current tx, pt would be a hospice candidate  -Discussed hospice option with Son David, but he prefers further discussion with his other brother and Pall Care  -continue abx  -Pulm following  -Pall Care following, to discuss hospice options  upon d/c pt to f/u with Dr. Domingo vs hospice    #VTE Prophylaxis    At this time will sign-off. Please re-consult if needed  Thank you for the referral. Will continue to monitor the patient.  Please call with any questions [phone]  Above reviewed with Attending Dr. Wu  [facility] [place]  [address], [address], [address]th [place] [address]  [phone]  *Note not finalized until signed by Attending Physician

## 2024-05-02 NOTE — DIETITIAN INITIAL EVALUATION ADULT - PERTINENT MEDS FT
MEDICATIONS  (STANDING):  albuterol/ipratropium for Nebulization 3 milliLiter(s) Nebulizer every 6 hours  atorvastatin 40 milliGRAM(s) Oral at bedtime  cefepime   IVPB 1000 milliGRAM(s) IV Intermittent every 12 hours  enoxaparin Injectable 40 milliGRAM(s) SubCutaneous every 24 hours  finasteride 5 milliGRAM(s) Oral daily  folic acid 1 milliGRAM(s) Oral daily  guaiFENesin  milliGRAM(s) Oral every 12 hours  sodium chloride 3%  Inhalation 4 milliLiter(s) Inhalation every 8 hours  vancomycin  IVPB 1000 milliGRAM(s) IV Intermittent every 12 hours    MEDICATIONS  (PRN):  albuterol    90 MICROgram(s) HFA Inhaler 2 Puff(s) Inhalation every 6 hours PRN Shortness of Breath and/or Wheezing  benzonatate 100 milliGRAM(s) Oral three times a day PRN Cough  hydrocodone/homatropine Syrup 5 milliLiter(s) Oral every 6 hours PRN Cough  ondansetron Injectable 4 milliGRAM(s) IV Push every 8 hours PRN Nausea and/or Vomiting  senna 2 Tablet(s) Oral at bedtime PRN Constipation

## 2024-05-02 NOTE — PROGRESS NOTE ADULT - NS ATTEND AMEND GEN_ALL_CORE FT
# Metastatic NSCLC:   Primary med Onc: Dr. Domingo at [facility], Dx in 2021 and currently on  Taxol + Tecentriq.    Concern for POD with worsening PS  now admitted with AHRF, sepsis d/t PNA     5/2/24: I connected with Dr. Domingo; recommendation for Hospice; she has had previous discussions with patient and grand-daughter   -Palliative care on board  -Ongoing goals of care discussion   -Supportive care; on Abx    #dvt ppx   We will follow here.

## 2024-05-02 NOTE — PROGRESS NOTE ADULT - SUBJECTIVE AND OBJECTIVE BOX
follow up on:  complex medical decision making related to goals of care    [facility] Geriatric and Palliative Consult Service:  Lisa Vásquez DO: cell ([phone])  Naresh Earl MD: cell ([phone])  Eyad Carrillo NP: cell ([phone])   Kwabena Valdes LMSW: cell ([phone])   Svitlana Gamez NP: via [employer] Teams    Can contact any Palliative Team member via [employer] Teams for consults and questions      OVERNIGHT EVENTS:    Present Symptoms: Mild, Moderate, Severe  Pain:             Location -                               Aggravating factors -             Quality -             Radiation -             Timing-             Severity (0-10 scale):             Minimal acceptable level (0-10 scale):  Fatigue:  Nausea:  Lack of Appetite:   SOB:  Depression:  Anxiety:  Constipation:  Review of Systems: [All others negative or Unable to obtain due to poor mentation]    CPOT:    https://ccs-st.org/resources/Documents/Sedation%20Analgesia%20Delirium%20in%20CC/CCS%20STH%20Guideline%20template%20CPOT.pdf  PAIN AD Score:   [email or web address] (press ctrl +  left click to view)MEDICATIONS  (STANDING):  albuterol/ipratropium for Nebulization 3 milliLiter(s) Nebulizer every 6 hours  atorvastatin 40 milliGRAM(s) Oral at bedtime  cefepime   IVPB 1000 milliGRAM(s) IV Intermittent every 12 hours  enoxaparin Injectable 40 milliGRAM(s) SubCutaneous every 24 hours  finasteride 5 milliGRAM(s) Oral daily  folic acid 1 milliGRAM(s) Oral daily  guaiFENesin  milliGRAM(s) Oral every 12 hours  sodium chloride 3%  Inhalation 4 milliLiter(s) Inhalation every 8 hours  vancomycin  IVPB 1000 milliGRAM(s) IV Intermittent every 12 hours    MEDICATIONS  (PRN):  albuterol    90 MICROgram(s) HFA Inhaler 2 Puff(s) Inhalation every 6 hours PRN Shortness of Breath and/or Wheezing  benzonatate 100 milliGRAM(s) Oral three times a day PRN Cough  hydrocodone/homatropine Syrup 5 milliLiter(s) Oral every 6 hours PRN Cough  ondansetron Injectable 4 milliGRAM(s) IV Push every 8 hours PRN Nausea and/or Vomiting  senna 2 Tablet(s) Oral at bedtime PRN Constipation      PHYSICAL EXAM:  Vital Signs Last 24 Hrs  T(C): 36.6 (02 May 2024 20:36), Max: 36.6 (02 May 2024 20:36)  T(F): 97.9 (02 May 2024 20:36), Max: 97.9 (02 May 2024 20:36)  HR: 96 (02 May 2024 20:36) (81 - 96)  BP: 122/67 (02 May 2024 20:36) (111/65 - 126/62)  BP(mean): --  RR: 18 (02 May 2024 20:36) (18 - 19)  SpO2: 95% (02 May 2024 20:36) (93% - 95%)    Parameters below as of 02 May 2024 20:36  Patient On (Oxygen Delivery Method): nasal cannula  O2 Flow (L/min): 5      General: alert  oriented x ____    lethargic distressed cachexia  verbal nonverbal  unarousable     Palliative Performance Scale/Karnofsky Score:  http://npcrc.org/files/news/palliative_performance_scale_ppsv2.pdf    HEENT: no abnormal lesion, dry mouth  ET tube/trach oral lesions:  Lungs: tachypnea/labored breathing, audible excessive secretions  CV: RRR, S1S2, tachycardia  GI: soft non distended non tender  incontinent               PEG/NG/OG tube  constipation  last BM:   : incontinent  oliguria/anuria  tillman  Musculoskeletal: weakness x4 edema x4    ambulatory with assistance   mostly/fully bedbound/wheelchair bound  Skin: no abnormal skin lesions, poor skin turgor, pressure ulcers stage:   Neuro: no deficits, mild cognitive impairment dsyphagia/dysarthria paresis  Oral intake ability: unable/only mouth care, minimal moderate full capability    LABS:                          9.3    7.18  )-----------( 205      ( 02 May 2024 08:10 )             29.9     05-02    138  |  104  |  19<H>  ----------------------------<  117<H>  3.5   |  31  |  1.06    Ca    8.3<L>      02 May 2024 08:10  Phos  2.8     05-02  Mg     2.2     05-02    TPro  6.6  /  Alb  2.1<L>  /  TBili  0.4  /  DBili  x   /  AST  18  /  ALT  19  /  AlkPhos  70  05-02    Urinalysis Basic - ( 02 May 2024 08:10 )    Color: x / Appearance: x / SG: x / pH: x  Gluc: 117 mg/dL / Ketone: x  / Bili: x / Urobili: x   Blood: x / Protein: x / Nitrite: x   Leuk Esterase: x / RBC: x / WBC x   Sq Epi: x / Non Sq Epi: x / Bacteria: x        RADIOLOGY & ADDITIONAL STUDIES: follow up on:  complex medical decision making related to goals of care    [facility] Geriatric and Palliative Consult Service:  Lisa Vásquez DO: cell ([phone])  Naresh Earl MD: cell ([phone])  Eyad Carrillo NP: cell ([phone])   Kwabena Valdes LMSW: [employer] ([phone])   Svitlana Gamez NP: via [employer] [employer] Teams    Can contact any Palliative Team member via [employer] [place] [employer] Teams for consults and questions      OVERNIGHT EVENTS:  None    Patient examined this morning with son Lazara at bedside.  Reports SOB minimally improved this morning, Hycodan helped "a little bit."  No other acute complaints.    Present Symptoms: Mild, Moderate, Severe  Pain:   Denies, 0/10  Fatigue: moderate  Nausea:  denies   Lack of Appetite: mild  SOB:  mild, unable to rate on numerical scale today  Depression:  Denies  Anxiety:  Constipation:  Denies  Review of Systems:  All other systems reviewed and are negative.        MEDICATIONS  (STANDING):  albuterol/ipratropium for Nebulization 3 milliLiter(s) Nebulizer every 6 hours  atorvastatin 40 milliGRAM(s) Oral at bedtime  cefepime   IVPB 1000 milliGRAM(s) IV Intermittent every 12 hours  enoxaparin Injectable 40 milliGRAM(s) SubCutaneous every 24 hours  finasteride 5 milliGRAM(s) Oral daily  folic acid 1 milliGRAM(s) Oral daily  guaiFENesin  milliGRAM(s) Oral every 12 hours  sodium chloride 3%  Inhalation 4 milliLiter(s) Inhalation every 8 hours  vancomycin  IVPB 1000 milliGRAM(s) IV Intermittent every 12 hours    MEDICATIONS  (PRN):  albuterol    90 MICROgram(s) HFA Inhaler 2 Puff(s) Inhalation every 6 hours PRN Shortness of Breath and/or Wheezing  benzonatate 100 milliGRAM(s) Oral three times a day PRN Cough  hydrocodone/homatropine Syrup 5 milliLiter(s) Oral every 6 hours PRN Cough  ondansetron Injectable 4 milliGRAM(s) IV Push every 8 hours PRN Nausea and/or Vomiting  senna 2 Tablet(s) Oral at bedtime PRN Constipation      PHYSICAL EXAM:  Vital Signs Last 24 Hrs  T(C): 36.6 (02 May 2024 20:36), Max: 36.6 (02 May 2024 20:36)  T(F): 97.9 (02 May 2024 20:36), Max: 97.9 (02 May 2024 20:36)  HR: 96 (02 May 2024 20:36) (81 - 96)  BP: 122/67 (02 May 2024 20:36) (111/65 - 126/62)  BP(mean): --  RR: 18 (02 May 2024 20:36) (18 - 19)  SpO2: 95% (02 May 2024 20:36) (93% - 95%)    Parameters below as of 02 May 2024 20:36  Patient On (Oxygen Delivery Method): nasal cannula  O2 Flow (L/min): 5      General: alert  oriented x __3_    + mld temporal wasting, NAD    Palliative Performance Scale/Karnofsky Score:  50%  http://[email or web address].org/files/news/palliative_performance_scale_[email or web address].pdf    HEENT: EOMI anicteric, pharynx clear, MM moist  Lungs:  breath sounds decreased diffusely, + rhonchi of right and left mid lung fields posteriorly, respirations unlabored  CV: RRR,  normal S1 and S2, no murmur  GI: soft non distended non tender  + bowel sounds  Musculoskeletal:  ambulatory, no edema, no focal abnormal joint findings identified  Skin: no abnormal skin lesions or DU noted  Neuro: no focal deficits  Oral intake ability:  full capability      LABS:                          9.3    7.18  )-----------( 205      ( 02 May 2024 08:10 )             29.9     05-02    138  |  104  |  19<H>  ----------------------------<  117<H>  3.5   |  31  |  1.06    Ca    8.3<L>      02 May 2024 08:10  Phos  2.8     05-02  Mg     2.2     05-02    TPro  6.6  /  Alb  2.1<L>  /  TBili  0.4  /  DBili  x   /  AST  18  /  ALT  19  /  AlkPhos  70  05-02    Urinalysis Basic - ( 02 May 2024 08:10 )    Color: x / Appearance: x / SG: x / pH: x  Gluc: 117 mg/dL / Ketone: x  / Bili: x / Urobili: x   Blood: x / Protein: x / Nitrite: x   Leuk Esterase: x / RBC: x / WBC x   Sq Epi: x / Non Sq Epi: x / Bacteria: x        RADIOLOGY & ADDITIONAL STUDIES:

## 2024-05-03 NOTE — PROGRESS NOTE ADULT - PROBLEM SELECTOR PLAN 5
As above.  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  Concern for continued POD despite ongoing chemo, regimen recently modified 2/2 concerns over performance status. ECOG 3 with PPS of 50%.  Patient at significant risk of decline towards death regardless of future cancer directed treatment.  Now per outside Heme/Onc, no further treatment options are available.  Patient is hospice appropriate with prognosis of 6 months or less.    See GOC discussion from 5/1--pt/family wish to continue with active treatment for now, awaiting further input from outside oncologist, not ready for hospice discussion at this time.    5/3--son Lazara advised regarding no further treatment options, strongly urged him to start discussing hospice with patient and family over the weekend, patient to remain Full Code at this time.    Agree with Pulmonary--please consider starting trial of IV dexamethasone for persistent respiratory symptoms and fatigue  Discussed with medical team and Dr. Hurley in detail  Palliative care team will continue to follow.

## 2024-05-03 NOTE — PROGRESS NOTE ADULT - PROBLEM SELECTOR PLAN 3
hx of Lung adenocarcinoma  -following with heme/onc specialist Dr. Domingo at [facility], last chemo on 4/3/2024  -heme/onc consulted Dr. Wu--recs appreciated  -palliative consult followin-->GOC conversations ongoing, likely hospice candidate  -Pulm consulted Dr. Rutherford

## 2024-05-03 NOTE — PROGRESS NOTE ADULT - SUBJECTIVE AND OBJECTIVE BOX
PGY-1 Progress Note discussed with attending    PAGER #: [[phone]] TILL 5:00 PM  PLEASE CONTACT ON CALL TEAM:  - On Call Team (Please refer to [place]) FROM 5:00 PM - 8:30PM  - Nightfloat Team FROM 8:30 -7:30 AM    CC: Patient is a 84y old  Male who presents with a chief complaint of Sepsis     (02 May 2024 15:37)      OVERNIGHT EVENTS: no acute events    SUBJECTIVE / INTERVAL HPI: Patient seen and examined at bedside with nephew at bedside translating. Patient reports continued dyspnea at rest on 5L NC. Says he is not feeling any better. Complaining of persistent productive cough associated with pleuritic R sternal pain. Tolerating PO diet but per nephew eating very little. Denies fever, chills, nausea, vomiting, diarrhea, constipation, and dysuria.      ROS:  CONSTITUTIONAL: +weakness, no fevers or chills  EYES/ENT: No visual changes;  No vertigo or throat pain   NECK: No pain or stiffness  RESPIRATORY: +cough, no wheezing, hemoptysis; +shortness of breath  CARDIOVASCULAR: No chest pain or palpitations  GASTROINTESTINAL: No abdominal or epigastric pain. No nausea, vomiting, or hematemesis; No diarrhea or constipation. No melena or hematochezia.  GENITOURINARY: No dysuria, frequency or hematuria  NEUROLOGICAL: No numbness or weakness  SKIN: No itching, burning, rashes, or lesions     VITAL SIGNS:  Vital Signs Last 24 Hrs  T(C): 36.2 (03 May 2024 05:27), Max: 36.6 (02 May 2024 20:36)  T(F): 97.2 (03 May 2024 05:27), Max: 97.9 (02 May 2024 20:36)  HR: 84 (03 May 2024 05:27) (81 - 96)  BP: 129/74 (03 May 2024 05:27) (111/65 - 129/74)  BP(mean): --  RR: 18 (03 May 2024 05:27) (18 - 18)  SpO2: 94% (03 May 2024 05:27) (94% - 95%)    Parameters below as of 03 May 2024 05:27  Patient On (Oxygen Delivery Method): nasal cannula  O2 Flow (L/min): 5      PHYSICAL EXAM:    General: WDWN, lethargic  HEENT: NC/AT; PERRL, anicteric sclera; dry mucous membranes  Neck: supple  Cardiovascular: regular rate and rhythm, +S1/S2; no murmurs  Respiratory: R basilar rub? coarse rhonchorous breath sounds b/l; trace bibasilar wheezing  Gastrointestinal: soft, NT/ND; +BSx4  Extremities: WWP; no edema, clubbing or cyanosis  Vascular: 2+ radial, DP/PT pulses B/L  Skin: Warm, dry, good turgor, no rashes, or ecchymoses  Neurological: AAOx3; no focal deficits    MEDICATIONS:  MEDICATIONS  (STANDING):  albuterol/ipratropium for Nebulization 3 milliLiter(s) Nebulizer every 6 hours  atorvastatin 40 milliGRAM(s) Oral at bedtime  cefepime   IVPB 1000 milliGRAM(s) IV Intermittent every 12 hours  enoxaparin Injectable 40 milliGRAM(s) SubCutaneous every 24 hours  finasteride 5 milliGRAM(s) Oral daily  folic acid 1 milliGRAM(s) Oral daily  guaiFENesin  milliGRAM(s) Oral every 12 hours  sodium chloride 3%  Inhalation 4 milliLiter(s) Inhalation <User Schedule>    MEDICATIONS  (PRN):  albuterol    90 MICROgram(s) HFA Inhaler 2 Puff(s) Inhalation every 6 hours PRN Shortness of Breath and/or Wheezing  hydrocodone/homatropine Syrup 5 milliLiter(s) Oral every 6 hours PRN Cough  ondansetron Injectable 4 milliGRAM(s) IV Push every 8 hours PRN Nausea and/or Vomiting  senna 2 Tablet(s) Oral at bedtime PRN Constipation      ALLERGIES:  Allergies    No Known Allergies    Intolerances        LABS:                        9.3    7.18  )-----------( 205      ( 02 May 2024 08:10 )             29.9     05-02    138  |  104  |  19<H>  ----------------------------<  117<H>  3.5   |  31  |  1.06    Ca    8.3<L>      02 May 2024 08:10  Phos  2.8     05-02  Mg     2.2     05-02    TPro  6.6  /  Alb  2.1<L>  /  TBili  0.4  /  DBili  x   /  AST  18  /  ALT  19  /  AlkPhos  70  05-02      Urinalysis Basic - ( 02 May 2024 08:10 )    Color: x / Appearance: x / SG: x / pH: x  Gluc: 117 mg/dL / Ketone: x  / Bili: x / Urobili: x   Blood: x / Protein: x / Nitrite: x   Leuk Esterase: x / RBC: x / WBC x   Sq Epi: x / Non Sq Epi: x / Bacteria: x      CAPILLARY BLOOD GLUCOSE          RADIOLOGY & ADDITIONAL TESTS: Reviewed.

## 2024-05-03 NOTE — PROGRESS NOTE ADULT - SUBJECTIVE AND OBJECTIVE BOX
follow up on:  complex medical decision making related to goals of care    [facility] Geriatric and Palliative Consult Service:  Lisa Vásquez DO: cell ([phone])  Naresh Earl MD: cell ([phone])  Eyad Carrillo NP: cell ([phone])   Kwabena Valdes LMSW: [place] ([phone])   Svitlana Gamez NP: via [employer] Teams    Can contact any Palliative Team member via [employer] Teams for consults and questions      OVERNIGHT EVENTS:    Present Symptoms: Mild, Moderate, Severe  Pain:             Location -                               Aggravating factors -             Quality -             Radiation -             Timing-             Severity (0-10 scale):             Minimal acceptable level (0-10 scale):  Fatigue:  Nausea:  Lack of Appetite:   SOB:  Depression:  Anxiety:  Constipation:  Review of Systems: [All others negative or Unable to obtain due to poor mentation]    CPOT:    https://ccs-st.org/resources/Documents/Sedation%20Analgesia%20Delirium%20in%20CC/CCS%20STH%20Guideline%20template%20CPOT.pdf  PAIN AD Score:   [email or web address]://[email or web address]/[email or web address] (press ctrl +  left click to view)MEDICATIONS  (STANDING):  albuterol/ipratropium for Nebulization 3 milliLiter(s) Nebulizer every 6 hours  atorvastatin 40 milliGRAM(s) Oral at bedtime  cefepime   IVPB 1000 milliGRAM(s) IV Intermittent every 12 hours  enoxaparin Injectable 40 milliGRAM(s) SubCutaneous every 24 hours  finasteride 5 milliGRAM(s) Oral daily  folic acid 1 milliGRAM(s) Oral daily  guaiFENesin  milliGRAM(s) Oral every 12 hours  sodium chloride 3%  Inhalation 4 milliLiter(s) Inhalation <User Schedule>    MEDICATIONS  (PRN):  albuterol    90 MICROgram(s) HFA Inhaler 2 Puff(s) Inhalation every 6 hours PRN Shortness of Breath and/or Wheezing  hydrocodone/homatropine Syrup 5 milliLiter(s) Oral every 6 hours PRN Cough  ondansetron Injectable 4 milliGRAM(s) IV Push every 8 hours PRN Nausea and/or Vomiting  senna 2 Tablet(s) Oral at bedtime PRN Constipation      PHYSICAL EXAM:  Vital Signs Last 24 Hrs  T(C): 36.7 (03 May 2024 21:15), Max: 36.7 (03 May 2024 21:15)  T(F): 98 (03 May 2024 21:15), Max: 98 (03 May 2024 21:15)  HR: 95 (03 May 2024 21:15) (84 - 97)  BP: 128/66 (03 May 2024 21:15) (124/74 - 129/74)  BP(mean): --  RR: 16 (03 May 2024 21:15) (16 - 18)  SpO2: 97% (03 May 2024 21:15) (94% - 97%)    Parameters below as of 03 May 2024 21:15  Patient On (Oxygen Delivery Method): nasal cannula  O2 Flow (L/min): 3      General: alert  oriented x ____    lethargic distressed cachexia  verbal nonverbal  unarousable     Palliative Performance Scale/Karnofsky Score:  http://npcrc.org/files/news/palliative_performance_scale_ppsv2.pdf    HEENT: no abnormal lesion, dry mouth  ET tube/trach oral lesions:  Lungs: tachypnea/labored breathing, audible excessive secretions  CV: RRR, S1S2, tachycardia  GI: soft non distended non tender  incontinent               PEG/NG/OG tube  constipation  last BM:   : incontinent  oliguria/anuria  tillman  Musculoskeletal: weakness x4 edema x4    ambulatory with assistance   mostly/fully bedbound/wheelchair bound  Skin: no abnormal skin lesions, poor skin turgor, pressure ulcers stage:   Neuro: no deficits, mild cognitive impairment dsyphagia/dysarthria paresis  Oral intake ability: unable/only mouth care, minimal moderate full capability    LABS:                          10.5   9.39  )-----------( 233      ( 03 May 2024 08:52 )             34.0     05-03    137  |  104  |  14  ----------------------------<  95  3.8   |  28  |  1.03    Ca    8.5      03 May 2024 08:52  Phos  2.9     05-03  Mg     2.3     05-03    TPro  7.3  /  Alb  2.3<L>  /  TBili  0.4  /  DBili  x   /  AST  16  /  ALT  21  /  AlkPhos  83  05-03    Urinalysis Basic - ( 03 May 2024 08:52 )    Color: x / Appearance: x / SG: x / pH: x  Gluc: 95 mg/dL / Ketone: x  / Bili: x / Urobili: x   Blood: x / Protein: x / Nitrite: x   Leuk Esterase: x / RBC: x / WBC x   Sq Epi: x / Non Sq Epi: x / Bacteria: x        RADIOLOGY & ADDITIONAL STUDIES: follow up on:  complex medical decision making related to goals of care    [facility] Geriatric and Palliative Consult Service:  Lisa Vásquez DO: cell ([phone])  Naresh Earl MD: cell ([phone])  Eyad Carrillo NP: cell ([phone])   Kwabena Valdes LMSW: cell ([phone])   Svitlana Gamez NP: via [employer] Teams    Can contact any Palliative Team member via [employer] Teams for consults and questions      OVERNIGHT EVENTS: None.      Follow-up notes (5/2) from Heme/Onc read and greatly appreciated.  Per discussion with Dr. Domingo's office, no further treatment options available, patient appropriate for hospice.    Patient examined at bedside this afternoon.  Little improvement in symptoms over last 24 hours. Continued cough, weakness, and fatigue.  Denies pain.  No other acute complaints     Present Symptoms: Mild, Moderate, Severe  Pain:  Denies, 0/10  Fatigue:  moderate to severe  Nausea:  denies  Lack of Appetite:  mild  SOB: mild  Depression:  denies  Constipation:  denies  Review of Systems:  All other systems reviewed and are negative.      MEDICATIONS  (STANDING):  albuterol/ipratropium for Nebulization 3 milliLiter(s) Nebulizer every 6 hours  atorvastatin 40 milliGRAM(s) Oral at bedtime  cefepime   IVPB 1000 milliGRAM(s) IV Intermittent every 12 hours  enoxaparin Injectable 40 milliGRAM(s) SubCutaneous every 24 hours  finasteride 5 milliGRAM(s) Oral daily  folic acid 1 milliGRAM(s) Oral daily  guaiFENesin  milliGRAM(s) Oral every 12 hours  sodium chloride 3%  Inhalation 4 milliLiter(s) Inhalation <User Schedule>    MEDICATIONS  (PRN):  albuterol    90 MICROgram(s) HFA Inhaler 2 Puff(s) Inhalation every 6 hours PRN Shortness of Breath and/or Wheezing  hydrocodone/homatropine Syrup 5 milliLiter(s) Oral every 6 hours PRN Cough  ondansetron Injectable 4 milliGRAM(s) IV Push every 8 hours PRN Nausea and/or Vomiting  senna 2 Tablet(s) Oral at bedtime PRN Constipation      PHYSICAL EXAM:  Vital Signs Last 24 Hrs  T(C): 36.7 (03 May 2024 21:15), Max: 36.7 (03 May 2024 21:15)  T(F): 98 (03 May 2024 21:15), Max: 98 (03 May 2024 21:15)  HR: 95 (03 May 2024 21:15) (84 - 97)  BP: 128/66 (03 May 2024 21:15) (124/74 - 129/74)  BP(mean): --  RR: 16 (03 May 2024 21:15) (16 - 18)  SpO2: 97% (03 May 2024 21:15) (94% - 97%)    Parameters below as of 03 May 2024 21:15  Patient On (Oxygen Delivery Method): nasal cannula  O2 Flow (L/min): 3      General: alert  oriented x _3___    appears weak + temporal wasting, NAD    Palliative Performance Scale/Karnofsky Score:  50%  http://[facility].org/files/news/palliative_performance_scale_ppsv2.pdf    HEENT: EOMI anicteric, pharynx clear, MM moist  Lungs:  continued rhonchi of bilateral lung fields posteriorly, respirations unlabored  CV: RRR,  normal S1 and S2, no murmur  GI: soft non distended non tender  + bowel sounds  Musculoskeletal:  ambulatory, no edema, no focal abnormal joint findings identified  Skin: no abnormal skin lesions or DU noted  Neuro: no focal deficits  Oral intake ability:  full capability      LABS:                          10.5   9.39  )-----------( 233      ( 03 May 2024 08:52 )             34.0     05-03    137  |  104  |  14  ----------------------------<  95  3.8   |  28  |  1.03    Ca    8.5      03 May 2024 08:52  Phos  2.9     05-03  Mg     2.3     05-03    TPro  7.3  /  Alb  2.3<L>  /  TBili  0.4  /  DBili  x   /  AST  16  /  ALT  21  /  AlkPhos  83  05-03    Urinalysis Basic - ( 03 May 2024 08:52 )    Color: x / Appearance: x / SG: x / pH: x  Gluc: 95 mg/dL / Ketone: x  / Bili: x / Urobili: x   Blood: x / Protein: x / Nitrite: x   Leuk Esterase: x / RBC: x / WBC x   Sq Epi: x / Non Sq Epi: x / Bacteria: x        RADIOLOGY & ADDITIONAL STUDIES:

## 2024-05-03 NOTE — PROGRESS NOTE ADULT - PROBLEM SELECTOR PLAN 1
Heme/Onc consult from Dr. Wu/[facility] greatly appreciated.    Patient with adenocarcinoma of lung, first diagnosed in 2021, on palliative chemo with Dr. Domingo at [facility].   Current regimen is Taxol and Tecentriq, last treatment on 4/3, was supposed to get next treatment this week.  Per review of HIE notes, Taxol previously reduced/held due to effects on performance status    In my discussions with Heme/Onc and Pulmonary, CT findings are concerning for underlying progression of disease (with or without superimposed infection).  By history, PS has worsened from ECOG 2 to ECOG 3, but he is also oxygen dependent.  In my medical opinion, he is a poor candidate for additional cancer directed treatment and likely has a prognosis of 6 months or less, regardless of further therapy.  He is hospice appropriate.     Follow-up note from A (5/2) appreciated--per outside oncologist, no further treatment options available, patient is hospice appropriate.  See additional GOC discussion above--urged renea Haile to discuss home hospice over the weekend with patient and family.

## 2024-05-03 NOTE — PROGRESS NOTE ADULT - SUBJECTIVE AND OBJECTIVE BOX
Pt reports continued cough and some phlegm, chest discomfort and overall weakness and malaise.     Review of Systems:  Review of Systems: REVIEW OF SYSTEMS:    CONSTITUTIONAL: + generalized weakness, + fevers or chills  EYES/ENT: No visual changes;  No vertigo or throat pain   NECK: No pain or stiffness  RESPIRATORY: + cough, No  wheezing, hemoptysis; +shortness of breath  CARDIOVASCULAR: No chest pain or palpitations  GASTROINTESTINAL: No abdominal or epigastric pain. + nausea, vomiting, No hematemesis; No diarrhea or constipation. No melena or hematochezia.  GENITOURINARY: No dysuria, frequency or hematuria  NEUROLOGICAL: No numbness or weakness  SKIN: No itching, rashes    MEDICATIONS  (STANDING):  albuterol/ipratropium for Nebulization 3 milliLiter(s) Nebulizer every 6 hours  atorvastatin 40 milliGRAM(s) Oral at bedtime  cefepime   IVPB 1000 milliGRAM(s) IV Intermittent every 12 hours  enoxaparin Injectable 40 milliGRAM(s) SubCutaneous every 24 hours  finasteride 5 milliGRAM(s) Oral daily  folic acid 1 milliGRAM(s) Oral daily  guaiFENesin  milliGRAM(s) Oral every 12 hours  sodium chloride 3%  Inhalation 4 milliLiter(s) Inhalation <User Schedule>    MEDICATIONS  (PRN):  albuterol    90 MICROgram(s) HFA Inhaler 2 Puff(s) Inhalation every 6 hours PRN Shortness of Breath and/or Wheezing  hydrocodone/homatropine Syrup 5 milliLiter(s) Oral every 6 hours PRN Cough  ondansetron Injectable 4 milliGRAM(s) IV Push every 8 hours PRN Nausea and/or Vomiting  senna 2 Tablet(s) Oral at bedtime PRN Constipation      Vital Signs Last 24 Hrs  T(C): 36.4 (03 May 2024 12:38), Max: 36.6 (02 May 2024 20:36)  T(F): 97.5 (03 May 2024 12:38), Max: 97.9 (02 May 2024 20:36)  HR: 97 (03 May 2024 12:38) (84 - 97)  BP: 124/74 (03 May 2024 12:38) (122/67 - 129/74)  BP(mean): --  RR: 17 (03 May 2024 12:38) (17 - 18)  SpO2: 96% (03 May 2024 12:38) (94% - 96%)    Parameters below as of 03 May 2024 12:38  Patient On (Oxygen Delivery Method): nasal cannula  O2 Flow (L/min): 5      GENERAL: NAD, lying in bed comfortably  HEAD:  Atraumatic, Normocephalic  EYES: EOMI, PERRLA, conjunctiva and sclera clear  ENT: Moist mucous membranes  NECK: Supple, No JVD  CHEST/LUNG: Clear to auscultation bilaterally; + diffuse rhonchi and crackles, No rales,  wheezing, or rubs. Unlabored respirations  HEART: Regular rate and rhythm; No murmurs, rubs, or gallops  ABDOMEN: Bowel sounds present; Soft, Nontender, Nondistended.   EXTREMITIES:  2+ Peripheral Pulses, brisk capillary refill. No clubbing, cyanosis, or edema  NERVOUS SYSTEM:  Alert & Oriented X2, speech clear. lethargic, opens eyes to verbal stimuli and follows commands, moves all extremities spontaneously   SKIN: No rashes or lesions    LABS:                          10.5   9.39  )-----------( 233      ( 03 May 2024 08:52 )             34.0     05-03    137  |  104  |  14  ----------------------------<  95  3.8   |  28  |  1.03    Ca    8.5      03 May 2024 08:52  Phos  2.9     05-03  Mg     2.3     05-03    TPro  7.3  /  Alb  2.3<L>  /  TBili  0.4  /  DBili  x   /  AST  16  /  ALT  21  /  AlkPhos  83  05-03         Radiology:    < from: CT Angio Chest PE Protocol w/ IV Cont (04.29.24 @ 19:17) >  ACC: [id number] EXAM:  CT ABDOMEN AND PELVIS IC   ORDERED BY: KEVIN TORRES     ACC: [id number] EXAM:  CT ANGIO CHEST PULM ART WAWI   ORDERED BY: KEVIN TORRES     PROCEDURE DATE:  04/29/2024          INTERPRETATION:  CTA CHEST AND CT ABDOMEN AND PELVIS    INDICATION: Rule out pulmonary embolism. Weakness. Abdominal pain.    TECHNIQUE: Enhanced helical images were obtained of the chest, abdomen   and pelvis. Coronal and sagittal images were reconstructed. Maximum   intensity projection images were generated.    CONTRAST/COMPLICATIONS:  IV Contrast: Omnipaque 350 (accession [id number]), IV contrast documented   in unlinked concurrent exam (accession [id number])  90 cc administered   10   cc discarded  Oral Contrast: NONE  Complications: None reported at time of study completion    COMPARISON: 2/16/2024 CT chest. 9/11/2023 PET/CT.    FINDINGS:    PULMONARY ARTERIES: No pulmonary embolism.    MEDIASTINUM: Normal heart size. No pericardial effusion. Thoracic aorta   normal caliber.  No large mediastinal lymphnodes.    AIRWAYS, LUNGS, PLEURA: Large and confluent consolidation involving the   entirety of the right lower lobe and majority of the right middle lobe,   left lower lobe, and portions of the posterior right upper lobe and left   upper lobe. With respect to 2/16/2024 findings have mildly progressed;   for example, there is now consolidation in the left lower lobe superior   segment (image 50, series 5), which was previously aerated and there is a   new patchy ground-glass opacity at the left apex measuring 1.4 cm (image   26, series 5).    There is now occlusion of the posterior right upper lobe airway.    Small loculated right pleural effusion is without significant change.    ABDOMEN and PELVIS: Hepatic cyst. Cholecystectomy. Bilateral renal   hypodense lesions, some are cysts and others are too small to   characterize. Unchanged calcification along the periphery of the spleen.   Unremarkable adrenal glands and pancreas.    Nondistended urinary bladder. Enlarged prostate measuring 5.4 x 5 cm.    No bowel obstruction.    Normal caliber abdominal aorta with extensive calcified and noncalcified   plaque. No large abdominal or pelvic nodes. No free fluid.    BONES: Small sclerotic lesion of C7 vertebral body (image 2, series 5)   waspresent on the prior exam.    SOFT TISSUES: Unremarkable.    IMPRESSION:    No pulmonary embolism.    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    No acute pathology in the abdomen and pelvis.    --- End of Report ---             MARCIE SCHUMACHER MD; Attending Radiologist  This document has been electronically signed. Apr 29 2024  7:38PM    < end of copied text >

## 2024-05-03 NOTE — PROGRESS NOTE ADULT - PROBLEM SELECTOR PLAN 4
p/w trop 88.9  ECG sinus tachycardia no ischemic changes  likely demand ischemia in the setting of sepsis and acute infection  repeat Trop 77  f/u repeat EKG sinus rhythm with PACs

## 2024-05-03 NOTE — PROGRESS NOTE ADULT - ASSESSMENT
85 yo male with adenocarcinoma of the lung, diagnosed in 2021, currently on palliative chemo with Taxol and Tecentriq, presented to [facility] ED on 4/29 with worsening SOB, lethargy, and cough.  In the ER, CTA of chest/abd/pelvis was negative for PE but showed mucinous adenocarcinoma of the lung with extensive consolidation bilaterally and mild progression compared to 2/16/2024.  Patient was admitted for AHRF 2/2 sepsis with pneumonia vs progression of underlying malignancy.  Started on IV cefepime and vancomycin.

## 2024-05-03 NOTE — PROGRESS NOTE ADULT - ASSESSMENT
84M with advanced local lung cancer on immunotherapy, off chemo for a few months.  He is admitted with suspected PNA  I personally reviewed the imaging and compared to prior from 2/2024  There is probable progression of local disease and cannot exclude superimposed infection    Recommend:  cont Cefepime  Hycodin and airway clearance regimens are contradictory - if focus on treatment then please proceed without cough suppressant, if focus on comfort, then d/c nebs.  Would give decadron 6 IV QD as a palliative treatment for advanced lung ca, for 3-4 days and reassess  Supplemental O2 to goal >92  Supportive care  address GOC as this pt has very advanced lung cancer on palliative treatment

## 2024-05-03 NOTE — PROGRESS NOTE ADULT - PROBLEM SELECTOR PLAN 1
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  meets sepsis criteria  s/p Cefepime and 2L IVF bolus in ED   ESR 98,  , procal 1.05  blood cultures: NG at 24hrs   sputum culture-->normal respiratory ivan   -MRSA, Legionella and Strep pneumo all negative  -urinalysis negative for WBCs   -d/c Vancomycin  -c/w Cefepime   -c/w Duoneb with hypertonic saline nebs and chest PT q6, Albuterol PRN   -passed speech and swallow evaluation-->diet advanced to regular w/ thin liquids   -Pulm consulted Dr. Rutherford  -supportive care for cough--Hycodan PRN, mucinex

## 2024-05-03 NOTE — PROGRESS NOTE ADULT - PROBLEM SELECTOR PLAN 4
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

## 2024-05-03 NOTE — PROGRESS NOTE ADULT - NS ATTEND AMEND GEN_ALL_CORE FT
Patient seen at bedside, states he feels almost the same as yesterday, has SOB and lethargy.    On exam, patient is AOx3, mildly distressed, cardiopulmonary exams unremarkable, abdomen soft, NT/ND, extremities without edema.  No labs available for review today.     Assessment and plan:   84 y.o M with a PMH of Metastatic Lung adenocarcinoma on chemotherapy, HTN, HLD, BPH, presented to the ED with complaints of SOB, Nausea, vomiting, and generalized weakness. Admitted for sepsis 2/2 to pneumonia vs worsening lung adenocarcinoma and nausea/vomiting.    # Sepsis 2/2 pneumonia vs worsening metastatic lung adenocarcinoma, improving  # Elevated troponin, peaked, likely demand ischemia  # HTN  # HLD  # BPH  # Anemia  - respiratory failure now likely from the combination of both PNA and progression of lung cancer  - weaning O2, however will need chronic O2 NC (was on intermittent O2 at home)  - Blood Cx negative, sputum cultures with normal ivan  - MRSA nare negative, discontinue vancomycin  - continue abx with cefepime, anticipating 7-day course  - albuterol prn  - Pulmonology Dr. lieberman recs appreciated  - Oncology recs and assistance on the lung carcinoma appreciated, outpatient oncologist Dr. Domingo recommends hospice   - pall care consulted, to have further discussion on GOC based on the recommendation above Patient seen at bedside, states he feels almost the same as yesterday, has SOB and lethargy.    On exam, patient is AOx3, mildly distressed, cardiopulmonary exams unremarkable, abdomen soft, NT/ND, extremities without edema.  Labs reviewed, CBC, BMP, LFT stable.    Assessment and plan:   84 y.o M with a PMH of Metastatic Lung adenocarcinoma on chemotherapy, HTN, HLD, BPH, presented to the ED with complaints of SOB, Nausea, vomiting, and generalized weakness. Admitted for sepsis 2/2 to pneumonia vs worsening lung adenocarcinoma and nausea/vomiting.    # Sepsis 2/2 pneumonia vs worsening metastatic lung adenocarcinoma, improving  # Elevated troponin, peaked, likely demand ischemia  # HTN  # HLD  # BPH  # Anemia  - respiratory failure now likely from the combination of both PNA and progression of lung cancer  - weaning O2, however will need chronic O2 NC (was on intermittent O2 at home)  - Blood Cx negative, sputum cultures with normal ivan  - MRSA nare negative, discontinue vancomycin  - continue abx with cefepime, anticipating 7-day course  - albuterol prn  - Pulmonology Dr. lieberman recs appreciated  - Oncology recs and assistance on the lung carcinoma appreciated, outpatient oncologist Dr. Domingo recommends hospice   - pall care consulted, to have further discussion on GOC based on the recommendation above

## 2024-05-03 NOTE — PROGRESS NOTE ADULT - CONVERSATION DETAILS
Additional telephonic family meeting held with renea Haile x 16 minutes (as separately identifiable service) to discuss prognosis, ACP, goals of care, and treatment preferences.  Advised son that per discussion with Heme/Onc, patient is not a candidate for further chemotherapy and that he is appropriate for hospice.  Told son that patient's time is short and that he likely has weeks to a couple of months to live.  Advised him that patient's symptoms are progressing despite active treatment in the hospital setting, and that patient would be better managed (and much more comfortable) under hospice management at home.  Urged son to discuss transition to comfort-oriented care and hospice services with patient and other family members over the weekend.  Also reminded him that given severity of patient's disease and the fact that he remains at risk of intubation at any time, we need a decision regarding advance directives and code status as soon as possible.  Son voiced understanding.
Face to face family meeting held with patient and son (primary HCP) at bedside x 16 minutes (as separately identifiable service) to discuss prognosis, ACP, goals of care, and treatment preferences.  Supportive role of palliative care team explained.     Again attempted to engage patient and son in discussion about code status, risk/benefits of intubation, and allowing natural death vs full resuscitation.  Patient and son stated they do not want to have this discussion this morning.  Spoke to son privately, again emphasized that patient remains at high risk of intubation, and likely would be unable to be extubated if ventilatory support is required.   Similarly counseled son that in case of in-hospital cardiac arrest, CPR would be unlikely to be successful, and chances of patient returning to his current functional/neurologic baseline would be essentially nil.  Son voiced understanding, states that patient is well aware that he has advanced disease, and family is also aware that patient would likely not want to go through CPR or be dependent on machines long term.  Son states that patient and family may be more willing to consider a separate DNR order (but still with a trial of intubation), but they are not ready to make a final decision yet.  Lazara stated that he will continue to discuss with his family, advised him that we need a decision on code status as soon as possible.  He was appreciative of the conversation, and all of his questions were answered.    Patient to remain Full Code for now

## 2024-05-04 NOTE — PROGRESS NOTE ADULT - SUBJECTIVE AND OBJECTIVE BOX
[facility]  Patient is a 84y old  Male who presents with a chief complaint of Sepsis 2/2 PNA (03 May 2024 17:39)      SUBJECTIVE / OVERNIGHT EVENTS:  No acute events over night.   patient seen at bedside, states his lethargy and SOB are unchanged. No  new complaints.    MEDICATIONS  (STANDING):  albuterol/ipratropium for Nebulization 3 milliLiter(s) Nebulizer every 6 hours  atorvastatin 40 milliGRAM(s) Oral at bedtime  cefepime   IVPB 1000 milliGRAM(s) IV Intermittent every 12 hours  dexAMETHasone  Injectable 6 milliGRAM(s) IV Push daily  enoxaparin Injectable 40 milliGRAM(s) SubCutaneous every 24 hours  finasteride 5 milliGRAM(s) Oral daily  folic acid 1 milliGRAM(s) Oral daily  guaiFENesin  milliGRAM(s) Oral every 12 hours  sodium chloride 3%  Inhalation 4 milliLiter(s) Inhalation <User Schedule>    MEDICATIONS  (PRN):  albuterol    90 MICROgram(s) HFA Inhaler 2 Puff(s) Inhalation every 6 hours PRN Shortness of Breath and/or Wheezing  hydrocodone/homatropine Syrup 5 milliLiter(s) Oral every 6 hours PRN Cough  ondansetron Injectable 4 milliGRAM(s) IV Push every 8 hours PRN Nausea and/or Vomiting  senna 2 Tablet(s) Oral at bedtime PRN Constipation          OBJECTIVE:  Vital Signs Last 24 Hrs  T(C): 36.2 (04 May 2024 05:25), Max: 36.7 (03 May 2024 21:15)  T(F): 97.2 (04 May 2024 05:25), Max: 98 (03 May 2024 21:15)  HR: 94 (04 May 2024 05:25) (94 - 97)  BP: 152/75 (04 May 2024 05:25) (124/74 - 152/75)  BP(mean): --  RR: 18 (04 May 2024 05:25) (16 - 18)  SpO2: 96% (04 May 2024 05:25) (96% - 97%)    Parameters below as of 04 May 2024 05:25  Patient On (Oxygen Delivery Method): nasal cannula  O2 Flow (L/min): 3      PHYSICAL EXAM:  GENERAL: mildly distressed  HEAD:  Atraumatic, Normocephalic  EYES: conjunctiva and sclera clear  NECK: Supple, No JVD  CHEST/LUNG: Clear to auscultation bilaterally; No wheeze  HEART: Regular rate and rhythm; No murmurs, rubs, or gallops  ABDOMEN: Soft, Nontender, Nondistended; Bowel sounds present  EXTREMITIES:  No clubbing, cyanosis, or edema  PSYCH: AAOx3  NEUROLOGY: non-focal  SKIN: No rashes or lesions      CAPILLARY BLOOD GLUCOSE        I&O's Summary            LABS:                        9.6    6.44  )-----------( 187      ( 04 May 2024 06:55 )             30.8     05-04    138  |  105  |  12  ----------------------------<  103<H>  3.8   |  29  |  1.00    Ca    8.6      04 May 2024 06:55  Phos  2.5     05-04  Mg     2.1     05-04    TPro  6.8  /  Alb  2.2<L>  /  TBili  0.4  /  DBili  x   /  AST  18  /  ALT  20  /  AlkPhos  80  05-04          Urinalysis Basic - ( 04 May 2024 06:55 )    Color: x / Appearance: x / SG: x / pH: x  Gluc: 103 mg/dL / Ketone: x  / Bili: x / Urobili: x   Blood: x / Protein: x / Nitrite: x   Leuk Esterase: x / RBC: x / WBC x   Sq Epi: x / Non Sq Epi: x / Bacteria: x            RADIOLOGY & ADDITIONAL TESTS:

## 2024-05-04 NOTE — PROGRESS NOTE ADULT - ASSESSMENT
84 y.o M with a PMH of Metastatic Lung adenocarcinoma on chemotherapy, HTN, HLD, BPH, presented to the ED with complaints of SOB, Nausea, vomiting, and generalized weakness. Admitted for sepsis 2/2 to pneumonia vs worsening lung adenocarcinoma and nausea/vomiting.    # Sepsis 2/2 pneumonia vs worsening metastatic lung adenocarcinoma, improving  # Elevated troponin, peaked, likely demand ischemia  # HTN  # HLD  # BPH  # Anemia  - respiratory failure now likely from the combination of both PNA and progression of lung cancer  - weaning O2, on intermittent O2 at home  - Blood Cx negative, sputum cultures with normal ivan  - continue abx with cefepime, anticipating 7-day course  - albuterol prn  - Pulmonology Dr. lieberman recs appreciated, try decadron IV for symptoms from the progression of lung cancer  - Oncology recs and assistance on the lung carcinoma appreciated, outpatient oncologist Dr. Domingo recommends hospice   - pall care consulted, family discussing GOC at this time

## 2024-05-05 NOTE — PROGRESS NOTE ADULT - SUBJECTIVE AND OBJECTIVE BOX
State of [place] Temp. Handicap Parking completed and ready for patient  in reception area of clinic. Detailed message left on patient's voicemail   PGY-1 Progress Note discussed with attending    PAGER #: [[phone]] TILL 5:00 PM  PLEASE CONTACT ON CALL TEAM:  - On Call Team (Please refer to Catrachita) FROM 5:00 PM - 8:30PM  - Nightfloat Team FROM 8:30 -7:30 AM    CC: Patient is a 84y old  Male who presents with a chief complaint of Sepsis 2/2 PNA (04 May 2024 12:21)      OVERNIGHT EVENTS:    SUBJECTIVE / INTERVAL HPI: Patient seen and examined at bedside.     ROS:  CONSTITUTIONAL: No weakness, fevers or chills  EYES/ENT: No visual changes;  No vertigo or throat pain   NECK: No pain or stiffness  RESPIRATORY: No cough, wheezing, hemoptysis; No shortness of breath  CARDIOVASCULAR: No chest pain or palpitations  GASTROINTESTINAL: No abdominal or epigastric pain. No nausea, vomiting, or hematemesis; No diarrhea or constipation. No melena or hematochezia.  GENITOURINARY: No dysuria, frequency or hematuria  NEUROLOGICAL: No numbness or weakness  SKIN: No itching, burning, rashes, or lesions     VITAL SIGNS:  Vital Signs Last 24 Hrs  T(C): 36.7 (05 May 2024 12:07), Max: 36.9 (04 May 2024 20:01)  T(F): 98 (05 May 2024 12:07), Max: 98.5 (04 May 2024 20:01)  HR: 94 (05 May 2024 12:07) (86 - 94)  BP: 132/63 (05 May 2024 12:07) (121/73 - 143/73)  BP(mean): --  RR: 17 (05 May 2024 12:07) (17 - 18)  SpO2: 99% (05 May 2024 12:07) (93% - 99%)    Parameters below as of 05 May 2024 12:07  Patient On (Oxygen Delivery Method): nasal cannula  O2 Flow (L/min): 3      PHYSICAL EXAM:    General: WDWN  HEENT: NC/AT; PERRL, anicteric sclera; MMM  Neck: supple  Cardiovascular: +S1/S2; RRR  Respiratory: CTA B/L; no W/R/R  Gastrointestinal: soft, NT/ND; +BSx4  Extremities: WWP; no edema, clubbing or cyanosis  Vascular: 2+ radial, DP/PT pulses B/L  Skin: Warm, dry, good turgor, no rashes, or ecchymoses  Neurological: AAOx3; no focal deficits    MEDICATIONS:  MEDICATIONS  (STANDING):  albuterol/ipratropium for Nebulization 3 milliLiter(s) Nebulizer every 6 hours  atorvastatin 40 milliGRAM(s) Oral at bedtime  cefepime   IVPB 1000 milliGRAM(s) IV Intermittent every 12 hours  dexAMETHasone  Injectable 6 milliGRAM(s) IV Push daily  enoxaparin Injectable 40 milliGRAM(s) SubCutaneous every 24 hours  finasteride 5 milliGRAM(s) Oral daily  folic acid 1 milliGRAM(s) Oral daily  guaiFENesin  milliGRAM(s) Oral every 12 hours  sodium chloride 3%  Inhalation 4 milliLiter(s) Inhalation <User Schedule>    MEDICATIONS  (PRN):  albuterol    90 MICROgram(s) HFA Inhaler 2 Puff(s) Inhalation every 6 hours PRN Shortness of Breath and/or Wheezing  ondansetron Injectable 4 milliGRAM(s) IV Push every 8 hours PRN Nausea and/or Vomiting  senna 2 Tablet(s) Oral at bedtime PRN Constipation      ALLERGIES:  Allergies    No Known Allergies    Intolerances        LABS:                        9.6    7.50  )-----------( 202      ( 05 May 2024 06:55 )             31.1     05-05    137  |  103  |  13  ----------------------------<  87  4.1   |  31  |  1.11    Ca    8.6      05 May 2024 06:55  Phos  3.1     05-05  Mg     2.2     05-05    TPro  7.0  /  Alb  2.3<L>  /  TBili  0.3  /  DBili  x   /  AST  29  /  ALT  27  /  AlkPhos  82  05-05      Urinalysis Basic - ( 05 May 2024 06:55 )    Color: x / Appearance: x / SG: x / pH: x  Gluc: 87 mg/dL / Ketone: x  / Bili: x / Urobili: x   Blood: x / Protein: x / Nitrite: x   Leuk Esterase: x / RBC: x / WBC x   Sq Epi: x / Non Sq Epi: x / Bacteria: x      CAPILLARY BLOOD GLUCOSE          RADIOLOGY & ADDITIONAL TESTS: Reviewed. PGY-1 Progress Note discussed with attending    PAGER #: [[phone]] TILL 5:00 PM  PLEASE CONTACT ON CALL TEAM:  - On Call Team (Please refer to Catrachita) FROM 5:00 PM - 8:30PM  - Nightfloat Team FROM 8:30 -7:30 AM    CC: Patient is a 84y old  Male who presents with a chief complaint of Sepsis 2/2 PNA (04 May 2024 12:21)      OVERNIGHT EVENTS: episode of vomiting overnight    SUBJECTIVE / INTERVAL HPI: Patient seen and examined at bedside. Had another episode of vomiting this morning. However, per nephew at bedside patient able to eat more than previous days this morning. Patient denies nausea currently. Denies constipation reporting last BM was yesterday or day before. Patent stated that cough and shortness of breath are slightly improved. Denies fever, chills, chest pain.       ROS:  CONSTITUTIONAL: + weakness, no fevers or chills  EYES/ENT: No visual changes;  No vertigo or throat pain   NECK: No pain or stiffness  RESPIRATORY: + cough, wheezing, hemoptysis;  +shortness of breath (improved)  CARDIOVASCULAR: No chest pain or palpitations  GASTROINTESTINAL: No abdominal or epigastric pain. No nausea, vomiting, or hematemesis; No diarrhea or constipation. No melena or hematochezia.  GENITOURINARY: No dysuria, frequency or hematuria  NEUROLOGICAL: No numbness or weakness  SKIN: No itching, burning, rashes, or lesions     VITAL SIGNS:  Vital Signs Last 24 Hrs  T(C): 36.7 (05 May 2024 12:07), Max: 36.9 (04 May 2024 20:01)  T(F): 98 (05 May 2024 12:07), Max: 98.5 (04 May 2024 20:01)  HR: 94 (05 May 2024 12:07) (86 - 94)  BP: 132/63 (05 May 2024 12:07) (121/73 - 143/73)  BP(mean): --  RR: 17 (05 May 2024 12:07) (17 - 18)  SpO2: 99% (05 May 2024 12:07) (93% - 99%)    Parameters below as of 05 May 2024 12:07  Patient On (Oxygen Delivery Method): nasal cannula  O2 Flow (L/min): 3      PHYSICAL EXAM:    General: WDWN, lethargic  HEENT: NC/AT; PERRL, anicteric sclera; dry mucous membranes  Neck: supple  Cardiovascular: regular rate and rhythm, +S1/S2; no murmurs  Respiratory: R basilar rub? coarse breath sounds b/l; trace bibasilar wheezing  Gastrointestinal: soft, NT/mildly distended; +BSx4  Extremities: WWP; no edema, clubbing or cyanosis  Vascular: 2+ radial, DP/PT pulses B/L  Skin: Warm, dry, good turgor, no rashes, or ecchymoses  Neurological: AAOx3; no focal deficits    MEDICATIONS:  MEDICATIONS  (STANDING):  albuterol/ipratropium for Nebulization 3 milliLiter(s) Nebulizer every 6 hours  atorvastatin 40 milliGRAM(s) Oral at bedtime  cefepime   IVPB 1000 milliGRAM(s) IV Intermittent every 12 hours  dexAMETHasone  Injectable 6 milliGRAM(s) IV Push daily  enoxaparin Injectable 40 milliGRAM(s) SubCutaneous every 24 hours  finasteride 5 milliGRAM(s) Oral daily  folic acid 1 milliGRAM(s) Oral daily  guaiFENesin  milliGRAM(s) Oral every 12 hours  sodium chloride 3%  Inhalation 4 milliLiter(s) Inhalation <User Schedule>    MEDICATIONS  (PRN):  albuterol    90 MICROgram(s) HFA Inhaler 2 Puff(s) Inhalation every 6 hours PRN Shortness of Breath and/or Wheezing  ondansetron Injectable 4 milliGRAM(s) IV Push every 8 hours PRN Nausea and/or Vomiting  senna 2 Tablet(s) Oral at bedtime PRN Constipation      ALLERGIES:  Allergies    No Known Allergies    Intolerances        LABS:                        9.6    7.50  )-----------( 202      ( 05 May 2024 06:55 )             31.1     05-05    137  |  103  |  13  ----------------------------<  87  4.1   |  31  |  1.11    Ca    8.6      05 May 2024 06:55  Phos  3.1     05-05  Mg     2.2     05-05    TPro  7.0  /  Alb  2.3<L>  /  TBili  0.3  /  DBili  x   /  AST  29  /  ALT  27  /  AlkPhos  82  05-05      Urinalysis Basic - ( 05 May 2024 06:55 )    Color: x / Appearance: x / SG: x / pH: x  Gluc: 87 mg/dL / Ketone: x  / Bili: x / Urobili: x   Blood: x / Protein: x / Nitrite: x   Leuk Esterase: x / RBC: x / WBC x   Sq Epi: x / Non Sq Epi: x / Bacteria: x      CAPILLARY BLOOD GLUCOSE          RADIOLOGY & ADDITIONAL TESTS: Reviewed.

## 2024-05-05 NOTE — PROGRESS NOTE ADULT - PROBLEM SELECTOR PLAN 3
hx of Lung adenocarcinoma  -following with heme/onc specialist Dr. Domingo at [facility], last chemo on 4/3/2024  -heme/onc consulted Dr. Wu--recs appreciated  -palliative consult followin-->GOC conversations ongoing, likely hospice candidate  -Pulm consulted Dr. Rutherford

## 2024-05-05 NOTE — PROGRESS NOTE ADULT - ATTENDING COMMENTS
Patient seen at bedside, states heb feels the same, has lethargy and mild SOB.    On exam, patient is AOx3, mildly distressed, cardiopulmonary exams unremarkable, abdomen soft, NT/ND, extremities without edema.  Labs reviewed, CBC, BMP, LFT stable.    Assessment and plan:  84 y.o M with a PMH of Metastatic Lung adenocarcinoma on chemotherapy, HTN, HLD, BPH, presented to the ED with complaints of SOB, Nausea, vomiting, and generalized weakness. Admitted for sepsis 2/2 to pneumonia vs worsening lung adenocarcinoma and nausea/vomiting.    # Sepsis 2/2 pneumonia vs worsening metastatic lung adenocarcinoma, improving  # Elevated troponin, peaked, likely demand ischemia  # HTN  # HLD  # BPH  # Anemia  - weaning O2, on intermittent O2 at home  - Blood Cx negative, sputum cultures with normal ivan  - continue abx with cefepime, anticipating 7-day course (last dose today)  - albuterol prn  - Pulmonology Dr. luisana wade appreciated, on decadron IV for symptoms from the progression of lung cancer  - Oncology recs and assistance on the lung carcinoma appreciated, outpatient oncologist Dr. Domingo recommends hospice   - pall care consulted, family discussing GOC at this time

## 2024-05-05 NOTE — PROGRESS NOTE ADULT - PROBLEM SELECTOR PLAN 1
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  meets sepsis criteria  s/p Cefepime and 2L IVF bolus in ED   ESR 98,  , procal 1.05  blood cultures: NG at 24hrs   sputum culture-->normal respiratory ivan   -MRSA, Legionella and Strep pneumo all negative  -urinalysis negative for WBCs   -d/c Vancomycin  -c/w Cefepime   -c/w Duoneb with hypertonic saline nebs and chest PT q6, Albuterol PRN   -passed speech and swallow evaluation-->diet advanced to regular w/ thin liquids   -Pulm consulted Dr. Rutherford  -supportive care for cough--Hycodan PRN, mucinex p/w dyspnea, cough, nausea and vomiting  in ED: Pawel of Temp 102.8, , /87, Sat 89% on 4L, on exam: HR 89, Sat 92% on 4L however decreases to 88% and improves rapidly to >92% on 6L   ABG on 6L NC: 7.38/48/62/28  Leukocytosis 12,2, lactate negative  CTA chest: Neg PE , Mucinous adenocarcinoma of the lung with extensive consolidation bilaterally and mild progression compared to 2/16/2024.  meets sepsis criteria  s/p Cefepime and 2L IVF bolus in ED   ESR 98,  , procal 1.05  blood cultures: NG at 24hrs   sputum culture-->normal respiratory ivan   -MRSA, Legionella and Strep pneumo all negative  -urinalysis negative for WBCs   -d/c Vancomycin  -c/w Cefepime   -c/w Duoneb with hypertonic saline nebs and chest PT q6, Albuterol PRN   -passed speech and swallow evaluation-->diet advanced to regular w/ thin liquids   -Pulm consulted Dr. Rutherford  -supportive care for cough  -d/c hycodan as contradictory to airway clearance efforts

## 2024-05-06 NOTE — PROGRESS NOTE ADULT - SUBJECTIVE AND OBJECTIVE BOX
PULMONARY CONSULT SERVICE FOLLOW-UP NOTE    INTERVAL HPI:  Reviewed chart and overnight events; patient seen and examined at bedside.    MEDICATIONS:  Pulmonary:  albuterol    90 MICROgram(s) HFA Inhaler 2 Puff(s) Inhalation every 6 hours PRN  albuterol/ipratropium for Nebulization 3 milliLiter(s) Nebulizer every 6 hours  guaiFENesin  milliGRAM(s) Oral every 12 hours  sodium chloride 3%  Inhalation 4 milliLiter(s) Inhalation <User Schedule>    Antimicrobials:  cefepime   IVPB 1000 milliGRAM(s) IV Intermittent every 12 hours    Anticoagulants:  enoxaparin Injectable 40 milliGRAM(s) SubCutaneous every 24 hours    Cardiac:      Allergies    No Known Allergies    Intolerances        Vital Signs Last 24 Hrs  T(C): 36.8 (06 May 2024 04:35), Max: 36.8 (06 May 2024 04:35)  T(F): 98.2 (06 May 2024 04:35), Max: 98.2 (06 May 2024 04:35)  HR: 88 (06 May 2024 04:35) (75 - 94)  BP: 130/68 (06 May 2024 04:35) (129/74 - 132/63)  BP(mean): --  RR: 18 (06 May 2024 04:35) (17 - 18)  SpO2: 95% (06 May 2024 04:35) (95% - 99%)    Parameters below as of 06 May 2024 04:35  Patient On (Oxygen Delivery Method): nasal cannula  O2 Flow (L/min): 3          PHYSICAL EXAM:  GENERAL: NAD, lying in bed comfortably  HEAD:  Atraumatic, Normocephalic  EYES: EOMI, PERRLA, conjunctiva and sclera clear  ENT: Moist mucous membranes  NECK: Supple, No JVD  CHEST/LUNG: Clear to auscultation bilaterally; + diffuse rhonchi and crackles, No rales,  wheezing, or rubs. Unlabored respirations  HEART: Regular rate and rhythm; No murmurs, rubs, or gallops  ABDOMEN: Bowel sounds present; Soft, Nontender, Nondistended.   EXTREMITIES:  2+ Peripheral Pulses, brisk capillary refill. No clubbing, cyanosis, or edema  NERVOUS SYSTEM:  Alert & Oriented X2, speech clear. lethargic, opens eyes to verbal stimuli and follows commands, moves all extremities spontaneously   SKIN: No rashes or lesions    LABS:      CBC Full  -  ( 06 May 2024 09:05 )  WBC Count : 9.73 K/uL  RBC Count : 3.87 M/uL  Hemoglobin : 10.1 g/dL  Hematocrit : 32.8 %  Platelet Count - Automated : 256 K/uL  Mean Cell Volume : 84.8 fl  Mean Cell Hemoglobin : 26.1 pg  Mean Cell Hemoglobin Concentration : 30.8 gm/dL  Auto Neutrophil # : 7.95 K/uL  Auto Lymphocyte # : 1.49 K/uL  Auto Monocyte # : 0.19 K/uL  Auto Eosinophil # : 0.02 K/uL  Auto Basophil # : 0.01 K/uL  Auto Neutrophil % : 81.7 %  Auto Lymphocyte % : 15.3 %  Auto Monocyte % : 2.0 %  Auto Eosinophil % : 0.2 %  Auto Basophil % : 0.1 %    05-05    137  |  103  |  13  ----------------------------<  87  4.1   |  31  |  1.11    Ca    8.6      05 May 2024 06:55  Phos  3.1     05-05  Mg     2.2     05-05    TPro  7.0  /  Alb  2.3<L>  /  TBili  0.3  /  DBili  x   /  AST  29  /  ALT  27  /  AlkPhos  82  05-05          Urinalysis Basic - ( 05 May 2024 06:55 )    Color: x / Appearance: x / SG: x / pH: x  Gluc: 87 mg/dL / Ketone: x  / Bili: x / Urobili: x   Blood: x / Protein: x / Nitrite: x   Leuk Esterase: x / RBC: x / WBC x   Sq Epi: x / Non Sq Epi: x / Bacteria: x              RADIOLOGY & ADDITIONAL STUDIES: PULMONARY CONSULT SERVICE FOLLOW-UP NOTE    INTERVAL HPI:  Reviewed chart and overnight events; patient seen and examined at bedside. Reports improved breathing and cough with adjunct dexamethasone; no other acute complaints. At this time pt denies anginal/pleuritic CP, SOB/ROWLEY, wheezing, nasal/chest congestion, stridor, orthopnea, hemoptysis, LE edema, f/c/n/v.     MEDICATIONS:  Pulmonary:  albuterol    90 MICROgram(s) HFA Inhaler 2 Puff(s) Inhalation every 6 hours PRN  albuterol/ipratropium for Nebulization 3 milliLiter(s) Nebulizer every 6 hours  guaiFENesin  milliGRAM(s) Oral every 12 hours  sodium chloride 3%  Inhalation 4 milliLiter(s) Inhalation <User Schedule>    Antimicrobials:  cefepime   IVPB 1000 milliGRAM(s) IV Intermittent every 12 hours    Anticoagulants:  enoxaparin Injectable 40 milliGRAM(s) SubCutaneous every 24 hours    Cardiac:      Allergies    No Known Allergies    Intolerances        Vital Signs Last 24 Hrs  T(C): 36.8 (06 May 2024 04:35), Max: 36.8 (06 May 2024 04:35)  T(F): 98.2 (06 May 2024 04:35), Max: 98.2 (06 May 2024 04:35)  HR: 88 (06 May 2024 04:35) (75 - 94)  BP: 130/68 (06 May 2024 04:35) (129/74 - 132/63)  BP(mean): --  RR: 18 (06 May 2024 04:35) (17 - 18)  SpO2: 95% (06 May 2024 04:35) (95% - 99%)    Parameters below as of 06 May 2024 04:35  Patient On (Oxygen Delivery Method): nasal cannula  O2 Flow (L/min): 3          PHYSICAL EXAM:  GENERAL: NAD, lying in bed comfortably  HEAD:  Atraumatic, Normocephalic  EYES: EOMI, PERRLA, conjunctiva and sclera clear  ENT: Moist mucous membranes  NECK: Supple, No JVD  CHEST/LUNG: Clear to auscultation bilaterally; No rales,  wheezing, or rubs. Unlabored respirations  HEART: Regular rate and rhythm; No murmurs, rubs, or gallops  ABDOMEN: Bowel sounds present; Soft, Nontender, Nondistended.   EXTREMITIES:  2+ Peripheral Pulses, brisk capillary refill. No clubbing, cyanosis, or edema  NERVOUS SYSTEM:  Alert & Oriented X2, speech clear. tired but opens eyes to verbal stimuli and follows commands, moves all extremities spontaneously   SKIN: No rashes or lesions    LABS:      CBC Full  -  ( 06 May 2024 09:05 )  WBC Count : 9.73 K/uL  RBC Count : 3.87 M/uL  Hemoglobin : 10.1 g/dL  Hematocrit : 32.8 %  Platelet Count - Automated : 256 K/uL  Mean Cell Volume : 84.8 fl  Mean Cell Hemoglobin : 26.1 pg  Mean Cell Hemoglobin Concentration : 30.8 gm/dL  Auto Neutrophil # : 7.95 K/uL  Auto Lymphocyte # : 1.49 K/uL  Auto Monocyte # : 0.19 K/uL  Auto Eosinophil # : 0.02 K/uL  Auto Basophil # : 0.01 K/uL  Auto Neutrophil % : 81.7 %  Auto Lymphocyte % : 15.3 %  Auto Monocyte % : 2.0 %  Auto Eosinophil % : 0.2 %  Auto Basophil % : 0.1 %    05-05    137  |  103  |  13  ----------------------------<  87  4.1   |  31  |  1.11    Ca    8.6      05 May 2024 06:55  Phos  3.1     05-05  Mg     2.2     05-05    TPro  7.0  /  Alb  2.3<L>  /  TBili  0.3  /  DBili  x   /  AST  29  /  ALT  27  /  AlkPhos  82  05-05          Urinalysis Basic - ( 05 May 2024 06:55 )    Color: x / Appearance: x / SG: x / pH: x  Gluc: 87 mg/dL / Ketone: x  / Bili: x / Urobili: x   Blood: x / Protein: x / Nitrite: x   Leuk Esterase: x / RBC: x / WBC x   Sq Epi: x / Non Sq Epi: x / Bacteria: x              RADIOLOGY & ADDITIONAL STUDIES:

## 2024-05-06 NOTE — PROGRESS NOTE ADULT - PROBLEM SELECTOR PROBLEM 5
HTN (hypertension)
Encounter for palliative care
Encounter for palliative care
HTN (hypertension)

## 2024-05-06 NOTE — PROGRESS NOTE ADULT - PROBLEM SELECTOR PLAN 3
hx of Lung adenocarcinoma  -following with heme/onc specialist Dr. Domingo at [facility], last chemo on 4/3/2024  -heme/onc consulted Dr. Wu--recs appreciated  -palliative consult followin-->GOC conversations ongoing, likely hospice candidate  -Pulm consulted Dr. Rutherford

## 2024-05-06 NOTE — PROGRESS NOTE ADULT - ASSESSMENT
84 yrs old M, from home, ambulating independently, pmhx of Adenocarcinoma of the lung dx on 2021, on Home oxygen 2L NC, on palliative chemoRx, last Rx on 4/3/2024, HLD, BPH, ?HTN, pshx hernia repair presented with dyspnea. Pt is admitted for AHRF and Sepsis 2/2 pneumonia, s/p ABX, palliative care consulted, planning d/s to home hospice pending SW arrangements

## 2024-05-06 NOTE — PROGRESS NOTE ADULT - TIME BILLING
review of chart and overnight events  review of labs and imaging  generation of plan of care  note completion  coordination of care with consultants  counseling pt on their disease process, management and prognosis.
- Review of chart (documentation, labs/studies, VS trends)  - Personal review of chest imaging  - Medication reconciliation  - Bedside interview and physical examination  - Bedside SpO2 monitoring and supplemental O2 titration
- Review of hospital course, labs, vitals, medical records  - Bedside exam and interview  - Discussed plan of care with primary team ACP and housestaff, pulmonology Dr. lieberman, [facility] Dr. arreola  - Documenting the encounter

## 2024-05-06 NOTE — PROGRESS NOTE ADULT - NS ATTEND AMEND GEN_ALL_CORE FT
Patient seen at bedside, states he had an episode of hot flush this morning, SOB and lethargy is unchanged.    On exam, patient is AOx3, mildly distressed, cardiopulmonary exams unremarkable, abdomen soft, NT/ND, extremities without edema.  Labs reviewed, CBC, BMP, LFT without significant change.    Assessment and plan:  84 y.o M with a PMH of Metastatic Lung adenocarcinoma on chemotherapy, HTN, HLD, BPH, presented to the ED with complaints of SOB, Nausea, vomiting, and generalized weakness. Admitted for sepsis 2/2 to pneumonia vs worsening lung adenocarcinoma and nausea/vomiting.    # Sepsis 2/2 pneumonia vs worsening metastatic lung adenocarcinoma, improving  # Elevated troponin, peaked, likely demand ischemia  # HTN  # HLD  # BPH  # Anemia  - weaning O2, now on 3LNC  - s/p 7-day course of cefepime for PNA  - Pulmonology Dr. Ivey recs appreciated, continue decadron IV for symptoms from the progression of lung cancer  - Oncology recs and assistance on the lung carcinoma appreciated, outpatient oncologist Dr. Domingo recommends hospice   - pall care consulted, family is now agreeable with home hospice intiation

## 2024-05-06 NOTE — PROGRESS NOTE ADULT - ASSESSMENT
INCOMPLETE ================================     84M with advanced local lung cancer on immunotherapy, off chemo for a few months.  He is admitted with suspected PNA  I personally reviewed the imaging and compared to prior from 2/2024  There is probable progression of local disease and cannot exclude superimposed infection    Recommend:  day 8 cefepime, completed 7d course, would d/c  Hycodan and airway clearance regimens are contradictory - if focus on treatment then please proceed without cough suppressant, if focus on comfort, then d/c nebs.  Continue trial of decadron 6 IV QD as a palliative treatment for advanced lung ca, for 3-4 days and reassess  Supplemental O2 to goal >92  Supportive care  address GOC as this pt has very advanced lung cancer on palliative treatment; hospice appropriate and pall care following   84M with advanced local lung cancer on immunotherapy, off chemo for a few months.  He is admitted with suspected PNA  I personally reviewed the imaging and compared to prior from 2/2024  There is probable progression of local disease and cannot exclude superimposed infection    Recommend:  day 8 cefepime, completed 7d course, would d/c  Hycodan and airway clearance regimens are contradictory - if focus on treatment then please proceed without cough suppressant, if focus on comfort, then d/c nebs.  Continue trial of decadron 6 IV QD as a palliative treatment for advanced lung ca, for 3-4 days and reassess  Supplemental O2 to goal >92  Supportive care  address GOC as this pt has very advanced lung cancer on palliative treatment; hospice appropriate and pall care following

## 2024-05-06 NOTE — PROGRESS NOTE ADULT - PROBLEM SELECTOR PROBLEM 4
Moderate protein-calorie malnutrition
Elevated troponin
Lung cancer
Elevated troponin
Moderate protein-calorie malnutrition
Lung cancer
Lung cancer
Elevated troponin

## 2024-05-06 NOTE — PROGRESS NOTE ADULT - NUTRITIONAL ASSESSMENT
This patient has been assessed with a concern for Malnutrition and has been determined to have a diagnosis/diagnoses of Severe protein-calorie malnutrition.    This patient is being managed with:   Diet Regular-  No Pork  Supplement Feeding Modality:  Oral  Ensure Plus High Protein Cans or Servings Per Day:  1       Frequency:  Two Times a day  Entered: May  2 2024  3:57PM  

## 2024-05-06 NOTE — PROGRESS NOTE ADULT - REASON FOR ADMISSION
Sepsis 2/2 PNA

## 2024-05-06 NOTE — PROGRESS NOTE ADULT - SUBJECTIVE AND OBJECTIVE BOX
PGY-1 Progress Note discussed with attending    PAGER #: [[phone]] TILL 5:00 PM  PLEASE CONTACT ON CALL TEAM:  - On Call Team (Please refer to Catrachita) FROM 5:00 PM - 8:30PM  - Nightfloat Team FROM 8:30 -7:30 AM    CHIEF COMPLAINT & BRIEF HOSPITAL COURSE:      INTERVAL HPI/OVERNIGHT EVENTS: No acute events   Patient received zofran for nausea this morning   Patient denies vomiting, abdominal pain       REVIEW OF SYSTEMS:  CONSTITUTIONAL: + weakness, no fevers or chills  EYES/ENT: No visual changes;  No vertigo or throat pain   NECK: No pain or stiffness  RESPIRATORY: + cough, wheezing, hemoptysis;  +shortness of breath (improved)  CARDIOVASCULAR: No chest pain or palpitations  GASTROINTESTINAL: No abdominal or epigastric pain. No nausea, vomiting, or hematemesis; No diarrhea or constipation. No melena or hematochezia.  GENITOURINARY: No dysuria, frequency or hematuria  NEUROLOGICAL: No numbness or weakness  SKIN: No itching, burning, rashes, or lesions     MEDICATIONS  (STANDING):  albuterol/ipratropium for Nebulization 3 milliLiter(s) Nebulizer every 6 hours  atorvastatin 40 milliGRAM(s) Oral at bedtime  dexAMETHasone  Injectable 6 milliGRAM(s) IV Push daily  enoxaparin Injectable 40 milliGRAM(s) SubCutaneous every 24 hours  finasteride 5 milliGRAM(s) Oral daily  folic acid 1 milliGRAM(s) Oral daily  guaiFENesin  milliGRAM(s) Oral every 12 hours  pantoprazole    Tablet 40 milliGRAM(s) Oral before breakfast  sodium chloride 3%  Inhalation 4 milliLiter(s) Inhalation <User Schedule>    MEDICATIONS  (PRN):  albuterol    90 MICROgram(s) HFA Inhaler 2 Puff(s) Inhalation every 6 hours PRN Shortness of Breath and/or Wheezing  ondansetron Injectable 4 milliGRAM(s) IV Push every 8 hours PRN Nausea and/or Vomiting  senna 2 Tablet(s) Oral at bedtime PRN Constipation      Vital Signs Last 24 Hrs  T(C): 36.8 (06 May 2024 12:59), Max: 36.8 (06 May 2024 04:35)  T(F): 98.2 (06 May 2024 12:59), Max: 98.2 (06 May 2024 04:35)  HR: 93 (06 May 2024 12:59) (75 - 93)  BP: 142/68 (06 May 2024 12:59) (129/74 - 142/68)  BP(mean): --  RR: 18 (06 May 2024 12:59) (17 - 18)  SpO2: 96% (06 May 2024 12:59) (95% - 96%)    Parameters below as of 06 May 2024 12:59  Patient On (Oxygen Delivery Method): nasal cannula  O2 Flow (L/min): 3      PHYSICAL EXAMINATION:  GENERAL: NAD, well built  HEAD:  Atraumatic, Normocephalic  EYES:  conjunctiva and sclera clear  NECK: Supple, No JVD, Normal thyroid  CHEST/LUNG: Clear to auscultation. Clear to percussion bilaterally; No rales, rhonchi, wheezing, or rubs  HEART: Regular rate and rhythm; No murmurs, rubs, or gallops  ABDOMEN: Soft, Nontender, Nondistended; Bowel sounds present, no pain or masses on palpation  NERVOUS SYSTEM:  Alert & Oriented X3  : voiding well  EXTREMITIES:  2+ Peripheral Pulses, No clubbing, cyanosis, or edema  SKIN: warm dry                          10.1   9.73  )-----------( 256      ( 06 May 2024 09:05 )             32.8     05-06    138  |  104  |  21<H>  ----------------------------<  154<H>  4.3   |  32<H>  |  1.24    Ca    9.3      06 May 2024 09:05  Phos  3.4     05-06  Mg     2.5     05-06    TPro  7.4  /  Alb  2.4<L>  /  TBili  0.3  /  DBili  x   /  AST  25  /  ALT  32  /  AlkPhos  84  05-06    LIVER FUNCTIONS - ( 06 May 2024 09:05 )  Alb: 2.4 g/dL / Pro: 7.4 g/dL / ALK PHOS: 84 U/L / ALT: 32 U/L DA / AST: 25 U/L / GGT: x                   I&O's Summary          CAPILLARY BLOOD GLUCOSE      RADIOLOGY & ADDITIONAL TESTS:

## 2024-05-06 NOTE — PROGRESS NOTE ADULT - PROBLEM SELECTOR PROBLEM 3
Lung cancer
Elevated troponin
Lung cancer
Sepsis due to pneumonia
Elevated troponin
Lung cancer
Sepsis due to pneumonia
Elevated troponin

## 2024-05-06 NOTE — PROGRESS NOTE ADULT - PROBLEM SELECTOR PLAN 7
hx of BPH on Finasteride 5 mg  c/w home meds
hx of BPH on Finasteride 5 mg  c/w home meds once pt on PO diet
hx of BPH on Finasteride 5 mg  c/w home meds

## 2024-05-06 NOTE — PROGRESS NOTE ADULT - PROBLEM SELECTOR PLAN 2
plan as above [sepsis 2/2 PNA]
Due to sepsis with multifocal pneumonia vs underlying progression of disease from lung cancer  Continue oxygen supplementation/nebs/Mucinex  Continue broad spectrum antibiotics (Vanc/Cefepime)    Continue Hycodan syrup 5 ml Q 6 PRN severe cough  From symptomatic point of view, patient may benefit from stronger opioids (i.e. oxycodone, morphine) for relief of cough and dyspnea. Son counseled regarding this, refusing additional titration of opioids at this time    Late Pulmonary input from Dr. Rutherford appreciated--agree that trial of IV dexamethasone should be considered (may also help with patient's fatigue).
plan as above [sepsis 2/2 PNA]
Due to sepsis with multifocal pneumonia vs underlying progression of disease from lung cancer  Continue oxygen supplementation/nebs/Mucinex  Continue broad spectrum antibiotics (Vanc/Cefepime)    Continue Benzonatate 100 mg TID PRN for cough  Continue Hycodan syrup 5 ml Q 6 PRN severe cough    From symptomatic point of view, patient may benefit from stronger opioids (i.e. oxycodone, morphine) for relief of cough and dyspnea. Son counseled regarding this, refusing additional titration of opioids at this time    Further management as per Pulmonary and primary medical team.

## 2024-05-06 NOTE — PROGRESS NOTE ADULT - PROVIDER SPECIALTY LIST ADULT
Palliative Care
Pulmonology
Hospitalist
Internal Medicine
Palliative Care
Heme/Onc
Pulmonology
Internal Medicine

## 2024-05-06 NOTE — PROGRESS NOTE ADULT - PROBLEM SELECTOR PLAN 6
hx of HLD on Rosuvastatin 10 mg   c/w home meds
hx of HLD on Rosuvastatin 10 mg   c/w home meds once pt on PO diet
hx of HLD on Rosuvastatin 10 mg   c/w home meds

## 2024-05-06 NOTE — PROGRESS NOTE ADULT - PROBLEM SELECTOR PLAN 1
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  meets sepsis criteria  s/p Cefepime and 2L IVF bolus in ED   ESR 98,  , procal 1.05  blood cultures: NG at 24hrs   sputum culture-->normal respiratory ivan   -MRSA, Legionella and Strep pneumo all negative  -urinalysis negative for WBCs   -d/c Vancomycin  -s/p  Cefepime   -c/w Duoneb with hypertonic saline nebs and chest PT q6, Albuterol PRN   -passed speech and swallow evaluation-->diet advanced to regular w/ thin liquids   -Pulm consulted Dr. Rutherford  -supportive care for cough  -d/c hycodan as contradictory to airway clearance efforts

## 2024-05-07 NOTE — PHARMACOTHERAPY INTERVENTION NOTE - COMMENTS
VIVO medications were delivered to the patient, along with the Micromedex CareNotes for the discharge medications. Counseling points were also provided to the patient. Patient did not have any further questions related to the discharge medications at the end of the session. VIVO medications were delivered to the patient, along with the Micromedex CareNotes for the discharge medications. Counseling points were also provided to the patient. Patient did not have any further questions related to the discharge medications at the end of the session.    Eloisa : Behzad [id number]

## 2024-05-07 NOTE — DISCHARGE NOTE PROVIDER - NSDCCPCAREPLAN_GEN_ALL_CORE_FT
PRINCIPAL DISCHARGE DIAGNOSIS  Diagnosis: Sepsis  Assessment and Plan of Treatment: You were diagnosed with Sepsis which is a very serious condition resulting from the presence of harmful micro-organisms in the blood or other tissues and the body's response to their presence, potentially leading to the malfunctioning of various organs, shock and death.   You had Sepsis secondary to Pneumonia. You were treated with IV fluids and IV antibiotics for which course was completed. You were seen by pulmonologist, hematology oncology, and palliative specialist and was determined that you were hospice appropriate due to progression of underlying disease. You are transitioned to home hospice at this time. Please take medications as prescribed.      SECONDARY DISCHARGE DIAGNOSES  Diagnosis: Acute on chronic respiratory failure  Assessment and Plan of Treatment: You were diagnosed with Acute on Chronic hypoxemic respiratory failure due to PNA and progression of underlying disease. You were treated with IV fluids and IV antibiotics for which course was completed. You were seen by pulmonologist, hematology oncology, and palliative specialist and was determined that you were hospice appropriate due to progression of underlying disease. You are transitioned to home hospice at this time. Please ontinue home oxygen and take medications as prescribed.    Diagnosis: Lung cancer  Assessment and Plan of Treatment: You have a history of advanced lung cancer which is cancer that starts as a growth of cells in the lungs. You were seen by pulmonologist, hematology oncology, and palliative specialist and was determined that you were hospice appropriate due to progression of underlying disease. You are transitioned to home hospice at this time. Please ontinue home oxygen and take medications as prescribed.    Diagnosis: History of BPH  Assessment and Plan of Treatment: You have history of BPH (benign prostatic hyperplasia) which means that you have an age associated prostate gland enlargement that can cause difficulties with urination and is not considered to be a precurosr to prostate cancer. You were continued on your HOME medications during your hospital stay. Please continue to take your HOME medications and  follow up with your PCP in a week from discharge.     PRINCIPAL DISCHARGE DIAGNOSIS  Diagnosis: Sepsis  Assessment and Plan of Treatment: You were diagnosed with Sepsis which is a very serious condition resulting from the presence of harmful micro-organisms in the blood or other tissues and the body's response to their presence, potentially leading to the malfunctioning of various organs, shock and death.   You had Sepsis secondary to Pneumonia. You were treated with IV fluids and IV antibiotics for which course was completed. You were seen by pulmonologist, hematology oncology, and palliative specialist and was determined that you were hospice appropriate due to progression of underlying disease. You are transitioned to home hospice at this time. Please take medications as prescribed.      SECONDARY DISCHARGE DIAGNOSES  Diagnosis: History of BPH  Assessment and Plan of Treatment: You have history of BPH (benign prostatic hyperplasia) which means that you have an age associated prostate gland enlargement that can cause difficulties with urination and is not considered to be a precurosr to prostate cancer. You were continued on your HOME medications during your hospital stay. Please continue to take your HOME medications and  follow up with your PCP in a week from discharge.    Diagnosis: Lung cancer  Assessment and Plan of Treatment: You have a history of advanced lung cancer which is cancer that starts as a growth of cells in the lungs. You were seen by pulmonologist, hematology oncology, and palliative specialist and was determined that you were hospice appropriate due to progression of underlying disease. You are transitioned to home hospice at this time. Please continue home oxygen and take medications as prescribed.    Diagnosis: Acute on chronic respiratory failure  Assessment and Plan of Treatment: You were diagnosed with Acute on Chronic hypoxemic respiratory failure due to PNA and progression of underlying disease. You were treated with IV fluids and IV antibiotics for which course was completed. You were seen by pulmonologist, hematology oncology, and palliative specialist and was determined that you were hospice appropriate due to progression of underlying disease. You are transitioned to home hospice at this time. Please ontinue home oxygen and take medications as prescribed.

## 2024-05-07 NOTE — DISCHARGE NOTE NURSING/CASE MANAGEMENT/SOCIAL WORK - NSDCPEFALRISK_GEN_ALL_CORE
For information on Fall & Injury Prevention, visit: [email or web address] OR  [email or web address] OR  https://www.cdc.gov/steadi/patient.html

## 2024-05-07 NOTE — DISCHARGE NOTE PROVIDER - ATTENDING DISCHARGE PHYSICAL EXAMINATION:
I, Sanju Burt, am the last provider completing and signing this document after my resident or NP has started the document. I have personally seen and examined the patient. I have collaborated with and supervised the midlevel practitioner on the discharge service for the patient. I agree with everything as documented by the midlevel practitioner. I have reviewed and made amendments to the documentation where necessary.    I was present with the Resident during the key portions of the history and exam. I discussed the case with the Resident and agree with the findings and plan as documented in the Resident 's note, unless noted below.    My physical exam on day of discharge:  Alert, answering qs appropriately, following commands, ill appearing  no murmurs heard  breathing relatively comfortably, nebulizer tx in progress  abdomen NT/ND BS+

## 2024-05-07 NOTE — DISCHARGE NOTE NURSING/CASE MANAGEMENT/SOCIAL WORK - PATIENT PORTAL LINK FT
You can access the FollowMyHealth Patient Portal offered by [facility] by registering at the following website: [email or web address]. By joining [facility]’s FollowMyHealth portal, you will also be able to view your health information using other applications (apps) compatible with our system.

## 2024-05-07 NOTE — CHART NOTE - NSCHARTNOTEFT_GEN_A_CORE
PGY 1 d/w PGY 3 Arnoldo       Notified by RN that patient had an episode of vomiting.   Patient examined bedside, son present providing Welsh translation.   Patient denies abdominal pain, fever, chills, constipation, diarrhea, urinary symptoms     VSS-  T 98.4, /73, HR 89, SPO2 95 % on 3 L NC    PE- NAD, alert oriented x 3       - BL rhonchi, more in  R lung base       - Abdomen - soft / non distended/ non tender to palpation       - WONG- No suprapubic/ CVA tenderness          PLAN   - Zofran STAT and PRN q 8 h   - Will continue to monitor for symptomatic progression/ improvement     Primary team to follow
Pt being discharged home hospice
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
## 2024-05-07 NOTE — DISCHARGE NOTE PROVIDER - DETAILS OF MALNUTRITION DIAGNOSIS/DIAGNOSES
This patient has been assessed with a concern for Malnutrition and was treated during this hospitalization for the following Nutrition diagnosis/diagnoses:     -  05/02/2024: Severe protein-calorie malnutrition

## 2024-05-07 NOTE — DISCHARGE NOTE PROVIDER - HOSPITAL COURSE
An 84 year old male, from home, ambulating independently, w/ PMHx of Adenocarcinoma of the lung dx on 2021, on Home oxygen 2L NC, on palliative chemoRx, last Rx on 4/3/2024, HLD, BPH, ?HTN, pshx hernia repair presented with dyspnea. In the ED, he was noted to have Temp 102.8, , /87, Sat 89% on 4L. On exam: HR 89, Sat 92% on 4L however decreases to 88% and improves rapidly to >92% on 6L. ABG on 6L NC: 7.38/48/62/28. 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 He also had leukocytosis on CBC. s/p Cefepime and 2L IVF bolus in ED. Admitted to medicine for AHRF and Sepsis 2/2 pneumonia. Patient was treated with Vancomycin and Cefepime with deescalation of Vancomycin later in the hospital course. He completed course of cefepime. MRSA, Legionella and Strep pneumo were all negative. Negative blood cultures and sputum culture showed normal respiratory ivan. Patient was seen by Pulmonology and recommendations were appreciated. Hypoxia improved and now at his baseline, but still with mild wheezing and cough. Patient was seen by Heme-Onc and Palliative due to outpatient discussion about progression of disease, no further treatment options available, making him hospice appropriate. GOC was discussed. Patient now home hospice.    Patient is stable for discharge to home hospice per attending.   Please refer to patient's complete medical chart with documents for a full hospital course, this is only a brief summary. An 84 year old male, from home, ambulating independently, w/ PMHx of Adenocarcinoma of the lung dx on 2021, on Home oxygen 2L NC, on palliative chemoRx, last Rx on 4/3/2024, HLD, BPH, ?HTN, pshx hernia repair presented with dyspnea. In the ED, he was noted to have Temp 102.8, , /87, Sat 89% on 4L. On exam: HR 89, Sat 92% on 4L however decreases to 88% and improves rapidly to >92% on 6L. ABG on 6L NC: 7.38/48/62/28. 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 He also had leukocytosis on CBC. s/p Cefepime and 2L IVF bolus in ED. Admitted to medicine for AHRF and Sepsis 2/2 pneumonia. Patient was treated with Vancomycin and Cefepime with deescalation of Vancomycin later in the hospital course. He completed course of cefepime. MRSA, Legionella and Strep pneumo were all negative. Negative blood cultures and sputum culture showed normal respiratory ivan. Patient was seen by Pulmonology and recommendations were appreciated. Hypoxia improved and now at his baseline, but still with mild wheezing and cough. Patient was seen by Heme-Onc and Palliative due to outpatient discussion about progression of malignancy, no further treatment options available, making him hospice appropriate. GOC was discussed. Patient now home hospice.    Patient is stable for discharge to home hospice per attending.   Please refer to patient's complete medical chart with documents for a full hospital course, this is only a brief summary.

## 2024-05-07 NOTE — DISCHARGE NOTE PROVIDER - NSDCCAREPROVSEEN_GEN_ALL_CORE_FT
Bekah, Erica Green, Enrrique Pelletier, Emile Carrillo, Eyad Earl, Naresh Cárdenas, Viridiana Sidhu, Bernarda Burt, Sanju Quiroz, Trace Sy, June Ivey, Alicia Ivey, Chana

## 2024-05-07 NOTE — DISCHARGE NOTE NURSING/CASE MANAGEMENT/SOCIAL WORK - NSDCVIVACCINE_GEN_ALL_CORE_FT
Tdap; 02-Oct-2017 21:58; Nica Joyce (RN); [employer]; [id number]; IntraMuscular; Deltoid Right.; 0.5 milliLiter(s); VIS (VIS Published: 09-May-2013, VIS Presented: 02-Oct-2017);

## 2024-05-07 NOTE — DISCHARGE NOTE PROVIDER - NSDCMRMEDTOKEN_GEN_ALL_CORE_FT
acetaminophen 650 mg oral tablet, extended release: 1 tab(s) orally every 6 hours as needed for  fever and/or mild pain.  benzonatate 100 mg oral capsule: 1 cap(s) orally 3 times a day as needed for  cough  dexAMETHasone 6 mg oral tablet: 1 tab(s) orally once a day  finasteride 5 mg oral tablet: 1 orally once a day  haloperidol 1 mg oral tablet: 1 tab(s) orally every 6 hours as needed for  restlessness/agitation  hyoscyamine 0.125 mg oral tablet: 1 tab(s) orally every 6 hours  LORazepam 0.5 mg oral tablet: 1 tab(s) orally every 6 hours as needed for -for anxiety MDD: 4 tablets  LORazepam 2 mg/mL oral concentrate: 0.5 milliliter(s) orally every 6 hours Take 0.5 milliliter(s) orally every 6 hours, As Needed -for anxiety MDD: 2 mL  morphine 20 mg/mL oral concentrate: 0.25 milliliter(s) orally every 6 hours Place 0.25 milliliter(s) under tongue every 6 hours, As Needed for pain or shortness of breath MDD: 1 mL  prochlorperazine 10 mg oral tablet: 1 tab(s) orally every 12 hours as needed for  nausea/vomiting

## 2024-11-19 ENCOUNTER — NON-APPOINTMENT (OUTPATIENT)
Age: 84
End: 2024-11-19

## 2024-11-19 NOTE — HISTORY OF PRESENT ILLNESS
Please review last seen 11/7/24, please review    Thank you [Disease: _____________________] : Disease: [unfilled] [T: ___] : T[unfilled] [N: ___] : N[unfilled] [M: ___] : M[unfilled] [AJCC Stage: ____] : AJCC Stage: [unfilled] [de-identified] : Mr. Del Rio is a pleasant 83 M, [place] (very little English), speaking with PMH of HTN and BPH, never smoker but previously worked in building maintenance presented initially 1/21/22 with chronic dry cough x1 year and worsened dyspnea on exertion for 3 weeks, admitted to [facility] after found on CT to have to have a 5.3 x 2.8 cm masslike consolidation in the RLL. On 1/25, he underwent inpatient bronchoscopy and endobronchial ultrasound with RLL BAL, TBNA of station 7 lymph node, RLL Mass transbronchial biopsy which was suspicious for malignancy.  He saw Dr. Moran and was recommended for PET/CT 2/7/22:  large FDG-avid consolidations in right middle lobe and right lower lobe, unchanged as compared to CT dated 1/21/2022, compatible with known adenocarcinoma. Small FDG-avid nodule in right lung is suspicious for another site of disease. A mildly FDG-avid left lower lobe peripheral opacity, also unchanged on CT, is indeterminate. 2. Mildly FDG-avid subcarinal lymph node is nonspecific. Pt does not wish to use  phone. Grand-daughter provided the translation as per patient request. Other than cough, he has no other symptoms. He has tried OTC cough meds with no help.   2/18/22: Pt seen vis tel. No new complaints. He still has cough which is persistent and dry.   4/7/22: Pt seen via tele. 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   5/3/22: Pt seen in tx room. He received cycle 1 3 weeks ago. Tolerated well with absolutely no AEs. He has since seen Dr. Medina who will see him again after scans post 3 cycles of tx. Pt here for cycle 2.   5/24/22: Since last visit, pt has had an eventful course. He was recently hospitalized with c/o fever, URI symptoms, myalgia and was found to have non-COVID coronavirus infection. Pt also febrile/tachycardic on admission, which improved with supportive care/abx. CXR suggested RLL consolidation (thought his could be known lung mass). He was started on vanc/zosyn in ED 5/20, followed by ceftriaxone and azithromycin for possible superimposed PNA in the setting of immunosuppression. Pt improved with all this and is now completing oral course of abx (last day tomorrow). Incidentally, pt was also noted to have ROSS (acute kidney injury) with admission SCr 1.88, baseline 1.2-1.3 thought to be related to sepsis/poor PO intake and dehydration. He was treated with IV hydration and home ARB was held (he continues to not take this at this time).  He presents today for planned 3/3 cycle of chemoIO. He notes no fever, chills and cough has markedly improved.   6/16/22: Pt was diagnosed with corona virus infection, after ER visit for fever and cough. Cough has decreased and pt is fine now.   6/30/22: Cough improved. Otherwise feeling well  7/19/22: saw Dr. Moran. A bx of left sided lesion was recommended. pt wanted to discuss with me. He feels well. denies any dyspnea or cough or other symptoms.   9/16/22: Seen today with son, Rich. Patient has no complaints. Is doing well. Has not received any cancer tx since 6/23.   10/25/22: Pt seen vis televisit. He is feeling well. Has been active and notes no difference in status since last visit.  11/25/22: Per pt's son, everything is stable. Pt remains active. Since last visit, he has had Lt VATS, JUANPABLO and LLL wedge rxn on 10/28/22. Path revealed invasive mucinous AdenoCA.   12/6/22: Pt here for follow up. He has cough but otherwise no symptoms.   1/5/23: Cough is persistent. No other complaints.  2/15/23: Patient seen today for follow up in treatment room. Has been tolerating well with no AEs to report. Ongoing cough not adequately relieved by OTC cough syrup   3/8/23: Pt seen today for follow accompanied by his son. Ongoing cough, no other complaints.   3/29/23: patient seen today accompanied by his son. Reports that since he did his CT scan last week, his cough has been more frequent. He has also ran out of Benzonatate. He also notes that right after CT scan was done he had an episode of emesis, no nausea or emesis since then.   4/19/23: seen today accompanied by his son. Continues on tecentriq and is tolerating well thus far. He continues to have dry cough and does not feel that benzonatate is adequately helping him. His breathing is stable, no pain, appetite is good. He is also thinking about traveling to [facility] some time soon   5/10/23: Patient seen today for follow up accompanied by his grand-daughter. Patient reports that since last visit, he has been feeling more SOB with exertion. His cough is persistent, minimally relieved by benzonatate 200mg TID, and is now productive.   6/21/23: Patient seen today for follow up with his granddaughter. They report that his sough has improved since starting promethazine-codeine. He saw IR yesterday for evaluation for biopsy and thora however biopsy is not possible but will move forward with thoracentesis. He is otherwise doing well   7/12/23: Pt seen in office. He is accompanied by his son He reports that his cough is worse, he also notes some pleuritic CP on left side. cough is more productive than it has been in the past. pt denies any fever, but does have night sweats.   8/23/23: Patient seen today for follow up accompanied by his son Sindi. They report that the patient was hospitalized at [facility] 2 weeks ago due to increased cough and was treated for PNA who improvement of his symptoms. Today he reports his breathing is better, no fever, however cough has only minimally improved. He continues to use benzonatate and promethazine/codeine. Pleuritic chest pain has resolved.   9/13/23: Patient seen today for follow up accompanied by his son Lea. They report that the patients breathing is stable, cough is somewhat improved.   9/20/23: Cough and dyspnea persist. He cannot sleep on his side.   10/4/23: Patient seen today accompanied by his son and his other son Sindi attending the visit via telephone. He is here today for tecentriq with the addition of Taxol. He took dex 20mg last night. He reports persistent dyspnea as well as worsened cough which he attributes to running out of his cough medication. He otherwise feels well.   10/23/23: After last tx, pt nausea, confusion, fatigue for several days. All these have improved, but not completely resolved. Pt has been coughing more.   11/15/23: Pt seen in treatment room in the company of his son. Reports continued cough and SOB. Using supplemental O2 at home. O2 sat today 90% RA at rest. He has been using cough medicine but reports only minimal relief. Taxol was held last treatment 2/2 PS but will reintroduced today split dose D1D8. No hemoptysis. No fevers. CT chest was done which revealed consolidation and nodules in all lobes, representing known lung cancer, mildly decreased in the right upper lobe and elsewhere unchanged.   12/14/23: Was hospitalized [facility] COVID end of November. He was treated with Remdesinvir and is now on home O2. He has been feeling extremely fatigued since then. He has increased cough with thin expectoration. Appetite is fair, but he has lost a lot of weight.   1/11/24: Pt seen in treatment room prior to treatment. He is completely O2 dependent. Uses 3L. Cough continues to be problematic. Using Benzonatate with some relief. Pt complains of thick mucous.  Pt now being followed by Dr. Lisker, Pt reports more dyspnea on exertion.   1/24/24: Pt returns for follow up. Pt main complaint continues to be the cough. Slightly less productive nature of cough. Still experiences ROWLEY and O2 dependence.  [de-identified] : adeno ca [Treatment Protocol] : Treatment Protocol

## 2025-03-09 NOTE — PHYSICAL THERAPY INITIAL EVALUATION ADULT - LIGHT TOUCH SENSATION, LUE, REHAB EVAL
Problem: Pain  Goal: Takes deep breaths with improved pain control throughout the shift  Outcome: Progressing     Problem: Pain  Goal: Turns in bed with improved pain control throughout the shift  Outcome: Progressing     Problem: Pain  Goal: Walks with improved pain control throughout the shift  Outcome: Progressing     Problem: Pain  Goal: Free from opioid side effects throughout the shift  Outcome: Progressing     Problem: Pain  Goal: Free from acute confusion related to pain meds throughout the shift  Outcome: Progressing     Problem: Respiratory  Goal: Clear secretions with interventions this shift  Outcome: Progressing     Problem: Respiratory  Goal: Verbalize decreased shortness of breath this shift  Outcome: Progressing     Problem: Respiratory  Goal: Increase self care and/or family involvement in next 24 hours  Outcome: Progressing   within normal limits

## (undated) DEVICE — BLADE SURGICAL #10 STAINLESS

## (undated) DEVICE — DRAPE INSTRUMENT POUCH 6.75" X 11"

## (undated) DEVICE — XI SEAL UNIV 5- 8 MM

## (undated) DEVICE — WARMING BLANKET LOWER ADULT

## (undated) DEVICE — VENODYNE/SCD SLEEVE CALF MEDIUM

## (undated) DEVICE — NDL ENDOBRONCHIAL ULTRASOUND FINE BIOPSY DEVICE 22GA

## (undated) DEVICE — SUTURE REMOVAL KIT

## (undated) DEVICE — BALLOON SINGLE FOR BF-UC160F

## (undated) DEVICE — TUBING STRYKEFLOW II SUCTION / IRRIGATOR

## (undated) DEVICE — SYR LUER LOK 10CC

## (undated) DEVICE — GLV 7 PROTEXIS (WHITE)

## (undated) DEVICE — GOWN XL

## (undated) DEVICE — NDL ASPIRATION VIZISHOT2 22G

## (undated) DEVICE — ELCTR BOVIE TIP BLADE INSULATED 2.75" EDGE

## (undated) DEVICE — D HELP - CLEARVIEW CLEARIFY SYSTEM

## (undated) DEVICE — SUT POLYSORB 4-0 P-12 UNDYED

## (undated) DEVICE — SYR LUER SLIP TIP 30CC

## (undated) DEVICE — SUT SURGIPRO 0 30" GS-22

## (undated) DEVICE — SUT PDS II 2-0 27" SH

## (undated) DEVICE — ENDOCATCH GENERAL 15MM (PURPLE)

## (undated) DEVICE — MASK SURGICAL WITH EYESHIELD ANTIFOG (ORANGE)

## (undated) DEVICE — TUBING SUCTION 20FT

## (undated) DEVICE — VALVE BIOPSY BRONCHOVIDEOSCOPE

## (undated) DEVICE — TROCAR COVIDIEN VERSASTEP PLUS 15MM STANDARD

## (undated) DEVICE — BRONCHOSCOPE ALCON MONARCH DISP

## (undated) DEVICE — PACK BRONCHOSCOPY

## (undated) DEVICE — FORCEP BIOPSY BRONCHOSCOPE DISP

## (undated) DEVICE — ADAPTER FIBEROPTIC BRONCHOSCOPE DUAL AXIS SWIVEL

## (undated) DEVICE — DRSG CURITY GAUZE SPONGE 4 X 4" 12-PLY

## (undated) DEVICE — TUBING OLYMPUS INSUFFLATION

## (undated) DEVICE — SUT VICRYL 0 27" UR-6

## (undated) DEVICE — SOL INJ NS 0.9% 100ML

## (undated) DEVICE — SOL IRR POUR H2O 500ML

## (undated) DEVICE — NDL ENDOBRONCHIAL ULTRASOUND FINE BIOPSY DEVICE 25GA

## (undated) DEVICE — VALVE SUCTION EVIS 160/200/240

## (undated) DEVICE — TRAP SPECIMEN SPUTUM 40CC

## (undated) DEVICE — DRAPE 3/4 SHEET 52X76"

## (undated) DEVICE — XI ARM PERMANENT CAUTERY SPATULA

## (undated) DEVICE — BRUSH CYTO DISP

## (undated) DEVICE — XI ARM FORCEP CADIERE 8MM

## (undated) DEVICE — SOL ANTI FOG

## (undated) DEVICE — TUBING CANNULA SALTER LABS NASAL ADULT 7FT

## (undated) DEVICE — LABELS BLANK W PEN

## (undated) DEVICE — ENDOCATCH GENERAL 10MM (PURPLE)

## (undated) DEVICE — CHEST DRAIN PLEUR-EVAC DRY/WET ADULT-PEDS SINGLE (QUICK)

## (undated) DEVICE — DRSG TEGADERM 2.5X3"

## (undated) DEVICE — NDL PREVIEW FLEX TBNA 21G 4/BX

## (undated) DEVICE — SPECIMEN CONTAINER 100ML

## (undated) DEVICE — SYR LUER LOK 20CC

## (undated) DEVICE — DRAPE TOWEL BLUE 17" X 24"

## (undated) DEVICE — STOPCOCK 4-WAY (BLUE) DISCOFIX SPIN-LOCK CONNECTOR

## (undated) DEVICE — SUT SILK 0 24" SH DA

## (undated) DEVICE — TUBING FLUDICS MONARCH BRONCHOSCOPE

## (undated) DEVICE — FOLEY TRAY 16FR 5CC LTX UMETER CLOSED

## (undated) DEVICE — XI DRAPE COLUMN

## (undated) DEVICE — KIT INTRODUCER MONARCH BRONCHOSCOPE

## (undated) DEVICE — NDL HYPO REGULAR BEVEL 22G X 1.5" (TURQUOISE)

## (undated) DEVICE — XI ARM GRASPER BIPOLAR LONG 8MM

## (undated) DEVICE — CHEST DRAIN OASIS DRY SUCTION WATER SEAL

## (undated) DEVICE — PACK ROBOTIC LIJ

## (undated) DEVICE — DRSG GAUZE PACKTNER ROLL

## (undated) DEVICE — SOL IRR POUR NS 0.9% 500ML

## (undated) DEVICE — DRSG TAPE TRANSPORE 1"

## (undated) DEVICE — XI ARM GRASPER TIP UP FENESTRATED

## (undated) DEVICE — SUT MONOCRYL 4-0 27" PS-2 UNDYED

## (undated) DEVICE — WARMING BLANKET UPPER ADULT

## (undated) DEVICE — FORCEP BIOPSY 1.8MM JAW X 100CM DISP

## (undated) DEVICE — XI DRAPE ARM

## (undated) DEVICE — SUT POLYSORB 2-0 30" V-20 UNDYED

## (undated) DEVICE — SOL IRR NS 0.9% 250ML

## (undated) DEVICE — XI OBTURATOR OPTICAL BLADELESS 8MM

## (undated) DEVICE — VALVE SHEATH BRONCHOSCOPE STRL

## (undated) DEVICE — BIOPSY FORCEP J&J MONARCH SMOOTH CUP

## (undated) DEVICE — POSITIONER FOAM HEAD CRADLE (PINK)

## (undated) DEVICE — BLADE SURGICAL #15 CARBON

## (undated) DEVICE — GRASPER LAPA 5MMX35CM

## (undated) DEVICE — STAPLER COVIDIEN ENDO GIA STANDARD HANDLE

## (undated) DEVICE — ENDOCATCH 10MM SPECIMEN POUCH